# Patient Record
Sex: FEMALE | Race: WHITE | NOT HISPANIC OR LATINO | Employment: FULL TIME | URBAN - METROPOLITAN AREA
[De-identification: names, ages, dates, MRNs, and addresses within clinical notes are randomized per-mention and may not be internally consistent; named-entity substitution may affect disease eponyms.]

---

## 2017-02-14 ENCOUNTER — APPOINTMENT (OUTPATIENT)
Dept: GENERAL RADIOLOGY | Facility: HOSPITAL | Age: 27
End: 2017-02-14

## 2017-02-14 ENCOUNTER — HOSPITAL ENCOUNTER (EMERGENCY)
Facility: HOSPITAL | Age: 27
Discharge: HOME | End: 2017-02-14
Attending: EMERGENCY MEDICINE | Admitting: EMERGENCY MEDICINE

## 2017-02-14 VITALS
WEIGHT: 230 LBS | DIASTOLIC BLOOD PRESSURE: 91 MMHG | SYSTOLIC BLOOD PRESSURE: 133 MMHG | BODY MASS INDEX: 36.1 KG/M2 | RESPIRATION RATE: 16 BRPM | OXYGEN SATURATION: 100 % | HEIGHT: 67 IN | TEMPERATURE: 98.1 F | HEART RATE: 64 BPM

## 2017-02-14 DIAGNOSIS — M79.672 FOOT PAIN, LEFT: Primary | ICD-10-CM

## 2017-02-14 PROCEDURE — 73630 X-RAY EXAM OF FOOT: CPT

## 2017-02-14 PROCEDURE — 99282 EMERGENCY DEPT VISIT SF MDM: CPT

## 2017-02-14 RX ORDER — NAPROXEN 500 MG/1
500 TABLET ORAL 2 TIMES DAILY PRN
Qty: 14 TABLET | Refills: 0 | Status: SHIPPED | OUTPATIENT
Start: 2017-02-14 | End: 2017-09-01

## 2017-02-14 NOTE — ED PROVIDER NOTES
Pt presents to the ED complaining of pain in the sole of the left foot. Pt's L foot XR shows nothing acute. On exam, the pt has tenderness of the distal left foot with chronic appearing deformities of the 2nd and 3rd toes. Instructed the pt to follow up with Dr. Mauro (ortho) if her symptoms continue. I agree with the plan to discharge the pt home with a prescription for anti-inflammatories and a work note.    I supervised care provided by the midlevel provider.    We have discussed this patient's history, physical exam, and treatment plan.   I have reviewed the note and personally saw and examined the patient and agree with the plan of care.    Documentation assistance provided by cat Welch for Dr. Almaguer.  Information recorded by the miriamibe was done at my direction and has been verified and validated by me.       Dariela Welch  02/14/17 8121       Ricardo Amlaguer MD  02/14/17 5126

## 2017-02-14 NOTE — ED PROVIDER NOTES
"EMERGENCY DEPARTMENT ENCOUNTER    CHIEF COMPLAINT  Chief Complaint: foot pain  History given by: patient  History limited by: nothing  Room Number: HALG/G  PMD: Luzma Noe MD      HPI:   Pt is a 26 y.o. female who presents with pain to the sole of her L foot under her first  toe onset 3 days ago with no known injury or trauma. Pt states hx of foot problems and currently wears insoles. Pt denies any numbness of tingling to her L foot or change in sensation. Pt states she is currently taking amoxicillin for an ear infection but she has not taken any pain medication since the onset of her sx. Pt states that she stands for long hours during the day at her job    Duration: 3 days  Timing:constant  Location: sole of L foot under first toe  Radiation: none  Quality: \"pain\"  Intensity/Severity: moderate  Progression: unchanged  Associated Symptoms: none  Aggravating Factors: weight bearing  Alleviating Factors: rest  Previous Episodes: none  Treatment before arrival: none    PAST MEDICAL HISTORY  Active Ambulatory Problems     Diagnosis Date Noted   • No Active Ambulatory Problems     Resolved Ambulatory Problems     Diagnosis Date Noted   • No Resolved Ambulatory Problems     Past Medical History   Diagnosis Date   • GERD (gastroesophageal reflux disease)    • Human metapneumovirus pneumonia    • Impetigo    • Muscle spasm        PAST SURGICAL HISTORY  Past Surgical History   Procedure Laterality Date   • Pharyngeal flap     • Adenoidectomy     • Tonsillectomy     • Ear tubes         FAMILY HISTORY  History reviewed. No pertinent family history.    SOCIAL HISTORY  Social History     Social History   • Marital status: Single     Spouse name: N/A   • Number of children: N/A   • Years of education: N/A     Occupational History   • Not on file.     Social History Main Topics   • Smoking status: Never Smoker   • Smokeless tobacco: Not on file   • Alcohol use No   • Drug use: No   • Sexual activity: Defer     Other Topics " Concern   • Not on file     Social History Narrative   • No narrative on file         ALLERGIES  Ciprofloxacin    REVIEW OF SYSTEMS  Review of Systems   Constitutional: Negative for fever.   Musculoskeletal:        R foot pain   Skin: Negative for color change and wound.   Neurological: Negative for weakness and numbness.       PHYSICAL EXAM  ED Triage Vitals   Temp Heart Rate Resp BP SpO2   02/14/17 1449 02/14/17 1449 02/14/17 1449 02/14/17 1532 02/14/17 1449   98.5 °F (36.9 °C) 67 18 133/93 100 %      Temp src Heart Rate Source Patient Position BP Location FiO2 (%)   02/14/17 1449 02/14/17 1449 -- -- --   Tympanic Monitor          Physical Exam   Constitutional: She is well-developed, well-nourished, and in no distress. No distress.   HENT:   Head: Normocephalic.   Eyes: Pupils are equal, round, and reactive to light.   Neck: Normal range of motion.   Cardiovascular:   Pulses:       Dorsalis pedis pulses are 2+ on the left side.        Posterior tibial pulses are 2+ on the left side.   Musculoskeletal:        Left foot: There is normal range of motion, no tenderness, no bony tenderness, no swelling and normal capillary refill.   Skin: Skin is warm and dry. No rash noted.   Psychiatric: Mood, memory, affect and judgment normal.   Nursing note and vitals reviewed.      RADIOLOGY  XR Foot 3+ View Left   Final Result   Normal 3 view exam     This report was finalized on 2/14/2017 4:57 PM by Dr. Demond Huertas MD.   I ordered the above noted radiological studies and reviewed the images on the PACS system.        PROGRESS AND CONSULTS    5:08 PM  Discussed with Pt nothing acute seen on XR. Advised Pt to limit weight bearing for a few days while sx resolve and take pain medication as need. Advised Pt to be careful taking NSAID's as they can irritate the stomach and she is already taking Prilosec. Pt verbalized understanding and agrees with plan to discharge with walking boot and crutches.     5:15 PM  Reviewed pt's  "history and workup with Dr. Rosina MD.  After a bedside evaluation; Dr. Rosina MD agrees with the plan of care    COURSE & MEDICAL DECISION MAKING  Pertinent Labs and Imaging studies that were ordered and reviewed are noted above.  Results were reviewed/discussed with the patient and they were also made aware of online assess.   Pt also made aware that some labs, such as cultures, will not be resulted during ER visit and follow up with PMD is necessary.     MEDICATIONS GIVEN IN ER  Medications - No data to display    Visit Vitals   • /91 (BP Location: Right arm, Patient Position: Lying)   • Pulse 64   • Temp 98.1 °F (36.7 °C) (Tympanic)   • Resp 16   • Ht 67\" (170.2 cm)   • Wt 230 lb (104 kg)   • SpO2 100%   • BMI 36.02 kg/m2     Discussed all results and noted any abnormalities with patient.  Discussed absoute need to recheck abnormalities with PCP.     Reviewed implications of results, diagnosis, meds, responsibility to follow up, warning signs and symptoms of possible worsening, potential complications and reasons to return to ER with patient    Discussed plan for discharge, as there is no emergent indication for admission.  Pt is agreeable and understands need for follow up and repeat testing.  Pt is aware that discharge does not mean that nothing is wrong but it indicates no emergency is present and they must continue care with PCP.  Pt is discharged with instructions to follow up with primary care doctor to have their blood pressure rechecked.       DIAGNOSIS  Final diagnoses:   Foot pain, left       FOLLOW UP   Luzma Noe MD  [address] [facility] [address]  [place] [address]  [phone]            RX     Medication List      Stop          azithromycin 500 MG tablet   Commonly known as:  ZITHROMAX             I personally reviewed the past medical history, past surgical history, social history, family history, current medications and allergies as they appear in this chart.  The scribe's note " accurately reflects the work and decisions made by me.         I personally scribed for Petra Bell on 2/14/2017 at 6:07 PM.  Electronically signed by Aydee Rosas on 2/14/2017 at time 6:07 PM     Aydee Rosas  02/14/17 3394       Petra Bell, APRN  02/14/17 4380

## 2017-02-17 ENCOUNTER — OFFICE (AMBULATORY)
Dept: URBAN - METROPOLITAN AREA CLINIC 75 | Facility: CLINIC | Age: 27
End: 2017-02-17

## 2017-02-17 VITALS
RESPIRATION RATE: 18 BRPM | SYSTOLIC BLOOD PRESSURE: 118 MMHG | HEIGHT: 67 IN | DIASTOLIC BLOOD PRESSURE: 64 MMHG | HEART RATE: 64 BPM | WEIGHT: 229 LBS

## 2017-02-17 DIAGNOSIS — R11.2 NAUSEA WITH VOMITING, UNSPECIFIED: ICD-10-CM

## 2017-02-17 DIAGNOSIS — K21.9 GASTRO-ESOPHAGEAL REFLUX DISEASE WITHOUT ESOPHAGITIS: ICD-10-CM

## 2017-02-17 PROCEDURE — 99213 OFFICE O/P EST LOW 20 MIN: CPT

## 2017-03-09 ENCOUNTER — OFFICE (AMBULATORY)
Dept: URBAN - METROPOLITAN AREA CLINIC 75 | Facility: CLINIC | Age: 27
End: 2017-03-09

## 2017-04-17 ENCOUNTER — HOSPITAL ENCOUNTER (EMERGENCY)
Facility: HOSPITAL | Age: 27
Discharge: HOME | End: 2017-04-17
Attending: EMERGENCY MEDICINE | Admitting: EMERGENCY MEDICINE

## 2017-04-17 ENCOUNTER — APPOINTMENT (OUTPATIENT)
Dept: GENERAL RADIOLOGY | Facility: HOSPITAL | Age: 27
End: 2017-04-17

## 2017-04-17 VITALS
WEIGHT: 230 LBS | HEART RATE: 55 BPM | TEMPERATURE: 99.1 F | OXYGEN SATURATION: 99 % | RESPIRATION RATE: 16 BRPM | SYSTOLIC BLOOD PRESSURE: 123 MMHG | BODY MASS INDEX: 36.1 KG/M2 | HEIGHT: 67 IN | DIASTOLIC BLOOD PRESSURE: 77 MMHG

## 2017-04-17 DIAGNOSIS — S99.921A RIGHT FOOT INJURY, INITIAL ENCOUNTER: Primary | ICD-10-CM

## 2017-04-17 PROCEDURE — 99283 EMERGENCY DEPT VISIT LOW MDM: CPT

## 2017-04-17 PROCEDURE — 73650 X-RAY EXAM OF HEEL: CPT

## 2017-04-17 NOTE — ED PROVIDER NOTES
26 y.o. female presents c/o R heel pain. Pt believes she kicked her TV in her sleep last night & states it feels like something is stuck in her foot. Pt reports her pain is worse w/ ambulation.    On exam:    Pt is AOx3 & in NAD. Pt has a very small puncture wound medially on her R heel. Pt has no erythema, drainage, swelling, or evidence of foreign body.    Results/Plan:    Pt's XR R foot was negative & I agree w/ plan to discharge pt.    I supervised care provided by the midlevel provider.  We have discussed this patient's history, physical exam, and treatment plan.  I have reviewed the note and personally saw and examined the patient and agree with the plan of care.    --  Documentation assistance provided by cat Agosto for Dr. Knott.  Information recorded by the scribe was done at my direction and has been verified and validated by me.     Lana Agosto  04/17/17 1204       Lana Agosto  04/17/17 1225       Richar Knott MD  04/17/17 7951

## 2017-04-17 NOTE — ED PROVIDER NOTES
EMERGENCY DEPARTMENT ENCOUNTER    CHIEF COMPLAINT  Chief Complaint: right heel pain   History given by: patient   History limited by: nothing   Room Number: 07/07  PMD: No Known Provider    HPI:  Pt is a 26 y.o. female who presents with right heel pain onset last night while pt was sleeping. Pt states it feels like something is stuck in her foot and thinks that she may have kicked her TV in her sleep. She removed a small amount of skin in search of foreign body but did not find one. Pt states it is painful to ambulate. Pt does not remember when her last tetanus shot was.     Duration: onset last night   Timing: constant   Location: right heel  Radiation: does not radiate   Quality: new   Intensity/Severity: moderate   Progression: unchanged   Associated Symptoms: pain  Aggravating Factors: ambulating   Alleviating Factors: none specified   Previous Episodes:none specified   Treatment before arrival: none specified      MEDICAL RECORD REVIEW  Reviewed in [employer].    PAST MEDICAL HISTORY  Active Ambulatory Problems     Diagnosis Date Noted   • No Active Ambulatory Problems     Resolved Ambulatory Problems     Diagnosis Date Noted   • No Resolved Ambulatory Problems     Past Medical History:   Diagnosis Date   • GERD (gastroesophageal reflux disease)    • Human metapneumovirus pneumonia    • Impetigo    • Muscle spasm        PAST SURGICAL HISTORY  Past Surgical History:   Procedure Laterality Date   • ADENOIDECTOMY     • EAR TUBES     • PHARYNGEAL FLAP     • TONSILLECTOMY         FAMILY HISTORY  History reviewed. No pertinent family history.    SOCIAL HISTORY  Social History     Social History   • Marital status: Single     Spouse name: N/A   • Number of children: N/A   • Years of education: N/A     Occupational History   • Not on file.     Social History Main Topics   • Smoking status: Never Smoker   • Smokeless tobacco: Not on file   • Alcohol use No   • Drug use: No   • Sexual activity: Defer     Other Topics Concern    • Not on file     Social History Narrative       ALLERGIES  Ciprofloxacin    REVIEW OF SYSTEMS  Review of Systems   Constitutional: Negative for chills and fever.   HENT: Negative for ear discharge, facial swelling, nosebleeds, rhinorrhea, sore throat, trouble swallowing and voice change.    Eyes: Negative for pain and visual disturbance.   Musculoskeletal: Negative for arthralgias, back pain and myalgias.        Right foot pain   Skin: Negative for rash and wound.   All other systems reviewed and are negative.      PHYSICAL EXAM  ED Triage Vitals   Temp Heart Rate Resp BP SpO2   04/17/17 1024 04/17/17 1024 04/17/17 1024 04/17/17 1037 04/17/17 1024   99.1 °F (37.3 °C) 81 18 127/92 98 %      Temp src Heart Rate Source Patient Position BP Location FiO2 (%)   04/17/17 1024 04/17/17 1024 -- -- --   Tympanic Monitor          Physical Exam   Constitutional: She is oriented to person, place, and time and well-developed, well-nourished, and in no distress. No distress.   HENT:   Head: Normocephalic and atraumatic.   Eyes: EOM are normal.   Neck: Normal range of motion.   Pulmonary/Chest: Effort normal. No respiratory distress.   Musculoskeletal:        Right ankle: Achilles tendon normal.        Right foot: There is tenderness (medial aspect).   There is a 0.5 cm area of skin on right foot that has been removed.    Neurological: She is alert and oriented to person, place, and time.   Skin: Skin is warm and dry. No pallor.   Psychiatric: Mood, memory, affect and judgment normal.   Nursing note and vitals reviewed.      RADIOLOGY  XR Calcaneus 2+ View Right   Final Result      XR calcaneus shows no osseous, articular nor soft tissue abnormality.    I ordered the above noted radiological studies and reviewed the images on the PACS system.      COURSE & MEDICAL DECISION MAKING  Pertinent imaging studies that were ordered and reviewed are noted above.  Results were reviewed/discussed with the patient and they were also made  "aware of online assess.       PROGRESS AND CONSULTS    Progress Notes:    1135: Rechecked pt. Pt is resting comfortably. Discussed unremarkable workup. I recommended that pt treat pain with epsom salt soaks. Pt will receive work note.     1140: Reviewed pt's history and workup with Dr. Knott.  After a bedside evaluation; Dr. Knott agrees with the plan of care.    1155: The patient's history, physical exam, and image findings were discussed with the physician, who also performed a face to face history and physical exam.  I discussed all results and noted any abnormalities with patient.  Discussed absoute need to recheck abnormalities with their family physician.  I answered any of the patient's questions.  Discussed plan for discharge, as there is no emergent indication for admission.  Pt is agreeable and understands need for follow up and repeat testing.  Pt is aware that discharge does not mean that nothing is wrong but it indicates no emergency is present and they must continue care with their family physician.  Pt is discharged with instructions to follow up with primary care doctor to have their blood pressure rechecked.     MEDICATIONS GIVEN IN ER  Medications   Tdap (BOOSTRIX) injection 0.5 mL (0.5 mL Intramuscular Not Given 4/17/17 1105)       /80 (BP Location: Right arm)  Pulse (!) 47  Temp 99.1 °F (37.3 °C) (Tympanic)   Resp 18  Ht 67\" (170.2 cm)  Wt 230 lb (104 kg)  SpO2 99%  BMI 36.02 kg/m2      DIAGNOSIS  Final diagnoses:   Right foot injury, initial encounter       FO[facility] UP   [facility]  [facility] [address] [phone]        RX     Medication List      Stop          AMOXICILLIN PO       azithromycin 500 MG tablet   Commonly known as:  ZITHROMAX       omeprazole 40 MG capsule   Commonly known as:  priLOSEC           I personally scribed for Ewa Elias PA-C on 4/17/2017 at 11:53 AM.  Electronically signed by Agnes Alicea on 4/17/2017 " at time 11:53 AM           Agnes Alicea  04/17/17 1153       Ewa Elias PA-C  04/17/17 1153

## 2017-04-17 NOTE — ED NOTES
PT STATES SHE THINKS SHE HIT HER FOOT ON SOMETHING OR STEPPED ON SOMETHING DURING THE NIGHT. PT C/O R HEEL PAIN AND IS CONCERNED THAT SOMETHING IS STUCK IN HER FOOT. Pt having difficulty walking due to pain.     Monserrat Subramanian RN  04/17/17 1037       Monserrat Subramanian RN  04/17/17 104

## 2017-04-17 NOTE — DISCHARGE INSTRUCTIONS
PLEASE READ AND REVIEW ALL DISCHARGE INSTRUCTIONS.     Please follow up with your primary care physician for any further evaluation/treatment and further management of your blood pressure.     Recheck in emergency department for any worsening and/or concerning symptoms.     Take all prescribed medicine as written and continue chronic medication.    Epsom salt soaks daily.  Apply Neosporin and Band-Aid to site.    Review of pain with walking on the site, obtain a doughnut foot pads to place on surrounding area.

## 2017-04-30 ENCOUNTER — HOSPITAL ENCOUNTER (EMERGENCY)
Facility: HOSPITAL | Age: 27
Discharge: HOME | End: 2017-04-30
Attending: EMERGENCY MEDICINE | Admitting: EMERGENCY MEDICINE

## 2017-04-30 VITALS
OXYGEN SATURATION: 97 % | HEART RATE: 67 BPM | RESPIRATION RATE: 16 BRPM | SYSTOLIC BLOOD PRESSURE: 131 MMHG | HEIGHT: 67 IN | TEMPERATURE: 97.8 F | DIASTOLIC BLOOD PRESSURE: 96 MMHG | WEIGHT: 230 LBS | BODY MASS INDEX: 36.1 KG/M2

## 2017-04-30 DIAGNOSIS — R10.9 LEFT FLANK PAIN: Primary | ICD-10-CM

## 2017-04-30 LAB
ALBUMIN SERPL-MCNC: 3.9 G/DL (ref 3.5–5.2)
ALBUMIN/GLOB SERPL: 1 G/DL
ALP SERPL-CCNC: 72 U/L (ref 39–117)
ALT SERPL W P-5'-P-CCNC: 12 U/L (ref 1–33)
ANION GAP SERPL CALCULATED.3IONS-SCNC: 15 MMOL/L
AST SERPL-CCNC: 12 U/L (ref 1–32)
BASOPHILS # BLD AUTO: 0.03 10*3/MM3 (ref 0–0.2)
BASOPHILS NFR BLD AUTO: 0.3 % (ref 0–1.5)
BILIRUB SERPL-MCNC: 0.2 MG/DL (ref 0.1–1.2)
BILIRUB UR QL STRIP: NEGATIVE
BUN BLD-MCNC: 11 MG/DL (ref 6–20)
BUN/CREAT SERPL: 15.3 (ref 7–25)
CALCIUM SPEC-SCNC: 9.2 MG/DL (ref 8.6–10.5)
CHLORIDE SERPL-SCNC: 106 MMOL/L (ref 98–107)
CLARITY UR: ABNORMAL
CO2 SERPL-SCNC: 20 MMOL/L (ref 22–29)
COLOR UR: YELLOW
CREAT BLD-MCNC: 0.72 MG/DL (ref 0.57–1)
DEPRECATED RDW RBC AUTO: 42.5 FL (ref 37–54)
EOSINOPHIL # BLD AUTO: 0.25 10*3/MM3 (ref 0–0.7)
EOSINOPHIL NFR BLD AUTO: 2.7 % (ref 0.3–6.2)
ERYTHROCYTE [DISTWIDTH] IN BLOOD BY AUTOMATED COUNT: 14.5 % (ref 11.7–13)
GFR SERPL CREATININE-BSD FRML MDRD: 99 ML/MIN/1.73
GLOBULIN UR ELPH-MCNC: 3.8 GM/DL
GLUCOSE BLD-MCNC: 105 MG/DL (ref 65–99)
GLUCOSE UR STRIP-MCNC: NEGATIVE MG/DL
HCG SERPL QL: NEGATIVE
HCT VFR BLD AUTO: 40.1 % (ref 35.6–45.5)
HGB BLD-MCNC: 13.1 G/DL (ref 11.9–15.5)
HGB UR QL STRIP.AUTO: NEGATIVE
HOLD SPECIMEN: NORMAL
HOLD SPECIMEN: NORMAL
IMM GRANULOCYTES # BLD: 0 10*3/MM3 (ref 0–0.03)
IMM GRANULOCYTES NFR BLD: 0 % (ref 0–0.5)
KETONES UR QL STRIP: NEGATIVE
LEUKOCYTE ESTERASE UR QL STRIP.AUTO: NEGATIVE
LIPASE SERPL-CCNC: 30 U/L (ref 13–60)
LYMPHOCYTES # BLD AUTO: 2.44 10*3/MM3 (ref 0.9–4.8)
LYMPHOCYTES NFR BLD AUTO: 26.2 % (ref 19.6–45.3)
MCH RBC QN AUTO: 26.4 PG (ref 26.9–32)
MCHC RBC AUTO-ENTMCNC: 32.7 G/DL (ref 32.4–36.3)
MCV RBC AUTO: 80.8 FL (ref 80.5–98.2)
MONOCYTES # BLD AUTO: 0.88 10*3/MM3 (ref 0.2–1.2)
MONOCYTES NFR BLD AUTO: 9.4 % (ref 5–12)
NEUTROPHILS # BLD AUTO: 5.73 10*3/MM3 (ref 1.9–8.1)
NEUTROPHILS NFR BLD AUTO: 61.4 % (ref 42.7–76)
NITRITE UR QL STRIP: NEGATIVE
PH UR STRIP.AUTO: 6 [PH] (ref 5–8)
PLATELET # BLD AUTO: 215 10*3/MM3 (ref 140–500)
PMV BLD AUTO: 9.4 FL (ref 6–12)
POTASSIUM BLD-SCNC: 4 MMOL/L (ref 3.5–5.2)
PROT SERPL-MCNC: 7.7 G/DL (ref 6–8.5)
PROT UR QL STRIP: NEGATIVE
RBC # BLD AUTO: 4.96 10*6/MM3 (ref 3.9–5.2)
SODIUM BLD-SCNC: 141 MMOL/L (ref 136–145)
SP GR UR STRIP: 1.02 (ref 1–1.03)
UROBILINOGEN UR QL STRIP: ABNORMAL
WBC NRBC COR # BLD: 9.33 10*3/MM3 (ref 4.5–10.7)
WHOLE BLOOD HOLD SPECIMEN: NORMAL
WHOLE BLOOD HOLD SPECIMEN: NORMAL

## 2017-04-30 PROCEDURE — 25010000002 KETOROLAC TROMETHAMINE PER 15 MG: Performed by: EMERGENCY MEDICINE

## 2017-04-30 PROCEDURE — 81003 URINALYSIS AUTO W/O SCOPE: CPT | Performed by: EMERGENCY MEDICINE

## 2017-04-30 PROCEDURE — 83690 ASSAY OF LIPASE: CPT | Performed by: EMERGENCY MEDICINE

## 2017-04-30 PROCEDURE — 84703 CHORIONIC GONADOTROPIN ASSAY: CPT | Performed by: EMERGENCY MEDICINE

## 2017-04-30 PROCEDURE — 85025 COMPLETE CBC W/AUTO DIFF WBC: CPT | Performed by: EMERGENCY MEDICINE

## 2017-04-30 PROCEDURE — 80053 COMPREHEN METABOLIC PANEL: CPT | Performed by: EMERGENCY MEDICINE

## 2017-04-30 PROCEDURE — 99283 EMERGENCY DEPT VISIT LOW MDM: CPT

## 2017-04-30 PROCEDURE — 96374 THER/PROPH/DIAG INJ IV PUSH: CPT

## 2017-04-30 RX ORDER — KETOROLAC TROMETHAMINE 30 MG/ML
30 INJECTION, SOLUTION INTRAMUSCULAR; INTRAVENOUS ONCE
Status: COMPLETED | OUTPATIENT
Start: 2017-04-30 | End: 2017-04-30

## 2017-04-30 RX ORDER — SODIUM CHLORIDE 0.9 % (FLUSH) 0.9 %
10 SYRINGE (ML) INJECTION AS NEEDED
Status: DISCONTINUED | OUTPATIENT
Start: 2017-04-30 | End: 2017-04-30 | Stop reason: HOSPADM

## 2017-04-30 RX ORDER — ONDANSETRON 4 MG/1
4 TABLET, FILM COATED ORAL EVERY 6 HOURS PRN
Qty: 12 TABLET | Refills: 0 | Status: SHIPPED | OUTPATIENT
Start: 2017-04-30 | End: 2017-10-25

## 2017-04-30 RX ADMIN — KETOROLAC TROMETHAMINE 30 MG: 30 INJECTION, SOLUTION INTRAMUSCULAR at 09:41

## 2017-06-01 LAB
BACTERIA UR QL AUTO: ABNORMAL /HPF
BILIRUB UR QL STRIP: NEGATIVE
CLARITY UR: ABNORMAL
COLOR UR: YELLOW
GLUCOSE UR STRIP-MCNC: NEGATIVE MG/DL
HGB UR QL STRIP.AUTO: NEGATIVE
HYALINE CASTS UR QL AUTO: ABNORMAL /LPF
KETONES UR QL STRIP: NEGATIVE
LEUKOCYTE ESTERASE UR QL STRIP.AUTO: ABNORMAL
NITRITE UR QL STRIP: NEGATIVE
PH UR STRIP.AUTO: 6 [PH] (ref 5–8)
PROT UR QL STRIP: NEGATIVE
RBC # UR: ABNORMAL /HPF
REF LAB TEST METHOD: ABNORMAL
SP GR UR STRIP: 1.02 (ref 1–1.03)
SQUAMOUS #/AREA URNS HPF: ABNORMAL /HPF
UROBILINOGEN UR QL STRIP: ABNORMAL
WBC UR QL AUTO: ABNORMAL /HPF

## 2017-06-01 PROCEDURE — 87086 URINE CULTURE/COLONY COUNT: CPT | Performed by: EMERGENCY MEDICINE

## 2017-06-01 PROCEDURE — 99283 EMERGENCY DEPT VISIT LOW MDM: CPT

## 2017-06-01 PROCEDURE — 81001 URINALYSIS AUTO W/SCOPE: CPT | Performed by: EMERGENCY MEDICINE

## 2017-06-02 ENCOUNTER — APPOINTMENT (OUTPATIENT)
Dept: CT IMAGING | Facility: HOSPITAL | Age: 27
End: 2017-06-02

## 2017-06-02 ENCOUNTER — HOSPITAL ENCOUNTER (EMERGENCY)
Facility: HOSPITAL | Age: 27
Discharge: HOME | End: 2017-06-02
Attending: EMERGENCY MEDICINE | Admitting: EMERGENCY MEDICINE

## 2017-06-02 VITALS
SYSTOLIC BLOOD PRESSURE: 147 MMHG | OXYGEN SATURATION: 99 % | WEIGHT: 220 LBS | RESPIRATION RATE: 18 BRPM | TEMPERATURE: 99.3 F | BODY MASS INDEX: 34.53 KG/M2 | HEART RATE: 57 BPM | DIASTOLIC BLOOD PRESSURE: 110 MMHG | HEIGHT: 67 IN

## 2017-06-02 DIAGNOSIS — R10.9 LEFT FLANK PAIN: Primary | ICD-10-CM

## 2017-06-02 LAB
ALBUMIN SERPL-MCNC: 3.6 G/DL (ref 3.5–5.2)
ALBUMIN/GLOB SERPL: 1 G/DL
ALP SERPL-CCNC: 69 U/L (ref 39–117)
ALT SERPL W P-5'-P-CCNC: 14 U/L (ref 1–33)
ANION GAP SERPL CALCULATED.3IONS-SCNC: 9.8 MMOL/L
AST SERPL-CCNC: 13 U/L (ref 1–32)
BASOPHILS # BLD AUTO: 0.02 10*3/MM3 (ref 0–0.2)
BASOPHILS NFR BLD AUTO: 0.2 % (ref 0–1.5)
BILIRUB SERPL-MCNC: <0.2 MG/DL (ref 0.1–1.2)
BILIRUB UR QL STRIP: NEGATIVE
BUN BLD-MCNC: 10 MG/DL (ref 6–20)
BUN/CREAT SERPL: 11.9 (ref 7–25)
CALCIUM SPEC-SCNC: 8.8 MG/DL (ref 8.6–10.5)
CHLORIDE SERPL-SCNC: 102 MMOL/L (ref 98–107)
CLARITY UR: CLEAR
CO2 SERPL-SCNC: 25.2 MMOL/L (ref 22–29)
COLOR UR: YELLOW
CREAT BLD-MCNC: 0.84 MG/DL (ref 0.57–1)
DEPRECATED RDW RBC AUTO: 43.8 FL (ref 37–54)
EOSINOPHIL # BLD AUTO: 0.33 10*3/MM3 (ref 0–0.7)
EOSINOPHIL NFR BLD AUTO: 3.7 % (ref 0.3–6.2)
ERYTHROCYTE [DISTWIDTH] IN BLOOD BY AUTOMATED COUNT: 14.3 % (ref 11.7–13)
GFR SERPL CREATININE-BSD FRML MDRD: 83 ML/MIN/1.73
GLOBULIN UR ELPH-MCNC: 3.5 GM/DL
GLUCOSE BLD-MCNC: 98 MG/DL (ref 65–99)
GLUCOSE UR STRIP-MCNC: NEGATIVE MG/DL
HCG SERPL QL: NEGATIVE
HCT VFR BLD AUTO: 40.6 % (ref 35.6–45.5)
HGB BLD-MCNC: 13 G/DL (ref 11.9–15.5)
HGB UR QL STRIP.AUTO: NEGATIVE
IMM GRANULOCYTES # BLD: 0 10*3/MM3 (ref 0–0.03)
IMM GRANULOCYTES NFR BLD: 0 % (ref 0–0.5)
KETONES UR QL STRIP: NEGATIVE
LEUKOCYTE ESTERASE UR QL STRIP.AUTO: NEGATIVE
LYMPHOCYTES # BLD AUTO: 3.66 10*3/MM3 (ref 0.9–4.8)
LYMPHOCYTES NFR BLD AUTO: 40.6 % (ref 19.6–45.3)
MCH RBC QN AUTO: 26.9 PG (ref 26.9–32)
MCHC RBC AUTO-ENTMCNC: 32 G/DL (ref 32.4–36.3)
MCV RBC AUTO: 83.9 FL (ref 80.5–98.2)
MONOCYTES # BLD AUTO: 0.71 10*3/MM3 (ref 0.2–1.2)
MONOCYTES NFR BLD AUTO: 7.9 % (ref 5–12)
NEUTROPHILS # BLD AUTO: 4.3 10*3/MM3 (ref 1.9–8.1)
NEUTROPHILS NFR BLD AUTO: 47.6 % (ref 42.7–76)
NITRITE UR QL STRIP: NEGATIVE
PH UR STRIP.AUTO: 6 [PH] (ref 5–8)
PLATELET # BLD AUTO: 205 10*3/MM3 (ref 140–500)
PMV BLD AUTO: 10.1 FL (ref 6–12)
POTASSIUM BLD-SCNC: 3.7 MMOL/L (ref 3.5–5.2)
PROT SERPL-MCNC: 7.1 G/DL (ref 6–8.5)
PROT UR QL STRIP: NEGATIVE
RBC # BLD AUTO: 4.84 10*6/MM3 (ref 3.9–5.2)
SODIUM BLD-SCNC: 137 MMOL/L (ref 136–145)
SP GR UR STRIP: 1.02 (ref 1–1.03)
UROBILINOGEN UR QL STRIP: NORMAL
WBC NRBC COR # BLD: 9.02 10*3/MM3 (ref 4.5–10.7)

## 2017-06-02 PROCEDURE — 80053 COMPREHEN METABOLIC PANEL: CPT | Performed by: PHYSICIAN ASSISTANT

## 2017-06-02 PROCEDURE — 74176 CT ABD & PELVIS W/O CONTRAST: CPT

## 2017-06-02 PROCEDURE — 96361 HYDRATE IV INFUSION ADD-ON: CPT

## 2017-06-02 PROCEDURE — 36415 COLL VENOUS BLD VENIPUNCTURE: CPT

## 2017-06-02 PROCEDURE — 25010000002 KETOROLAC TROMETHAMINE PER 15 MG: Performed by: PHYSICIAN ASSISTANT

## 2017-06-02 PROCEDURE — 84703 CHORIONIC GONADOTROPIN ASSAY: CPT | Performed by: PHYSICIAN ASSISTANT

## 2017-06-02 PROCEDURE — 85025 COMPLETE CBC W/AUTO DIFF WBC: CPT | Performed by: PHYSICIAN ASSISTANT

## 2017-06-02 PROCEDURE — 81003 URINALYSIS AUTO W/O SCOPE: CPT | Performed by: PHYSICIAN ASSISTANT

## 2017-06-02 PROCEDURE — 96374 THER/PROPH/DIAG INJ IV PUSH: CPT

## 2017-06-02 RX ORDER — SODIUM CHLORIDE 0.9 % (FLUSH) 0.9 %
10 SYRINGE (ML) INJECTION AS NEEDED
Status: DISCONTINUED | OUTPATIENT
Start: 2017-06-02 | End: 2017-06-02 | Stop reason: HOSPADM

## 2017-06-02 RX ORDER — KETOROLAC TROMETHAMINE 30 MG/ML
30 INJECTION, SOLUTION INTRAMUSCULAR; INTRAVENOUS ONCE
Status: COMPLETED | OUTPATIENT
Start: 2017-06-02 | End: 2017-06-02

## 2017-06-02 RX ADMIN — SODIUM CHLORIDE 1000 ML: 9 INJECTION, SOLUTION INTRAVENOUS at 01:16

## 2017-06-02 RX ADMIN — KETOROLAC TROMETHAMINE 30 MG: 30 INJECTION, SOLUTION INTRAMUSCULAR at 01:16

## 2017-06-02 NOTE — ED PROVIDER NOTES
Pt presents with left flank pain onset this morning. She denies nausea, vomiting and diarrhea. She has h/o kidney stones. 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 On exam, she has tenderness of left paraspinal, lumbar and left SI joint. Abdomen is benign. Negative leg raise. CT Abd/Pel was negative. She will be discharged home.       I supervised care provided by the midlevel provider.    We have discussed this patient's history, physical exam, and treatment plan.   I have reviewed the note and personally saw and examined the patient and agree with the plan of care.  Documentation assistance provided by cat Long for Dr. Galloway.  Information recorded by the scribe was done at my direction and has been verified and validated by me.       Nasreen Long  06/02/17 6257       Scott Galloway MD  06/02/17 0494

## 2017-06-02 NOTE — ED PROVIDER NOTES
"EMERGENCY DEPARTMENT ENCOUNTER    CHIEF COMPLAINT  Chief Complaint: Flank Pain  History given by:Patient  History limited by:  Room Number: 02/02  PMD: No Known Provider  Urologist: Dr. Jaimes    HPI:  Pt is a 26 y.o. female who presents with L sided flank pain that onset yesterday AM. Pt describes the pain as similar to a muscle spasm. She reports a hx of renal abscess and a hx of nephrolithiasis. Pt denies any fever, chills.    Duration: 1 day  Timing:constant  Location:L flank  Radiation:none  Quality:\"pain\"  Intensity/Severity:moderate  Progression:unchanged  Associated Symptoms:back pain  Aggravating Factors:none  Alleviating Factors:none  Previous Episodes:hx of renal abscess, nephrolithaisis, hx of back pain  Treatment before arrival:none    MEDICAL RECORD REVIEW  Hx of back pain with muscle spasm    PAST MEDICAL HISTORY  Active Ambulatory Problems     Diagnosis Date Noted   • No Active Ambulatory Problems     Resolved Ambulatory Problems     Diagnosis Date Noted   • No Resolved Ambulatory Problems     Past Medical History:   Diagnosis Date   • GERD (gastroesophageal reflux disease)    • Human metapneumovirus pneumonia    • Impetigo    • Muscle spasm        PAST SURGICAL HISTORY  Past Surgical History:   Procedure Laterality Date   • ABSCESS DRAINAGE      kidney   • ADENOIDECTOMY     • EAR TUBES     • PHARYNGEAL FLAP     • TONSILLECTOMY         FAMILY HISTORY  History reviewed. No pertinent family history.    SOCIAL HISTORY  Social History     Social History   • Marital status: Single     Spouse name: N/A   • Number of children: N/A   • Years of education: N/A     Occupational History   • Not on file.     Social History Main Topics   • Smoking status: Never Smoker   • Smokeless tobacco: Not on file   • Alcohol use No   • Drug use: No   • Sexual activity: Defer     Other Topics Concern   • Not on file     Social History Narrative       ALLERGIES  Ciprofloxacin    REVIEW OF SYSTEMS  Review of Systems "   Constitutional: Negative for fever.   HENT: Negative for sore throat.    Eyes: Negative.    Respiratory: Negative for cough and shortness of breath.    Cardiovascular: Negative for chest pain.   Gastrointestinal: Negative for abdominal pain, diarrhea and vomiting.   Genitourinary: Positive for flank pain. Negative for dysuria.   Musculoskeletal: Positive for back pain. Negative for neck pain.   Skin: Negative for rash.   Allergic/Immunologic: Negative.    Neurological: Negative for weakness, numbness and headaches.   Hematological: Negative.    Psychiatric/Behavioral: Negative.    All other systems reviewed and are negative.      PHYSICAL EXAM  ED Triage Vitals   Temp Heart Rate Resp BP SpO2   06/01/17 2011 06/01/17 2010 06/01/17 2010 06/01/17 2032 06/01/17 2010   99.3 °F (37.4 °C) 85 18 155/99 98 %      Temp src Heart Rate Source Patient Position BP Location FiO2 (%)   06/01/17 2011 06/01/17 2010 -- -- --   Tympanic Monitor          Physical Exam   Constitutional: She is oriented to person, place, and time and well-developed, well-nourished, and in no distress. No distress.   HENT:   Head: Normocephalic and atraumatic.   Mouth/Throat: Oropharynx is clear and moist.   Eyes: EOM are normal.   Neck: Normal range of motion. Neck supple.   Cardiovascular: Normal rate and regular rhythm.    Pulmonary/Chest: Effort normal and breath sounds normal. No respiratory distress. She has no wheezes. She exhibits no tenderness.   Abdominal: Soft. She exhibits no distension. There is no tenderness. There is CVA tenderness (L). There is no rebound.   Musculoskeletal: Normal range of motion. She exhibits no edema.   Lymphadenopathy:     She has no cervical adenopathy.   Neurological: She is alert and oriented to person, place, and time.   Skin: Skin is warm and dry. No rash noted. No pallor.   Psychiatric: Mood, memory, affect and judgment normal.   Nursing note and vitals reviewed.      LAB RESULTS  Recent Results (from the past  24 hour(s))   Urinalysis With / Culture If Indicated    Collection Time: 06/01/17  8:46 PM   Result Value Ref Range    Color, UA Yellow Yellow, Straw    Appearance, UA Cloudy (A) Clear    pH, UA 6.0 5.0 - 8.0    Specific Gravity, UA 1.022 1.005 - 1.030    Glucose, UA Negative Negative    Ketones, UA Negative Negative    Bilirubin, UA Negative Negative    Blood, UA Negative Negative    Protein, UA Negative Negative    Leuk Esterase, UA Small (1+) (A) Negative    Nitrite, UA Negative Negative    Urobilinogen, UA 0.2 E.U./dL 0.2 - 1.0 E.U./dL   Urinalysis, Microscopic Only    Collection Time: 06/01/17  8:46 PM   Result Value Ref Range    RBC, UA 0-2 None Seen, 0-2 /HPF    WBC, UA 3-5 (A) None Seen, 0-2 /HPF    Bacteria, UA 1+ (A) None Seen /HPF    Squamous Epithelial Cells, UA 7-12 (A) None Seen, 0-2 /HPF    Hyaline Casts, UA 3-6 None Seen /LPF    Methodology Automated Microscopy    Comprehensive Metabolic Panel    Collection Time: 06/02/17  1:15 AM   Result Value Ref Range    Glucose 98 65 - 99 mg/dL    BUN 10 6 - 20 mg/dL    Creatinine 0.84 0.57 - 1.00 mg/dL    Sodium 137 136 - 145 mmol/L    Potassium 3.7 3.5 - 5.2 mmol/L    Chloride 102 98 - 107 mmol/L    CO2 25.2 22.0 - 29.0 mmol/L    Calcium 8.8 8.6 - 10.5 mg/dL    Total Protein 7.1 6.0 - 8.5 g/dL    Albumin 3.60 3.50 - 5.20 g/dL    ALT (SGPT) 14 1 - 33 U/L    AST (SGOT) 13 1 - 32 U/L    Alkaline Phosphatase 69 39 - 117 U/L    Total Bilirubin <0.2 0.1 - 1.2 mg/dL    eGFR Non African Amer 83 >60 mL/min/1.73    Globulin 3.5 gm/dL    A/G Ratio 1.0 g/dL    BUN/Creatinine Ratio 11.9 7.0 - 25.0    Anion Gap 9.8 mmol/L   CBC Auto Differential    Collection Time: 06/02/17  1:15 AM   Result Value Ref Range    WBC 9.02 4.50 - 10.70 10*3/mm3    RBC 4.84 3.90 - 5.20 10*6/mm3    Hemoglobin 13.0 11.9 - 15.5 g/dL    Hematocrit 40.6 35.6 - 45.5 %    MCV 83.9 80.5 - 98.2 fL    MCH 26.9 26.9 - 32.0 pg    MCHC 32.0 (L) 32.4 - 36.3 g/dL    RDW 14.3 (H) 11.7 - 13.0 %    RDW-SD 43.8  37.0 - 54.0 fl    MPV 10.1 6.0 - 12.0 fL    Platelets 205 140 - 500 10*3/mm3    Neutrophil % 47.6 42.7 - 76.0 %    Lymphocyte % 40.6 19.6 - 45.3 %    Monocyte % 7.9 5.0 - 12.0 %    Eosinophil % 3.7 0.3 - 6.2 %    Basophil % 0.2 0.0 - 1.5 %    Immature Grans % 0.0 0.0 - 0.5 %    Neutrophils, Absolute 4.30 1.90 - 8.10 10*3/mm3    Lymphocytes, Absolute 3.66 0.90 - 4.80 10*3/mm3    Monocytes, Absolute 0.71 0.20 - 1.20 10*3/mm3    Eosinophils, Absolute 0.33 0.00 - 0.70 10*3/mm3    Basophils, Absolute 0.02 0.00 - 0.20 10*3/mm3    Immature Grans, Absolute 0.00 0.00 - 0.03 10*3/mm3   hCG, Serum, Qualitative    Collection Time: 06/02/17  1:15 AM   Result Value Ref Range    HCG Qualitative Negative Indeterminate, Negative   Urinalysis With / Culture If Indicated    Collection Time: 06/02/17  1:30 AM   Result Value Ref Range    Color, UA Yellow Yellow, Straw    Appearance, UA Clear Clear    pH, UA 6.0 5.0 - 8.0    Specific Gravity, UA 1.019 1.005 - 1.030    Glucose, UA Negative Negative    Ketones, UA Negative Negative    Bilirubin, UA Negative Negative    Blood, UA Negative Negative    Protein, UA Negative Negative    Leuk Esterase, UA Negative Negative    Nitrite, UA Negative Negative    Urobilinogen, UA 0.2 E.U./dL 0.2 - 1.0 E.U./dL       I ordered the above labs and reviewed the results    RADIOLOGY  CT Abdomen Pelvis Without Contrast   Preliminary Result   1.  No definite acute abdominal pathology, no obstructive uropathy or   inflammatory bowel disease.    2.  Nonobstructing 0.3 cm stone of the right kidney. Bilateral renal   cysts.   3.  Overall no significant change.              I ordered the above noted radiological studies and reviewed the images on the PACS system.    PROCEDURES      COURSE & MEDICAL DECISION MAKING  Pertinent Labs and Imaging studies that were ordered and reviewed are noted above.  Results were reviewed/discussed with the patient and they were also made aware of online assess.  Pt also made aware  "that some labs, such as cultures, will not be resulted during ER visit and follow up with PMD is necessary.       PROGRESS AND CONSULTS    Progress Notes:    0046 Ordered Toradol and IVF. Ordered CT abd/pel.    0220 Reviewed pt's history and workup with Dr. Galloway.  After a bedside evaluation; Dr Galloway agrees with the plan of care    0305 The patient's history, physical exam, and lab findings were discussed with the physician, who also performed a face to face history and physical exam.  I discussed all results and noted any abnormalities with patient.  Discussed absoute need to recheck abnormalities with their family physician.  I answered any of the patient's questions.  Discussed plan for discharge, as there is no emergent indication for admission.  Pt is agreeable and understands need for follow up and repeat testing.  Pt is aware that discharge does not mean that nothing is wrong but it indicates no emergency is present and they must continue care with their family physician.  Pt is discharged with instructions to follow up with primary care doctor to have their blood pressure rechecked.       MEDICATIONS GIVEN IN ER  Medications   sodium chloride 0.9 % flush 10 mL (not administered)   sodium chloride 0.9 % bolus 1,000 mL (1,000 mL Intravenous New Bag 6/2/17 0116)   ketorolac (TORADOL) injection 30 mg (30 mg Intravenous Given 6/2/17 0116)       BP (!) 147/110  Pulse 57  Temp 99.3 °F (37.4 °C) (Tympanic)   Resp 18  Ht 67\" (170.2 cm)  Wt 220 lb (99.8 kg)  SpO2 99%  BMI 34.46 kg/m2      DIAGNOSIS  Final diagnoses:   Left flank pain       FOLLOW UP   [facility] REFERR[facility] SERVICE  [facility] [id number] [phone]        Dedrick Jaimes MD  [address] [facility] [id number] [phone]            RX     Medication List      Stop          AMOXICILLIN PO       azithromycin 500 MG tablet   Commonly known as:  ZITHROMAX       cyclobenzaprine 10 MG tablet   Commonly " known as:  FLEXERIL       naproxen 500 MG EC tablet   Commonly known as:  EC NAPROSYN       ondansetron 4 MG tablet   Commonly known as:  ZOFRAN           I personally scribed for Ewa Elias PA-C on 6/1/2017 at 3:06 AM.  Electronically signed by Sg Alejandro on 6/1/2017 at time 3:06 AM       Anderson Alejandro  06/02/17 0306       Ewa Elias PA-C  06/02/17 0341

## 2017-06-02 NOTE — DISCHARGE INSTRUCTIONS
PLEASE READ AND REVIEW ALL DISCHARGE INSTRUCTIONS.     Please follow up with your primary care physician for any further evaluation/treatment and further management of your blood pressure.     Recheck in emergency department for any worsening and/or concerning symptoms.     Take all prescribed medicine as written and continue chronic medication.    Follow up with Dr. Walt Jaimes regarding your flank pain if it continues.      Tylenol as needed for pain.

## 2017-06-03 LAB — BACTERIA SPEC AEROBE CULT: NORMAL

## 2017-08-01 ENCOUNTER — APPOINTMENT (OUTPATIENT)
Dept: GENERAL RADIOLOGY | Facility: HOSPITAL | Age: 27
End: 2017-08-01

## 2017-08-01 ENCOUNTER — HOSPITAL ENCOUNTER (EMERGENCY)
Facility: HOSPITAL | Age: 27
Discharge: HOME | End: 2017-08-01
Attending: EMERGENCY MEDICINE | Admitting: EMERGENCY MEDICINE

## 2017-08-01 ENCOUNTER — APPOINTMENT (OUTPATIENT)
Dept: CARDIOLOGY | Facility: HOSPITAL | Age: 27
End: 2017-08-01

## 2017-08-01 VITALS
DIASTOLIC BLOOD PRESSURE: 66 MMHG | BODY MASS INDEX: 34.53 KG/M2 | TEMPERATURE: 98.3 F | RESPIRATION RATE: 18 BRPM | SYSTOLIC BLOOD PRESSURE: 107 MMHG | OXYGEN SATURATION: 98 % | HEIGHT: 67 IN | HEART RATE: 54 BPM | WEIGHT: 220 LBS

## 2017-08-01 DIAGNOSIS — M79.10 MYALGIA: Primary | ICD-10-CM

## 2017-08-01 LAB
ANION GAP SERPL CALCULATED.3IONS-SCNC: 8.4 MMOL/L
BH CV LOWER VASCULAR LEFT COMMON FEMORAL AUGMENT: NORMAL
BH CV LOWER VASCULAR LEFT COMMON FEMORAL COMPETENT: NORMAL
BH CV LOWER VASCULAR LEFT COMMON FEMORAL COMPRESS: NORMAL
BH CV LOWER VASCULAR LEFT COMMON FEMORAL PHASIC: NORMAL
BH CV LOWER VASCULAR LEFT COMMON FEMORAL SPONT: NORMAL
BH CV LOWER VASCULAR LEFT DISTAL FEMORAL COMPRESS: NORMAL
BH CV LOWER VASCULAR LEFT GASTRONEMIUS COMPRESS: NORMAL
BH CV LOWER VASCULAR LEFT GREATER SAPH AK COMPRESS: NORMAL
BH CV LOWER VASCULAR LEFT GREATER SAPH BK COMPRESS: NORMAL
BH CV LOWER VASCULAR LEFT LESSER SAPH COMPRESS: NORMAL
BH CV LOWER VASCULAR LEFT MID FEMORAL AUGMENT: NORMAL
BH CV LOWER VASCULAR LEFT MID FEMORAL COMPETENT: NORMAL
BH CV LOWER VASCULAR LEFT MID FEMORAL COMPRESS: NORMAL
BH CV LOWER VASCULAR LEFT MID FEMORAL PHASIC: NORMAL
BH CV LOWER VASCULAR LEFT MID FEMORAL SPONT: NORMAL
BH CV LOWER VASCULAR LEFT PERONEAL COMPRESS: NORMAL
BH CV LOWER VASCULAR LEFT POPLITEAL AUGMENT: NORMAL
BH CV LOWER VASCULAR LEFT POPLITEAL COMPETENT: NORMAL
BH CV LOWER VASCULAR LEFT POPLITEAL COMPRESS: NORMAL
BH CV LOWER VASCULAR LEFT POPLITEAL PHASIC: NORMAL
BH CV LOWER VASCULAR LEFT POPLITEAL SPONT: NORMAL
BH CV LOWER VASCULAR LEFT POSTERIOR TIBIAL COMPRESS: NORMAL
BH CV LOWER VASCULAR LEFT PROXIMAL FEMORAL COMPRESS: NORMAL
BH CV LOWER VASCULAR LEFT SAPHENOFEMORAL JUNCTION AUGMENT: NORMAL
BH CV LOWER VASCULAR LEFT SAPHENOFEMORAL JUNCTION COMPETENT: NORMAL
BH CV LOWER VASCULAR LEFT SAPHENOFEMORAL JUNCTION COMPRESS: NORMAL
BH CV LOWER VASCULAR LEFT SAPHENOFEMORAL JUNCTION PHASIC: NORMAL
BH CV LOWER VASCULAR LEFT SAPHENOFEMORAL JUNCTION SPONT: NORMAL
BH CV LOWER VASCULAR RIGHT COMMON FEMORAL AUGMENT: NORMAL
BH CV LOWER VASCULAR RIGHT COMMON FEMORAL COMPETENT: NORMAL
BH CV LOWER VASCULAR RIGHT COMMON FEMORAL COMPRESS: NORMAL
BH CV LOWER VASCULAR RIGHT COMMON FEMORAL PHASIC: NORMAL
BH CV LOWER VASCULAR RIGHT COMMON FEMORAL SPONT: NORMAL
BUN BLD-MCNC: 10 MG/DL (ref 6–20)
BUN/CREAT SERPL: 15.6 (ref 7–25)
CALCIUM SPEC-SCNC: 9.7 MG/DL (ref 8.6–10.5)
CHLORIDE SERPL-SCNC: 105 MMOL/L (ref 98–107)
CO2 SERPL-SCNC: 26.6 MMOL/L (ref 22–29)
CREAT BLD-MCNC: 0.64 MG/DL (ref 0.57–1)
GFR SERPL CREATININE-BSD FRML MDRD: 112 ML/MIN/1.73
GLUCOSE BLD-MCNC: 74 MG/DL (ref 65–99)
POTASSIUM BLD-SCNC: 4.3 MMOL/L (ref 3.5–5.2)
SODIUM BLD-SCNC: 140 MMOL/L (ref 136–145)

## 2017-08-01 PROCEDURE — 93971 EXTREMITY STUDY: CPT

## 2017-08-01 PROCEDURE — 99283 EMERGENCY DEPT VISIT LOW MDM: CPT

## 2017-08-01 PROCEDURE — 73590 X-RAY EXAM OF LOWER LEG: CPT

## 2017-08-01 PROCEDURE — 36415 COLL VENOUS BLD VENIPUNCTURE: CPT

## 2017-08-01 PROCEDURE — 80048 BASIC METABOLIC PNL TOTAL CA: CPT | Performed by: NURSE PRACTITIONER

## 2017-08-01 NOTE — ED PROVIDER NOTES
Pt presents complaining of atraumatic L shin pain and bilateral  arm pain onset PTA. She denies any other symptoms at this time. Feels like muscle aches. .    .   On exam, BP- 107/66 HR- 54 Temp- 98.3 °F (36.8 °C) (Oral) O2 sat- 98%. Rechecked the patient who is in NAD and is resting comfortably. Pt's upper and lower extremities are intact. She has intact distal pulses.    Checked xray, doppler and lab work which was unremarkable.     Pt understands and agrees with the plan. All questions answered.      I supervised care provided by the midlevel provider.    We have discussed this patient's history, physical exam, and treatment plan.   I have reviewed the note and personally saw and examined the patient and agree with the plan of care.  Documentation assistance provided by cat Ramirez for Dr. Prasad.  Information recorded by the scribe was done at my direction and has been verified and validated by me.       Mima Ramirez  08/01/17 120       Chao Prasad MD  08/01/17 3310

## 2017-08-01 NOTE — PROGRESS NOTES
Vascular lab left lower extremity venous doppler complete prelim negative DVT - Elizabeth Conner, APRN aware

## 2017-08-01 NOTE — ED PROVIDER NOTES
EMERGENCY DEPARTMENT ENCOUNTER    CHIEF COMPLAINT  Chief Complaint: lower extremity pain  History given by: patient  History limited by: N/A  Room Number: 02/02  PMD: No Known Provider      HPI:  Pt is a 27 y.o. female who presents with lower extremity pain. 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 This morning, while she was resting in a chair, she developed sharp left lower leg pain that is exacerbated by LLE movement and bearing weight. She has also had mild bilateral shoulder pain since yesterday that is exacerbated by BUE movement. She denies recent direct injury or trauma, sensory loss, motor loss, neck pain, fevers, chills, chest pain, trouble breathing, palpitations, N/V/D, abd pain, recent decreased PO fluid intake, and recent travel. She took ibuprofen earlier today for pain with temporary sx relief. She also reports that she receives depo injections every 3 months. Past Medical History of GERD.     Duration: started this morning  Timing: intermittent  Location: left lower leg  Radiation: none  Quality: sharp  Intensity/Severity: moderate  Progression: unchanged  Associated Symptoms: mild bilateral shoulder pain  Aggravating Factors: LLE movement, bearing weight  Alleviating Factors: resting, taking ibuprofen  Previous Episodes: none  Treatment before arrival: Pt states that she took ibuprofen earlier today for pain with temporary sx relief.    PAST MEDICAL HISTORY  Active Ambulatory Problems     Diagnosis Date Noted   • No Active Ambulatory Problems     Resolved Ambulatory Problems     Diagnosis Date Noted   • No Resolved Ambulatory Problems     Past Medical History:   Diagnosis Date   • Ear infection    • GERD (gastroesophageal reflux disease)    • Human metapneumovirus pneumonia    • Impetigo    • Muscle spasm    • Sinus infection        PAST SURGICAL HISTORY  Past Surgical History:   Procedure Laterality Date   •  ABSCESS DRAINAGE      kidney   • ADENOIDECTOMY     • EAR TUBES     • PHARYNGEAL FLAP     • TONSILLECTOMY         FAMILY HISTORY  History reviewed. No pertinent family history.    SOCIAL HISTORY  Social History     Social History   • Marital status: Single     Spouse name: N/A   • Number of children: N/A   • Years of education: N/A     Occupational History   • Not on file.     Social History Main Topics   • Smoking status: Former Smoker   • Smokeless tobacco: Not on file   • Alcohol use Yes      Comment: occasionally   • Drug use: No   • Sexual activity: Defer     Other Topics Concern   • Not on file     Social History Narrative   • No narrative on file         ALLERGIES  Ciprofloxacin    REVIEW OF SYSTEMS  Review of Systems   Constitutional: Negative for chills and fever.   HENT: Negative for sore throat.    Respiratory: Negative for cough and shortness of breath.    Cardiovascular: Negative for chest pain and palpitations.   Gastrointestinal: Negative for abdominal pain, diarrhea, nausea and vomiting.   Genitourinary: Negative for dysuria.   Musculoskeletal: Negative for back pain and neck pain.        Left lower leg pain, mild bilateral shoulder pain   Neurological: Negative for weakness and numbness.   Psychiatric/Behavioral: The patient is not nervous/anxious.        PHYSICAL EXAM  ED Triage Vitals   Temp Heart Rate Resp BP SpO2   08/01/17 1018 08/01/17 1018 08/01/17 1018 08/01/17 1031 08/01/17 1018   97.9 °F (36.6 °C) 79 18 133/97 99 % WNL       Physical Exam   Constitutional: She is oriented to person, place, and time and well-developed, well-nourished, and in no distress.   HENT:   Head: Normocephalic.   Mouth/Throat: Mucous membranes are normal.   Eyes: No scleral icterus.   Neck: Normal range of motion.   Cardiovascular: Normal rate, regular rhythm and normal heart sounds.    Pulses:       Radial pulses are 2+ on the right side, and 2+ on the left side.        Dorsalis pedis pulses are 2+ on the right side,  and 2+ on the left side.        Posterior tibial pulses are 2+ on the right side, and 2+ on the left side.   Pulmonary/Chest: Effort normal and breath sounds normal. No respiratory distress.   Abdominal: Soft. There is no tenderness. There is no rebound and no guarding.   Musculoskeletal: Normal range of motion.   Mild left lower leg tenderness, no calf tenderness, no bilateral shoulder tenderness, FROM off all extremities, NV intact distally to all extremities   Neurological: She is alert and oriented to person, place, and time. She has normal motor skills and normal sensation.   Skin: Skin is warm and dry.   Psychiatric: Mood and affect normal.   Nursing note and vitals reviewed.      LAB RESULTS  Recent Results (from the past 24 hour(s))   Basic Metabolic Panel    Collection Time: 08/01/17 11:17 AM   Result Value Ref Range    Glucose 74 65 - 99 mg/dL    BUN 10 6 - 20 mg/dL    Creatinine 0.64 0.57 - 1.00 mg/dL    Sodium 140 136 - 145 mmol/L    Potassium 4.3 3.5 - 5.2 mmol/L    Chloride 105 98 - 107 mmol/L    CO2 26.6 22.0 - 29.0 mmol/L    Calcium 9.7 8.6 - 10.5 mg/dL    eGFR Non African Amer 112 >60 mL/min/1.73    BUN/Creatinine Ratio 15.6 7.0 - 25.0    Anion Gap 8.4 mmol/L   Duplex Venous Lower Extremity - Left    Collection Time: 08/01/17 12:40 PM   Result Value Ref Range    Right Common Femoral Spont Y     Right Common Femoral Phasic Y     Right Common Femoral Augment Y     Right Common Femoral Competent Y     Right Common Femoral Compress C     Left Common Femoral Spont Y     Left Common Femoral Phasic Y     Left Common Femoral Augment Y     Left Common Femoral Competent Y     Left Common Femoral Compress C     Left Saphenofemoral Junction Spont Y     Left Saphenofemoral Junction Phasic Y     Left Saphenofemoral Junction Augment Y     Left Saphenofemoral Junction Competent Y     Left Saphenofemoral Junction Compress C     Left Proximal Femoral Compress C     Left Mid Femoral Spont Y     Left Mid Femoral Phasic  Y     Left Mid Femoral Augment Y     Left Mid Femoral Competent Y     Left Mid Femoral Compress C     Left Distal Femoral Compress C     Left Popliteal Spont Y     Left Popliteal Phasic Y     Left Popliteal Augment Y     Left Popliteal Competent Y     Left Popliteal Compress C     Left Posterior Tibial Compress C     Left Peroneal Compress C     Left GastronemiusSoleal Compress C     Left Greater Saph AK Compress C     Left Greater Saph BK Compress C     Left Lesser Saph Compress C      LLE venous duplex- negative, no DVT    I ordered the above labs and reviewed the results.  Spoke with Elise (vascular tech) regarding LLE venous duplex scan results      RADIOLOGY            XR Tibia Fibula 2 View Left (Final result) Result time: 08/01/17 13:51:53     Final result by Nando Vergara MD (08/01/17 13:51:53)     Impression:        No acute fracture is identified. With persistent clinical indication,  MRI could be considered for further evaluation.     This report was finalized on 8/1/2017 1:51 PM by Dr. Nando Vergara MD.        Narrative:     XR TIBIA FIBULA 2 VW LEFT-     INDICATIONS: Left lower leg pain, no injury     TECHNIQUE: FRONTAL AND LATERAL VIEWS OF THE LEFT LOWER LEG     COMPARISON: None available     FINDINGS:      No acute fracture is identified. Joint spaces appear preserved. Soft  tissues appear unremarkable.                   I ordered the above noted radiological studies and reviewed the images on the PACS system.      PROGRESS AND CONSULTS  11:01 AM- Reviewed pt's history and workup with Dr. Prasad.  At bedside evaluation, they agree with the plan of care.  2:20 PM- Family now at pt's bedside. Rechecked pt. She is resting comfortably and is in no acute distress. Reviewed implications of results (including negative LLE venous duplex results, nothing acute indicated on left tib/fib xray, stable lab results), diagnosis, meds, responsibility to follow up, warning signs and symptoms of possible  "worsening, potential complications and reasons to return to ER with patient.  Discussed all results and noted any abnormalities with patient.  Discussed absolute need to recheck abnormalities and condition with referred [facility] physician. Informed pt that sx could possibly be due to myalgias for which she should take ibuprofen. Advised pt to well hydrate.   Discussed plan for discharge, as there is no emergent indication for admission.  Pt is agreeable and understands need for follow up and repeat testing.  Pt is aware that discharge does not mean that nothing is wrong but it indicates no emergency is present.  Pt is discharged with instructions to follow up with primary care doctor to have their blood pressure rechecked.       DIAGNOSIS  Final diagnoses:   Myalgia       FOLLOW UP   [facility]  [facility] [address] [phone]  Schedule an appointment as soon as possible for a visit  As needed        COURSE & MEDICAL DECISION MAKING  Pertinent Labs and Imaging studies that were ordered and reviewed are noted above.  Results were reviewed/discussed with the patient and they were also made aware of online assess.   Pt also made aware that some labs, such as cultures, will not be resulted during ER visit and follow up with PMD is necessary.     MEDICATIONS GIVEN IN ER  Medications - No data to display    /63 (Patient Position: Sitting)  Pulse 73  Temp 97.9 °F (36.6 °C) (Tympanic)   Resp 18  Ht 67\" (170.2 cm)  Wt 220 lb (99.8 kg)  SpO2 98%  BMI 34.46 kg/m2      I personally reviewed the past medical history, past surgical history, social history, family history, current medications and allergies as they appear in this chart.  The scribe's note accurately reflects the work and decisions made by me.     I personally scribed for ANDREE Burkett on 8/1/2017 at 3:08 PM.  Electronically signed by Mook Mccormick on 8/1/2017 at time 3:08 PM        Mook " Anny  08/01/17 1509       Florinda Conner, LIVIA  08/02/17 2205

## 2017-08-01 NOTE — ED NOTES
Pt c/o left arm pain and left lower extremity pain since this morning with no known injury     Elba Escobar RN  08/01/17 1017

## 2017-08-01 NOTE — PROGRESS NOTES
Spoke w/pt with no PCP listed; offered to arrange a new PCP for patient to f/u with. Patient accepted. Called Kyra w/[facility]- appt made w/Dr.Wantland- Live for 8/21/17 @1400. Appt date and time provided to pt. Tayla Ramirez RN

## 2017-08-01 NOTE — DISCHARGE INSTRUCTIONS
Ibuprofen as needed for pain  Increase fluid intake  Follow up with pmd or clinic of choice as needed  Return to er for fever, chills, shortness of air, chest pain, increased pain or any new or worsening symptoms    Community Clinics available for follow up:      [facility] [place]             [address] [facility] [address]  [phone]    [facility]  [address]  [phone]    [place] [address]  [phone]    [place] [address]  [phone]    [facility]  [address]  [phone]    [facility]  [address]  [phone]    [place] [address]  ([address] [facility])  [phone]    [facility] [address]  [phone]    [facility]  [address]  [phone]    [facility]  [phone]    [facility]  [address]  [phone]    [facility] [place] [id number] [facility]  [phone]    [facility]  [address] [facility]  [phone]

## 2017-09-01 ENCOUNTER — OFFICE VISIT (OUTPATIENT)
Dept: FAMILY MEDICINE CLINIC | Facility: CLINIC | Age: 27
End: 2017-09-01

## 2017-09-01 VITALS
BODY MASS INDEX: 37.2 KG/M2 | DIASTOLIC BLOOD PRESSURE: 70 MMHG | SYSTOLIC BLOOD PRESSURE: 112 MMHG | HEART RATE: 74 BPM | WEIGHT: 237 LBS | HEIGHT: 67 IN | OXYGEN SATURATION: 98 %

## 2017-09-01 DIAGNOSIS — J45.30 MILD PERSISTENT ASTHMA WITHOUT COMPLICATION: ICD-10-CM

## 2017-09-01 DIAGNOSIS — Z23 NEED FOR INFLUENZA VACCINATION: ICD-10-CM

## 2017-09-01 DIAGNOSIS — M54.2 NECK PAIN: ICD-10-CM

## 2017-09-01 DIAGNOSIS — K21.9 GASTROESOPHAGEAL REFLUX DISEASE, ESOPHAGITIS PRESENCE NOT SPECIFIED: Primary | ICD-10-CM

## 2017-09-01 DIAGNOSIS — M62.838 MUSCLE SPASM: ICD-10-CM

## 2017-09-01 PROCEDURE — 90686 IIV4 VACC NO PRSV 0.5 ML IM: CPT | Performed by: FAMILY MEDICINE

## 2017-09-01 PROCEDURE — 99214 OFFICE O/P EST MOD 30 MIN: CPT | Performed by: FAMILY MEDICINE

## 2017-09-01 PROCEDURE — 90471 IMMUNIZATION ADMIN: CPT | Performed by: FAMILY MEDICINE

## 2017-09-01 RX ORDER — SULFAMETHOXAZOLE AND TRIMETHOPRIM 800; 160 MG/1; MG/1
800160 TABLET ORAL 2 TIMES DAILY
Refills: 0 | COMMUNITY
Start: 2017-08-14 | End: 2017-09-01

## 2017-09-01 RX ORDER — AZITHROMYCIN 250 MG/1
250 TABLET, FILM COATED ORAL 2 TIMES DAILY
COMMUNITY
Start: 2017-08-30 | End: 2017-09-20

## 2017-09-01 RX ORDER — CYCLOBENZAPRINE HCL 10 MG
10 TABLET ORAL 3 TIMES DAILY PRN
Qty: 45 TABLET | Refills: 1 | Status: SHIPPED | OUTPATIENT
Start: 2017-09-01 | End: 2017-09-25 | Stop reason: SDUPTHER

## 2017-09-01 RX ORDER — ALBUTEROL SULFATE 90 UG/1
2 AEROSOL, METERED RESPIRATORY (INHALATION) EVERY 4 HOURS PRN
Qty: 18 G | Refills: 6 | Status: SHIPPED | OUTPATIENT
Start: 2017-09-01 | End: 2018-03-16

## 2017-09-01 RX ORDER — CYCLOBENZAPRINE HCL 10 MG
10 TABLET ORAL 3 TIMES DAILY PRN
Qty: 45 TABLET | Refills: 1 | Status: SHIPPED | OUTPATIENT
Start: 2017-09-01 | End: 2017-09-01 | Stop reason: SDUPTHER

## 2017-09-01 RX ORDER — NAPROXEN 500 MG/1
500 TABLET ORAL DAILY
Qty: 14 TABLET | Refills: 0 | Status: SHIPPED | OUTPATIENT
Start: 2017-09-01 | End: 2017-10-27 | Stop reason: SDUPTHER

## 2017-09-01 RX ORDER — DEXLANSOPRAZOLE 60 MG/1
60 CAPSULE, DELAYED RELEASE ORAL DAILY
Refills: 4 | COMMUNITY
Start: 2017-08-24 | End: 2018-03-16 | Stop reason: SDUPTHER

## 2017-09-01 NOTE — PROGRESS NOTES
Subjective   Beatriz Boland is a 27 y.o. female. Being seen in office today to establish care. Beatriz has several specialist for her chronic conditions and wants to discuss care coordination.    Chief Complaint   Patient presents with   • Establish Care     set up PCP   • Back Pain   • Heartburn       HPI     Back pain: Muscle spasms in the lower back x 1-2 yr and the neck x 2-3 weeks.  She has a hx of scoliosis and has experienced back pain ever since she started working at [employer].  Flexeril helps a lot when she takes it, but the pain flares up as soon as she stops taking the muscle relaxer.  She completed a 6 week course of PT with [facility] last year and it helped for a while.  She was recently seen by Shannon Burr, orthopedic PA, who did x-rays of the neck, advised her to take Naproxen, and ordered another course of outpatient PT.      GERD: well controlled on Dexilant unless she eats or drinks something acidic.  S/P EGD on 2/17/17 for persistent GERD with Dr. Wesley Frero which showed nonspecific gastritis.     She follows with Dr. Jaimes with [facility] due to hx of a kidney abscess and kidney stones.      She follows with an ENT, Dr. Shine, due to her hx of congential cleft palate as well as recurrent ear and sinus infections.      She follows with a Gynecologist, Dr. Eddy..      She uses Ventolin PRN SOA or dizzy spells  Her previous PCP prescribed it once during a sinus infection and pt kept using it once she was well since it seemed to help.  Since it ran out, her mother has lent on of her own albuterol inhalers to Beatriz.      Review of Systems   Constitutional: Negative for appetite change, fever and unexpected weight change.   HENT: Positive for congestion and sinus pressure. Negative for rhinorrhea and sore throat.    Eyes: Negative for pain and visual disturbance.   Respiratory: Positive for shortness of breath and wheezing. Negative for cough and chest tightness.    Cardiovascular:  Negative for chest pain and palpitations.   Gastrointestinal: Positive for abdominal pain. Negative for constipation, diarrhea, nausea and vomiting.   Genitourinary: Negative for dysuria, hematuria and menstrual problem.   Musculoskeletal: Positive for back pain, myalgias and neck pain. Negative for arthralgias.   Skin: Negative for pallor and rash.   Neurological: Positive for dizziness and light-headedness. Negative for headaches.   Psychiatric/Behavioral: Negative for dysphoric mood and sleep disturbance. The patient is not nervous/anxious.         Pt reports feeling irritable and getting into fights with her mother   All other systems reviewed and are negative.      The following portions of the patient's history were reviewed and updated as appropriate: allergies, current medications, past family history, past medical history, past social history, past surgical history and problem list.    Past Medical History:   Diagnosis Date   • Amblyopia    • Back pain    • Cleft palate    • Depression    • Developmental delay    • Ear infection     recurrent   • GERD (gastroesophageal reflux disease)    • Impetigo    • Kidney stone    • Muscle spasm    • Scoliosis    • Sinus infection     recurrent   • Sprain of shoulder, left 12/2016   • Urinary tract infection      Past Surgical History:   Procedure Laterality Date   • ABSCESS DRAINAGE      kidney   • ADENOIDECTOMY     • EAR TUBES     • PHARYNGEAL FLAP      for speech   • TONSILLECTOMY       Family History   Problem Relation Age of Onset   • COPD Mother    • Arthritis Mother    • Obesity Mother    • Gestational diabetes Mother    • Cancer Father    • Scoliosis Father    • Rheum arthritis Maternal Aunt    • Diabetes Maternal Uncle    • Alcohol abuse Maternal Uncle    • Rheum arthritis Maternal Grandmother    • COPD Maternal Grandmother    • Stroke Maternal Grandfather      Social History     Social History   • Marital status: Single     Spouse name: N/A   • Number of  children: N/A   • Years of education: N/A     Occupational History   • Not on file.     Social History Main Topics   • Smoking status: Current Some Day Smoker   • Smokeless tobacco: Never Used      Comment: smokes hookah 1-2 times per week     • Alcohol use Yes      Comment: occasionally, 1-2 glasses of wine 1-2 x per week   • Drug use: No   • Sexual activity: Yes     Partners: Male     Birth control/ protection: Injection     Other Topics Concern   • Not on file     Social History Narrative    Works at [employer] Sun-Thursday.  Lives at home with her mom and puppy.  Spends a couple of nights per month with her boyfriend.          Allergies   Allergen Reactions   • Ciprofloxacin Diarrhea and Nausea And Vomiting     Tingling in left leg   • Dust Mite Extract      Other reaction(s): Other (See Comments)  Nasal symptoms          Current Outpatient Prescriptions:   •  albuterol (PROVENTIL HFA;VENTOLIN HFA) 108 (90 Base) MCG/ACT inhaler, Inhale 2 puffs Every 4 (Four) Hours As Needed for Wheezing., Disp: 18 g, Rfl: 6  •  azithromycin (ZITHROMAX) 250 MG tablet, Take 250 mg by mouth 2 (Two) Times a Day., Disp: , Rfl:   •  DEXILANT 60 MG capsule, Take 60 mg by mouth Daily., Disp: , Rfl: 4  •  estradiol cypionate (DEPO-ESTRADIOL) 5 MG/ML injection, Inject 1.5 mg into the shoulder, thigh, or buttocks Every 28 (Twenty-Eight) Days. Patient states she takes it every 3 months, Disp: , Rfl:   •  ibuprofen (ADVIL,MOTRIN) 600 MG tablet, Take 1 tablet by mouth Every 8 (Eight) Hours As Needed for mild pain (1-3)., Disp: 30 tablet, Rfl: 0  •  naproxen (EC NAPROSYN) 500 MG EC tablet, Take 1 tablet by mouth Daily. With food, Disp: 14 tablet, Rfl: 0  •  ondansetron (ZOFRAN) 4 MG tablet, Take 1 tablet by mouth Every 6 (Six) Hours As Needed for Nausea or Vomiting., Disp: 12 tablet, Rfl: 0  •  Chlorcyclizine-Pseudoephed (STAHIST AD) 25-60 MG tablet, Take 25 mg by mouth Daily., Disp: , Rfl:   •  cyclobenzaprine (FLEXERIL) 10 MG tablet, Take 1 tablet  by mouth 3 (Three) Times a Day As Needed for Muscle Spasms., Disp: 45 tablet, Rfl: 1    Objective     Vitals:    09/01/17 1440   BP: 112/70   Pulse: 74   SpO2: 98%       Physical Exam   Constitutional: Vital signs are normal. She appears well-developed and well-nourished. No distress.   BMI 37   HENT:   Head: Normocephalic and atraumatic.   Nose: Nose normal.   Mouth/Throat: Oropharynx is clear and moist.   Narrow arched palate   Eyes: Conjunctivae are normal.   L sided amblyopia   Neck: No thyromegaly present.   Cardiovascular: Normal rate, regular rhythm, S1 normal, S2 normal and normal heart sounds.  Exam reveals no gallop and no friction rub.    No murmur heard.  Pulses:       Radial pulses are 2+ on the right side, and 2+ on the left side.   Pulmonary/Chest: Effort normal and breath sounds normal. She has no wheezes. She has no rales.   Abdominal: Soft. Bowel sounds are normal. She exhibits no distension. There is no hepatosplenomegaly. There is no tenderness. There is no rebound and no guarding.   Musculoskeletal: She exhibits no edema or deformity.   Paraspinal muscle spasms throughout cervical and lumbar spine   Lymphadenopathy:     She has no cervical adenopathy.        Right: No supraclavicular adenopathy present.        Left: No supraclavicular adenopathy present.   Neurological: She is alert. She has normal strength. Gait normal.   Pt pleasant and conversant.  Mild-moderate developmental delay.     Skin: Skin is warm and dry. No cyanosis. Nails show no clubbing.   Psychiatric: She has a normal mood and affect. She expresses impulsivity and inappropriate judgment.   Nursing note and vitals reviewed.       In-office spirometry: FEV1 77% predicted, FVC 71% predicted, FEV1/FVC 93% predicted, FEF 25-75 60% predicted.  Per MA report pt had difficulty completing spirometry and was unable to give good technique.      ASSESSMENT/PLAN             Visit Diagnoses     Gastroesophageal reflux disease, esophagitis  presence not specified    -  Primary    Relevant Medications    Refill DEXILANT 60 MG capsule             Muscle spasm        and        Neck pain        Relevant Medications    cyclobenzaprine (FLEXERIL) 10 MG tablet  Pt advised to continue PT as advised by her Orthopedic Surgery PA      Mild persistent asthma without complication      Working diagnosis based on pt-reported SOA relieved by PRN albuterol.  In-office spirometry likely inaccurate due to pt's inability to complete test.      Relevant Medications    albuterol (PROVENTIL HFA;VENTOLIN HFA) 108 (90 Base) MCG/ACT inhaler    Other Relevant Orders    Full Pulmonary Function Test Without Bronchodilator    Flu Vaccine Quad PF >18YR (Completed)      Need for influenza vaccination        Relevant Orders    Flu Vaccine Quad PF >18YR (Completed)            Patient Instructions   Please try to stop smoking hookah.    Decrease Naproxen to 500 mg daily.    You may take Tylenol with Naproxen, but please avoid Ibuprofen, Advil, and Excedrin.       Gastroesophageal Reflux Disease, Adult  Normally, food travels down the esophagus and stays in the stomach to be digested. If a person has gastroesophageal reflux disease (GERD), food and stomach acid move back up into the esophagus. When this happens, the esophagus becomes sore and swollen (inflamed). Over time, GERD can make small holes (ulcers) in the lining of the esophagus.  HOME CARE  Diet   · Follow a diet as told by your doctor. You may need to avoid foods and drinks such as:    Coffee and tea (with or without caffeine).    Drinks that contain alcohol.    Energy drinks and sports drinks.    Carbonated drinks or sodas.    Chocolate and cocoa.    Peppermint and mint flavorings.    Garlic and onions.    Horseradish.    Spicy and acidic foods, such as peppers, chili powder, cotter powder, vinegar, hot sauces, and BBQ sauce.    Citrus fruit juices and citrus fruits, such as oranges, betsy, and limes.    Tomato-based foods,  such as red sauce, chili, salsa, and pizza with red sauce.    Fried and fatty foods, such as donuts, french fries, potato chips, and high-fat dressings.    High-fat meats, such as hot dogs, rib eye steak, sausage, ham, and kwon.    High-fat dairy items, such as whole milk, butter, and cream cheese.  · Eat small meals often. Avoid eating large meals.  · Avoid drinking large amounts of liquid with your meals.  · Avoid eating meals during the 2-3 hours before bedtime.  · Avoid lying down right after you eat.  · Do not exercise right after you eat.   General Instructions   · Pay attention to any changes in your symptoms.  · Take over-the-counter and prescription medicines only as told by your doctor. Do not take aspirin, ibuprofen, or other NSAIDs unless your doctor says it is okay.  · Do not use any tobacco products, including cigarettes, chewing tobacco, and e-cigarettes. If you need help quitting, ask your doctor.  · Wear loose clothes. Do not wear anything tight around your waist.  · Raise (elevate) the head of your bed about 6 inches (15 cm).  · Try to lower your stress. If you need help doing this, ask your doctor.  · If you are overweight, lose an amount of weight that is healthy for you. Ask your doctor about a safe weight loss goal.  · Keep all follow-up visits as told by your doctor. This is important.  GET HELP IF:  · You have new symptoms.  · You lose weight and you do not know why it is happening.  · You have trouble swallowing, or it hurts to swallow.  · You have wheezing or a cough that keeps happening.  · Your symptoms do not get better with treatment.  · You have a hoarse voice.  GET HELP RIGHT AWAY IF:  · You have pain in your arms, neck, jaw, teeth, or back.  · You feel sweaty, dizzy, or light-headed.  · You have chest pain or shortness of breath.  · You throw up (vomit) and your throw up looks like blood or coffee grounds.  · You pass out (faint).  · Your poop (stool) is bloody or black.  · You  cannot swallow, drink, or eat.     This information is not intended to replace advice given to you by your health care provider. Make sure you discuss any questions you have with your health care provider.     Document Released: 06/05/2009 Document Revised: 09/07/2016 Document Reviewed: 04/13/2016  [employer] Patient Education ©2017 [employer]      Return in about 4 weeks (around 9/29/2017) for Annual.      Victorina Ortega MD  09/04/17

## 2017-09-01 NOTE — PATIENT INSTRUCTIONS
Please try to stop smoking hookah.    Decrease Naproxen to 500 mg daily.    You may take Tylenol with Naproxen, but please avoid Ibuprofen, Advil, and Excedrin.       Gastroesophageal Reflux Disease, Adult  Normally, food travels down the esophagus and stays in the stomach to be digested. If a person has gastroesophageal reflux disease (GERD), food and stomach acid move back up into the esophagus. When this happens, the esophagus becomes sore and swollen (inflamed). Over time, GERD can make small holes (ulcers) in the lining of the esophagus.  HOME CARE  Diet   · Follow a diet as told by your doctor. You may need to avoid foods and drinks such as:    Coffee and tea (with or without caffeine).    Drinks that contain alcohol.    Energy drinks and sports drinks.    Carbonated drinks or sodas.    Chocolate and cocoa.    Peppermint and mint flavorings.    Garlic and onions.    Horseradish.    Spicy and acidic foods, such as peppers, chili powder, cotter powder, vinegar, hot sauces, and BBQ sauce.    Citrus fruit juices and citrus fruits, such as oranges, betsy, and limes.    Tomato-based foods, such as red sauce, chili, salsa, and pizza with red sauce.    Fried and fatty foods, such as donuts, french fries, potato chips, and high-fat dressings.    High-fat meats, such as hot dogs, rib eye steak, sausage, ham, and kwon.    High-fat dairy items, such as whole milk, butter, and cream cheese.  · Eat small meals often. Avoid eating large meals.  · Avoid drinking large amounts of liquid with your meals.  · Avoid eating meals during the 2-3 hours before bedtime.  · Avoid lying down right after you eat.  · Do not exercise right after you eat.   General Instructions   · Pay attention to any changes in your symptoms.  · Take over-the-counter and prescription medicines only as told by your doctor. Do not take aspirin, ibuprofen, or other NSAIDs unless your doctor says it is okay.  · Do not use any tobacco products, including  cigarettes, chewing tobacco, and e-cigarettes. If you need help quitting, ask your doctor.  · Wear loose clothes. Do not wear anything tight around your waist.  · Raise (elevate) the head of your bed about 6 inches (15 cm).  · Try to lower your stress. If you need help doing this, ask your doctor.  · If you are overweight, lose an amount of weight that is healthy for you. Ask your doctor about a safe weight loss goal.  · Keep all follow-up visits as told by your doctor. This is important.  GET HELP IF:  · You have new symptoms.  · You lose weight and you do not know why it is happening.  · You have trouble swallowing, or it hurts to swallow.  · You have wheezing or a cough that keeps happening.  · Your symptoms do not get better with treatment.  · You have a hoarse voice.  GET HELP RIGHT AWAY IF:  · You have pain in your arms, neck, jaw, teeth, or back.  · You feel sweaty, dizzy, or light-headed.  · You have chest pain or shortness of breath.  · You throw up (vomit) and your throw up looks like blood or coffee grounds.  · You pass out (faint).  · Your poop (stool) is bloody or black.  · You cannot swallow, drink, or eat.     This information is not intended to replace advice given to you by your health care provider. Make sure you discuss any questions you have with your health care provider.     Document Released: 06/05/2009 Document Revised: 09/07/2016 Document Reviewed: 04/13/2016  [employer] Interactive Patient Education ©2017 [employer]

## 2017-09-14 ENCOUNTER — TELEPHONE (OUTPATIENT)
Dept: FAMILY MEDICINE CLINIC | Facility: CLINIC | Age: 27
End: 2017-09-14

## 2017-09-20 ENCOUNTER — OFFICE (AMBULATORY)
Dept: URBAN - METROPOLITAN AREA CLINIC 75 | Facility: CLINIC | Age: 27
End: 2017-09-20

## 2017-09-20 ENCOUNTER — OFFICE VISIT (OUTPATIENT)
Dept: FAMILY MEDICINE CLINIC | Facility: CLINIC | Age: 27
End: 2017-09-20

## 2017-09-20 VITALS
BODY MASS INDEX: 36.58 KG/M2 | HEART RATE: 60 BPM | OXYGEN SATURATION: 98 % | HEIGHT: 67 IN | DIASTOLIC BLOOD PRESSURE: 58 MMHG | TEMPERATURE: 97.4 F | WEIGHT: 233.1 LBS | SYSTOLIC BLOOD PRESSURE: 116 MMHG | RESPIRATION RATE: 16 BRPM

## 2017-09-20 VITALS
HEIGHT: 67 IN | DIASTOLIC BLOOD PRESSURE: 80 MMHG | WEIGHT: 233 LBS | SYSTOLIC BLOOD PRESSURE: 132 MMHG | HEART RATE: 80 BPM

## 2017-09-20 DIAGNOSIS — R19.4 CHANGE IN BOWEL HABIT: ICD-10-CM

## 2017-09-20 DIAGNOSIS — R11.2 NAUSEA WITH VOMITING, UNSPECIFIED: ICD-10-CM

## 2017-09-20 DIAGNOSIS — H66.004 RECURRENT ACUTE SUPPURATIVE OTITIS MEDIA OF RIGHT EAR WITHOUT SPONTANEOUS RUPTURE OF TYMPANIC MEMBRANE: Primary | ICD-10-CM

## 2017-09-20 DIAGNOSIS — J01.01 ACUTE RECURRENT MAXILLARY SINUSITIS: ICD-10-CM

## 2017-09-20 DIAGNOSIS — K21.9 GASTRO-ESOPHAGEAL REFLUX DISEASE WITHOUT ESOPHAGITIS: ICD-10-CM

## 2017-09-20 DIAGNOSIS — K62.5 HEMORRHAGE OF ANUS AND RECTUM: ICD-10-CM

## 2017-09-20 PROCEDURE — 99214 OFFICE O/P EST MOD 30 MIN: CPT

## 2017-09-20 PROCEDURE — 99213 OFFICE O/P EST LOW 20 MIN: CPT | Performed by: FAMILY MEDICINE

## 2017-09-20 RX ORDER — AMOXICILLIN 500 MG/1
1000 CAPSULE ORAL 3 TIMES DAILY
Qty: 30 CAPSULE | Refills: 0 | Status: SHIPPED | OUTPATIENT
Start: 2017-09-20 | End: 2017-09-25

## 2017-09-20 NOTE — PATIENT INSTRUCTIONS
Otitis Media, Adult  Otitis media is redness, soreness, and inflammation of the middle ear. Otitis media may be caused by allergies or, most commonly, by infection. Often it occurs as a complication of the common cold.  SIGNS AND SYMPTOMS  Symptoms of otitis media may include:  · Earache.  · Fever.  · Ringing in your ear.  · Headache.  · Leakage of fluid from the ear.  DIAGNOSIS  To diagnose otitis media, your health care provider will examine your ear with an otoscope. This is an instrument that allows your health care provider to see into your ear in order to examine your eardrum. Your health care provider also will ask you questions about your symptoms.  TREATMENT   Typically, otitis media resolves on its own within 3-5 days. Your health care provider may prescribe medicine to ease your symptoms of pain. If otitis media does not resolve within 5 days or is recurrent, your health care provider may prescribe antibiotic medicines if he or she suspects that a bacterial infection is the cause.  HOME CARE INSTRUCTIONS   · If you were prescribed an antibiotic medicine, finish it all even if you start to feel better.  · Take medicines only as directed by your health care provider.  · Keep all follow-up visits as directed by your health care provider.  SEEK MEDICAL CARE IF:  · You have otitis media only in one ear, or bleeding from your nose, or both.  · You notice a lump on your neck.  · You are not getting better in 3-5 days.  · You feel worse instead of better.  SEEK IMMEDIATE MEDICAL CARE IF:   · You have pain that is not controlled with medicine.  · You have swelling, redness, or pain around your ear or stiffness in your neck.  · You notice that part of your face is paralyzed.  · You notice that the bone behind your ear (mastoid) is tender when you touch it.  MAKE SURE YOU:   · Understand these instructions.  · Will watch your condition.  · Will get help right away if you are not doing well or get worse.     This  information is not intended to replace advice given to you by your health care provider. Make sure you discuss any questions you have with your health care provider.     Document Released: 09/22/2005 Document Revised: 04/10/2017 Document Reviewed: 07/15/2014  [employer] Interactive Patient Education ©2017 [employer]      Sinusitis, Adult  Sinusitis is soreness and inflammation of your sinuses. Sinuses are hollow spaces in the bones around your face. Your sinuses are located:  · Around your eyes.  · In the middle of your forehead.  · Behind your nose.  · In your cheekbones.  Your sinuses and nasal passages are lined with a stringy fluid (mucus). Mucus normally drains out of your sinuses. When your nasal tissues become inflamed or swollen, the mucus can become trapped or blocked so air cannot flow through your sinuses. This allows bacteria, viruses, and funguses to grow, which leads to infection.  Sinusitis can develop quickly and last for 7-10 days (acute) or for more than 12 weeks (chronic). Sinusitis often develops after a cold.  CAUSES  This condition is caused by anything that creates swelling in the sinuses or stops mucus from draining, including:  · Allergies.  · Asthma.  · Bacterial or viral infection.  · Abnormally shaped bones between the nasal passages.  · Nasal growths that contain mucus (nasal polyps).  · Narrow sinus openings.  · Pollutants, such as chemicals or irritants in the air.  · A foreign object stuck in the nose.  · A fungal infection. This is rare.  RISK FACTORS  The following factors may make you more likely to develop this condition:  · Having allergies or asthma.  · Having had a recent cold or respiratory tract infection.  · Having structural deformities or blockages in your nose or sinuses.  · Having a weak immune system.  · Doing a lot of swimming or diving.  · Overusing nasal sprays.  · Smoking.  SYMPTOMS  The main symptoms of this condition are pain and a feeling of pressure around the  affected sinuses. Other symptoms include:  · Upper toothache.  · Earache.  · Headache.  · Bad breath.  · Decreased sense of smell and taste.  · A cough that may get worse at night.  · Fatigue.  · Fever.  · Thick drainage from your nose. The drainage is often green and it may contain pus (purulent).  · Stuffy nose or congestion.  · Postnasal drip. This is when extra mucus collects in the throat or back of the nose.  · Swelling and warmth over the affected sinuses.  · Sore throat.  · Sensitivity to light.  DIAGNOSIS  This condition is diagnosed based on symptoms, a medical history, and a physical exam. To find out if your condition is acute or chronic, your health care provider may:  · Look in your nose for signs of nasal polyps.  · Tap over the affected sinus to check for signs of infection.  · View the inside of your sinuses using an imaging device that has a light attached (endoscope).  If your health care provider suspects that you have chronic sinusitis, you may also:  · Be tested for allergies.  · Have a sample of mucus taken from your nose (nasal culture) and checked for bacteria.  · Have a mucus sample examined to see if your sinusitis is related to an allergy.  If your sinusitis does not respond to treatment and it lasts longer than 8 weeks, you may have an MRI or CT scan to check your sinuses. These scans also help to determine how severe your infection is.  In rare cases, a bone biopsy may be done to rule out more serious types of fungal sinus disease.  TREATMENT  Treatment for sinusitis depends on the cause and whether your condition is chronic or acute. If a virus is causing your sinusitis, your symptoms will go away on their own within 10 days. You may be given medicines to relieve your symptoms, including:  · Topical nasal decongestants. They shrink swollen nasal passages and let mucus drain from your sinuses.  · Antihistamines. These drugs block inflammation that is triggered by allergies. This can  help to ease swelling in your nose and sinuses.  · Topical nasal corticosteroids. These are nasal sprays that ease inflammation and swelling in your nose and sinuses.  · Nasal saline washes. These rinses can help to get rid of thick mucus in your nose.  If your condition is caused by bacteria, you will be given an antibiotic medicine. If your condition is caused by a fungus, you will be given an antifungal medicine.  Surgery may be needed to correct underlying conditions, such as narrow nasal passages. Surgery may also be needed to remove polyps.  HOME CARE INSTRUCTIONS  Medicines  · Take, use, or apply over-the-counter and prescription medicines only as told by your health care provider. These may include nasal sprays.  · If you were prescribed an antibiotic medicine, take it as told by your health care provider. Do not stop taking the antibiotic even if you start to feel better.  Hydrate and Humidify  · Drink enough water to keep your urine clear or pale yellow. Staying hydrated will help to thin your mucus.  · Use a cool mist humidifier to keep the humidity level in your home above 50%.  · Inhale steam for 10-15 minutes, 3-4 times a day or as told by your health care provider. You can do this in the bathroom while a hot shower is running.  · Limit your exposure to cool or dry air.  Rest  · Rest as much as possible.  · Sleep with your head raised (elevated).  · Make sure to get enough sleep each night.  General Instructions  · Apply a warm, moist washcloth to your face 3-4 times a day or as told by your health care provider. This will help with discomfort.  · Wash your hands often with soap and water to reduce your exposure to viruses and other germs. If soap and water are not available, use hand .  · Do not smoke. Avoid being around people who are smoking (secondhand smoke).  · Keep all follow-up visits as told by your health care provider. This is important.  SEEK MEDICAL CARE IF:  · You have a  fever.  · Your symptoms get worse.  · Your symptoms do not improve within 10 days.  SEEK IMMEDIATE MEDICAL CARE IF:  · You have a severe headache.  · You have persistent vomiting.  · You have pain or swelling around your face or eyes.  · You have vision problems.  · You develop confusion.  · Your neck is stiff.  · You have trouble breathing.     This information is not intended to replace advice given to you by your health care provider. Make sure you discuss any questions you have with your health care provider.     Document Released: 12/18/2006 Document Revised: 04/10/2017 Document Reviewed: 10/12/2016  [employer] Interactive Patient Education ©2017 [employer]

## 2017-09-20 NOTE — PROGRESS NOTES
Beatriz Boland is a 27 y.o. female. Being seen in office today for possible sinus infection. Has an appointment with GI today.  Chief Complaint   Patient presents with   • Sinusitis     possible sinus infection       Subjective       HPI     Possible sinus infection:   -nasal congestion, post-nasal drip, lots of sinus pressure and nonproductive cough  -no fevers  -she has a hx of environmental allergies and had been getting allergy shots until fairly recently (earlier this year), she plans to get back on allergy shots with her ENT, Dr. Shine  -sinusitis and otitis media are recurrent problems for Ms. Boland since childhood  -she completed a Z-pack for acute sinusitis about 2 weeks ago and felt better for about 1 week, then symptoms returned worse than before  -she has been using albuterol a couple of times per day for SOA and cough         The following portions of the patient's history were reviewed and updated as appropriate: allergies, current medications, past family history, past medical history, past social history, past surgical history and problem list.      Review of Systems   Constitutional: Positive for fatigue. Negative for appetite change and fever.   HENT: Positive for congestion, postnasal drip, rhinorrhea, sinus pain, sinus pressure, sneezing and tinnitus. Negative for ear discharge, ear pain, hearing loss and sore throat (intermittent).    Eyes: Negative for pain and visual disturbance.   Respiratory: Positive for cough and shortness of breath (intermittent, during coughing spells). Negative for chest tightness, wheezing and stridor.    Cardiovascular: Positive for palpitations (with albuterol when she took 8 puffs yesterday). Negative for chest pain.   Gastrointestinal: Positive for blood in stool (GI appointment later today), nausea and vomiting. Negative for abdominal pain.        Normal appetite   Skin: Negative for rash and wound.   Neurological: Positive for dizziness and  light-headedness. Negative for syncope and weakness.   All other systems reviewed and are negative.        Current Outpatient Prescriptions:   •  albuterol (PROVENTIL HFA;VENTOLIN HFA) 108 (90 Base) MCG/ACT inhaler, Inhale 2 puffs Every 4 (Four) Hours As Needed for Wheezing., Disp: 18 g, Rfl: 6  •  Chlorcyclizine-Pseudoephed (STAHIST AD) 25-60 MG tablet, Take 25 mg by mouth Daily., Disp: 42 tablet, Rfl: 0  •  cyclobenzaprine (FLEXERIL) 10 MG tablet, Take 1 tablet by mouth 3 (Three) Times a Day As Needed for Muscle Spasms., Disp: 45 tablet, Rfl: 1  •  DEXILANT 60 MG capsule, Take 60 mg by mouth Daily., Disp: , Rfl: 4  •  estradiol cypionate (DEPO-ESTRADIOL) 5 MG/ML injection, Inject 1.5 mg into the shoulder, thigh, or buttocks Every 28 (Twenty-Eight) Days. Patient states she takes it every 3 months, Disp: , Rfl:   •  ibuprofen (ADVIL,MOTRIN) 600 MG tablet, Take 1 tablet by mouth Every 8 (Eight) Hours As Needed for mild pain (1-3)., Disp: 30 tablet, Rfl: 0  •  naproxen (EC NAPROSYN) 500 MG EC tablet, Take 1 tablet by mouth Daily. With food, Disp: 14 tablet, Rfl: 0  •  ondansetron (ZOFRAN) 4 MG tablet, Take 1 tablet by mouth Every 6 (Six) Hours As Needed for Nausea or Vomiting., Disp: 12 tablet, Rfl: 0  •  amoxicillin (AMOXIL) 500 MG capsule, Take 2 capsules by mouth 3 (Three) Times a Day for 5 days., Disp: 30 capsule, Rfl: 0    Objective     Vitals:    09/20/17 1048   BP: 116/58   Pulse: 60   Resp: 16   Temp: 97.4 °F (36.3 °C)   SpO2: 98%     Physical Exam   Constitutional: Vital signs are normal. She appears well-developed and well-nourished. No distress.   BMI 36.5   HENT:   Head: Normocephalic and atraumatic.   Right Ear: Ear canal normal. Tympanic membrane is bulging. Tympanic membrane is not perforated. A middle ear effusion is present.   Left Ear: Ear canal normal. Tympanic membrane is scarred. Tympanic membrane is not perforated.   Nose: Mucosal edema and rhinorrhea present. Right sinus exhibits maxillary sinus  tenderness. Right sinus exhibits no frontal sinus tenderness. Left sinus exhibits maxillary sinus tenderness. Left sinus exhibits no frontal sinus tenderness.   Mouth/Throat: Oropharynx is clear and moist. No oropharyngeal exudate.   Eyes: Conjunctivae are normal. Pupils are equal, round, and reactive to light.   L eye strabismus   Neck: No thyromegaly present.   Cardiovascular: Normal rate, regular rhythm, S1 normal, S2 normal and normal heart sounds.  Exam reveals no gallop and no friction rub.    No murmur heard.  Pulmonary/Chest: Effort normal and breath sounds normal. She has no wheezes. She has no rhonchi. She has no rales.   Abdominal: Soft. Bowel sounds are normal. She exhibits no distension. There is no tenderness. There is no rebound and no guarding.   Lymphadenopathy:     She has no cervical adenopathy.        Right: No supraclavicular adenopathy present.        Left: No supraclavicular adenopathy present.   Skin: Skin is warm and dry. No cyanosis. Nails show no clubbing.   Nursing note and vitals reviewed.      ASSESSMENT/PLAN        Visit Diagnoses     Recurrent acute suppurative otitis media of right ear without spontaneous rupture of tympanic membrane    -  Primary    Relevant Medications    amoxicillin (AMOXIL) 500 MG capsule , 2 capsules TID with meals x 5 days      Acute recurrent maxillary sinusitis        Relevant Medications    Refill Chlorcyclizine-Pseudoephed (STAHIST AD) 25-60 MG tablet    amoxicillin (AMOXIL) 500 MG capsule as above    Pt encouraged to call her ENT and schedule a follow up appointment to discuss resuming allergy shots.              Patient Instructions   Otitis Media, Adult  Otitis media is redness, soreness, and inflammation of the middle ear. Otitis media may be caused by allergies or, most commonly, by infection. Often it occurs as a complication of the common cold.  SIGNS AND SYMPTOMS  Symptoms of otitis media may include:  · Earache.  · Fever.  · Ringing in your  ear.  · Headache.  · Leakage of fluid from the ear.  DIAGNOSIS  To diagnose otitis media, your health care provider will examine your ear with an otoscope. This is an instrument that allows your health care provider to see into your ear in order to examine your eardrum. Your health care provider also will ask you questions about your symptoms.  TREATMENT   Typically, otitis media resolves on its own within 3-5 days. Your health care provider may prescribe medicine to ease your symptoms of pain. If otitis media does not resolve within 5 days or is recurrent, your health care provider may prescribe antibiotic medicines if he or she suspects that a bacterial infection is the cause.  HOME CARE INSTRUCTIONS   · If you were prescribed an antibiotic medicine, finish it all even if you start to feel better.  · Take medicines only as directed by your health care provider.  · Keep all follow-up visits as directed by your health care provider.  SEEK MEDICAL CARE IF:  · You have otitis media only in one ear, or bleeding from your nose, or both.  · You notice a lump on your neck.  · You are not getting better in 3-5 days.  · You feel worse instead of better.  SEEK IMMEDIATE MEDICAL CARE IF:   · You have pain that is not controlled with medicine.  · You have swelling, redness, or pain around your ear or stiffness in your neck.  · You notice that part of your face is paralyzed.  · You notice that the bone behind your ear (mastoid) is tender when you touch it.  MAKE SURE YOU:   · Understand these instructions.  · Will watch your condition.  · Will get help right away if you are not doing well or get worse.     This information is not intended to replace advice given to you by your health care provider. Make sure you discuss any questions you have with your health care provider.     Document Released: 09/22/2005 Document Revised: 04/10/2017 Document Reviewed: 07/15/2014  [employer] Interactive Patient Education ©2017 [employer]      Sinusitis, Adult  Sinusitis is soreness and inflammation of your sinuses. Sinuses are hollow spaces in the bones around your face. Your sinuses are located:  · Around your eyes.  · In the middle of your forehead.  · Behind your nose.  · In your cheekbones.  Your sinuses and nasal passages are lined with a stringy fluid (mucus). Mucus normally drains out of your sinuses. When your nasal tissues become inflamed or swollen, the mucus can become trapped or blocked so air cannot flow through your sinuses. This allows bacteria, viruses, and funguses to grow, which leads to infection.  Sinusitis can develop quickly and last for 7-10 days (acute) or for more than 12 weeks (chronic). Sinusitis often develops after a cold.  CAUSES  This condition is caused by anything that creates swelling in the sinuses or stops mucus from draining, including:  · Allergies.  · Asthma.  · Bacterial or viral infection.  · Abnormally shaped bones between the nasal passages.  · Nasal growths that contain mucus (nasal polyps).  · Narrow sinus openings.  · Pollutants, such as chemicals or irritants in the air.  · A foreign object stuck in the nose.  · A fungal infection. This is rare.  RISK FACTORS  The following factors may make you more likely to develop this condition:  · Having allergies or asthma.  · Having had a recent cold or respiratory tract infection.  · Having structural deformities or blockages in your nose or sinuses.  · Having a weak immune system.  · Doing a lot of swimming or diving.  · Overusing nasal sprays.  · Smoking.  SYMPTOMS  The main symptoms of this condition are pain and a feeling of pressure around the affected sinuses. Other symptoms include:  · Upper toothache.  · Earache.  · Headache.  · Bad breath.  · Decreased sense of smell and taste.  · A cough that may get worse at night.  · Fatigue.  · Fever.  · Thick drainage from your nose. The drainage is often green and it may contain pus (purulent).  · Stuffy nose  or congestion.  · Postnasal drip. This is when extra mucus collects in the throat or back of the nose.  · Swelling and warmth over the affected sinuses.  · Sore throat.  · Sensitivity to light.  DIAGNOSIS  This condition is diagnosed based on symptoms, a medical history, and a physical exam. To find out if your condition is acute or chronic, your health care provider may:  · Look in your nose for signs of nasal polyps.  · Tap over the affected sinus to check for signs of infection.  · View the inside of your sinuses using an imaging device that has a light attached (endoscope).  If your health care provider suspects that you have chronic sinusitis, you may also:  · Be tested for allergies.  · Have a sample of mucus taken from your nose (nasal culture) and checked for bacteria.  · Have a mucus sample examined to see if your sinusitis is related to an allergy.  If your sinusitis does not respond to treatment and it lasts longer than 8 weeks, you may have an MRI or CT scan to check your sinuses. These scans also help to determine how severe your infection is.  In rare cases, a bone biopsy may be done to rule out more serious types of fungal sinus disease.  TREATMENT  Treatment for sinusitis depends on the cause and whether your condition is chronic or acute. If a virus is causing your sinusitis, your symptoms will go away on their own within 10 days. You may be given medicines to relieve your symptoms, including:  · Topical nasal decongestants. They shrink swollen nasal passages and let mucus drain from your sinuses.  · Antihistamines. These drugs block inflammation that is triggered by allergies. This can help to ease swelling in your nose and sinuses.  · Topical nasal corticosteroids. These are nasal sprays that ease inflammation and swelling in your nose and sinuses.  · Nasal saline washes. These rinses can help to get rid of thick mucus in your nose.  If your condition is caused by bacteria, you will be given an  antibiotic medicine. If your condition is caused by a fungus, you will be given an antifungal medicine.  Surgery may be needed to correct underlying conditions, such as narrow nasal passages. Surgery may also be needed to remove polyps.  HOME CARE INSTRUCTIONS  Medicines  · Take, use, or apply over-the-counter and prescription medicines only as told by your health care provider. These may include nasal sprays.  · If you were prescribed an antibiotic medicine, take it as told by your health care provider. Do not stop taking the antibiotic even if you start to feel better.  Hydrate and Humidify  · Drink enough water to keep your urine clear or pale yellow. Staying hydrated will help to thin your mucus.  · Use a cool mist humidifier to keep the humidity level in your home above 50%.  · Inhale steam for 10-15 minutes, 3-4 times a day or as told by your health care provider. You can do this in the bathroom while a hot shower is running.  · Limit your exposure to cool or dry air.  Rest  · Rest as much as possible.  · Sleep with your head raised (elevated).  · Make sure to get enough sleep each night.  General Instructions  · Apply a warm, moist washcloth to your face 3-4 times a day or as told by your health care provider. This will help with discomfort.  · Wash your hands often with soap and water to reduce your exposure to viruses and other germs. If soap and water are not available, use hand .  · Do not smoke. Avoid being around people who are smoking (secondhand smoke).  · Keep all follow-up visits as told by your health care provider. This is important.  SEEK MEDICAL CARE IF:  · You have a fever.  · Your symptoms get worse.  · Your symptoms do not improve within 10 days.  SEEK IMMEDIATE MEDICAL CARE IF:  · You have a severe headache.  · You have persistent vomiting.  · You have pain or swelling around your face or eyes.  · You have vision problems.  · You develop confusion.  · Your neck is stiff.  · You have  trouble breathing.     This information is not intended to replace advice given to you by your health care provider. Make sure you discuss any questions you have with your health care provider.     Document Released: 12/18/2006 Document Revised: 04/10/2017 Document Reviewed: 10/12/2016  [employer] Interactive Patient Education ©2017 [employer]        Return next week as scheduled.      Victorina Ortega MD  09/20/17

## 2017-09-25 ENCOUNTER — OFFICE VISIT (OUTPATIENT)
Dept: FAMILY MEDICINE CLINIC | Facility: CLINIC | Age: 27
End: 2017-09-25

## 2017-09-25 VITALS
HEIGHT: 67 IN | TEMPERATURE: 97.8 F | OXYGEN SATURATION: 98 % | DIASTOLIC BLOOD PRESSURE: 78 MMHG | HEART RATE: 78 BPM | RESPIRATION RATE: 16 BRPM | SYSTOLIC BLOOD PRESSURE: 122 MMHG | WEIGHT: 231.3 LBS | BODY MASS INDEX: 36.3 KG/M2

## 2017-09-25 DIAGNOSIS — M62.838 MUSCLE SPASM: ICD-10-CM

## 2017-09-25 DIAGNOSIS — M41.9 SCOLIOSIS, UNSPECIFIED SCOLIOSIS TYPE, UNSPECIFIED SPINAL REGION: ICD-10-CM

## 2017-09-25 DIAGNOSIS — M25.512 CHRONIC LEFT SHOULDER PAIN: ICD-10-CM

## 2017-09-25 DIAGNOSIS — M54.5 CHRONIC BILATERAL LOW BACK PAIN, WITH SCIATICA PRESENCE UNSPECIFIED: Primary | ICD-10-CM

## 2017-09-25 DIAGNOSIS — G89.29 CHRONIC LEFT SHOULDER PAIN: ICD-10-CM

## 2017-09-25 DIAGNOSIS — G89.29 CHRONIC BILATERAL LOW BACK PAIN, WITH SCIATICA PRESENCE UNSPECIFIED: Primary | ICD-10-CM

## 2017-09-25 PROCEDURE — 99213 OFFICE O/P EST LOW 20 MIN: CPT | Performed by: FAMILY MEDICINE

## 2017-09-25 RX ORDER — CYCLOBENZAPRINE HCL 10 MG
10 TABLET ORAL 3 TIMES DAILY PRN
Qty: 45 TABLET | Refills: 1 | Status: SHIPPED | OUTPATIENT
Start: 2017-09-25 | End: 2018-01-20 | Stop reason: SDUPTHER

## 2017-09-25 RX ORDER — NAPROXEN 500 MG/1
TABLET ORAL
Refills: 0 | COMMUNITY
Start: 2017-09-20 | End: 2017-09-25

## 2017-09-25 NOTE — PROGRESS NOTES
Beatriz Boland is a 27 y.o. female. Being seen in office to follow up for her right ear.  Chief Complaint   Patient presents with   • Otitis Media     follow up   • chronic back pain   • Shoulder Pain       Subjective       HPI     Sinus and ear pain much improved with amoxicillin prescribed at last office visit.  No fevers or diarrhea.      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      Review of Systems   Constitutional: Negative for fatigue and fever.   HENT: Negative for congestion, ear discharge, ear pain, sneezing and sore throat.    Respiratory: Negative for cough and shortness of breath.    Cardiovascular: Negative for chest pain and palpitations.   Gastrointestinal: Negative for abdominal pain, diarrhea and nausea.   Musculoskeletal: Positive for back pain. Negative for gait problem, joint swelling and myalgias.        L shoulder pain   Skin: Negative for rash and wound.   Neurological: Negative for dizziness and weakness.   All other systems reviewed and are negative.    Past Medical History:   Diagnosis Date   • Amblyopia    • Back pain    • Cleft palate    • Depression    • Developmental delay    • Ear infection     recurrent   • GERD (gastroesophageal reflux disease)    •  Impetigo    • Kidney abscess    • Kidney stone    • Muscle spasm    • Scoliosis    • Sinus infection     recurrent   • Sprain of shoulder, left 12/2016   • Urinary tract infection      Past Surgical History:   Procedure Laterality Date   • ABSCESS DRAINAGE      kidney   • ADENOIDECTOMY     • EAR TUBES     • PHARYNGEAL FLAP      for speech   • TONSILLECTOMY             Current Outpatient Prescriptions:   •  albuterol (PROVENTIL HFA;VENTOLIN HFA) 108 (90 Base) MCG/ACT inhaler, Inhale 2 puffs Every 4 (Four) Hours As Needed for Wheezing., Disp: 18 g, Rfl: 6  •  Chlorcyclizine-Pseudoephed (STAHIST AD) 25-60 MG tablet, Take 25 mg by mouth Daily., Disp: 42 tablet, Rfl: 0  •  cyclobenzaprine (FLEXERIL) 10 MG tablet, Take 1 tablet by mouth 3 (Three) Times a Day As Needed for Muscle Spasms., Disp: 45 tablet, Rfl: 1  •  DEXILANT 60 MG capsule, Take 60 mg by mouth Daily., Disp: , Rfl: 4  •  estradiol cypionate (DEPO-ESTRADIOL) 5 MG/ML injection, Inject 1.5 mg into the shoulder, thigh, or buttocks Every 28 (Twenty-Eight) Days. Patient states she takes it every 3 months, Disp: , Rfl:   •  ibuprofen (ADVIL,MOTRIN) 600 MG tablet, Take 1 tablet by mouth Every 8 (Eight) Hours As Needed for mild pain (1-3)., Disp: 30 tablet, Rfl: 0  •  naproxen (EC NAPROSYN) 500 MG EC tablet, Take 1 tablet by mouth Daily. With food, Disp: 14 tablet, Rfl: 0  •  ondansetron (ZOFRAN) 4 MG tablet, Take 1 tablet by mouth Every 6 (Six) Hours As Needed for Nausea or Vomiting., Disp: 12 tablet, Rfl: 0    Objective     Vitals:    09/25/17 1432   BP: 122/78   Pulse: 78   Resp: 16   Temp: 97.8 °F (36.6 °C)   SpO2: 98%     Physical Exam   Constitutional: She is oriented to person, place, and time. Vital signs are normal. She appears well-developed and well-nourished. No distress.   BMI 36.2   HENT:   Head: Normocephalic and atraumatic.   Right Ear: Tympanic membrane and ear canal normal.   Left Ear: Tympanic membrane normal.   Nose: Nose normal.   Mouth/Throat:  Oropharynx is clear and moist.   Eyes: Conjunctivae are normal. Pupils are equal, round, and reactive to light.   amblyopia   Neck: No thyromegaly present.   Cardiovascular: Normal rate, regular rhythm, S1 normal, S2 normal and normal heart sounds.  Exam reveals no gallop and no friction rub.    No murmur heard.  Pulses:       Radial pulses are 2+ on the right side, and 2+ on the left side.   Pulmonary/Chest: Effort normal and breath sounds normal. She has no wheezes. She has no rales.   Abdominal: Soft. Bowel sounds are normal. She exhibits no distension. There is no hepatosplenomegaly. There is no tenderness. There is no rebound and no guarding.   Musculoskeletal: She exhibits no edema or deformity.   Bilateral lumbar paraspinal muscle spasms and tenderness to palpation.    B/L shoulders with full ROM and normal strength.     Lymphadenopathy:     She has no cervical adenopathy.        Right: No supraclavicular adenopathy present.        Left: No supraclavicular adenopathy present.   Neurological: She is alert and oriented to person, place, and time. She has normal strength. Gait normal.   Skin: Skin is warm and dry. No cyanosis. Nails show no clubbing.   Psychiatric: She has a normal mood and affect. Her speech is normal and behavior is normal.   Nursing note and vitals reviewed.      ASSESSMENT/PLAN     Problem List Items Addressed This Visit        Nervous and Auditory    Back pain - Primary    Relevant Orders    Ambulatory Referral to Orthopedic Surgery for a second opinion regarding work duties        Musculoskeletal and Integument    Scoliosis    Relevant Orders    Ambulatory Referral to Orthopedic Surgery      Other Visit Diagnoses     Chronic left shoulder pain        Relevant Orders    Ambulatory Referral to Orthopedic Surgery    Muscle spasm        Relevant Medications    cyclobenzaprine (FLEXERIL) 10 MG tablet    Neck pain        Relevant Medications    cyclobenzaprine (FLEXERIL) 10 MG tablet             Patient Instructions   Scoliosis  Scoliosis is the name given to a spine that curves sideways. Scoliosis can cause twisting of your shoulders, hips, chest, back, and rib cage.   CAUSES   The cause of scoliosis is not always known. It may be caused by a birth defect or by a disease that can cause muscular dysfunction and imbalance, such as cerebral palsy and muscular dystrophy.   RISK FACTORS  Having a disease that causes muscle disease or dysfunction.  SIGNS AND SYMPTOMS  Scoliosis often has no signs or symptoms. If they are present, they may include:  · Unequal size of one body side compared to the other (asymmetry).  · Visible curvature of the spine.  · Pain. The pain may limit physical activity.  · Shortness of breath.  · Bowel or bladder issues.  DIAGNOSIS  A skilled health care provider will perform an evaluation. This will involve:  · Taking your history.  · Performing a physical examination.  · Performing a neurological exam to detect nerve or muscle function loss.  · Range of motion studies on the spine.  · X-rays.  An MRI may also be obtained.  TREATMENT   Treatment varies depending on the nature, extent, and severity of the disease. If the curvature is not great, you may need only observation. A brace may be used to prevent scoliosis from progressing. A brace may also be needed during growth spurts. Physical therapy may be of benefit. Surgery may be required.   HOME CARE INSTRUCTIONS   · Your health care provider may suggest exercises to strengthen your muscles. Perform them as directed.  · Ask your health care provider before participating in any sports.    · If you have been prescribed an orthopedic brace, wear it as instructed by your health care provider.   SEEK MEDICAL CARE IF:  Your brace causes the skin to become sore (chafe) or is uncomfortable.   SEEK IMMEDIATE MEDICAL CARE IF:  · You have back pain that is not relieved by the medicines prescribed by your health care provider.    · Your  legs feel weak or you lose function in your legs.  · You lose some bowel or bladder control.       This information is not intended to replace advice given to you by your health care provider. Make sure you discuss any questions you have with your health care provider.     Document Released: 12/15/2001 Document Revised: 12/23/2014 Document Reviewed: 08/25/2014  [employer] Interactive Patient Education ©2017 [employer]      Return in about 3 weeks (around 10/16/2017) for Annual with fasting labs.      Victorina Ortega MD  09/29/17

## 2017-09-25 NOTE — PATIENT INSTRUCTIONS
Scoliosis  Scoliosis is the name given to a spine that curves sideways. Scoliosis can cause twisting of your shoulders, hips, chest, back, and rib cage.   CAUSES   The cause of scoliosis is not always known. It may be caused by a birth defect or by a disease that can cause muscular dysfunction and imbalance, such as cerebral palsy and muscular dystrophy.   RISK FACTORS  Having a disease that causes muscle disease or dysfunction.  SIGNS AND SYMPTOMS  Scoliosis often has no signs or symptoms. If they are present, they may include:  · Unequal size of one body side compared to the other (asymmetry).  · Visible curvature of the spine.  · Pain. The pain may limit physical activity.  · Shortness of breath.  · Bowel or bladder issues.  DIAGNOSIS  A skilled health care provider will perform an evaluation. This will involve:  · Taking your history.  · Performing a physical examination.  · Performing a neurological exam to detect nerve or muscle function loss.  · Range of motion studies on the spine.  · X-rays.  An MRI may also be obtained.  TREATMENT   Treatment varies depending on the nature, extent, and severity of the disease. If the curvature is not great, you may need only observation. A brace may be used to prevent scoliosis from progressing. A brace may also be needed during growth spurts. Physical therapy may be of benefit. Surgery may be required.   HOME CARE INSTRUCTIONS   · Your health care provider may suggest exercises to strengthen your muscles. Perform them as directed.  · Ask your health care provider before participating in any sports.    · If you have been prescribed an orthopedic brace, wear it as instructed by your health care provider.   SEEK MEDICAL CARE IF:  Your brace causes the skin to become sore (chafe) or is uncomfortable.   SEEK IMMEDIATE MEDICAL CARE IF:  · You have back pain that is not relieved by the medicines prescribed by your health care provider.    · Your legs feel weak or you lose  function in your legs.  · You lose some bowel or bladder control.       This information is not intended to replace advice given to you by your health care provider. Make sure you discuss any questions you have with your health care provider.     Document Released: 12/15/2001 Document Revised: 12/23/2014 Document Reviewed: 08/25/2014  [employer] Interactive Patient Education ©2017 [employer]

## 2017-09-29 PROBLEM — M62.838 MUSCLE SPASM: Status: ACTIVE | Noted: 2017-09-29

## 2017-09-29 PROBLEM — M25.512 CHRONIC LEFT SHOULDER PAIN: Status: ACTIVE | Noted: 2017-09-29

## 2017-09-29 PROBLEM — G89.29 CHRONIC LEFT SHOULDER PAIN: Status: ACTIVE | Noted: 2017-09-29

## 2017-10-05 ENCOUNTER — HOSPITAL ENCOUNTER (EMERGENCY)
Facility: HOSPITAL | Age: 27
Discharge: HOME | End: 2017-10-05
Attending: EMERGENCY MEDICINE | Admitting: EMERGENCY MEDICINE

## 2017-10-05 ENCOUNTER — APPOINTMENT (OUTPATIENT)
Dept: GENERAL RADIOLOGY | Facility: HOSPITAL | Age: 27
End: 2017-10-05

## 2017-10-05 ENCOUNTER — TELEPHONE (OUTPATIENT)
Dept: FAMILY MEDICINE CLINIC | Facility: CLINIC | Age: 27
End: 2017-10-05

## 2017-10-05 VITALS
OXYGEN SATURATION: 98 % | RESPIRATION RATE: 16 BRPM | WEIGHT: 231 LBS | HEART RATE: 53 BPM | BODY MASS INDEX: 35.01 KG/M2 | SYSTOLIC BLOOD PRESSURE: 128 MMHG | TEMPERATURE: 98.7 F | DIASTOLIC BLOOD PRESSURE: 82 MMHG | HEIGHT: 68 IN

## 2017-10-05 DIAGNOSIS — R07.89 CHEST WALL PAIN: Primary | ICD-10-CM

## 2017-10-05 PROCEDURE — 71020 HC CHEST PA AND LATERAL: CPT

## 2017-10-05 PROCEDURE — 93010 ELECTROCARDIOGRAM REPORT: CPT | Performed by: INTERNAL MEDICINE

## 2017-10-05 PROCEDURE — 93005 ELECTROCARDIOGRAM TRACING: CPT | Performed by: EMERGENCY MEDICINE

## 2017-10-05 PROCEDURE — 99283 EMERGENCY DEPT VISIT LOW MDM: CPT

## 2017-10-05 RX ORDER — AMOXICILLIN 500 MG/1
1000 CAPSULE ORAL 3 TIMES DAILY
COMMUNITY
End: 2017-10-27

## 2017-10-05 NOTE — ED NOTES
Pt reports intermittent midsternal chest pain 7/10 for past two days; denies weakness, dizziness, SOA, n/v.  On assessment, pt appears calm; respirations even and unlabored, HR 94 on monitor.     Vinayak Abbott RN  10/05/17 1016

## 2017-10-05 NOTE — TELEPHONE ENCOUNTER
Pt called and complained of having chest pain on 10/4/17 and then again on 10/5/17. Pt rated pain as 8 on pain level scale.Pt complained of chest hurting when taking a deep breath. She thinks that it may have began when her mom walked outside and she couldn't find her. Spoke to Dr Ortega and she recommended pt go to er. Spoke to pt and advised her per Dr Ortega to go to er for chest pain and to f/u here after er visit. Pt fully understood and said that she would go to er today.

## 2017-10-05 NOTE — ED PROVIDER NOTES
" EMERGENCY DEPARTMENT ENCOUNTER    CHIEF COMPLAINT  Chief Complaint: chest pain  History given by: Pt  History limited by: N/A  Room Number: 33/33  PMD: Victorina Ortega MD      HPI:  Pt is a 27 y.o. female who presents with a h/o of asthma complaining of mid sternal sharp chest pain for the last 2 days. She states pain worsens with coughing and exertion, and also c/o of pain to her R shoulder. She denies any fever, and states she might have overused her chest muscle yesterday while working at [employer]. Pt is currently on Amoxicillin for an ear infection.    Duration: 2 days ago  Onset: gradual  Timing: constant  Location: mid-sternal  Radiation: none  Quality: \"sharp\"  Intensity/Severity: moderate  Progression: unchanged  Associated Symptoms: R shoulder pain  Aggravating Factors: coughing, exertion  Alleviating Factors: none  Previous Episodes: No history of chest pain.   Treatment before arrival: No treatment PTA.     PAST MEDICAL HISTORY  Active Ambulatory Problems     Diagnosis Date Noted   • Depression    • Developmental delay    • GERD (gastroesophageal reflux disease)    • Scoliosis    • Amblyopia    • Back pain    • Chronic left shoulder pain 09/29/2017   • Muscle spasm 09/29/2017     Resolved Ambulatory Problems     Diagnosis Date Noted   • No Resolved Ambulatory Problems     Past Medical History:   Diagnosis Date   • Amblyopia    • Back pain    • Cleft palate    • Depression    • Developmental delay    • Ear infection    • GERD (gastroesophageal reflux disease)    • Impetigo    • Kidney abscess    • Kidney stone    • Muscle spasm    • Scoliosis    • Sinus infection    • Sprain of shoulder, left 12/2016   • Urinary tract infection        PAST SURGICAL HISTORY  Past Surgical History:   Procedure Laterality Date   • ABSCESS DRAINAGE      kidney   • ADENOIDECTOMY     • EAR TUBES     • PHARYNGEAL FLAP      for speech   • TONSILLECTOMY         FAMILY HISTORY  Family History   Problem Relation Age of " Onset   • COPD Mother    • Arthritis Mother    • Obesity Mother    • Gestational diabetes Mother    • Cancer Father    • Scoliosis Father    • Rheum arthritis Maternal Aunt    • Diabetes Maternal Uncle    • Alcohol abuse Maternal Uncle    • Rheum arthritis Maternal Grandmother    • COPD Maternal Grandmother    • Stroke Maternal Grandfather        SOCIAL HISTORY  Social History     Social History   • Marital status: Single     Spouse name: N/A   • Number of children: N/A   • Years of education: N/A     Occupational History   • Not on file.     Social History Main Topics   • Smoking status: Current Some Day Smoker   • Smokeless tobacco: Never Used      Comment: smokes hookah 1-2 times per week     • Alcohol use Yes      Comment: occasionally, 1-2 glasses of wine 1-2 x per week   • Drug use: No   • Sexual activity: Yes     Partners: Male     Birth control/ protection: Injection     Other Topics Concern   • Not on file     Social History Narrative    Works at [employer] Sun-Thursday.  Lives at home with her mom and puppy.  Spends a couple of nights per month with her boyfriend.         ALLERGIES  Ciprofloxacin and Dust mite extract    REVIEW OF SYSTEMS  Review of Systems   Constitutional: Negative for fever.   HENT: Negative for sore throat.    Eyes: Negative.    Respiratory: Negative for cough and shortness of breath.    Cardiovascular: Positive for chest pain.   Gastrointestinal: Negative for abdominal pain, diarrhea and vomiting.   Genitourinary: Negative for dysuria.   Musculoskeletal: Positive for arthralgias (R shoulder pain). Negative for neck pain.   Skin: Negative for rash.   Allergic/Immunologic: Negative.    Neurological: Negative for weakness, numbness and headaches.   Hematological: Negative.    Psychiatric/Behavioral: Negative.    All other systems reviewed and are negative.      PHYSICAL EXAM  ED Triage Vitals   Temp Heart Rate Resp BP SpO2   10/05/17 0958 10/05/17 0958 10/05/17 0958 10/05/17 1004 10/05/17 0958    98.7 °F (37.1 °C) 75 16 135/119 99 %      Temp src Heart Rate Source Patient Position BP Location FiO2 (%)   10/05/17 0958 10/05/17 0958 10/05/17 1004 10/05/17 1004 --   Tympanic Monitor Lying Right arm        Physical Exam   Constitutional: She is oriented to person, place, and time and well-developed, well-nourished, and in no distress. No distress.   HENT:   Head: Normocephalic and atraumatic.   Eyes: EOM are normal. Pupils are equal, round, and reactive to light.   Neck: Normal range of motion. Neck supple.   Cardiovascular: Normal rate, regular rhythm and normal heart sounds.    There is no leg swelling or calf tenderness.    Pulmonary/Chest: Effort normal and breath sounds normal. No respiratory distress. She exhibits tenderness (to anterior chest wall.).   Abdominal: Soft. There is no tenderness. There is no rebound and no guarding.   Musculoskeletal: Normal range of motion. She exhibits no edema.   Neurological: She is alert and oriented to person, place, and time. She has normal sensation and normal strength.   Skin: Skin is warm and dry. No rash noted.   Psychiatric: Mood and affect normal.   Nursing note and vitals reviewed.      RADIOLOGY  XR Chest 2 View   Final Result   Negative.       This report was finalized on 10/5/2017 11:52 AM by Dr. Zackery Rivas MD.               I ordered the above noted radiological studies. Interpreted by radiologist. Reviewed by me in PACS.       PROCEDURES  Procedures  EKG           EKG time: 1156  Rhythm/Rate: sinus bradycardia, 52 BPM, PACs  present  QRS, axis: normal   ST and T waves: nonspecific T wave changes     Interpreted Contemporaneously by me, independently viewed  No prior for comparison.       PROGRESS AND CONSULTS  ED Course     1117  I informed pt of plan to get an EKG and XR chest done, and she will be able to go home with pain meds if her workup is negative. She understands and agrees with plan.   1119  Ordered XR chest and EKG for further  evaluation.  1221  I informed pt her XR chest and EKG were negative, and there were some extra beats in her EKG she can f/u for. We discussed plan to discharge home with Ibuprofen. She understands and agrees with plan. All questions addressed.      MEDICAL DECISION MAKING  Results were reviewed/discussed with the patient and they were also made aware of online access. Pt also made aware that some labs, such as cultures, will not be resulted during ER visit and follow up with PMD is necessary.     MDM  Number of Diagnoses or Management Options     Amount and/or Complexity of Data Reviewed  Tests in the radiology section of CPT®: ordered and reviewed (XR chest- Negative. )  Tests in the medicine section of CPT®: reviewed and ordered (See note. )  Independent visualization of images, tracings, or specimens: yes    Patient Progress  Patient progress: stable         DIAGNOSIS  Final diagnoses:   Chest wall pain       DISPOSITION  DISCHARGE    Patient discharged in stable condition.    Reviewed implications of results, diagnosis, meds, responsibility to follow up, warning signs and symptoms of possible worsening, potential complications and reasons to return to ER.    Patient/Family voiced understanding of above instructions.    Discussed plan for discharge, as there is no emergent indication for admission.  Pt/family is agreeable and understands need for follow up and repeat testing.  Pt is aware that discharge does not mean that nothing is wrong but it indicates no emergency is present that requires admission and they must continue care with follow-up as given below or physician of their choice.     FOLLOW-UP  Victorina Ortega MD  [address] [facility] [address] [phone]               Medication List      Stop          ondansetron 4 MG tablet   Commonly known as:  ZOFRAN               Latest Documented Vital Signs:  As of 12:18 PM  BP- 119/91 HR- 68 Temp- 98.7 °F (37.1 °C) (Tympanic) O2 sat-  98%    --  Documentation assistance provided by cat Faria for Dr.Eric Samuel MD.  Information recorded by the scribe was done at my direction and has been verified and validated by me.     Anahi Patel  10/05/17 1225       Cj Baker MD  10/08/17 2017

## 2017-10-06 ENCOUNTER — TELEPHONE (OUTPATIENT)
Dept: SOCIAL WORK | Facility: HOSPITAL | Age: 27
End: 2017-10-06

## 2017-10-06 NOTE — TELEPHONE ENCOUNTER
ER f/u phone call: states she is feeling better and has f/u evert't w/ PCP on 10/27. No questions/concerns

## 2017-10-07 ENCOUNTER — HOSPITAL ENCOUNTER (EMERGENCY)
Facility: HOSPITAL | Age: 27
Discharge: HOME | End: 2017-10-07
Attending: EMERGENCY MEDICINE | Admitting: EMERGENCY MEDICINE

## 2017-10-07 ENCOUNTER — APPOINTMENT (OUTPATIENT)
Dept: CARDIOLOGY | Facility: HOSPITAL | Age: 27
End: 2017-10-07
Attending: EMERGENCY MEDICINE

## 2017-10-07 VITALS
TEMPERATURE: 98.9 F | OXYGEN SATURATION: 100 % | BODY MASS INDEX: 35.01 KG/M2 | RESPIRATION RATE: 18 BRPM | SYSTOLIC BLOOD PRESSURE: 125 MMHG | HEIGHT: 68 IN | WEIGHT: 231 LBS | HEART RATE: 72 BPM | DIASTOLIC BLOOD PRESSURE: 85 MMHG

## 2017-10-07 DIAGNOSIS — M79.652 LEFT THIGH PAIN: Primary | ICD-10-CM

## 2017-10-07 LAB
ANION GAP SERPL CALCULATED.3IONS-SCNC: 11.2 MMOL/L
BASOPHILS # BLD AUTO: 0.03 10*3/MM3 (ref 0–0.2)
BASOPHILS NFR BLD AUTO: 0.3 % (ref 0–1.5)
BH CV LOWER VASCULAR LEFT COMMON FEMORAL AUGMENT: NORMAL
BH CV LOWER VASCULAR LEFT COMMON FEMORAL COMPETENT: NORMAL
BH CV LOWER VASCULAR LEFT COMMON FEMORAL COMPRESS: NORMAL
BH CV LOWER VASCULAR LEFT COMMON FEMORAL PHASIC: NORMAL
BH CV LOWER VASCULAR LEFT COMMON FEMORAL SPONT: NORMAL
BH CV LOWER VASCULAR LEFT DISTAL FEMORAL COMPRESS: NORMAL
BH CV LOWER VASCULAR LEFT GASTRONEMIUS COMPRESS: NORMAL
BH CV LOWER VASCULAR LEFT GREATER SAPH AK COMPRESS: NORMAL
BH CV LOWER VASCULAR LEFT GREATER SAPH BK COMPRESS: NORMAL
BH CV LOWER VASCULAR LEFT LESSER SAPH COMPRESS: NORMAL
BH CV LOWER VASCULAR LEFT MID FEMORAL AUGMENT: NORMAL
BH CV LOWER VASCULAR LEFT MID FEMORAL COMPETENT: NORMAL
BH CV LOWER VASCULAR LEFT MID FEMORAL COMPRESS: NORMAL
BH CV LOWER VASCULAR LEFT MID FEMORAL PHASIC: NORMAL
BH CV LOWER VASCULAR LEFT MID FEMORAL SPONT: NORMAL
BH CV LOWER VASCULAR LEFT PERONEAL COMPRESS: NORMAL
BH CV LOWER VASCULAR LEFT POPLITEAL AUGMENT: NORMAL
BH CV LOWER VASCULAR LEFT POPLITEAL COMPETENT: NORMAL
BH CV LOWER VASCULAR LEFT POPLITEAL COMPRESS: NORMAL
BH CV LOWER VASCULAR LEFT POPLITEAL PHASIC: NORMAL
BH CV LOWER VASCULAR LEFT POPLITEAL SPONT: NORMAL
BH CV LOWER VASCULAR LEFT POSTERIOR TIBIAL COMPRESS: NORMAL
BH CV LOWER VASCULAR LEFT PROXIMAL FEMORAL COMPRESS: NORMAL
BH CV LOWER VASCULAR LEFT SAPHENOFEMORAL JUNCTION AUGMENT: NORMAL
BH CV LOWER VASCULAR LEFT SAPHENOFEMORAL JUNCTION COMPETENT: NORMAL
BH CV LOWER VASCULAR LEFT SAPHENOFEMORAL JUNCTION COMPRESS: NORMAL
BH CV LOWER VASCULAR LEFT SAPHENOFEMORAL JUNCTION PHASIC: NORMAL
BH CV LOWER VASCULAR LEFT SAPHENOFEMORAL JUNCTION SPONT: NORMAL
BH CV LOWER VASCULAR RIGHT COMMON FEMORAL AUGMENT: NORMAL
BH CV LOWER VASCULAR RIGHT COMMON FEMORAL COMPETENT: NORMAL
BH CV LOWER VASCULAR RIGHT COMMON FEMORAL COMPRESS: NORMAL
BH CV LOWER VASCULAR RIGHT COMMON FEMORAL PHASIC: NORMAL
BH CV LOWER VASCULAR RIGHT COMMON FEMORAL SPONT: NORMAL
BUN BLD-MCNC: 9 MG/DL (ref 6–20)
BUN/CREAT SERPL: 10.7 (ref 7–25)
CALCIUM SPEC-SCNC: 9.2 MG/DL (ref 8.6–10.5)
CHLORIDE SERPL-SCNC: 106 MMOL/L (ref 98–107)
CK SERPL-CCNC: 86 U/L (ref 20–180)
CO2 SERPL-SCNC: 24.8 MMOL/L (ref 22–29)
CREAT BLD-MCNC: 0.84 MG/DL (ref 0.57–1)
DEPRECATED RDW RBC AUTO: 43 FL (ref 37–54)
EOSINOPHIL # BLD AUTO: 0.57 10*3/MM3 (ref 0–0.7)
EOSINOPHIL NFR BLD AUTO: 5.7 % (ref 0.3–6.2)
ERYTHROCYTE [DISTWIDTH] IN BLOOD BY AUTOMATED COUNT: 13.7 % (ref 11.7–13)
GFR SERPL CREATININE-BSD FRML MDRD: 82 ML/MIN/1.73
GLUCOSE BLD-MCNC: 96 MG/DL (ref 65–99)
HCG SERPL QL: NEGATIVE
HCT VFR BLD AUTO: 41.1 % (ref 35.6–45.5)
HGB BLD-MCNC: 13.1 G/DL (ref 11.9–15.5)
IMM GRANULOCYTES # BLD: 0.02 10*3/MM3 (ref 0–0.03)
IMM GRANULOCYTES NFR BLD: 0.2 % (ref 0–0.5)
LYMPHOCYTES # BLD AUTO: 3.1 10*3/MM3 (ref 0.9–4.8)
LYMPHOCYTES NFR BLD AUTO: 30.8 % (ref 19.6–45.3)
MCH RBC QN AUTO: 27.4 PG (ref 26.9–32)
MCHC RBC AUTO-ENTMCNC: 31.9 G/DL (ref 32.4–36.3)
MCV RBC AUTO: 86 FL (ref 80.5–98.2)
MONOCYTES # BLD AUTO: 0.87 10*3/MM3 (ref 0.2–1.2)
MONOCYTES NFR BLD AUTO: 8.6 % (ref 5–12)
NEUTROPHILS # BLD AUTO: 5.49 10*3/MM3 (ref 1.9–8.1)
NEUTROPHILS NFR BLD AUTO: 54.4 % (ref 42.7–76)
NRBC BLD MANUAL-RTO: 0 /100 WBC (ref 0–0)
PLATELET # BLD AUTO: 215 10*3/MM3 (ref 140–500)
PMV BLD AUTO: 9.2 FL (ref 6–12)
POTASSIUM BLD-SCNC: 4.2 MMOL/L (ref 3.5–5.2)
RBC # BLD AUTO: 4.78 10*6/MM3 (ref 3.9–5.2)
SODIUM BLD-SCNC: 142 MMOL/L (ref 136–145)
WBC NRBC COR # BLD: 10.08 10*3/MM3 (ref 4.5–10.7)

## 2017-10-07 PROCEDURE — 82550 ASSAY OF CK (CPK): CPT | Performed by: EMERGENCY MEDICINE

## 2017-10-07 PROCEDURE — 84703 CHORIONIC GONADOTROPIN ASSAY: CPT | Performed by: EMERGENCY MEDICINE

## 2017-10-07 PROCEDURE — 85025 COMPLETE CBC W/AUTO DIFF WBC: CPT | Performed by: EMERGENCY MEDICINE

## 2017-10-07 PROCEDURE — 36415 COLL VENOUS BLD VENIPUNCTURE: CPT

## 2017-10-07 PROCEDURE — 93971 EXTREMITY STUDY: CPT

## 2017-10-07 PROCEDURE — 99284 EMERGENCY DEPT VISIT MOD MDM: CPT

## 2017-10-07 PROCEDURE — 80048 BASIC METABOLIC PNL TOTAL CA: CPT | Performed by: EMERGENCY MEDICINE

## 2017-10-07 NOTE — ED PROVIDER NOTES
" EMERGENCY DEPARTMENT ENCOUNTER    CHIEF COMPLAINT  Chief Complaint: Left thigh pain  History given by: Patient  History limited by: Nothing  Room Number: 02/02  PMD: Victorina Ortega MD      HPI:  Pt is a 27 y.o. female who presents complaining of left thigh pain onset earlier today. The pt states she was watching TV when the pain started and denies injury. The pt states she had CP earlier this week, but is has since resolved. Pt denies CP, SOA, fever, chills, and cough. The pt denies recent long travel.       Duration:  Since earlier today  Onset: Gradual  Timing: Constant  Location: Left thigh  Radiation: None  Quality: \"Pain\"  Intensity/Severity: Moderate  Progression: Unchanged  Associated Symptoms: Nothing  Aggravating Factors: Nothing  Alleviating Factors: Nothing  Previous Episodes: None  Treatment before arrival: Nothing    PAST MEDICAL HISTORY  Active Ambulatory Problems     Diagnosis Date Noted   • Depression    • Developmental delay    • GERD (gastroesophageal reflux disease)    • Scoliosis    • Amblyopia    • Back pain    • Chronic left shoulder pain 09/29/2017   • Muscle spasm 09/29/2017     Resolved Ambulatory Problems     Diagnosis Date Noted   • No Resolved Ambulatory Problems     Past Medical History:   Diagnosis Date   • Amblyopia    • Back pain    • Cleft palate    • Depression    • Developmental delay    • Ear infection    • GERD (gastroesophageal reflux disease)    • Impetigo    • Kidney abscess    • Kidney stone    • Muscle spasm    • Scoliosis    • Sinus infection    • Sprain of shoulder, left 12/2016   • Urinary tract infection        PAST SURGICAL HISTORY  Past Surgical History:   Procedure Laterality Date   • ABSCESS DRAINAGE      kidney   • ADENOIDECTOMY     • EAR TUBES     • PHARYNGEAL FLAP      for speech   • TONSILLECTOMY         FAMILY HISTORY  Family History   Problem Relation Age of Onset   • COPD Mother    • Arthritis Mother    • Obesity Mother    • Gestational " diabetes Mother    • Cancer Father    • Scoliosis Father    • Rheum arthritis Maternal Aunt    • Diabetes Maternal Uncle    • Alcohol abuse Maternal Uncle    • Rheum arthritis Maternal Grandmother    • COPD Maternal Grandmother    • Stroke Maternal Grandfather        SOCIAL HISTORY  Social History     Social History   • Marital status: Single     Spouse name: N/A   • Number of children: N/A   • Years of education: N/A     Occupational History   • Not on file.     Social History Main Topics   • Smoking status: Current Some Day Smoker   • Smokeless tobacco: Never Used      Comment: smokes hookah 1-2 times per week     • Alcohol use Yes      Comment: occasionally, 1-2 glasses of wine 1-2 x per week   • Drug use: No   • Sexual activity: Yes     Partners: Male     Birth control/ protection: Injection     Other Topics Concern   • Not on file     Social History Narrative    Works at [employer]-Thursday.  Lives at home with her mom and puppy.  Spends a couple of nights per month with her boyfriend.         ALLERGIES  Ciprofloxacin and Dust mite extract    REVIEW OF SYSTEMS  Review of Systems   Constitutional: Negative for chills and fever.   HENT: Negative for sore throat and trouble swallowing.    Eyes: Negative for visual disturbance.   Respiratory: Negative for cough and shortness of breath.    Cardiovascular: Negative for chest pain, palpitations and leg swelling.   Gastrointestinal: Negative for abdominal pain, diarrhea and vomiting.   Endocrine: Negative.    Genitourinary: Negative for decreased urine volume, dysuria and frequency.   Musculoskeletal: Negative for neck pain.   Skin: Negative for rash.   Allergic/Immunologic: Negative.    Neurological: Negative for syncope, weakness, numbness and headaches.   Hematological: Negative.    Psychiatric/Behavioral: Negative.    All other systems reviewed and are negative.      PHYSICAL EXAM  ED Triage Vitals   Temp Heart Rate Resp BP SpO2   10/07/17 1823 10/07/17 1823 10/07/17  1823 10/07/17 1823 10/07/17 1823   98.9 °F (37.2 °C) 76 16 132/91 97 %      Temp src Heart Rate Source Patient Position BP Location FiO2 (%)   -- 10/07/17 1845 -- -- --    Monitor          Physical Exam   Constitutional: She is oriented to person, place, and time and well-developed, well-nourished, and in no distress. No distress.   HENT:   Head: Normocephalic and atraumatic.   Eyes: EOM are normal. Pupils are equal, round, and reactive to light.   Neck: Normal range of motion. Neck supple.   Cardiovascular: Normal rate, regular rhythm and normal heart sounds.    Pulmonary/Chest: Effort normal and breath sounds normal. No respiratory distress.   Abdominal: Soft. There is no tenderness. There is no rebound and no guarding.   Musculoskeletal: Normal range of motion. She exhibits no edema.   Anterior lateral thigh tenderness in left leg.   Neurological: She is alert and oriented to person, place, and time. She has normal sensation and normal strength.   Skin: Skin is warm and dry. No rash noted.   Psychiatric: Mood and affect normal.   Nursing note and vitals reviewed.      LAB RESULTS  Lab Results (last 24 hours)     Procedure Component Value Units Date/Time    CBC & Differential [[id number]] Collected:  10/07/17 1911    Specimen:  Blood Updated:  10/07/17 1928    Narrative:       The following orders were created for panel order CBC & Differential.  Procedure                               Abnormality         Status                     ---------                               -----------         ------                     CBC Auto Differential[[id number]]        Abnormal            Final result                 Please view results for these tests on the individual orders.    Basic Metabolic Panel [[id number]] Collected:  10/07/17 1911    Specimen:  Blood Updated:  10/07/17 1945     Glucose 96 mg/dL      BUN 9 mg/dL      Creatinine 0.84 mg/dL      Sodium 142 mmol/L      Potassium 4.2 mmol/L      Chloride 106 mmol/L      CO2  24.8 mmol/L      Calcium 9.2 mg/dL      eGFR Non African Amer 82 mL/min/1.73      BUN/Creatinine Ratio 10.7     Anion Gap 11.2 mmol/L     Narrative:       GFR Normal >60  Chronic Kidney Disease <60  Kidney Failure <15    hCG, Serum, Qualitative [[id number]]  (Normal) Collected:  10/07/17 1911    Specimen:  Blood Updated:  10/07/17 1956     HCG Qualitative Negative    CK [[id number]]  (Normal) Collected:  10/07/17 1911    Specimen:  Blood Updated:  10/07/17 1945     Creatine Kinase 86 U/L     CBC Auto Differential [[id number]]  (Abnormal) Collected:  10/07/17 1911    Specimen:  Blood Updated:  10/07/17 1928     WBC 10.08 10*3/mm3      RBC 4.78 10*6/mm3      Hemoglobin 13.1 g/dL      Hematocrit 41.1 %      MCV 86.0 fL      MCH 27.4 pg      MCHC 31.9 (L) g/dL      RDW 13.7 (H) %      RDW-SD 43.0 fl      MPV 9.2 fL      Platelets 215 10*3/mm3      Neutrophil % 54.4 %      Lymphocyte % 30.8 %      Monocyte % 8.6 %      Eosinophil % 5.7 %      Basophil % 0.3 %      Immature Grans % 0.2 %      Neutrophils, Absolute 5.49 10*3/mm3      Lymphocytes, Absolute 3.10 10*3/mm3      Monocytes, Absolute 0.87 10*3/mm3      Eosinophils, Absolute 0.57 10*3/mm3      Basophils, Absolute 0.03 10*3/mm3      Immature Grans, Absolute 0.02 10*3/mm3      nRBC 0.0 /100 WBC           I ordered the above labs and reviewed the results    RADIOLOGY  No orders to display      Duplex Venous LLE - Negative for DVT    I ordered the above noted radiological studies. Interpreted by radiologist. Reviewed by me in PACS.       PROCEDURES  Procedures      PROGRESS AND CONSULTS  ED Course     1852  Labs and Duplex Venous LLE ordered for further evaluation.    2006  I went to recheck the pt, but she was still at US. Discussed the negative US results with the pt's family. Discussed the plan to discharge the pt due to her negative work up. The pt's family understands and agrees with the plan.      MEDICAL DECISION MAKING  Results were reviewed/discussed with the  patient and they were also made aware of online access. Pt also made aware that some labs, such as cultures, will not be resulted during ER visit and follow up with PMD is necessary.     MDM  Number of Diagnoses or Management Options  Left thigh pain:      Amount and/or Complexity of Data Reviewed  Clinical lab tests: reviewed and ordered (Unremarkable)  Tests in the radiology section of CPT®: reviewed and ordered (Duplex Venous LLE - Negative for DVT)  Review and summarize past medical records: yes (Patient was seen in the emergency room  2 days ago for chest pain, had a negative EKG and chest XR)    Patient Progress  Patient progress: stable         DIAGNOSIS  Final diagnoses:   Left thigh pain       DISPOSITION  DISCHARGE    Patient discharged in stable condition.    Reviewed implications of results, diagnosis, meds, responsibility to follow up, warning signs and symptoms of possible worsening, potential complications and reasons to return to ER.    Patient/Family voiced understanding of above instructions.    Discussed plan for discharge, as there is no emergent indication for admission.  Pt/family is agreeable and understands need for follow up and repeat testing.  Pt is aware that discharge does not mean that nothing is wrong but it indicates no emergency is present that requires admission and they must continue care with follow-up as given below or physician of their choice.     FOLLOW-UP  Victorina Ortega MD  [address] [facility] [address] [phone]    Schedule an appointment as soon as possible for a visit  If symptoms worsen or do not improve         Medication List      Stop          amoxicillin 500 MG capsule   Commonly known as:  AMOXIL               Latest Documented Vital Signs:  As of 8:08 PM  BP- 141/96 HR- 59 Temp- 98.9 °F (37.2 °C) O2 sat- 100%    --  Documentation assistance provided by cat Navarro for Dr. Ortega.  Information recorded by the cat was done  at my direction and has been verified and validated by me.         Aric Navarro  10/07/17 2008       Tarik Ortega MD  10/07/17 7234

## 2017-10-07 NOTE — ED NOTES
Patient reports left knee pain started earlier today that radiates to left hip. Denies pain. No swelling noted. Pt states she too muscle relaxer before coming to ER rates pain 9.5/10 states she can not put weight on it or walk. Pt stand without difficulty.      Jeancarlos Griffith RN  10/07/17 7893

## 2017-10-07 NOTE — PROGRESS NOTES
Vascular Lab    LLE Venous doppler completed    Preliminary exam is Negative for DVT    Results to Dr. Jordan Guillen T

## 2017-10-09 ENCOUNTER — TELEPHONE (OUTPATIENT)
Dept: SOCIAL WORK | Facility: HOSPITAL | Age: 27
End: 2017-10-09

## 2017-10-09 VITALS
HEIGHT: 67 IN | HEART RATE: 82 BPM | SYSTOLIC BLOOD PRESSURE: 111 MMHG | RESPIRATION RATE: 17 BRPM | RESPIRATION RATE: 12 BRPM | HEART RATE: 77 BPM | TEMPERATURE: 99.5 F | DIASTOLIC BLOOD PRESSURE: 76 MMHG | SYSTOLIC BLOOD PRESSURE: 141 MMHG | HEART RATE: 80 BPM | DIASTOLIC BLOOD PRESSURE: 59 MMHG | RESPIRATION RATE: 21 BRPM | HEART RATE: 87 BPM | RESPIRATION RATE: 27 BRPM | DIASTOLIC BLOOD PRESSURE: 78 MMHG | SYSTOLIC BLOOD PRESSURE: 76 MMHG | SYSTOLIC BLOOD PRESSURE: 74 MMHG | HEART RATE: 79 BPM | TEMPERATURE: 97.8 F | RESPIRATION RATE: 20 BRPM | WEIGHT: 233 LBS | OXYGEN SATURATION: 99 % | DIASTOLIC BLOOD PRESSURE: 53 MMHG | HEART RATE: 81 BPM | RESPIRATION RATE: 18 BRPM | HEART RATE: 99 BPM | OXYGEN SATURATION: 95 % | OXYGEN SATURATION: 98 % | RESPIRATION RATE: 23 BRPM | SYSTOLIC BLOOD PRESSURE: 101 MMHG | OXYGEN SATURATION: 97 % | OXYGEN SATURATION: 100 % | DIASTOLIC BLOOD PRESSURE: 62 MMHG | HEART RATE: 93 BPM | DIASTOLIC BLOOD PRESSURE: 73 MMHG | SYSTOLIC BLOOD PRESSURE: 92 MMHG | SYSTOLIC BLOOD PRESSURE: 133 MMHG | OXYGEN SATURATION: 96 % | DIASTOLIC BLOOD PRESSURE: 54 MMHG | SYSTOLIC BLOOD PRESSURE: 97 MMHG | DIASTOLIC BLOOD PRESSURE: 63 MMHG

## 2017-10-09 DIAGNOSIS — E66.9 OBESITY WITHOUT SERIOUS COMORBIDITY, UNSPECIFIED CLASSIFICATION, UNSPECIFIED OBESITY TYPE: ICD-10-CM

## 2017-10-09 DIAGNOSIS — R53.83 FATIGUE, UNSPECIFIED TYPE: Primary | ICD-10-CM

## 2017-10-09 DIAGNOSIS — Z13.220 SCREENING FOR HYPERLIPIDEMIA: ICD-10-CM

## 2017-10-09 DIAGNOSIS — Z13.1 SCREENING FOR DIABETES MELLITUS: ICD-10-CM

## 2017-10-09 NOTE — TELEPHONE ENCOUNTER
ER F/U phone call:   Pt states that she is doing better. Intends of keeping scheduled appointments. No other questions or concerns voiced at this time. Denita Sheppard RN

## 2017-10-10 ENCOUNTER — OFFICE (AMBULATORY)
Dept: URBAN - METROPOLITAN AREA CLINIC 64 | Facility: CLINIC | Age: 27
End: 2017-10-10
Payer: COMMERCIAL

## 2017-10-10 ENCOUNTER — AMBULATORY SURGICAL CENTER (AMBULATORY)
Dept: URBAN - METROPOLITAN AREA SURGERY 17 | Facility: SURGERY | Age: 27
End: 2017-10-10
Payer: COMMERCIAL

## 2017-10-10 DIAGNOSIS — K64.8 OTHER HEMORRHOIDS: ICD-10-CM

## 2017-10-10 DIAGNOSIS — K62.5 HEMORRHAGE OF ANUS AND RECTUM: ICD-10-CM

## 2017-10-10 DIAGNOSIS — K63.5 POLYP OF COLON: ICD-10-CM

## 2017-10-10 DIAGNOSIS — R19.4 CHANGE IN BOWEL HABIT: ICD-10-CM

## 2017-10-10 LAB
GI HISTOLOGY: A. UNSPECIFIED: (no result)
GI HISTOLOGY: B. UNSPECIFIED: (no result)
GI HISTOLOGY: PDF REPORT: (no result)

## 2017-10-10 PROCEDURE — 88305 TISSUE EXAM BY PATHOLOGIST: CPT

## 2017-10-10 PROCEDURE — 45380 COLONOSCOPY AND BIOPSY: CPT

## 2017-10-10 RX ADMIN — PROPOFOL 50 MG: 10 INJECTION, EMULSION INTRAVENOUS at 09:16

## 2017-10-10 RX ADMIN — PROPOFOL 50 MG: 10 INJECTION, EMULSION INTRAVENOUS at 09:23

## 2017-10-10 RX ADMIN — PROPOFOL 100 MG: 10 INJECTION, EMULSION INTRAVENOUS at 09:12

## 2017-10-10 RX ADMIN — LIDOCAINE HYDROCHLORIDE 50 MG: 10 INJECTION, SOLUTION EPIDURAL; INFILTRATION; INTRACAUDAL; PERINEURAL at 09:10

## 2017-10-10 RX ADMIN — PROPOFOL 50 MG: 10 INJECTION, EMULSION INTRAVENOUS at 09:20

## 2017-10-10 RX ADMIN — PROPOFOL 50 MG: 10 INJECTION, EMULSION INTRAVENOUS at 09:14

## 2017-10-10 RX ADMIN — PROPOFOL 50 MG: 10 INJECTION, EMULSION INTRAVENOUS at 09:18

## 2017-10-10 NOTE — SERVICENOTES
I think the bleeding was most likely hemorrhoidal. Would suggest she stay on a high fiber cereal daily. Await pathology.

## 2017-10-10 NOTE — SERVICEHPINOTES
Shaunna indicates that recently she has had some rectal bleeding. She's had some intermittent diarrhea she says for some time. Previously with seeing her for upper gastrointestinal complaints. Those symptoms have subsided. She is on Dexilant. She's not having any stomach issues. No nausea or vomiting. There is no family history of colitis or Crohn's disease. No family history of colon cancer. Her weight has been stable. She has developed some asthma since we last saw her. Apparently for the last week she has been on some naproxen and Flexeril for some back problems. She is not a diabetic. She is not on blood thinners. She did start on some amoxicillin today but that was after the bleeding episode.

## 2017-10-19 ENCOUNTER — OFFICE (AMBULATORY)
Dept: URBAN - METROPOLITAN AREA CLINIC 75 | Facility: CLINIC | Age: 27
End: 2017-10-19

## 2017-10-25 ENCOUNTER — OFFICE VISIT (OUTPATIENT)
Dept: ORTHOPEDIC SURGERY | Facility: CLINIC | Age: 27
End: 2017-10-25

## 2017-10-25 VITALS — WEIGHT: 243.2 LBS | BODY MASS INDEX: 38.17 KG/M2 | HEIGHT: 67 IN | TEMPERATURE: 98.2 F

## 2017-10-25 DIAGNOSIS — M54.50 LUMBAR SPINE PAIN: Primary | ICD-10-CM

## 2017-10-25 DIAGNOSIS — M41.129 ADOLESCENT SCOLIOSIS: ICD-10-CM

## 2017-10-25 PROCEDURE — 99213 OFFICE O/P EST LOW 20 MIN: CPT | Performed by: ORTHOPAEDIC SURGERY

## 2017-10-25 PROCEDURE — 72100 X-RAY EXAM L-S SPINE 2/3 VWS: CPT | Performed by: ORTHOPAEDIC SURGERY

## 2017-10-25 NOTE — PROGRESS NOTES
New patient or new problem visit    Chief Complaint   Patient presents with   • Lumbar Spine - Establish Care       HPI: She complains of low back pain ongoing for 5 years accompanied by spasm and rare shooting pains into the left leg.  When these occur they are severe quite intermittent stabbing aching and burning.  She hasn't fallen in her leg is not weak.  She sees Shannon Burr at [facility] for a plethora of complaints.    PFSH: See chart- reviewed    Review of Systems   Constitutional: Negative.  Negative for chills, fever and unexpected weight change.   HENT: Positive for postnasal drip. Negative for trouble swallowing and voice change.    Eyes: Negative.  Negative for visual disturbance.   Respiratory: Negative.  Negative for cough and shortness of breath.    Cardiovascular: Positive for chest pain. Negative for leg swelling.   Gastrointestinal: Negative.  Negative for abdominal pain, nausea and vomiting.   Endocrine: Negative.  Negative for cold intolerance and heat intolerance.   Genitourinary: Negative.  Negative for difficulty urinating, frequency and urgency.   Musculoskeletal: Positive for back pain, gait problem, myalgias, neck pain and neck stiffness.   Skin: Negative.  Negative for rash and wound.   Allergic/Immunologic: Negative.  Negative for immunocompromised state.   Neurological: Positive for dizziness. Negative for weakness and numbness.   Hematological: Negative.  Does not bruise/bleed easily.   Psychiatric/Behavioral: Negative.  Negative for dysphoric mood. The patient is not nervous/anxious.        PE: Constitutional: Vital signs above-noted.  Awake, alert and oriented she is obese    Psychiatric: Affect and insight do not appear grossly disturbed.    Pulmonary: Breathing is unlabored, color is good.    Skin: Warm, dry and normal turgor    Cardiac:  pedal pulses intact.  No edema.    Eyesight and hearing appear adequate for examination purposes      Musculoskeletal:   There is moderate tenderness to percussion and palpation of the spine. Motion appears undisturbed.  Posture is unremarkable to coronal and sagittal inspection.    The skin about the area is intact.  There is no palpable or visible deformity.  There is no local spasm.       Neurologic:   Reflexes are 2+ and symmetrical in the patellae and absent in the Achilles.   Motor function is undisturbed in quadriceps, EHL, and gastrocnemius      Sensation appears symmetrically intact to light touch   .  In the bilateral lower extremities there is no evidence of atrophy.   Clonus is absent..  Gait appears undisturbed.  SLR test negative      MEDICAL DECISION MAKING    XRAY: Plain film x-rays the lumbar spine show minimal disc space narrowing at L5-S1 and slight scoliosis.  I have no comparison films.    Other: n/a    Impression: Lumbar back pain    Plan: For now bracing and physical therapy.  Told her I would write a note for work to limit bending, to the extent possible.  I did encourage her however to continue to work.  If she fails to improve we'll get an MRI the lumbar spine but I don't anticipate anything worrisome.

## 2017-10-27 ENCOUNTER — OFFICE VISIT (OUTPATIENT)
Dept: FAMILY MEDICINE CLINIC | Facility: CLINIC | Age: 27
End: 2017-10-27

## 2017-10-27 VITALS
HEART RATE: 83 BPM | WEIGHT: 239.4 LBS | SYSTOLIC BLOOD PRESSURE: 100 MMHG | RESPIRATION RATE: 16 BRPM | HEIGHT: 67 IN | BODY MASS INDEX: 37.57 KG/M2 | TEMPERATURE: 98 F | OXYGEN SATURATION: 99 % | DIASTOLIC BLOOD PRESSURE: 72 MMHG

## 2017-10-27 DIAGNOSIS — M54.40 CHRONIC BILATERAL LOW BACK PAIN WITH SCIATICA, SCIATICA LATERALITY UNSPECIFIED: Primary | ICD-10-CM

## 2017-10-27 DIAGNOSIS — G89.29 CHRONIC BILATERAL LOW BACK PAIN WITH SCIATICA, SCIATICA LATERALITY UNSPECIFIED: Primary | ICD-10-CM

## 2017-10-27 PROCEDURE — 99213 OFFICE O/P EST LOW 20 MIN: CPT | Performed by: FAMILY MEDICINE

## 2017-10-27 RX ORDER — OMEPRAZOLE 40 MG/1
40 CAPSULE, DELAYED RELEASE ORAL DAILY
Refills: 4 | COMMUNITY
Start: 2017-10-06 | End: 2017-11-15 | Stop reason: ALTCHOICE

## 2017-10-27 RX ORDER — AMOXICILLIN AND CLAVULANATE POTASSIUM 875; 125 MG/1; MG/1
1 TABLET, FILM COATED ORAL 2 TIMES DAILY
Refills: 0 | COMMUNITY
Start: 2017-10-22 | End: 2017-11-15

## 2017-10-27 RX ORDER — NAPROXEN 500 MG/1
500 TABLET ORAL DAILY
Qty: 30 TABLET | Refills: 1 | Status: ON HOLD | OUTPATIENT
Start: 2017-10-27 | End: 2020-10-22

## 2017-10-27 RX ORDER — GUAIFENESIN/PSEUDOEPHEDRNE HCL 600MG-60MG
2 TABLET, EXTENDED RELEASE 12 HR ORAL DAILY
Refills: 0 | COMMUNITY
Start: 2017-10-22

## 2017-11-02 ENCOUNTER — APPOINTMENT (OUTPATIENT)
Dept: PHYSICAL THERAPY | Facility: HOSPITAL | Age: 27
End: 2017-11-02

## 2017-11-05 PROBLEM — J30.2 SEASONAL ALLERGIES: Status: ACTIVE | Noted: 2017-11-05

## 2017-11-13 ENCOUNTER — APPOINTMENT (OUTPATIENT)
Dept: PHYSICAL THERAPY | Facility: HOSPITAL | Age: 27
End: 2017-11-13
Attending: ORTHOPAEDIC SURGERY

## 2017-11-15 ENCOUNTER — OFFICE VISIT (OUTPATIENT)
Dept: FAMILY MEDICINE CLINIC | Facility: CLINIC | Age: 27
End: 2017-11-15

## 2017-11-15 VITALS
WEIGHT: 239 LBS | HEIGHT: 67 IN | OXYGEN SATURATION: 98 % | DIASTOLIC BLOOD PRESSURE: 88 MMHG | HEART RATE: 70 BPM | BODY MASS INDEX: 37.51 KG/M2 | SYSTOLIC BLOOD PRESSURE: 132 MMHG

## 2017-11-15 DIAGNOSIS — R00.2 PALPITATIONS: ICD-10-CM

## 2017-11-15 DIAGNOSIS — F41.0 PANIC ATTACKS: Primary | ICD-10-CM

## 2017-11-15 DIAGNOSIS — H60.503 ACUTE OTITIS EXTERNA OF BOTH EARS, UNSPECIFIED TYPE: ICD-10-CM

## 2017-11-15 PROCEDURE — 99213 OFFICE O/P EST LOW 20 MIN: CPT | Performed by: FAMILY MEDICINE

## 2017-11-15 RX ORDER — CIPROFLOXACIN AND DEXAMETHASONE 3; 1 MG/ML; MG/ML
4 SUSPENSION/ DROPS AURICULAR (OTIC) 2 TIMES DAILY
Qty: 7.5 ML | Refills: 0 | Status: SHIPPED | OUTPATIENT
Start: 2017-11-15 | End: 2017-11-22

## 2017-11-15 NOTE — PROGRESS NOTES
"Subjective   Beatriz Boland is a 27 y.o. female.     Chief Complaint   Patient presents with   • Panic Attack   • Palpitations   • Sinus Problem       HPI     Panic attack:  -heart racing, palpitations, \"nerves tensed up,\" sweating, anxious  -started about 1.5 hr ago while she was watching one of her favorite TV show in bed  -no chest pain, but she always worries about her heart when this happens  -no dizziness  -panic attacks are becoming more frequent and disruptive to her life  -she is under a lot of stress due to conflict with her mother, who she lives with  -she has seen a Psychiatrist in the past and reports not tolerating any mood stabilizing medications in the past    Possible sinus infection:  -runny nose, ear and sinus pressure  -no fevers  -no myalgias  -no rash       Review of Systems   Constitutional: Negative for appetite change, fever and unexpected weight change.   HENT: Positive for congestion, ear discharge, ear pain, rhinorrhea and sinus pressure. Negative for hearing loss and sore throat.    Respiratory: Positive for chest tightness. Negative for cough, shortness of breath and wheezing.    Cardiovascular: Positive for palpitations. Negative for chest pain.   Gastrointestinal: Negative for abdominal pain, constipation, diarrhea, nausea and vomiting.   Musculoskeletal: Negative for arthralgias and myalgias.   Skin: Negative for rash and wound.   Neurological: Negative for dizziness and headaches.   Psychiatric/Behavioral: Negative for dysphoric mood, self-injury and sleep disturbance. The patient is nervous/anxious.        The following portions of the patient's history were reviewed and updated as appropriate: allergies, current medications, past family history, past medical history, past social history, past surgical history and problem list.    Past Medical History:   Diagnosis Date   • Amblyopia    • Back pain    • Cleft palate    • Depression    • Developmental delay    • Ear infection  "    recurrent   • GERD (gastroesophageal reflux disease)    • Impetigo    • Kidney abscess    • Kidney stone    • Muscle spasm    • Scoliosis    • Sinus infection     recurrent   • Sprain of shoulder, left 12/2016   • Urinary tract infection      Past Surgical History:   Procedure Laterality Date   • ABSCESS DRAINAGE      kidney   • ADENOIDECTOMY     • EAR TUBES     • PHARYNGEAL FLAP      for speech   • TONSILLECTOMY       Family History   Problem Relation Age of Onset   • COPD Mother    • Arthritis Mother    • Obesity Mother    • Gestational diabetes Mother    • Cancer Father    • Scoliosis Father    • Rheum arthritis Maternal Aunt    • Diabetes Maternal Uncle    • Alcohol abuse Maternal Uncle    • Rheum arthritis Maternal Grandmother    • COPD Maternal Grandmother    • Stroke Maternal Grandfather      Social History     Social History   • Marital status: Single     Social History Main Topics   • Smoking status: Never Smoker   • Smokeless tobacco: Never Used      Comment: smokes hookah 1-2 times per week     • Alcohol use Yes      Comment: occasionally, 1-2 glasses of wine 1-2 x per week   • Drug use: No   • Sexual activity: Yes     Partners: Male     Birth control/ protection: Injection     Other Topics Concern   • Not on file     Social History Narrative    Works at [employer] Sun-Thursday.  Lives at home with her mom and puppy.  Spends a couple of nights per month with her boyfriend.          Allergies   Allergen Reactions   • Ciprofloxacin Diarrhea and Nausea And Vomiting     Tingling in left leg   • Dust Mite Extract      Other reaction(s): Other (See Comments)  Nasal symptoms          Current Outpatient Prescriptions:   •  albuterol (PROVENTIL HFA;VENTOLIN HFA) 108 (90 Base) MCG/ACT inhaler, Inhale 2 puffs Every 4 (Four) Hours As Needed for Wheezing., Disp: 18 g, Rfl: 6  •  Chlorcyclizine-Pseudoephed (STAHIST AD) 25-60 MG tablet, Take 25 mg by mouth Daily., Disp: 42 tablet, Rfl: 0  •  [employer] NASAL ALLERGY SPRAY 55  MCG/ACT nasal inhaler, 2 sprays into each nostril Daily., Disp: , Rfl: 0  •  cyclobenzaprine (FLEXERIL) 10 MG tablet, Take 1 tablet by mouth 3 (Three) Times a Day As Needed for Muscle Spasms., Disp: 45 tablet, Rfl: 1  •  DEXILANT 60 MG capsule, Take 60 mg by mouth Daily., Disp: , Rfl: 4  •  estradiol cypionate (DEPO-ESTRADIOL) 5 MG/ML injection, Inject 1.5 mg into the shoulder, thigh, or buttocks Every 28 (Twenty-Eight) Days. Patient states she takes it every 3 months, Disp: , Rfl:   •  ibuprofen (ADVIL,MOTRIN) 600 MG tablet, Take 1 tablet by mouth Every 8 (Eight) Hours As Needed for mild pain (1-3)., Disp: 30 tablet, Rfl: 0  •  naproxen (EC NAPROSYN) 500 MG EC tablet, Take 1 tablet by mouth Daily. With food, Disp: 30 tablet, Rfl: 1      Objective     Vitals:    11/15/17 1031   BP: 132/88   Pulse: 70   SpO2: 98%   RR 16    Physical Exam   Constitutional: She is oriented to person, place, and time. Vital signs are normal. She appears well-developed and well-nourished. No distress.   BMI 37.5   HENT:   Head: Normocephalic and atraumatic.   Right Ear: Tympanic membrane normal. There is drainage and tenderness.   Left Ear: Tympanic membrane normal. There is drainage and tenderness.   Nose: Nose normal.   Mouth/Throat: Oropharynx is clear and moist.   Eyes: Conjunctivae are normal. Pupils are equal, round, and reactive to light.   amblyopia   Neck: No thyromegaly present.   Cardiovascular: Normal rate, regular rhythm, S1 normal, S2 normal and normal heart sounds.  Exam reveals no gallop and no friction rub.    No murmur heard.  Pulses:       Radial pulses are 2+ on the right side, and 2+ on the left side.   Pulmonary/Chest: Effort normal and breath sounds normal. She has no wheezes. She has no rales.   Abdominal: Soft. Bowel sounds are normal. She exhibits no distension. There is no hepatosplenomegaly. There is no tenderness. There is no rebound and no guarding.   Musculoskeletal: She exhibits no edema or deformity.  "  Lymphadenopathy:     She has no cervical adenopathy.        Right: No supraclavicular adenopathy present.        Left: No supraclavicular adenopathy present.   Neurological: She is alert and oriented to person, place, and time. She has normal strength. Gait normal.   Skin: Skin is warm and dry. No cyanosis. Nails show no clubbing.   Psychiatric: Her mood appears anxious. She is agitated.   Nursing note and vitals reviewed.      EKG: sinus bradycardia at 55 bpm    ASSESSMENT/PLAN       Problem List Items Addressed This Visit        Cardiovascular and Mediastinum    Palpitations    Relevant Orders    ECG 12 Lead, sinus bradycardia which is consistent with her prior EKGs, pt reassured       Nervous and Auditory    Acute otitis externa of both ears, recurrent problem    Relevant Medications    ciprofloxacin-dexamethasone (CIPRODEX) 0.3-0.1 % otic suspension (pt has tolerated this treatment well in the past despite ciprofloxacin allergy when taken systemically)       Other    Panic attacks - Primary    Relevant Orders    Ambulatory Referral to Psychiatry            Patient Instructions   You may take the rest of the day off work.    Call your ENT and schedule a follow up appointment.      Otitis Externa  Otitis externa is a bacterial or fungal infection of the outer ear canal. This is the area from the eardrum to the outside of the ear. Otitis externa is sometimes called \"swimmer's ear.\"  CAUSES   Possible causes of infection include:  · Swimming in dirty water.  · Moisture remaining in the ear after swimming or bathing.  · Mild injury (trauma) to the ear.  · Objects stuck in the ear (foreign body).  · Cuts or scrapes (abrasions) on the outside of the ear.  SIGNS AND SYMPTOMS   The first symptom of infection is often itching in the ear canal. Later signs and symptoms may include swelling and redness of the ear canal, ear pain, and yellowish-white fluid (pus) coming from the ear. The ear pain may be worse when pulling " on the earlobe.  DIAGNOSIS   Your health care provider will perform a physical exam. A sample of fluid may be taken from the ear and examined for bacteria or fungi.  TREATMENT   Antibiotic ear drops are often given for 10 to 14 days. Treatment may also include pain medicine or corticosteroids to reduce itching and swelling.  HOME CARE INSTRUCTIONS   · Apply antibiotic ear drops to the ear canal as prescribed by your health care provider.  · Take medicines only as directed by your health care provider.  · If you have diabetes, follow any additional treatment instructions from your health care provider.  · Keep all follow-up visits as directed by your health care provider.  PREVENTION   · Keep your ear dry. Use the corner of a towel to absorb water out of the ear canal after swimming or bathing.  · Avoid scratching or putting objects inside your ear. This can damage the ear canal or remove the protective wax that lines the canal. This makes it easier for bacteria and fungi to grow.  · Avoid swimming in lakes, polluted water, or poorly chlorinated pools.  · You may use ear drops made of rubbing alcohol and vinegar after swimming. Combine equal parts of white vinegar and alcohol in a bottle. Put 3 or 4 drops into each ear after swimming.  SEEK MEDICAL CARE IF:   · You have a fever.  · Your ear is still red, swollen, painful, or draining pus after 3 days.  · Your redness, swelling, or pain gets worse.  · You have a severe headache.  · You have redness, swelling, pain, or tenderness in the area behind your ear.  MAKE SURE YOU:   · Understand these instructions.  · Will watch your condition.  · Will get help right away if you are not doing well or get worse.     This information is not intended to replace advice given to you by your health care provider. Make sure you discuss any questions you have with your health care provider.     Document Released: 12/18/2006 Document Revised: 01/08/2016 Document Reviewed:  09/26/2016  [employer] Interactive Patient Education ©2017 [employer] Inc.      Return in about 1 month (around 12/15/2017).      Victorina Ortega MD  11/15/17

## 2017-11-15 NOTE — PATIENT INSTRUCTIONS
"You may take the rest of the day off work.    Call your ENT and schedule a follow up appointment.      Otitis Externa  Otitis externa is a bacterial or fungal infection of the outer ear canal. This is the area from the eardrum to the outside of the ear. Otitis externa is sometimes called \"swimmer's ear.\"  CAUSES   Possible causes of infection include:  · Swimming in dirty water.  · Moisture remaining in the ear after swimming or bathing.  · Mild injury (trauma) to the ear.  · Objects stuck in the ear (foreign body).  · Cuts or scrapes (abrasions) on the outside of the ear.  SIGNS AND SYMPTOMS   The first symptom of infection is often itching in the ear canal. Later signs and symptoms may include swelling and redness of the ear canal, ear pain, and yellowish-white fluid (pus) coming from the ear. The ear pain may be worse when pulling on the earlobe.  DIAGNOSIS   Your health care provider will perform a physical exam. A sample of fluid may be taken from the ear and examined for bacteria or fungi.  TREATMENT   Antibiotic ear drops are often given for 10 to 14 days. Treatment may also include pain medicine or corticosteroids to reduce itching and swelling.  HOME CARE INSTRUCTIONS   · Apply antibiotic ear drops to the ear canal as prescribed by your health care provider.  · Take medicines only as directed by your health care provider.  · If you have diabetes, follow any additional treatment instructions from your health care provider.  · Keep all follow-up visits as directed by your health care provider.  PREVENTION   · Keep your ear dry. Use the corner of a towel to absorb water out of the ear canal after swimming or bathing.  · Avoid scratching or putting objects inside your ear. This can damage the ear canal or remove the protective wax that lines the canal. This makes it easier for bacteria and fungi to grow.  · Avoid swimming in lakes, polluted water, or poorly chlorinated pools.  · You may use ear drops made of " rubbing alcohol and vinegar after swimming. Combine equal parts of white vinegar and alcohol in a bottle. Put 3 or 4 drops into each ear after swimming.  SEEK MEDICAL CARE IF:   · You have a fever.  · Your ear is still red, swollen, painful, or draining pus after 3 days.  · Your redness, swelling, or pain gets worse.  · You have a severe headache.  · You have redness, swelling, pain, or tenderness in the area behind your ear.  MAKE SURE YOU:   · Understand these instructions.  · Will watch your condition.  · Will get help right away if you are not doing well or get worse.     This information is not intended to replace advice given to you by your health care provider. Make sure you discuss any questions you have with your health care provider.     Document Released: 12/18/2006 Document Revised: 01/08/2016 Document Reviewed: 09/26/2016  [employer] Interactive Patient Education ©2017 Elsevier Inc.

## 2017-11-28 ENCOUNTER — APPOINTMENT (OUTPATIENT)
Dept: GENERAL RADIOLOGY | Facility: HOSPITAL | Age: 27
End: 2017-11-28

## 2017-11-28 ENCOUNTER — HOSPITAL ENCOUNTER (EMERGENCY)
Facility: HOSPITAL | Age: 27
Discharge: HOME | End: 2017-11-29
Attending: EMERGENCY MEDICINE | Admitting: EMERGENCY MEDICINE

## 2017-11-28 VITALS
HEIGHT: 67 IN | DIASTOLIC BLOOD PRESSURE: 86 MMHG | BODY MASS INDEX: 37.67 KG/M2 | WEIGHT: 240 LBS | OXYGEN SATURATION: 100 % | HEART RATE: 102 BPM | TEMPERATURE: 102.2 F | SYSTOLIC BLOOD PRESSURE: 126 MMHG | RESPIRATION RATE: 20 BRPM

## 2017-11-28 DIAGNOSIS — J01.90 ACUTE SINUSITIS, RECURRENCE NOT SPECIFIED, UNSPECIFIED LOCATION: Primary | ICD-10-CM

## 2017-11-28 LAB
ALBUMIN SERPL-MCNC: 3.9 G/DL (ref 3.5–5.2)
ALBUMIN/GLOB SERPL: 1 G/DL
ALP SERPL-CCNC: 77 U/L (ref 39–117)
ALT SERPL W P-5'-P-CCNC: 9 U/L (ref 1–33)
ANION GAP SERPL CALCULATED.3IONS-SCNC: 17.2 MMOL/L
AST SERPL-CCNC: 14 U/L (ref 1–32)
BACTERIA UR QL AUTO: ABNORMAL /HPF
BASOPHILS # BLD AUTO: 0.02 10*3/MM3 (ref 0–0.2)
BASOPHILS NFR BLD AUTO: 0.1 % (ref 0–1.5)
BILIRUB SERPL-MCNC: 0.4 MG/DL (ref 0.1–1.2)
BILIRUB UR QL STRIP: NEGATIVE
BUN BLD-MCNC: 9 MG/DL (ref 6–20)
BUN/CREAT SERPL: 11.1 (ref 7–25)
CALCIUM SPEC-SCNC: 9.4 MG/DL (ref 8.6–10.5)
CHLORIDE SERPL-SCNC: 100 MMOL/L (ref 98–107)
CLARITY UR: ABNORMAL
CO2 SERPL-SCNC: 19.8 MMOL/L (ref 22–29)
COLOR UR: YELLOW
CREAT BLD-MCNC: 0.81 MG/DL (ref 0.57–1)
D-LACTATE SERPL-SCNC: 1.1 MMOL/L (ref 0.5–2)
DEPRECATED RDW RBC AUTO: 43.3 FL (ref 37–54)
EOSINOPHIL # BLD AUTO: 0 10*3/MM3 (ref 0–0.7)
EOSINOPHIL NFR BLD AUTO: 0 % (ref 0.3–6.2)
ERYTHROCYTE [DISTWIDTH] IN BLOOD BY AUTOMATED COUNT: 14 % (ref 11.7–13)
FLUAV AG NPH QL: NEGATIVE
FLUBV AG NPH QL IA: NEGATIVE
GFR SERPL CREATININE-BSD FRML MDRD: 85 ML/MIN/1.73
GLOBULIN UR ELPH-MCNC: 3.8 GM/DL
GLUCOSE BLD-MCNC: 133 MG/DL (ref 65–99)
GLUCOSE UR STRIP-MCNC: NEGATIVE MG/DL
HCT VFR BLD AUTO: 41.9 % (ref 35.6–45.5)
HGB BLD-MCNC: 13.5 G/DL (ref 11.9–15.5)
HGB UR QL STRIP.AUTO: ABNORMAL
HOLD SPECIMEN: NORMAL
HOLD SPECIMEN: NORMAL
HYALINE CASTS UR QL AUTO: ABNORMAL /LPF
IMM GRANULOCYTES # BLD: 0.06 10*3/MM3 (ref 0–0.03)
IMM GRANULOCYTES NFR BLD: 0.3 % (ref 0–0.5)
KETONES UR QL STRIP: ABNORMAL
LEUKOCYTE ESTERASE UR QL STRIP.AUTO: ABNORMAL
LYMPHOCYTES # BLD AUTO: 1.51 10*3/MM3 (ref 0.9–4.8)
LYMPHOCYTES NFR BLD AUTO: 8.3 % (ref 19.6–45.3)
MCH RBC QN AUTO: 27.3 PG (ref 26.9–32)
MCHC RBC AUTO-ENTMCNC: 32.2 G/DL (ref 32.4–36.3)
MCV RBC AUTO: 84.6 FL (ref 80.5–98.2)
MONOCYTES # BLD AUTO: 0.94 10*3/MM3 (ref 0.2–1.2)
MONOCYTES NFR BLD AUTO: 5.2 % (ref 5–12)
NEUTROPHILS # BLD AUTO: 15.62 10*3/MM3 (ref 1.9–8.1)
NEUTROPHILS NFR BLD AUTO: 86.1 % (ref 42.7–76)
NITRITE UR QL STRIP: NEGATIVE
PH UR STRIP.AUTO: 6.5 [PH] (ref 5–8)
PLATELET # BLD AUTO: 209 10*3/MM3 (ref 140–500)
PMV BLD AUTO: 9.5 FL (ref 6–12)
POTASSIUM BLD-SCNC: 3.6 MMOL/L (ref 3.5–5.2)
PROT SERPL-MCNC: 7.7 G/DL (ref 6–8.5)
PROT UR QL STRIP: NEGATIVE
RBC # BLD AUTO: 4.95 10*6/MM3 (ref 3.9–5.2)
RBC # UR: ABNORMAL /HPF
REF LAB TEST METHOD: ABNORMAL
S PYO AG THROAT QL: NEGATIVE
SODIUM BLD-SCNC: 137 MMOL/L (ref 136–145)
SP GR UR STRIP: 1.01 (ref 1–1.03)
SQUAMOUS #/AREA URNS HPF: ABNORMAL /HPF
UROBILINOGEN UR QL STRIP: ABNORMAL
WBC NRBC COR # BLD: 18.15 10*3/MM3 (ref 4.5–10.7)
WBC UR QL AUTO: ABNORMAL /HPF
WHOLE BLOOD HOLD SPECIMEN: NORMAL
WHOLE BLOOD HOLD SPECIMEN: NORMAL

## 2017-11-28 PROCEDURE — 99284 EMERGENCY DEPT VISIT MOD MDM: CPT

## 2017-11-28 PROCEDURE — 87804 INFLUENZA ASSAY W/OPTIC: CPT | Performed by: EMERGENCY MEDICINE

## 2017-11-28 PROCEDURE — 87081 CULTURE SCREEN ONLY: CPT | Performed by: PHYSICIAN ASSISTANT

## 2017-11-28 PROCEDURE — 81001 URINALYSIS AUTO W/SCOPE: CPT | Performed by: EMERGENCY MEDICINE

## 2017-11-28 PROCEDURE — 83605 ASSAY OF LACTIC ACID: CPT | Performed by: EMERGENCY MEDICINE

## 2017-11-28 PROCEDURE — 36415 COLL VENOUS BLD VENIPUNCTURE: CPT | Performed by: EMERGENCY MEDICINE

## 2017-11-28 PROCEDURE — 85025 COMPLETE CBC W/AUTO DIFF WBC: CPT | Performed by: EMERGENCY MEDICINE

## 2017-11-28 PROCEDURE — 87880 STREP A ASSAY W/OPTIC: CPT | Performed by: PHYSICIAN ASSISTANT

## 2017-11-28 PROCEDURE — 87086 URINE CULTURE/COLONY COUNT: CPT | Performed by: EMERGENCY MEDICINE

## 2017-11-28 PROCEDURE — 71020 HC CHEST PA AND LATERAL: CPT

## 2017-11-28 PROCEDURE — 96360 HYDRATION IV INFUSION INIT: CPT

## 2017-11-28 PROCEDURE — 87040 BLOOD CULTURE FOR BACTERIA: CPT | Performed by: EMERGENCY MEDICINE

## 2017-11-28 PROCEDURE — 80053 COMPREHEN METABOLIC PANEL: CPT | Performed by: EMERGENCY MEDICINE

## 2017-11-28 RX ORDER — ACETAMINOPHEN 500 MG
TABLET ORAL
Status: COMPLETED
Start: 2017-11-28 | End: 2017-11-28

## 2017-11-28 RX ORDER — SODIUM CHLORIDE 0.9 % (FLUSH) 0.9 %
10 SYRINGE (ML) INJECTION AS NEEDED
Status: DISCONTINUED | OUTPATIENT
Start: 2017-11-28 | End: 2017-11-29 | Stop reason: HOSPADM

## 2017-11-28 RX ORDER — ACETAMINOPHEN 500 MG
1000 TABLET ORAL ONCE
Status: COMPLETED | OUTPATIENT
Start: 2017-11-28 | End: 2017-11-28

## 2017-11-28 RX ADMIN — Medication 1000 MG: at 17:21

## 2017-11-28 RX ADMIN — ACETAMINOPHEN 1000 MG: 500 TABLET ORAL at 17:21

## 2017-11-28 RX ADMIN — SODIUM CHLORIDE 1000 ML: 9 INJECTION, SOLUTION INTRAVENOUS at 21:13

## 2017-11-29 RX ORDER — AMOXICILLIN AND CLAVULANATE POTASSIUM 875; 125 MG/1; MG/1
1 TABLET, FILM COATED ORAL 2 TIMES DAILY
Qty: 20 TABLET | Refills: 0 | Status: SHIPPED | OUTPATIENT
Start: 2017-11-29 | End: 2018-01-04

## 2017-11-30 LAB
BACTERIA SPEC AEROBE CULT: NORMAL
BACTERIA SPEC AEROBE CULT: NORMAL

## 2017-12-01 ENCOUNTER — TELEPHONE (OUTPATIENT)
Dept: SOCIAL WORK | Facility: HOSPITAL | Age: 27
End: 2017-12-01

## 2017-12-01 LAB — BACTERIA SPEC AEROBE CULT: NORMAL

## 2017-12-01 NOTE — TELEPHONE ENCOUNTER
ED f/u phone call: spoke w/ both patient and mother. No longer has a fever and feels a bit better, but continues to have a lot of congestion. They asked how long it should take for the Augmentin to take effect. After speaking w/ MNarendra Cadet, pharmacist, informed them that it will take 3-4 days to have a noticeable effect, and she may not feel back to normal until she has completed the entire course. Patient states she is also Neti-pot. Advised to continue to use, and when asked, informed her that the rinse mix contains saline, which may give it a salty taste. No other questions/concerns

## 2017-12-03 LAB
BACTERIA SPEC AEROBE CULT: NORMAL
BACTERIA SPEC AEROBE CULT: NORMAL

## 2017-12-26 DIAGNOSIS — M54.5 CHRONIC BILATERAL LOW BACK PAIN, WITH SCIATICA PRESENCE UNSPECIFIED: Primary | ICD-10-CM

## 2017-12-26 DIAGNOSIS — G89.29 CHRONIC BILATERAL LOW BACK PAIN, WITH SCIATICA PRESENCE UNSPECIFIED: Primary | ICD-10-CM

## 2018-01-04 ENCOUNTER — OFFICE VISIT (OUTPATIENT)
Dept: FAMILY MEDICINE CLINIC | Facility: CLINIC | Age: 28
End: 2018-01-04

## 2018-01-04 VITALS
RESPIRATION RATE: 14 BRPM | BODY MASS INDEX: 37.83 KG/M2 | HEIGHT: 67 IN | OXYGEN SATURATION: 98 % | HEART RATE: 70 BPM | SYSTOLIC BLOOD PRESSURE: 124 MMHG | WEIGHT: 241 LBS | DIASTOLIC BLOOD PRESSURE: 86 MMHG

## 2018-01-04 DIAGNOSIS — R07.89 ATYPICAL CHEST PAIN: Primary | ICD-10-CM

## 2018-01-04 DIAGNOSIS — F41.9 ANXIETY: ICD-10-CM

## 2018-01-04 PROCEDURE — 99213 OFFICE O/P EST LOW 20 MIN: CPT | Performed by: FAMILY MEDICINE

## 2018-01-04 RX ORDER — BUSPIRONE HYDROCHLORIDE 5 MG/1
5 TABLET ORAL 3 TIMES DAILY PRN
Qty: 90 TABLET | Refills: 1 | Status: SHIPPED | OUTPATIENT
Start: 2018-01-04 | End: 2018-05-22

## 2018-01-04 NOTE — PROGRESS NOTES
Subjective   Beatriz Boland is a 27 y.o. female.     Chief Complaint   Patient presents with   • Chest Pain       HPI     Chest pain:  -sharp and needle-like pain  -located in the center of the sternum  -worsened by deep breathing and stress  -occurring off and on for months  -she has been to the ER a few times  -no palpitations or SOA  -occasional dizziness, but pt associates this more with chronic sinusitis   -pt worries that her heart may be damaged  -she has a hx of anxiety and depression with panic attacks  -she is following up with her therapist again soon  -she is reluctant to add an SSRI to her current medication regimen but would be open to a PRN anxiety medication  -last ECG in November 2017 (2 months ago) showed sinus bradycardia  -pt hasn't seen a Cardiologist since early childhood (at which time she had a benign heart murmur)    Review of Systems   Constitutional: Negative for fatigue and fever.   HENT: Negative for sore throat.    Respiratory: Positive for chest tightness. Negative for cough, shortness of breath and wheezing.    Cardiovascular: Positive for chest pain. Negative for palpitations and leg swelling.   Gastrointestinal: Negative for abdominal pain and nausea.   Neurological: Positive for dizziness and light-headedness. Negative for syncope.   Psychiatric/Behavioral: Positive for dysphoric mood. Negative for suicidal ideas. The patient is nervous/anxious.        The following portions of the patient's history were reviewed and updated as appropriate: allergies, current medications, past family history, past medical history, past social history, past surgical history and problem list.    Past Medical History:   Diagnosis Date   • Amblyopia    • Back pain    • Cleft palate    • Depression    • Developmental delay    • Ear infection     recurrent   • GERD (gastroesophageal reflux disease)    • Impetigo    • Kidney abscess    • Kidney stone    • Muscle spasm    • Scoliosis    • Sinus  infection     recurrent   • Sprain of shoulder, left 12/2016   • Urinary tract infection      Past Surgical History:   Procedure Laterality Date   • ABSCESS DRAINAGE      kidney   • ADENOIDECTOMY     • EAR TUBES     • PHARYNGEAL FLAP      for speech   • TONSILLECTOMY       Family History   Problem Relation Age of Onset   • COPD Mother    • Arthritis Mother    • Obesity Mother    • Gestational diabetes Mother    • Cancer Father    • Scoliosis Father    • Rheum arthritis Maternal Aunt    • Diabetes Maternal Uncle    • Alcohol abuse Maternal Uncle    • Rheum arthritis Maternal Grandmother    • COPD Maternal Grandmother    • Stroke Maternal Grandfather      Social History     Social History   • Marital status: Single     Social History Main Topics   • Smoking status: Never Smoker   • Smokeless tobacco: Never Used      Comment: smokes hookah 1-2 times per week     • Alcohol use Yes      Comment: occasionally, 1-2 glasses of wine 1-2 x per week   • Drug use: No   • Sexual activity: Yes     Partners: Male     Birth control/ protection: Injection     Social History Narrative    Works at [employer] Sun-Thursday.  Lives at home with her mom and puppy.  Spends a couple of nights per month with her boyfriend.          Allergies   Allergen Reactions   • Ciprofloxacin Diarrhea and Nausea And Vomiting     Tingling in left leg   • Dust Mite Extract      Other reaction(s): Other (See Comments)  Nasal symptoms        Outpatient Medications Prior to Visit   Medication Sig Dispense Refill   • albuterol (PROVENTIL HFA;VENTOLIN HFA) 108 (90 Base) MCG/ACT inhaler Inhale 2 puffs Every 4 (Four) Hours As Needed for Wheezing. 18 g 6   • Chlorcyclizine-Pseudoephed (STAHIST AD) 25-60 MG tablet Take 25 mg by mouth Daily. 42 tablet 0   • [employer]S NASAL ALLERGY SPRAY 55 MCG/ACT nasal inhaler 2 sprays into each nostril Daily.  0   • cyclobenzaprine (FLEXERIL) 10 MG tablet Take 1 tablet by mouth 3 (Three) Times a Day As Needed for Muscle Spasms. 45 tablet 1   •  DEXILANT 60 MG capsule Take 60 mg by mouth Daily.  4   • estradiol cypionate (DEPO-ESTRADIOL) 5 MG/ML injection Inject 1.5 mg into the shoulder, thigh, or buttocks Every 28 (Twenty-Eight) Days. Patient states she takes it every 3 months     • ibuprofen (ADVIL,MOTRIN) 600 MG tablet Take 1 tablet by mouth Every 8 (Eight) Hours As Needed for mild pain (1-3). 30 tablet 0   • naproxen (EC NAPROSYN) 500 MG EC tablet Take 1 tablet by mouth Daily. With food 30 tablet 1   • pseudoephedrine (SUDAFED) 15 MG/5ML liquid liquid Take  by mouth.           Objective     Vitals:    01/04/18 1121   BP: 124/86   Pulse: 70   Resp: 14   SpO2: 98%   BMI 38    Physical Exam   Constitutional: She appears well-developed and well-nourished. No distress.   HENT:   Head: Normocephalic and atraumatic.   Cardiovascular: Normal rate, regular rhythm and normal heart sounds.  Exam reveals no gallop and no friction rub.    No murmur heard.  Pulses:       Radial pulses are 2+ on the right side, and 2+ on the left side.   Chest wall tender to palpation bilaterally   Pulmonary/Chest: Effort normal and breath sounds normal. No respiratory distress. She has no wheezes. She has no rhonchi. She has no rales.   Abdominal: Soft. Bowel sounds are normal. She exhibits no distension. There is no tenderness.   Skin: Skin is warm and dry.       ASSESSMENT/PLAN       Problem List Items Addressed This Visit        Nervous and Auditory    Atypical chest pain - Primary, most likely panic attack vs. Costochondritis, but will refer to Cardiology to rule out cardiac etiology of recurrent chest pain    Relevant Orders    Ambulatory Referral to Cardiology   Pt advised to schedule Naproxen 500 mg daily x 3 days       Other    Anxiety    Relevant Medications    Trial of busPIRone (BUSPAR) 5 MG tablet            Patient Instructions   Try taking 1 dose of buspirone every morning and take a second dose as needed for anxiety or panic attacks.      Take your Naproxen daily for  the next 3 days.        Panic Attacks  Panic attacks are sudden, short feelings of great fear or discomfort. You may have them for no reason when you are relaxed, when you are uneasy (anxious), or when you are sleeping.   HOME CARE  · Take all your medicines as told.  · Check with your doctor before starting new medicines.  · Keep all doctor visits.  GET HELP IF:  · You are not able to take your medicines as told.  · Your symptoms do not get better.  · Your symptoms get worse.  GET HELP RIGHT AWAY IF:  · Your attacks seem different than your normal attacks.  · You have thoughts about hurting yourself or others.  · You take panic attack medicine and you have a side effect.  MAKE SURE YOU:  · Understand these instructions.  · Will watch your condition.  · Will get help right away if you are not doing well or get worse.     This information is not intended to replace advice given to you by your health care provider. Make sure you discuss any questions you have with your health care provider.     Document Released: 01/20/2012 Document Revised: 10/08/2014 Document Reviewed: 08/01/2014  [employer] Interactive Patient Education ©2017 [employer]      Return in about 1 month (around 2/4/2018).      Victorina Ortega MD  01/04/18

## 2018-01-04 NOTE — PATIENT INSTRUCTIONS
Try taking 1 dose of buspirone every morning and take a second dose as needed for anxiety or panic attacks.      Take your Naproxen daily for the next 3 days.        Panic Attacks  Panic attacks are sudden, short feelings of great fear or discomfort. You may have them for no reason when you are relaxed, when you are uneasy (anxious), or when you are sleeping.   HOME CARE  · Take all your medicines as told.  · Check with your doctor before starting new medicines.  · Keep all doctor visits.  GET HELP IF:  · You are not able to take your medicines as told.  · Your symptoms do not get better.  · Your symptoms get worse.  GET HELP RIGHT AWAY IF:  · Your attacks seem different than your normal attacks.  · You have thoughts about hurting yourself or others.  · You take panic attack medicine and you have a side effect.  MAKE SURE YOU:  · Understand these instructions.  · Will watch your condition.  · Will get help right away if you are not doing well or get worse.     This information is not intended to replace advice given to you by your health care provider. Make sure you discuss any questions you have with your health care provider.     Document Released: 01/20/2012 Document Revised: 10/08/2014 Document Reviewed: 08/01/2014  [employer] Interactive Patient Education ©2017 [employer] Inc.

## 2018-01-16 ENCOUNTER — TRANSCRIBE ORDERS (OUTPATIENT)
Dept: URGENT CARE | Facility: CLINIC | Age: 28
End: 2018-01-16

## 2018-01-16 DIAGNOSIS — S93.402A SPRAIN OF LEFT ANKLE, UNSPECIFIED LIGAMENT, INITIAL ENCOUNTER: Primary | ICD-10-CM

## 2018-01-19 ENCOUNTER — APPOINTMENT (OUTPATIENT)
Dept: PHYSICAL THERAPY | Facility: HOSPITAL | Age: 28
End: 2018-01-19
Attending: ORTHOPAEDIC SURGERY

## 2018-01-20 DIAGNOSIS — M62.838 MUSCLE SPASM: ICD-10-CM

## 2018-01-22 RX ORDER — CYCLOBENZAPRINE HCL 10 MG
TABLET ORAL
Qty: 45 TABLET | Refills: 1 | Status: SHIPPED | OUTPATIENT
Start: 2018-01-22 | End: 2018-09-04 | Stop reason: SDUPTHER

## 2018-01-26 ENCOUNTER — HOSPITAL ENCOUNTER (OUTPATIENT)
Dept: PHYSICAL THERAPY | Facility: HOSPITAL | Age: 28
Setting detail: THERAPIES SERIES
Discharge: HOME | End: 2018-01-26
Attending: ORTHOPAEDIC SURGERY

## 2018-01-26 DIAGNOSIS — G89.29 CHRONIC MIDLINE LOW BACK PAIN WITHOUT SCIATICA: Primary | ICD-10-CM

## 2018-01-26 DIAGNOSIS — M54.50 CHRONIC MIDLINE LOW BACK PAIN WITHOUT SCIATICA: Primary | ICD-10-CM

## 2018-01-26 PROCEDURE — 97161 PT EVAL LOW COMPLEX 20 MIN: CPT | Performed by: PHYSICAL THERAPIST

## 2018-01-26 PROCEDURE — 97110 THERAPEUTIC EXERCISES: CPT | Performed by: PHYSICAL THERAPIST

## 2018-01-26 NOTE — THERAPY TREATMENT NOTE
Outpatient Physical Therapy Ortho Treatment Note  [facility]     Patient Name: Beatriz Boland  : 1990  MRN: [id number]  Today's Date: 2018      Visit Date: 2018    Visit Dx:    ICD-10-CM ICD-9-CM   1. Chronic midline low back pain without sciatica M54.5 724.2    G89.29 338.29       Patient Active Problem List   Diagnosis   • Depression   • Developmental delay   • GERD (gastroesophageal reflux disease)   • Scoliosis   • Amblyopia   • Back pain   • Chronic left shoulder pain   • Muscle spasm   • Seasonal allergies   • Sinus bradycardia   • Palpitations   • Panic attacks   • Acute otitis externa of both ears   • Atypical chest pain   • Anxiety        Past Medical History:   Diagnosis Date   • Amblyopia    • Back pain    • Cleft palate    • Depression    • Developmental delay    • Ear infection     recurrent   • GERD (gastroesophageal reflux disease)    • Impetigo    • Kidney abscess    • Kidney stone    • Muscle spasm    • Scoliosis    • Sinus infection     recurrent   • Sprain of shoulder, left 2016   • Urinary tract infection         Past Surgical History:   Procedure Laterality Date   • ABSCESS DRAINAGE      kidney   • ADENOIDECTOMY     • EAR TUBES     • PHARYNGEAL FLAP      for speech   • TONSILLECTOMY               PT Ortho       18 1100    Subjective Comments    Subjective Comments 26 y/o F with 1 year history of LBP, insidious onset. Has worked at [employer] for 5 years.  She lifts at least 50 lbs on a regular basis.  Right now she is on lifting restrictions of no weight due to a recent L ankle sprain which she is having treatment for at [facility] [place], [place] by [facility].  States the back problem is a separate issue. Pain is worse with bending. Denies sleep interruption, typically sleeps on her side.She has to limit walking at times per her back and also due to being in a boot.  -GR    Precautions and Contraindications    Precautions no lifting per L ankle injury (in boot and being treated at  [facility]).  -GR    Subjective Pain    Able to rate subjective pain? yes  -GR    Pre-Treatment Pain Level 0  -GR    Subjective Pain Comment at worst 8-9/10 - lowest part of her bacck  -GR    Posture/Observations    Posture/Observations Comments Slumped sitting  -GR    Quarter Clearing    Quarter Clearing Lower Quarter Clearing  -GR    DTR- Lower Quarter Clearing    Patellar tendon (L2-4) Bilateral:;2- Normal response  -GR    Achilles tendon (S1-2) Right:;2- Normal response;Left:;Unable to assess  -GR    Neural Tension Signs- Lower Quarter Clearing    Slump Bilateral:;Negative  -GR    SLR Bilateral:;Negative  -GR    Myotomal Screen- Lower Quarter Clearing    Hip flexion (L2) 5 (Normal)  -GR    Knee extension (L3) 4 (Good)  -GR    Ankle DF (L4) Right:;5 (Normal);Left:;Unable to assess  -GR    Great toe extension (L5) Right:;5 (Normal);Left:;Unable to assess  -GR    Knee flexion (S2) 4 (Good)  -GR    Lumbar ROM Screen- Lower Quarter Clearing    Lumbar Flexion Normal  -GR    Lumbar Extension Impaired   25% with pain  -GR    Lumbar Lateral Flexion Impaired   to ankle B with forward flexion compensation  -GR    Lumbar Rotation Normal  -GR    SI/Hip Screen- Lower Quarter Clearing    ASIS compression Bilateral:;Positive  -GR    ROM (Range of Motion)    General ROM head/neck/trunk range of motion deficits identified  -GR    General Head/Neck/Trunk Assessment    ROM trunk ROM deficit  -GR    ROM Detail see lumbar screen  -GR      User Key  (r) = Recorded By, (t) = Taken By, (c) = Cosigned By    Initials Name Provider Type    GR Richar Fernandez, PT Physical Therapist                            PT Assessment/Plan       01/26/18 1311       PT Assessment    Functional Limitations Limitations in community activities;Limitations in functional capacity and performance;Performance in leisure activities;Performance in self-care ADL;Performance in work activities  -GR     Impairments Balance;Gait;Endurance;Joint  mobility;Posture;Poor body mechanics;Pain;Muscle strength;Range of motion  -GR     Assessment Comments 28 y/o F evaluated in outpatient PT for chronic LBP, insidious onset x 1 year history. She works at [employer] and has some difficulty organizing her PMH.  Patient also in PT at [facility] for treatment of L ankle injury while at work.  At this time she presents in boot with abnormal posture, including rounded shoulders increased lordosis and scoliosis.  She demonstrates decreased core strength and impaired lumbar AROM impacting her positional tolerance and ADL completion.  Patient may benefit from skilled PT to address current deficits.  -GR     Please refer to paper survey for additional self-reported information Yes  -GR     Rehab Potential Good  -GR     Patient/caregiver participated in establishment of treatment plan and goals Yes  -GR     Patient would benefit from skilled therapy intervention Yes  -GR     PT Plan    PT Frequency 2x/week  -GR     Predicted Duration of Therapy Intervention (days/wks) 4 weeks  -GR     Planned CPT's? PT EVAL LOW COMPLEXITY: 35814;PT THER PROC EA 15 MIN: 23399;PT THER ACT EA 15 MIN: 42052;PT MANUAL THERAPY EA 15 MIN: 37612;PT NEUROMUSC RE-EDUCATION EA 15 MIN: 04581;PT GAIT TRAINING EA 15 MIN: 14388;PT HOT OR COLD PACK TREAT MCARE;PT ELECTRICAL STIM UNATTEND: ;PT ULTRASOUND EA 15 MIN: 01918  -GR     Physical Therapy Interventions (Optional Details) balance training;joint mobilization;home exercise program;gait training;lumbar stabilization;manual therapy techniques;neuromuscular re-education;patient/family education;postural re-education;ROM (Range of Motion);strengthening;stretching;swiss ball techniques;taping  -GR     PT Plan Comments Review HEP. Add iso hip add and h/l clam.  Bridges when able pending L ankle. Modify with swiss ball as needed.  -GR       User Key  (r) = Recorded By, (t) = Taken By, (c) = Cosigned By    Initials Name Provider Type    GR Richar Fernandez, PT  Physical Therapist                    Exercises       01/26/18 1100          Subjective Comments    Subjective Comments 26 y/o F with 1 year history of LBP, insidious onset. Has worked at [employer] for 5 years.  She lifts at least 50 lbs on a regular basis.  Right now she is on lifting restrictions of no weight due to a recent L ankle sprain which she is having treatment for at [facility].  States the back problem is a separate issue. Pain is worse with bending. Denies sleep interruption, typically sleeps on her side.She has to limit walking at times per her back and also due to being in a boot.  -GR      Subjective Pain    Able to rate subjective pain? yes  -GR      Pre-Treatment Pain Level 0  -GR      Subjective Pain Comment at worst 8-9/10 - lowest part of her bacck  -GR      Exercise 1    Exercise Name 1 LTR  -GR      Cueing 1 Demo;Verbal  -GR      Reps 1 10  -GR      Exercise 2    Exercise Name 2 SKTC  -GR      Cueing 2 Demo;Verbal  -GR      Reps 2 10  -GR      Exercise 3    Exercise Name 3 PPT  -GR      Cueing 3 Demo;Verbal  -GR      Reps 3 10  -GR        User Key  (r) = Recorded By, (t) = Taken By, (c) = Cosigned By    Initials Name Provider Type    GR Richar Fernandez, PT Physical Therapist                               PT OP Goals       01/26/18 1300       PT Short Term Goals    STG Date to Achieve 02/09/18  -GR     STG 1 Patient will be independent with initial HEP.  -GR     STG 1 Progress New  -GR     STG 2 Patient will report 50% reduction in pain with bending forward.  -GR     STG 2 Progress New  -GR     Long Term Goals    LTG Date to Achieve 02/25/18  -GR     LTG 1 Patient will be independent with progressive HEP for long term management of current condition.  -GR     LTG 1 Progress New  -GR     LTG 2 Patient will score </=20% disability on the Modified Oswestry to indicate improved perceived performance with ADLs.  -GR     LTG 2 Progress New  -GR     LTG 3 Patient will demonstrate lumbar extension >/=50%  of normal limits to improve transfers and transitions.  -GR     LTG 3 Progress New  -GR     LTG 4 Patient will demonstrate safe body mechanics with lifting to reduce risk of reinjury.  -GR     LTG 4 Progress New  -GR     Time Calculation    PT Goal Re-Cert Due Date 04/26/18  -GR       User Key  (r) = Recorded By, (t) = Taken By, (c) = Cosigned By    Initials Name Provider Type    GR Richar Fernandez, PT Physical Therapist          Therapy Education  Education Details: Educated on basic HEP, limited per time constraints (started 15' late per patient paperwork issues)  Given: HEP, Symptoms/condition management, Posture/body mechanics, Pain management  Program: New  How Provided: Verbal, Written  Provided to: Patient  Level of Understanding: Teach back education performed, Verbalized    Outcome Measure Options: Modifed Winston  Modified Oswestry  Modified Oswestry Score/Comments: 30% disability      Time Calculation:   Start Time: 1145  Stop Time: 1215  Time Calculation (min): 30 min    Therapy Charges for Today     Code Description Service Date Service Provider Modifiers Qty    92574209608 HC PT THER PROC EA 15 MIN 1/26/2018 Richar Fernandez, PT GP 1    22303827523 HC PT EVAL LOW COMPLEXITY 1 1/26/2018 Richar Fernandez, PT GP 1          PT G-Codes  Outcome Measure Options: Modifed Owestry         Richar Fernandez, PT  1/26/2018

## 2018-02-01 ENCOUNTER — HOSPITAL ENCOUNTER (OUTPATIENT)
Dept: PHYSICAL THERAPY | Facility: HOSPITAL | Age: 28
Setting detail: THERAPIES SERIES
Discharge: HOME | End: 2018-02-01

## 2018-02-01 DIAGNOSIS — M54.50 CHRONIC MIDLINE LOW BACK PAIN WITHOUT SCIATICA: Primary | ICD-10-CM

## 2018-02-01 DIAGNOSIS — G89.29 CHRONIC MIDLINE LOW BACK PAIN WITHOUT SCIATICA: Primary | ICD-10-CM

## 2018-02-01 PROCEDURE — 97110 THERAPEUTIC EXERCISES: CPT

## 2018-02-01 NOTE — THERAPY TREATMENT NOTE
Outpatient Physical Therapy Ortho Treatment Note  [facility]     Patient Name: Beatriz Boland  : 1990  MRN: [id number]  Today's Date: 2018      Visit Date: 2018    Visit Dx:    ICD-10-CM ICD-9-CM   1. Chronic midline low back pain without sciatica M54.5 724.2    G89.29 338.29       Patient Active Problem List   Diagnosis   • Depression   • Developmental delay   • GERD (gastroesophageal reflux disease)   • Scoliosis   • Amblyopia   • Back pain   • Chronic left shoulder pain   • Muscle spasm   • Seasonal allergies   • Sinus bradycardia   • Palpitations   • Panic attacks   • Acute otitis externa of both ears   • Atypical chest pain   • Anxiety        Past Medical History:   Diagnosis Date   • Amblyopia    • Back pain    • Cleft palate    • Depression    • Developmental delay    • Ear infection     recurrent   • GERD (gastroesophageal reflux disease)    • Impetigo    • Kidney abscess    • Kidney stone    • Muscle spasm    • Scoliosis    • Sinus infection     recurrent   • Sprain of shoulder, left 2016   • Urinary tract infection         Past Surgical History:   Procedure Laterality Date   • ABSCESS DRAINAGE      kidney   • ADENOIDECTOMY     • EAR TUBES     • PHARYNGEAL FLAP      for speech   • TONSILLECTOMY                               PT Assessment/Plan       18 1615       PT Assessment    Assessment Comments Pain is low and controlled. Added several exercises to HEP. May consider miini squats next visit  -       User Key  (r) = Recorded By, (t) = Taken By, (c) = Cosigned By    Initials Name Provider Type    HALIE Chahal PTA Physical Therapy Assistant                    Exercises       18 6490          Subjective Comments    Subjective Comments No pain. Occasional muscle spasm  -      Subjective Pain    Able to rate subjective pain? yes  -WS      Pre-Treatment Pain Level 0  -WS      Exercise 1    Exercise Name 1 LTR  -WS      Cueing 1 Demo;Verbal  -WS      Reps  1 10  -WS      Exercise 2    Exercise Name 2 SKTC  -WS      Cueing 2 Demo;Verbal  -WS      Reps 2 10  -WS      Time (Seconds) 2 5  -WS      Exercise 3    Exercise Name 3 PPT  -WS      Cueing 3 Demo;Verbal  -WS      Reps 3 15  -WS      Exercise 4    Exercise Name 4 scap ret  -WS      Reps 4 15  -WS      Additional Comments GTB  -WS      Exercise 5    Exercise Name 5 supine hip abd  -WS      Reps 5 15  -WS      Additional Comments GTB  -WS      Exercise 6    Exercise Name 6 supine hip add  -WS      Reps 6 15  -WS      Exercise 7    Exercise Name 7 add mini squat  -WS        User Key  (r) = Recorded By, (t) = Taken By, (c) = Cosigned By    Initials Name Provider Type    HALIE Chahal PTA Physical Therapy Assistant                               PT OP Goals       02/01/18 1600       PT Short Term Goals    STG Date to Achieve 02/09/18  -WS     STG 1 Patient will be independent with initial HEP.  -WS     STG 1 Progress Ongoing  -     STG 2 Patient will report 50% reduction in pain with bending forward.  -     STG 2 Progress Ongoing  -     Long Term Goals    LTG Date to Achieve 02/25/18  -WS     LTG 1 Patient will be independent with progressive HEP for long term management of current condition.  -     LTG 1 Progress New  -     LTG 2 Patient will score </=20% disability on the Modified Oswestry to indicate improved perceived performance with ADLs.  -WS     LTG 2 Progress New  -     LTG 3 Patient will demonstrate lumbar extension >/=50% of normal limits to improve transfers and transitions.  -     LTG 3 Progress New  -     LTG 4 Patient will demonstrate safe body mechanics with lifting to reduce risk of reinjury.  -     LTG 4 Progress New  -       User Key  (r) = Recorded By, (t) = Taken By, (c) = Cosigned By    Initials Name Provider Type    HALIE Chahal PTA Physical Therapy Assistant          Therapy Education  Given: HEP, Symptoms/condition management, Pain management, Posture/body  mechanics  Program: Reinforced  How Provided: Verbal  Provided to: Patient              Time Calculation:   Start Time: 1545  Stop Time: 1615  Time Calculation (min): 30 min    Therapy Charges for Today     Code Description Service Date Service Provider Modifiers Qty    28359489976 HC PT THER PROC EA 15 MIN 2/1/2018 Silvestre Chahal PTA GP 2                    Silvestre Chahal PTA  2/1/2018

## 2018-02-08 ENCOUNTER — HOSPITAL ENCOUNTER (OUTPATIENT)
Dept: PHYSICAL THERAPY | Facility: HOSPITAL | Age: 28
Setting detail: THERAPIES SERIES
Discharge: HOME | End: 2018-02-08

## 2018-02-08 DIAGNOSIS — M54.50 CHRONIC MIDLINE LOW BACK PAIN WITHOUT SCIATICA: Primary | ICD-10-CM

## 2018-02-08 DIAGNOSIS — G89.29 CHRONIC MIDLINE LOW BACK PAIN WITHOUT SCIATICA: Primary | ICD-10-CM

## 2018-02-08 PROCEDURE — 97110 THERAPEUTIC EXERCISES: CPT | Performed by: PHYSICAL THERAPIST

## 2018-02-08 NOTE — THERAPY TREATMENT NOTE
Outpatient Physical Therapy Ortho Treatment Note  [facility]     Patient Name: Beatriz Boland  : 1990  MRN: [id number]  Today's Date: 2018      Visit Date: 2018    Visit Dx:    ICD-10-CM ICD-9-CM   1. Chronic midline low back pain without sciatica M54.5 724.2    G89.29 338.29       Patient Active Problem List   Diagnosis   • Depression   • Developmental delay   • GERD (gastroesophageal reflux disease)   • Scoliosis   • Amblyopia   • Back pain   • Chronic left shoulder pain   • Muscle spasm   • Seasonal allergies   • Sinus bradycardia   • Palpitations   • Panic attacks   • Acute otitis externa of both ears   • Atypical chest pain   • Anxiety        Past Medical History:   Diagnosis Date   • Amblyopia    • Back pain    • Cleft palate    • Depression    • Developmental delay    • Ear infection     recurrent   • GERD (gastroesophageal reflux disease)    • Impetigo    • Kidney abscess    • Kidney stone    • Muscle spasm    • Scoliosis    • Sinus infection     recurrent   • Sprain of shoulder, left 2016   • Urinary tract infection         Past Surgical History:   Procedure Laterality Date   • ABSCESS DRAINAGE      kidney   • ADENOIDECTOMY     • EAR TUBES     • PHARYNGEAL FLAP      for speech   • TONSILLECTOMY                               PT Assessment/Plan       18 1631       PT Assessment    Assessment Comments Patient requires redirection to stay on task but is completing exercises to appropriate fatigue.Her LBP is well controlled; 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  -GR     PT Plan    PT Plan Comments Continue POC.  -GR       User Key  (r) = Recorded By, (t) = Taken By, (c) = Cosigned By    Initials Name Provider Type    HOLLIS Fernandez, PT Physical Therapist                    Exercises       18 1500          Subjective Comments    Subjective Comments Sees MD about her L ankle tomorrow.  Back is not in pain.  -GR      Subjective Pain    Able to rate  "subjective pain? yes  -GR      Pre-Treatment Pain Level 0  -GR      Exercise 1    Exercise Name 1 Nustep  -GR      Time (Minutes) 1 5  -GR      Additional Comments L3  -GR      Exercise 2    Exercise Name 2 DKTC  -GR      Cueing 2 Verbal  -GR      Sets 2 2  -GR      Reps 2 10  -GR      Additional Comments with swiss ball  -GR      Exercise 3    Exercise Name 3 PPT  -GR      Cueing 3 Verbal  -GR      Sets 3 2  -GR      Reps 3 10  -GR      Additional Comments legs over swiss ball  -GR      Exercise 4    Exercise Name 4 seated rows  -GR      Cueing 4 Verbal  -GR      Sets 4 2  -GR      Reps 4 10  -GR      Additional Comments 30# tuff stuff  -GR      Exercise 5    Exercise Name 5 h/l clam  -GR      Cueing 5 Verbal  -GR      Sets 5 2  -GR      Reps 5 10  -GR      Additional Comments GTB  -GR      Exercise 6    Exercise Name 6 h/l hip add squeeze  -GR      Cueing 6 Verbal  -GR      Sets 6 2  -GR      Reps 6 10  -GR      Exercise 7    Exercise Name 7 attempted mini squat - achieved 10 degrees stopped per boot irritation  -GR      Exercise 8    Exercise Name 8 \"prayer\" stretch - fwd and lateral R/L  -GR      Sets 8 1  -GR      Reps 8 3  -GR      Time (Seconds) 8 20  -GR      Additional Comments each position  -GR        User Key  (r) = Recorded By, (t) = Taken By, (c) = Cosigned By    Initials Name Provider Type    GR Richar Fernandez, PT Physical Therapist                               PT OP Goals       02/08/18 1600       PT Short Term Goals    STG Date to Achieve 02/09/18  -GR     STG 1 Patient will be independent with initial HEP.  -GR     STG 1 Progress Ongoing  -GR     STG 2 Patient will report 50% reduction in pain with bending forward.  -GR     STG 2 Progress Ongoing  -GR     Long Term Goals    LTG Date to Achieve 02/25/18  -GR     LTG 1 Patient will be independent with progressive HEP for long term management of current condition.  -GR     LTG 1 Progress Ongoing  -GR     LTG 2 Patient will score </=20% disability " on the Modified Oswestry to indicate improved perceived performance with ADLs.  -GR     LTG 2 Progress Ongoing  -GR     LTG 3 Patient will demonstrate lumbar extension >/=50% of normal limits to improve transfers and transitions.  -GR     LTG 3 Progress Ongoing  -GR     LTG 4 Patient will demonstrate safe body mechanics with lifting to reduce risk of reinjury.  -GR     LTG 4 Progress Ongoing  -GR       User Key  (r) = Recorded By, (t) = Taken By, (c) = Cosigned By    Initials Name Provider Type    GR Richar Fernandez PT Physical Therapist          Therapy Education  Education Details: reviewed HEP and modifications  Given: HEP, Symptoms/condition management, Posture/body mechanics  Program: Reinforced  How Provided: Verbal  Provided to: Patient  Level of Understanding: Teach back education performed              Time Calculation:   Start Time: 1550  Stop Time: 1636  Time Calculation (min): 46 min    Therapy Charges for Today     Code Description Service Date Service Provider Modifiers Qty    48497874624  PT THER PROC EA 15 MIN 2/8/2018 Richar Fernandez PT GP 2                    Richar Fernandez PT  2/8/2018

## 2018-02-13 ENCOUNTER — APPOINTMENT (OUTPATIENT)
Dept: PHYSICAL THERAPY | Facility: HOSPITAL | Age: 28
End: 2018-02-13

## 2018-02-15 ENCOUNTER — HOSPITAL ENCOUNTER (OUTPATIENT)
Dept: PHYSICAL THERAPY | Facility: HOSPITAL | Age: 28
Setting detail: THERAPIES SERIES
Discharge: HOME | End: 2018-02-15

## 2018-02-15 DIAGNOSIS — G89.29 CHRONIC MIDLINE LOW BACK PAIN WITHOUT SCIATICA: Primary | ICD-10-CM

## 2018-02-15 DIAGNOSIS — M54.50 CHRONIC MIDLINE LOW BACK PAIN WITHOUT SCIATICA: Primary | ICD-10-CM

## 2018-02-15 PROCEDURE — 97110 THERAPEUTIC EXERCISES: CPT

## 2018-02-15 NOTE — THERAPY TREATMENT NOTE
Outpatient Physical Therapy Ortho Treatment Note  [facility]     Patient Name: Beatriz Boland  : 1990  MRN: [id number]  Today's Date: 2/15/2018      Visit Date: 02/15/2018    Visit Dx:    ICD-10-CM ICD-9-CM   1. Chronic midline low back pain without sciatica M54.5 724.2    G89.29 338.29       Patient Active Problem List   Diagnosis   • Depression   • Developmental delay   • GERD (gastroesophageal reflux disease)   • Scoliosis   • Amblyopia   • Back pain   • Chronic left shoulder pain   • Muscle spasm   • Seasonal allergies   • Sinus bradycardia   • Palpitations   • Panic attacks   • Acute otitis externa of both ears   • Atypical chest pain   • Anxiety        Past Medical History:   Diagnosis Date   • Amblyopia    • Back pain    • Cleft palate    • Depression    • Developmental delay    • Ear infection     recurrent   • GERD (gastroesophageal reflux disease)    • Impetigo    • Kidney abscess    • Kidney stone    • Muscle spasm    • Scoliosis    • Sinus infection     recurrent   • Sprain of shoulder, left 2016   • Urinary tract infection         Past Surgical History:   Procedure Laterality Date   • ABSCESS DRAINAGE      kidney   • ADENOIDECTOMY     • EAR TUBES     • PHARYNGEAL FLAP      for speech   • TONSILLECTOMY                               PT Assessment/Plan       02/15/18 1621       PT Assessment    Assessment Comments Able to tolerate mini squat and bridge since not wearing brace on ankle.   -WS       User Key  (r) = Recorded By, (t) = Taken By, (c) = Cosigned By    Initials Name Provider Type    HALIE Chahal PTA Physical Therapy Assistant                    Exercises       02/15/18 7309          Subjective Comments    Subjective Comments LBP is low  -WS      Subjective Pain    Able to rate subjective pain? yes  -WS      Pre-Treatment Pain Level 2  -WS      Exercise 1    Exercise Name 1 Nustep UE/LE  -WS      Time (Minutes) 1 5  -WS      Additional Comments L4  -WS      Exercise  "2    Exercise Name 2 DKTC  -WS      Cueing 2 Verbal  -WS      Sets 2 2  -WS      Reps 2 10  -WS      Additional Comments with swiss ball  -WS      Exercise 3    Exercise Name 3 PPT  -WS      Cueing 3 Verbal  -WS      Sets 3 2  -WS      Reps 3 10  -WS      Exercise 4    Exercise Name 4 standing rows  -WS      Cueing 4 Verbal  -WS      Sets 4 2  -WS      Reps 4 10  -WS      Additional Comments 30# Tuff Stuff  -WS      Exercise 5    Exercise Name 5 h/l clam  -WS      Cueing 5 Verbal  -WS      Sets 5 2  -WS      Reps 5 10  -WS      Additional Comments GTB  -WS      Exercise 6    Exercise Name 6 h/l hip add squeeze  -WS      Cueing 6 Verbal  -WS      Sets 6 2  -WS      Reps 6 10  -WS      Exercise 7    Exercise Name 7  mini squat   -WS      Reps 7 10  -WS      Exercise 8    Exercise Name 8 \"prayer\" stretch - fwd and lateral R/L  -WS      Sets 8 1  -WS      Reps 8 3  -WS      Time (Seconds) 8 20  -WS      Additional Comments each position  -WS      Exercise 9    Exercise Name 9 bridge  -WS      Reps 9 10  -WS      Exercise 10    Exercise Name 10 consider HS stretch  -WS      Exercise 11    Exercise Name 11 consider piriformis stretch  -WS        User Key  (r) = Recorded By, (t) = Taken By, (c) = Cosigned By    Initials Name Provider Type    HALIE Chahal PTA Physical Therapy Assistant                               PT OP Goals       02/15/18 1600       PT Short Term Goals    STG Date to Achieve 02/09/18  -WS     STG 1 Patient will be independent with initial HEP.  -WS     STG 1 Progress Ongoing  -WS     STG 2 Patient will report 50% reduction in pain with bending forward.  -     STG 2 Progress Ongoing  -     Long Term Goals    LTG Date to Achieve 02/25/18  -WS     LTG 1 Patient will be independent with progressive HEP for long term management of current condition.  -WS     LTG 1 Progress Ongoing  -WS     LTG 2 Patient will score </=20% disability on the Modified Oswestry to indicate improved perceived " performance with ADLs.  -WS     LTG 2 Progress Ongoing  -WS     LTG 3 Patient will demonstrate lumbar extension >/=50% of normal limits to improve transfers and transitions.  -WS     LTG 3 Progress Ongoing  -     LTG 4 Patient will demonstrate safe body mechanics with lifting to reduce risk of reinjury.  -     LTG 4 Progress Ongoing  -       User Key  (r) = Recorded By, (t) = Taken By, (c) = Cosigned By    Initials Name Provider Type     Silvestre Chahal PTA Physical Therapy Assistant          Therapy Education  Given: HEP, Symptoms/condition management  Program: Reinforced  How Provided: Verbal  Provided to: Patient              Time Calculation:   Start Time: 1545  Stop Time: 1630  Time Calculation (min): 45 min    Therapy Charges for Today     Code Description Service Date Service Provider Modifiers Qty    57807747250 HC PT THER PROC EA 15 MIN 2/15/2018 Silvestre Chahal PTA GP 3                    Silvestre Chahal PTA  2/15/2018

## 2018-02-20 ENCOUNTER — APPOINTMENT (OUTPATIENT)
Dept: PHYSICAL THERAPY | Facility: HOSPITAL | Age: 28
End: 2018-02-20

## 2018-02-22 ENCOUNTER — HOSPITAL ENCOUNTER (OUTPATIENT)
Dept: PHYSICAL THERAPY | Facility: HOSPITAL | Age: 28
Setting detail: THERAPIES SERIES
Discharge: HOME | End: 2018-02-22

## 2018-02-22 DIAGNOSIS — M54.50 CHRONIC MIDLINE LOW BACK PAIN WITHOUT SCIATICA: Primary | ICD-10-CM

## 2018-02-22 DIAGNOSIS — G89.29 CHRONIC MIDLINE LOW BACK PAIN WITHOUT SCIATICA: Primary | ICD-10-CM

## 2018-02-22 PROCEDURE — 97110 THERAPEUTIC EXERCISES: CPT

## 2018-02-22 NOTE — THERAPY TREATMENT NOTE
Outpatient Physical Therapy Ortho Treatment Note  [facility]     Patient Name: Beatriz Boland  : 1990  MRN: [id number]  Today's Date: 2018      Visit Date: 2018    Visit Dx:    ICD-10-CM ICD-9-CM   1. Chronic midline low back pain without sciatica M54.5 724.2    G89.29 338.29       Patient Active Problem List   Diagnosis   • Depression   • Developmental delay   • GERD (gastroesophageal reflux disease)   • Scoliosis   • Amblyopia   • Back pain   • Chronic left shoulder pain   • Muscle spasm   • Seasonal allergies   • Sinus bradycardia   • Palpitations   • Panic attacks   • Acute otitis externa of both ears   • Atypical chest pain   • Anxiety        Past Medical History:   Diagnosis Date   • Amblyopia    • Anxiety    • Atypical chest pain    • Back pain    • Chest pain    • Chest tightness    • Cleft palate    • Depression    • Developmental delay    • Dizziness    • Dysphoric mood    • Ear infection     recurrent   • GERD (gastroesophageal reflux disease)    • Impetigo    • Kidney abscess    • Kidney stone    • Lightheadedness    • Muscle spasm    • Scoliosis    • Sinus infection     recurrent   • Sprain of shoulder, left 2016   • Urinary tract infection         Past Surgical History:   Procedure Laterality Date   • ABSCESS DRAINAGE      kidney   • ADENOIDECTOMY     • EAR TUBES     • PHARYNGEAL FLAP      for speech   • TONSILLECTOMY                               PT Assessment/Plan       18 1615       PT Assessment    Assessment Comments Patient reports 0 pain. Reprinted HEP. Worked on cuing to aaliyah/doff back brace.   -WS       User Key  (r) = Recorded By, (t) = Taken By, (c) = Cosigned By    Initials Name Provider Type    HALIE Chahal PTA Physical Therapy Assistant                    Exercises       18 1542          Subjective Comments    Subjective Comments LBP 0/10. Need to get ankle evaled  -WS      Subjective Pain    Able to rate subjective pain? yes  -WS    "   Pre-Treatment Pain Level 0  -WS      Exercise 1    Exercise Name 1 Nustep UE/LE  -WS      Time (Minutes) 1 5  -WS      Additional Comments L5  -WS      Exercise 2    Exercise Name 2 DKTC  -WS      Cueing 2 Verbal  -WS      Sets 2 2  -WS      Reps 2 10  -WS      Exercise 3    Exercise Name 3 PPT  -WS      Cueing 3 Verbal  -WS      Sets 3 2  -WS      Reps 3 10  -WS      Exercise 4    Exercise Name 4 standing rows  -WS      Cueing 4 Verbal  -WS      Sets 4 2  -WS      Reps 4 10  -WS      Additional Comments 30# Tuff Stuff  -WS      Exercise 5    Exercise Name 5 h/l clam  -WS      Cueing 5 Verbal  -WS      Sets 5 2  -WS      Reps 5 10  -WS      Additional Comments GTB  -WS      Exercise 6    Exercise Name 6 h/l hip add squeeze  -WS      Cueing 6 Verbal  -WS      Sets 6 2  -WS      Reps 6 10  -WS      Exercise 7    Exercise Name 7  mini squat   -WS      Reps 7 15  -WS      Exercise 8    Exercise Name 8 \"prayer\" stretch - fwd and lateral R/L  -WS      Sets 8 1  -WS      Reps 8 3  -WS      Time (Seconds) 8 20  -WS      Exercise 9    Exercise Name 9 bridge  -WS      Sets 9 2  -WS      Reps 9 10  -WS      Exercise 10    Exercise Name 10  HS stretch  -WS      Reps 10 3  -WS      Time (Seconds) 10 20  -WS      Exercise 11    Exercise Name 11  piriformis stretch  -WS      Reps 11 3  -WS      Time (Seconds) 11 20  -WS        User Key  (r) = Recorded By, (t) = Taken By, (c) = Cosigned By    Initials Name Provider Type    HALIE Chahal PTA Physical Therapy Assistant                               PT OP Goals       02/22/18 1600       PT Short Term Goals    STG Date to Achieve 02/09/18  -WS     STG 1 Patient will be independent with initial HEP.  -WS     STG 1 Progress Ongoing  -WS     STG 2 Patient will report 50% reduction in pain with bending forward.  -WS     STG 2 Progress Met  -WS     Long Term Goals    LTG Date to Achieve 02/25/18  -WS     LTG 1 Patient will be independent with progressive HEP for long term " management of current condition.  -     LTG 1 Progress Ongoing  -     LTG 2 Patient will score </=20% disability on the Modified Oswestry to indicate improved perceived performance with ADLs.  -     LTG 2 Progress Ongoing  -     LTG 3 Patient will demonstrate lumbar extension >/=50% of normal limits to improve transfers and transitions.  -     LTG 3 Progress Ongoing  -     LTG 4 Patient will demonstrate safe body mechanics with lifting to reduce risk of reinjury.  -     LTG 4 Progress Ongoing  -       User Key  (r) = Recorded By, (t) = Taken By, (c) = Cosigned By    Initials Name Provider Type    HALIE Chahal PTA Physical Therapy Assistant          Therapy Education  Given: HEP, Symptoms/condition management, Pain management, Posture/body mechanics  Program: Reinforced  How Provided: Verbal  Provided to: Patient  Level of Understanding: Teach back education performed              Time Calculation:   Start Time: 1540  Stop Time: 1615  Time Calculation (min): 35 min    Therapy Charges for Today     Code Description Service Date Service Provider Modifiers Qty    70108059746 HC PT THER PROC EA 15 MIN 2/22/2018 Silvestre Chahal PTA GP 2                    Silvestre Chahal PTA  2/22/2018

## 2018-02-23 ENCOUNTER — OFFICE VISIT (OUTPATIENT)
Dept: CARDIOLOGY | Facility: CLINIC | Age: 28
End: 2018-02-23

## 2018-02-23 VITALS
HEART RATE: 73 BPM | HEIGHT: 68 IN | BODY MASS INDEX: 39.22 KG/M2 | DIASTOLIC BLOOD PRESSURE: 74 MMHG | SYSTOLIC BLOOD PRESSURE: 106 MMHG | WEIGHT: 258.8 LBS

## 2018-02-23 DIAGNOSIS — R07.89 ATYPICAL CHEST PAIN: Primary | ICD-10-CM

## 2018-02-23 PROBLEM — R00.2 PALPITATIONS: Status: RESOLVED | Noted: 2017-11-15 | Resolved: 2018-02-23

## 2018-02-23 PROBLEM — H60.503 ACUTE OTITIS EXTERNA OF BOTH EARS: Status: RESOLVED | Noted: 2017-11-15 | Resolved: 2018-02-23

## 2018-02-23 PROCEDURE — 99244 OFF/OP CNSLTJ NEW/EST MOD 40: CPT | Performed by: INTERNAL MEDICINE

## 2018-02-23 PROCEDURE — 93000 ELECTROCARDIOGRAM COMPLETE: CPT | Performed by: INTERNAL MEDICINE

## 2018-02-23 RX ORDER — FLUTICASONE PROPIONATE 50 MCG
SPRAY, SUSPENSION (ML) NASAL
COMMUNITY
Start: 2018-01-09

## 2018-02-23 RX ORDER — MEDROXYPROGESTERONE ACETATE 150 MG/ML
INJECTION, SUSPENSION INTRAMUSCULAR
COMMUNITY
Start: 2018-02-23 | End: 2018-04-28 | Stop reason: SDUPTHER

## 2018-02-23 NOTE — PROGRESS NOTES
"Date of Office Visit: 2018  Encounter Provider: Víctor Gunter MD  Place of Service: [facility] CARDIOLOGY  Patient Name: Beatriz Boland  :1990    Chief Complaint   Patient presents with   • Chest Pain   :     HPI: Beatriz Boland is a 27 y.o. female who presents today in consultation for chest pain at the request of Dr. Ortega.    She reports seeing a cardiologist \"3-4 years ago\" for a murmur but was told that everything was fine and that she would \"grow out of it.\"  I cannot find any record of this in the [facility] or [place] [facility] system.      She has had recurrent atypical chest pain and has made numerous ED and UC visits.  She has had normal EKGs, normal chest x-rays, and normal biomarkers.  She has sharp, stabbing pain in the upper sternum that occurs at rest and worsens with positional changes.  She does not have exertional pain.  It's severe, and can last 15-20 minutes.  It makes her \"catch her breath.\"  She does not have palpitations, or syncope.  She has had vertiginous dizziness in the past but this is resolved.      She took naproxen BID for a few weeks, and now her pain has resolved.      Past Medical History:   Diagnosis Date   • Amblyopia    • Anxiety    • Back pain    • Cleft palate    • Depression    • Developmental delay    • GERD (gastroesophageal reflux disease)    • Impetigo    • Kidney abscess    • Kidney stone    • Muscle spasm    • Obesity (BMI 30-39.9)    • Recurrent otitis media    • Scoliosis    • Sinus infection     recurrent   • Sprain of shoulder, left 2016   • Urinary tract infection        Past Surgical History:   Procedure Laterality Date   • ABSCESS DRAINAGE      kidney   • ADENOIDECTOMY     • EAR TUBES     • PHARYNGEAL FLAP      for speech   • TONSILLECTOMY         Social History     Social History   • Marital status: Single     Spouse name: N/A   • Number of children: N/A   • Years of education: N/A     Occupational History "   • Not on file.     Social History Main Topics   • Smoking status: Current Some Day Smoker   • Smokeless tobacco: Never Used      Comment: smokes hookah 1-2 times per week     • Alcohol use Yes      Comment: occasionally, 1-2 glasses of wine 1-2 x per week   • Drug use: No   • Sexual activity: Yes     Partners: Male     Birth control/ protection: Injection     Other Topics Concern   • Not on file     Social History Narrative    Works at [employer].  Lives at home with her mom and puppy.  Spends a couple of nights per month with her boyfriend.         Family History   Problem Relation Age of Onset   • COPD Mother    • Arthritis Mother    • Obesity Mother    • Gestational diabetes Mother    • Cancer Father    • Scoliosis Father    • Rheum arthritis Maternal Aunt    • Diabetes Maternal Uncle    • Alcohol abuse Maternal Uncle    • Rheum arthritis Maternal Grandmother    • COPD Maternal Grandmother    • Stroke Maternal Grandfather        Review of Systems   HENT: Positive for ear pain.    Cardiovascular: Negative for chest pain and palpitations.   Respiratory: Negative for shortness of breath.    Musculoskeletal: Positive for myalgias.   Neurological: Negative for dizziness.   Psychiatric/Behavioral: Positive for depression. The patient is nervous/anxious.        Allergies   Allergen Reactions   • Ciprofloxacin Diarrhea and Nausea And Vomiting     Tingling in left leg   • Dust Mite Extract      Other reaction(s): Other (See Comments)  Nasal symptoms         Current Outpatient Prescriptions:   •  albuterol (PROVENTIL HFA;VENTOLIN HFA) 108 (90 Base) MCG/ACT inhaler, Inhale 2 puffs Every 4 (Four) Hours As Needed for Wheezing., Disp: 18 g, Rfl: 6  •  busPIRone (BUSPAR) 5 MG tablet, Take 1 tablet by mouth 3 (Three) Times a Day As Needed (anxiety or panic)., Disp: 90 tablet, Rfl: 1  •  Chlorcyclizine-Pseudoephed (STAHIST AD) 25-60 MG tablet, Take 25 mg by mouth Daily., Disp: 42 tablet, Rfl: 0  •  [employer] NASAL ALLERGY SPRAY  "55 MCG/ACT nasal inhaler, 2 sprays into each nostril Daily., Disp: , Rfl: 0  •  cyclobenzaprine (FLEXERIL) 10 MG tablet, TAKE 1 TABLET BY MOUTH 3 TIMES A DAY AS NEEDED, Disp: 45 tablet, Rfl: 1  •  DEXILANT 60 MG capsule, Take 60 mg by mouth Daily., Disp: , Rfl: 4  •  estradiol cypionate (DEPO-ESTRADIOL) 5 MG/ML injection, Inject 1.5 mg into the shoulder, thigh, or buttocks Every 28 (Twenty-Eight) Days. Patient states she takes it every 3 months, Disp: , Rfl:   •  fluticasone (FLONASE) 50 MCG/ACT nasal spray, , Disp: , Rfl:   •  ibuprofen (ADVIL,MOTRIN) 600 MG tablet, Take 1 tablet by mouth Every 8 (Eight) Hours As Needed for mild pain (1-3)., Disp: 30 tablet, Rfl: 0  •  MedroxyPROGESTERone Acetate 150 MG/ML suspension prefilled syringe, , Disp: , Rfl:   •  naproxen (EC NAPROSYN) 500 MG EC tablet, Take 1 tablet by mouth Daily. With food, Disp: 30 tablet, Rfl: 1  •  pseudoephedrine (SUDAFED) 15 MG/5ML liquid liquid, Take  by mouth., Disp: , Rfl:      Objective:     Vitals:    02/23/18 0943   BP: 106/74   BP Location: Left arm   Pulse: 73   Weight: 117 kg (258 lb 12.8 oz)   Height: 172.7 cm (68\")     Body mass index is 39.35 kg/(m^2).    Physical Exam   Constitutional: She is oriented to person, place, and time.   Obese   HENT:   Head: Normocephalic.   Nose: Nose normal.   Mouth/Throat: Oropharynx is clear and moist.   Eyes: Conjunctivae and EOM are normal. Pupils are equal, round, and reactive to light.   Neck: Normal range of motion.   Cannot assess for JVD due to habitus   Cardiovascular: Normal rate, regular rhythm, normal heart sounds and intact distal pulses.    No murmur heard.  Pulmonary/Chest: Effort normal. She exhibits tenderness (upper sternum and costoschondral angle bilaterally).   Abdominal: Soft.   Obesity limits abdominal exam   Musculoskeletal: Normal range of motion. She exhibits no edema.   Neurological: She is alert and oriented to person, place, and time. No cranial nerve deficit.   Skin: Skin is " warm and dry. No rash noted.   Psychiatric: She has a normal mood and affect. Her behavior is normal. Judgment and thought content normal.   Vitals reviewed.        ECG 12 Lead  Date/Time: 2/23/2018 11:24 AM  Performed by: VÍCTOR GUNTER  Authorized by: VÍCTOR GUNTER   Comparison: compared with previous ECG   Similar to previous ECG  Rhythm: sinus rhythm  Conduction: conduction normal  ST Segments: ST segments normal  T Waves: T waves normal  QRS axis: normal  Other: no other findings  Clinical impression: normal ECG              Assessment:       Diagnosis Plan   1. Atypical chest pain            Plan:       Her pain sounds classically musculoskeletal.  She has reproducible tenderness to palpitation over the bilateral costochondral margins superiorly.  Her pain has improved with NSAIDS.      I do not feel that she needs further evaluation in the absence of risk factors and in the setting of atypical pain. Additionally, she cannot walk on a treadmill due to a tendon injury in her foot.      Her exam is normal, without murmur, and I don't think an echocardiogram would be clinically useful.     Sincerely,       Víctor Gunter MD

## 2018-03-08 ENCOUNTER — HOSPITAL ENCOUNTER (OUTPATIENT)
Dept: PHYSICAL THERAPY | Facility: HOSPITAL | Age: 28
Setting detail: THERAPIES SERIES
Discharge: HOME | End: 2018-03-08

## 2018-03-08 DIAGNOSIS — M25.572 ACUTE LEFT ANKLE PAIN: Primary | ICD-10-CM

## 2018-03-08 PROCEDURE — 97110 THERAPEUTIC EXERCISES: CPT | Performed by: PHYSICAL THERAPIST

## 2018-03-08 PROCEDURE — 97161 PT EVAL LOW COMPLEX 20 MIN: CPT | Performed by: PHYSICAL THERAPIST

## 2018-03-08 NOTE — THERAPY EVALUATION
"    Outpatient Physical Therapy Ortho Initial Evaluation  [facility]     Patient Name: Beatriz Boland  : 1990  MRN: [id number]  Today's Date: 3/8/2018      Visit Date: 2018    Patient Active Problem List   Diagnosis   • Depression   • Developmental delay   • GERD (gastroesophageal reflux disease)   • Scoliosis   • Amblyopia   • Back pain   • Chronic left shoulder pain   • Muscle spasm   • Seasonal allergies   • Panic attacks   • Anxiety        Past Medical History:   Diagnosis Date   • Amblyopia    • Anxiety    • Back pain    • Cleft palate    • Depression    • Developmental delay    • GERD (gastroesophageal reflux disease)    • Impetigo    • Kidney abscess    • Kidney stone    • Muscle spasm    • Obesity (BMI 30-39.9)    • Recurrent otitis media    • Scoliosis    • Sinus infection     recurrent   • Sprain of shoulder, left 2016   • Urinary tract infection         Past Surgical History:   Procedure Laterality Date   • ABSCESS DRAINAGE      kidney   • ADENOIDECTOMY     • EAR TUBES     • PHARYNGEAL FLAP      for speech   • TONSILLECTOMY         Visit Dx:     ICD-10-CM ICD-9-CM   1. Acute left ankle pain M25.572 719.47                 PT Ortho       18 0900    Subjective Comments    Subjective Comments 28 y/o F to clinic today for evaluation of L ankle;MADIHA fall at work ([employer]) on 1/15/18.  Patient reports having xray negative for fracture, no further imaging. Was placed in boot until 18 then transitioned into ankle brace and given composite toed shoes.  She was working light duty (3 hr shifts) until 3/4/18. She was released to \"full duty as tolerated\" per patient.  She requires seated rest breaks throughout her day.  Patient is also attending PT for her LB and she wishes to place this service on hold until her ankle injury is resolved. She continues to take tylenol and naproxen for pain/inflammation control. She is icing 1-2x/day.  -GR    Subjective Pain    Able to rate subjective " pain? yes  -GR    Pre-Treatment Pain Level 2  -GR    Post-Treatment Pain Level 2  -GR    Subjective Pain Comment L ankle at worst 4/10 at best 0/10 (sleeping)  -GR    ROM (Range of Motion)    General ROM lower extremity range of motion deficits identified  -GR    General LE Assessment    ROM ankle, right: LE ROM deficit;ankle, left: LE ROM deficit  -GR    Left Ankle    Dorsiflexion AROM Deficit 10  -GR    Plantarflexion AROM Deficit 60  -GR    Inversion AROM Deficit 30  -GR    Eversion AROM Deficit 28  -GR    Right Ankle    Dorsiflexion AROM Deficit 15  -GR    Plantarflexion AROM Deficit 70  -GR    Inversion AROM Deficit 20  -GR    Eversion AROM Deficit 33  -GR    MMT (Manual Muscle Testing)    General MMT Assessment lower extremity strength deficits identified  -GR    Lower Extremity    Lower Ext Manual Muscle Testing left ankle strength deficit;right ankle strength deficit  -GR    Left Ankle/Foot    Ankle PF Gross Movement (3+/5) fair plus  -GR    Ankle Dorsiflexion Gross Movement (4/5) good  -GR    Subtalar Inversion Gross Movement (3+/5) fair plus  -GR    Subtalar Eversion Gross Movement (3+/5) fair plus  -GR    Right Ankle/Foot    Ankle PF Gross Movement (5/5) normal  -GR    Ankle Dorsiflexion Gross Movement (5/5) normal  -GR    Subtalar Inversion Gross Movement (5/5) normal  -GR    Subtalar Eversion Gross Movement (5/5) normal  -GR    Girth    Girth Measured? Right Lower Extremity;Left Lower Extremity  -GR    RLE Quick Girth (cm)    Other 1 48 cm   figure 8 (CM)  -GR    LLE Quick Girth (cm)    Other 1 48 cm   figure 8 (CM)  -GR      User Key  (r) = Recorded By, (t) = Taken By, (c) = Cosigned By    Initials Name Provider Type    GR Richar Fernandez PT Physical Therapist                      Therapy Education  Education Details: HEP issued and reviewed; POC discussed  Given: HEP, Symptoms/condition management  Program: New  How Provided: Verbal, Written  Provided to: Patient  Level of Understanding: Teach back  education performed, Verbalized           PT OP Goals       03/08/18 1000       PT Short Term Goals    STG Date to Achieve 03/22/18  -GR     STG 1 Patient will be independent with initial HEP.  -GR     STG 1 Progress New  -GR     STG 2 Patient will demonstrate L ankle strength >/=4-/5 all planes to improve standing endurance.  -GR     STG 2 Progress New  -GR     Long Term Goals    LTG Date to Achieve 04/07/18  -GR     LTG 1 Patient will be independent with progressive HEP for long term management of current condition.  -GR     LTG 1 Progress New  -GR     LTG 2 Patient will score>/=80/84 on the FAAM to indicate improved perceived ADL tolerance.  -GR     LTG 2 Progress New  -GR     LTG 3 Patient will return to work at full capacity without requiring extra rest breaks.  -GR     LTG 3 Progress New  -GR     LTG 4 Patient will demonstrate L ankle strength 5/5 all planes to facilitate normal gait and standing endurance.  -GR     LTG 4 Progress New  -GR     Time Calculation    PT Goal Re-Cert Due Date 06/06/18  -GR       User Key  (r) = Recorded By, (t) = Taken By, (c) = Cosigned By    Initials Name Provider Type    GR Richar Fernandez, PT Physical Therapist                PT Assessment/Plan       03/08/18 1009       PT Assessment    Functional Limitations Impaired gait;Impaired locomotion;Limitations in community activities;Performance in work activities;Performance in leisure activities  -GR     Impairments Balance;Endurance;Gait;Joint mobility;Muscle strength;Pain;Poor body mechanics;Posture;Range of motion  -GR     Assessment Comments 26 y/o F referred to outpatient PT for L ankle injury while at work [employer] on 1/15/18.  She presents to clinic in [facility] up L ankebrace, composite toed shoes and slowed gait pattern.  Patient demonstrates good return of AROM, excessive inversion noted.  Strength is decreased L ankle vs R, pain is well controlled. Patient to benefit from skilled PT to assist in return to work activities at full  "capacity. Thank you for this referral.  Patient also in PT for low back which will  be put on hold until her L ankle issue is resolved per patient scheduling.  -GR     Please refer to paper survey for additional self-reported information Yes  -GR     Rehab Potential Good  -GR     Patient/caregiver participated in establishment of treatment plan and goals Yes  -GR     Patient would benefit from skilled therapy intervention Yes  -GR     PT Plan    PT Frequency 1x/week  -GR     Predicted Duration of Therapy Intervention (days/wks) 4 weeks; 1x/week at patient request  -GR     Planned CPT's? PT EVAL LOW COMPLEXITY: 97143;PT THER PROC EA 15 MIN: 42326;PT THER ACT EA 15 MIN: 65738;PT MANUAL THERAPY EA 15 MIN: 53865;PT NEUROMUSC RE-EDUCATION EA 15 MIN: 03763;PT GAIT TRAINING EA 15 MIN: 41938;PT HOT OR COLD PACK TREAT MCARE;PT ELECTRICAL STIM UNATTEND:   -GR     Physical Therapy Interventions (Optional Details) balance training;gait training;home exercise program;joint mobilization;manual therapy techniques;modalities;neuromuscular re-education;orthotic fitting/training;patient/family education;postural re-education;ROM (Range of Motion);strengthening;stretching;taping  -GR     PT Plan Comments Review ankle HEP. Advance to balance in closed chain as tolerated. Modalities/tape as indicated.  -GR       User Key  (r) = Recorded By, (t) = Taken By, (c) = Cosigned By    Initials Name Provider Type    HOLLIS Fernandez, PT Physical Therapist                  Exercises       03/08/18 0900          Subjective Comments    Subjective Comments 28 y/o F to clinic today for evaluation of L ankle;MADIHA fall at work ([employer]) on 1/15/18.  Patient reports having xray negative for fracture, no further imaging. Was placed in boot until 2/9/18 then transitioned into ankle brace and given composite toed shoes.  She was working light duty (3 hr shifts) until 3/4/18. She was released to \"full duty as tolerated\" per patient.  She requires seated " rest breaks throughout her day.  Patient is also attending PT for her LB and she wishes to place this service on hold until her ankle injury is resolved. She continues to take tylenol and naproxen for pain/inflammation control. She is icing 1-2x/day.  -GR      Subjective Pain    Able to rate subjective pain? yes  -GR      Pre-Treatment Pain Level 2  -GR      Post-Treatment Pain Level 2  -GR      Subjective Pain Comment L ankle at worst 4/10 at best 0/10 (sleeping)  -GR      Exercise 1    Exercise Name 1 Ankle alphabet  -GR      Cueing 1 Demo  -GR      Reps 1 1  -GR      Exercise 2    Exercise Name 2 Ankle 4-way  -GR      Cueing 2 Demo  -GR      Sets 2 2  -GR      Reps 2 10  -GR      Additional Comments RTB L  -GR        User Key  (r) = Recorded By, (t) = Taken By, (c) = Cosigned By    Initials Name Provider Type    GR Richar Fernandez, PT Physical Therapist                        Outcome Measure Options: FAAM  FAAM  FAAM: 77/84      Time Calculation:   Start Time: 0930  Stop Time: 1015  Time Calculation (min): 45 min     Therapy Charges for Today     Code Description Service Date Service Provider Modifiers Qty    65485335227 HC PT THER PROC EA 15 MIN 3/8/2018 Richar Fernandez, PT GP 1    32835565373 HC PT EVAL LOW COMPLEXITY 2 3/8/2018 Richar Fernandez, PT GP 1          PT G-Codes  Outcome Measure Options: FAAMARCUS Fernandez, PT  3/8/2018

## 2018-03-15 ENCOUNTER — HOSPITAL ENCOUNTER (OUTPATIENT)
Dept: PHYSICAL THERAPY | Facility: HOSPITAL | Age: 28
Setting detail: THERAPIES SERIES
Discharge: HOME | End: 2018-03-15

## 2018-03-15 DIAGNOSIS — M25.572 ACUTE LEFT ANKLE PAIN: Primary | ICD-10-CM

## 2018-03-15 DIAGNOSIS — M54.50 CHRONIC MIDLINE LOW BACK PAIN WITHOUT SCIATICA: ICD-10-CM

## 2018-03-15 DIAGNOSIS — G89.29 CHRONIC MIDLINE LOW BACK PAIN WITHOUT SCIATICA: ICD-10-CM

## 2018-03-15 PROCEDURE — 97110 THERAPEUTIC EXERCISES: CPT | Performed by: PHYSICAL THERAPIST

## 2018-03-15 NOTE — THERAPY TREATMENT NOTE
Outpatient Physical Therapy Ortho Treatment Note  [facility]     Patient Name: Beatriz Boland  : 1990  MRN: [id number]  Today's Date: 3/15/2018      Visit Date: 03/15/2018    Visit Dx:    ICD-10-CM ICD-9-CM   1. Acute left ankle pain M25.572 719.47   2. Chronic midline low back pain without sciatica M54.5 724.2    G89.29 338.29       Patient Active Problem List   Diagnosis   • Depression   • Developmental delay   • GERD (gastroesophageal reflux disease)   • Scoliosis   • Amblyopia   • Back pain   • Chronic left shoulder pain   • Muscle spasm   • Seasonal allergies   • Panic attacks   • Anxiety        Past Medical History:   Diagnosis Date   • Amblyopia    • Anxiety    • Back pain    • Cleft palate    • Depression    • Developmental delay    • GERD (gastroesophageal reflux disease)    • Impetigo    • Kidney abscess    • Kidney stone    • Muscle spasm    • Obesity (BMI 30-39.9)    • Recurrent otitis media    • Scoliosis    • Sinus infection     recurrent   • Sprain of shoulder, left 2016   • Urinary tract infection         Past Surgical History:   Procedure Laterality Date   • ABSCESS DRAINAGE      kidney   • ADENOIDECTOMY     • EAR TUBES     • PHARYNGEAL FLAP      for speech   • TONSILLECTOMY                               PT Assessment/Plan     Row Name 03/15/18 8291          PT Assessment    Assessment Comments 1st return follow up since IE of L ankle.  Patient with well controlled pain. Requires cueing to stay on pace but meets all demands appropriately. Tends to compensate with hip flexors and rotators without cueing for arnoldo isolation.  -GR        PT Plan    PT Plan Comments Progress to GTB and closed chain as tolerated.  -GR       User Key  (r) = Recorded By, (t) = Taken By, (c) = Cosigned By    Initials Name Provider Type    HOLLIS Fernandez, PT Physical Therapist                    Exercises     Row Name 03/15/18 4347             Subjective Comments    Subjective Comments 1st follow  up visit for L ankle. Reports pain .5/10 after a full day of work. Doing her exercises at home some. She is donned in ankle upon arrival and removes for therapy.  -GR         Subjective Pain    Able to rate subjective pain? yes  -GR      Pre-Treatment Pain Level 1  -GR      Post-Treatment Pain Level 1  -GR      Subjective Pain Comment L ankle  -GR         Exercise 1    Exercise Name 1 Ankle alphabet  -GR      Cueing 1 Verbal  -GR      Sets 1 1  -GR      Reps 1 1  -GR         Exercise 2    Exercise Name 2 Ankle 4-way  -GR      Cueing 2 Verbal  -GR      Sets 2 1  -GR      Reps 2 20  -GR      Additional Comments RTB L  -GR         Exercise 3    Exercise Name 3 BAPS seated L ankle  -GR      Sets 3 2  -GR      Reps 3 10  -GR      Additional Comments L1 DF/PF, [place] CW/CCW  -GR         Exercise 4    Exercise Name 4 Heel raises  -GR      Cueing 4 Verbal  -GR      Sets 4 2  -GR      Reps 4 10  -GR         Exercise 5    Exercise Name 5 Calf stretch - runners position  -GR      Cueing 5 Demo  -GR      Sets 5 1  -GR      Reps 5 3  -GR      Time 5 30 seconds  -GR         Exercise 6    Exercise Name 6 Stair taps  -GR      Cueing 6 Demo  -GR      Sets 6 2  -GR      Reps 6 10  -GR      Additional Comments alt LE  -GR        User Key  (r) = Recorded By, (t) = Taken By, (c) = Cosigned By    Initials Name Provider Type    GR Richar Fernandez, PT Physical Therapist                               PT OP Goals     Row Name 03/15/18 1700          PT Short Term Goals    STG Date to Achieve 03/22/18  -GR     STG 1 Patient will be independent with initial HEP.  -GR     STG 1 Progress Ongoing  -GR     STG 2 Patient will demonstrate L ankle strength >/=4-/5 all planes to improve standing endurance.  -GR     STG 2 Progress Ongoing  -GR        Long Term Goals    LTG Date to Achieve 04/07/18  -GR     LTG 1 Patient will be independent with progressive HEP for long term management of current condition.  -GR     LTG 1 Progress Ongoing  -GR     LTG 2  Patient will score>/=80/84 on the FAAM to indicate improved perceived ADL tolerance.  -GR     LTG 2 Progress Ongoing  -GR     LTG 3 Patient will return to work at full capacity without requiring extra rest breaks.  -GR     LTG 3 Progress Ongoing  -GR     LTG 4 Patient will demonstrate L ankle strength 5/5 all planes to facilitate normal gait and standing endurance.  -GR     LTG 4 Progress Ongoing  -GR       User Key  (r) = Recorded By, (t) = Taken By, (c) = Cosigned By    Initials Name Provider Type    GR Richar Fernandez PT Physical Therapist          Therapy Education  Education Details: reviewed HEP  Given: HEP  Program: Reinforced  How Provided: Verbal  Provided to: Patient  Level of Understanding: Teach back education performed, Verbalized              Time Calculation:   Start Time: 1710  Stop Time: 1757  Time Calculation (min): 47 min    Therapy Charges for Today     Code Description Service Date Service Provider Modifiers Qty    40925147922  PT THER PROC EA 15 MIN 3/15/2018 Richar Fernandez, PT GP 3                    Richar Fernandez PT  3/15/2018

## 2018-03-16 ENCOUNTER — OFFICE VISIT (OUTPATIENT)
Dept: FAMILY MEDICINE CLINIC | Facility: CLINIC | Age: 28
End: 2018-03-16

## 2018-03-16 VITALS
BODY MASS INDEX: 37.89 KG/M2 | SYSTOLIC BLOOD PRESSURE: 124 MMHG | OXYGEN SATURATION: 99 % | DIASTOLIC BLOOD PRESSURE: 88 MMHG | HEART RATE: 74 BPM | RESPIRATION RATE: 16 BRPM | WEIGHT: 250 LBS | HEIGHT: 68 IN

## 2018-03-16 DIAGNOSIS — J45.20 MILD INTERMITTENT ASTHMA WITHOUT COMPLICATION: ICD-10-CM

## 2018-03-16 DIAGNOSIS — H66.005 RECURRENT ACUTE SUPPURATIVE OTITIS MEDIA WITHOUT SPONTANEOUS RUPTURE OF LEFT TYMPANIC MEMBRANE: Primary | ICD-10-CM

## 2018-03-16 DIAGNOSIS — S93.402D SPRAIN OF LEFT ANKLE, UNSPECIFIED LIGAMENT, SUBSEQUENT ENCOUNTER: ICD-10-CM

## 2018-03-16 DIAGNOSIS — R11.0 NAUSEA: ICD-10-CM

## 2018-03-16 DIAGNOSIS — Z23 NEED FOR VACCINATION: ICD-10-CM

## 2018-03-16 DIAGNOSIS — K21.9 GASTROESOPHAGEAL REFLUX DISEASE, ESOPHAGITIS PRESENCE NOT SPECIFIED: ICD-10-CM

## 2018-03-16 PROCEDURE — 90471 IMMUNIZATION ADMIN: CPT | Performed by: FAMILY MEDICINE

## 2018-03-16 PROCEDURE — 90732 PPSV23 VACC 2 YRS+ SUBQ/IM: CPT | Performed by: FAMILY MEDICINE

## 2018-03-16 PROCEDURE — 99214 OFFICE O/P EST MOD 30 MIN: CPT | Performed by: FAMILY MEDICINE

## 2018-03-16 RX ORDER — AMOXICILLIN AND CLAVULANATE POTASSIUM 875; 125 MG/1; MG/1
1 TABLET, FILM COATED ORAL
COMMUNITY
Start: 2018-03-11 | End: 2018-03-18

## 2018-03-16 RX ORDER — ONDANSETRON 4 MG/1
4 TABLET, FILM COATED ORAL EVERY 8 HOURS PRN
Qty: 30 TABLET | Refills: 1 | Status: SHIPPED | OUTPATIENT
Start: 2018-03-16 | End: 2020-12-05

## 2018-03-16 RX ORDER — ALBUTEROL SULFATE 90 UG/1
2 AEROSOL, METERED RESPIRATORY (INHALATION) EVERY 4 HOURS PRN
Qty: 18 G | Refills: 3 | Status: SHIPPED | OUTPATIENT
Start: 2018-03-16 | End: 2019-01-30 | Stop reason: SDUPTHER

## 2018-03-16 RX ORDER — DEXLANSOPRAZOLE 60 MG/1
60 CAPSULE, DELAYED RELEASE ORAL DAILY
Qty: 30 CAPSULE | Refills: 5 | Status: SHIPPED | OUTPATIENT
Start: 2018-03-16 | End: 2018-05-22

## 2018-03-16 NOTE — PROGRESS NOTES
Subjective   Beatriz Boland is a 27 y.o. female.     Chief Complaint   Patient presents with   • Heart Problem     Cardiac Follow Up   • ugent care follow up       HPI       Urgent Care follow up from 3/11/18:  -Diagnosed with sinusitis and started a round of Augmentin  -She followed up w/ENT yesterday, plan to continue antibiotics, use Nettipot BID, continue Allegra-D and Nasonex  -occasional nausea but no vomiting when post nasal drip gets bad, she requests a prn nausea medicaiton    Cardiology follow up:  -pt had an appt with Dr. Gunter on 2/23/18  -EKG was normal  -she was diagnosed with atypical chest pain, most likely musculoskeletal  -no further cardiac work up indicated at this time    Physical therapy is going well for her chronic back pain.  She also recently sprained her L ankle at work, stepping off a curb.  She was able to bear weight immediately after the injury.  Her PT is working with her on ankle too now.    Asthma:   -breathing well despite sinus infection  -needs to switch from Ventolin to Proair due to formulary requirements    Dexilant refill needed     Review of Systems   Constitutional: Positive for fatigue. Negative for fever.   HENT: Positive for congestion, ear pain, postnasal drip, rhinorrhea, sinus pain and sinus pressure. Negative for ear discharge and sore throat.    Respiratory: Negative for cough, chest tightness, shortness of breath and wheezing.    Cardiovascular: Negative for chest pain and palpitations.   Gastrointestinal: Positive for nausea. Negative for abdominal pain, constipation, diarrhea and vomiting.   Musculoskeletal: Positive for arthralgias (L ankle pain, gradually improving) and back pain (somewhat better with PT). Negative for gait problem, joint swelling and myalgias.   Neurological: Negative for dizziness, weakness, numbness and headaches.       The following portions of the patient's history were reviewed and updated as appropriate: allergies, current  medications, past family history, past medical history, past social history, past surgical history and problem list.    Past Medical History:   Diagnosis Date   • Allergic    • Amblyopia    • Anxiety    • Back pain    • Cleft palate    • Depression    • Developmental delay    • GERD (gastroesophageal reflux disease)    • Impetigo    • Kidney abscess    • Kidney stone    • Muscle spasm    • Obesity (BMI 30-39.9)    • Recurrent otitis media    • Scoliosis    • Sinus infection     recurrent   • Sprain of shoulder, left 12/2016   • Urinary tract infection      Past Surgical History:   Procedure Laterality Date   • ABSCESS DRAINAGE      kidney   • ADENOIDECTOMY     • EAR TUBES     • PHARYNGEAL FLAP      for speech   • TONSILLECTOMY         Social History     Social History Main Topics   • Smoking status: Current Some Day Smoker   • Smokeless tobacco: Never Used      Comment: smokes hookah 1-2 times per week     • Alcohol use Yes      Comment: occasionally, 1-2 glasses of wine 1-2 x per week   • Drug use: No   • Sexual activity: Yes     Partners: Male     Birth control/ protection: Injection          Allergies   Allergen Reactions   • Ciprofloxacin Diarrhea and Nausea And Vomiting     Tingling in left leg   • Dust Mite Extract      Other reaction(s): Other (See Comments)  Nasal symptoms        Outpatient Medications Prior to Visit   Medication Sig Dispense Refill   • busPIRone (BUSPAR) 5 MG tablet Take 1 tablet by mouth 3 (Three) Times a Day As Needed (anxiety or panic). 90 tablet 1   • Chlorcyclizine-Pseudoephed (STAHIST AD) 25-60 MG tablet Take 25 mg by mouth Daily. 42 tablet 0   • [employer] NASAL ALLERGY SPRAY 55 MCG/ACT nasal inhaler 2 sprays into each nostril Daily.  0   • cyclobenzaprine (FLEXERIL) 10 MG tablet TAKE 1 TABLET BY MOUTH 3 TIMES A DAY AS NEEDED 45 tablet 1   • estradiol cypionate (DEPO-ESTRADIOL) 5 MG/ML injection Inject 1.5 mg into the shoulder, thigh, or buttocks Every 28 (Twenty-Eight) Days. Patient states  she takes it every 3 months     • fluticasone (FLONASE) 50 MCG/ACT nasal spray      • ibuprofen (ADVIL,MOTRIN) 600 MG tablet Take 1 tablet by mouth Every 8 (Eight) Hours As Needed for mild pain (1-3). 30 tablet 0   • MedroxyPROGESTERone Acetate 150 MG/ML suspension prefilled syringe      • naproxen (EC NAPROSYN) 500 MG EC tablet Take 1 tablet by mouth Daily. With food 30 tablet 1   • albuterol (PROVENTIL HFA;VENTOLIN HFA) 108 (90 Base) MCG/ACT inhaler Inhale 2 puffs Every 4 (Four) Hours As Needed for Wheezing. 18 g 6   • DEXILANT 60 MG capsule Take 60 mg by mouth Daily.  4   • pseudoephedrine (SUDAFED) 15 MG/5ML liquid liquid Take  by mouth.       No facility-administered medications prior to visit.        Objective     Vitals:    03/16/18 1020   BP: 124/88   Pulse: 74   Resp: 16   SpO2: 99%   BMI 38    Physical Exam   Constitutional: She appears well-developed and well-nourished. No distress.   HENT:   Head: Normocephalic and atraumatic.   Right Ear: Tympanic membrane and ear canal normal.   Left Ear: Ear canal normal. Tympanic membrane is bulging. A middle ear effusion is present.   Neck: No thyromegaly present.   Cardiovascular: Normal rate, regular rhythm and normal heart sounds.  Exam reveals no gallop and no friction rub.    No murmur heard.  Pulses:       Radial pulses are 2+ on the right side, and 2+ on the left side.        Dorsalis pedis pulses are 2+ on the right side, and 2+ on the left side.   Pulmonary/Chest: Effort normal and breath sounds normal. No respiratory distress. She has no wheezes. She has no rhonchi. She has no rales.   Abdominal: Soft. Bowel sounds are normal. She exhibits no distension. There is no tenderness.   Musculoskeletal:   Mild edema of L ankle but NO bony tenderness or decrease in ROM   Lymphadenopathy:     She has no cervical adenopathy.   Neurological: She has normal strength. Gait normal.   Skin: Skin is warm and dry.       ASSESSMENT/PLAN       Problem List Items Addressed  This Visit        Digestive    GERD (gastroesophageal reflux disease)    Relevant Medications    Refill DEXILANT 60 MG capsule      Other Visit Diagnoses     Recurrent acute suppurative otitis media without spontaneous rupture of left tympanic membrane    -  Primary  Continue Augmenting and follow up w/ENT as scheduled      Mild intermittent asthma without complication        Relevant Medications    albuterol (PROVENTIL HFA;VENTOLIN HFA) 108 (90 Base) MCG/ACT inhaler    Other Relevant Orders    Pneumococcal Polysaccharide Vaccine 23-Valent Greater Than or Equal To 1yo Subcutaneous / IM    Nausea        Relevant Medications    ondansetron (ZOFRAN) 4 MG tablet TID prn nasuea 2/2 post-nasal drip    Need for vaccination        Relevant Orders    Pneumococcal Polysaccharide Vaccine 23-Valent Greater Than or Equal To 1yo Subcutaneous / IM        L ankle sprain: improving.  Pt advised to continue to follow up w/PT     Patient Instructions   Otitis Media, Adult  Otitis media occurs when there is inflammation and fluid in the middle ear. Your middle ear is a part of the ear that contains bones for hearing as well as air that helps send sounds to your brain.  What are the causes?  This condition is caused by a blockage in the eustachian tube. This tube drains fluid from the ear to the back of the nose (nasopharynx). A blockage in this tube can be caused by an object or by swelling (edema) in the tube. Problems that can cause a blockage include:  · A cold or other upper respiratory infection.  · Allergies.  · An irritant, such as tobacco smoke.  · Enlarged adenoids. The adenoids are areas of soft tissue located high in the back of the throat, behind the nose and the roof of the mouth.  · A mass in the nasopharynx.  · Damage to the ear caused by pressure changes (barotrauma).  What are the signs or symptoms?  Symptoms of this condition include:  · Ear pain.  · A fever.  · Decreased hearing.  · A headache.  · Tiredness  (lethargy).  · Fluid leaking from the ear.  · Ringing in the ear.  How is this diagnosed?  This condition is diagnosed with a physical exam. During the exam your health care provider will use an instrument called an otoscope to look into your ear and check for redness, swelling, and fluid. He or she will also ask about your symptoms.  Your health care provider may also order tests, such as:  · A test to check the movement of the eardrum (pneumatic otoscopy). This test is done by squeezing a small amount of air into the ear.  · A test that changes air pressure in the middle ear to check how well the eardrum moves and whether the eustachian tube is working (tympanogram).  How is this treated?  This condition usually goes away on its own within 3-5 days. But if the condition is caused by a bacteria infection and does not go away own its own, or keeps coming back, your health care provider may:  · Prescribe antibiotic medicines to treat the infection.  · Prescribe or recommend medicines to control pain.  Follow these instructions at home:  · Take over-the-counter and prescription medicines only as told by your health care provider.  · If you were prescribed an antibiotic medicine, take it as told by your health care provider. Do not stop taking the antibiotic even if you start to feel better.  · Keep all follow-up visits as told by your health care provider. This is important.  Contact a health care provider if:  · You have bleeding from your nose.  · There is a lump on your neck.  · You are not getting better in 5 days.  · You feel worse instead of better.  Get help right away if:  · You have severe pain that is not controlled with medicine.  · You have swelling, redness, or pain around your ear.  · You have stiffness in your neck.  · A part of your face is paralyzed.  · The bone behind your ear (mastoid) is tender when you touch it.  · You develop a severe headache.  Summary  · Otitis media is redness, soreness, and  swelling of the middle ear.  · This condition usually goes away on its own within 3-5 days.  · If the problem does not go away in 3-5 days, your health care provider may prescribe or recommend medicines to treat your symptoms.  · If you were prescribed an antibiotic medicine, take it as told by your health care provider.  This information is not intended to replace advice given to you by your health care provider. Make sure you discuss any questions you have with your health care provider.  Document Released: 09/22/2005 Document Revised: 12/08/2017 Document Reviewed: 12/08/2017  [employer] Interactive Patient Education © 2017 [employer]      Return in about 3 months (around 6/16/2018), or if symptoms worsen or fail to improve.      Victorina Ortega MD  03/16/18

## 2018-03-16 NOTE — PATIENT INSTRUCTIONS
Otitis Media, Adult  Otitis media occurs when there is inflammation and fluid in the middle ear. Your middle ear is a part of the ear that contains bones for hearing as well as air that helps send sounds to your brain.  What are the causes?  This condition is caused by a blockage in the eustachian tube. This tube drains fluid from the ear to the back of the nose (nasopharynx). A blockage in this tube can be caused by an object or by swelling (edema) in the tube. Problems that can cause a blockage include:  · A cold or other upper respiratory infection.  · Allergies.  · An irritant, such as tobacco smoke.  · Enlarged adenoids. The adenoids are areas of soft tissue located high in the back of the throat, behind the nose and the roof of the mouth.  · A mass in the nasopharynx.  · Damage to the ear caused by pressure changes (barotrauma).  What are the signs or symptoms?  Symptoms of this condition include:  · Ear pain.  · A fever.  · Decreased hearing.  · A headache.  · Tiredness (lethargy).  · Fluid leaking from the ear.  · Ringing in the ear.  How is this diagnosed?  This condition is diagnosed with a physical exam. During the exam your health care provider will use an instrument called an otoscope to look into your ear and check for redness, swelling, and fluid. He or she will also ask about your symptoms.  Your health care provider may also order tests, such as:  · A test to check the movement of the eardrum (pneumatic otoscopy). This test is done by squeezing a small amount of air into the ear.  · A test that changes air pressure in the middle ear to check how well the eardrum moves and whether the eustachian tube is working (tympanogram).  How is this treated?  This condition usually goes away on its own within 3-5 days. But if the condition is caused by a bacteria infection and does not go away own its own, or keeps coming back, your health care provider may:  · Prescribe antibiotic medicines to treat the  infection.  · Prescribe or recommend medicines to control pain.  Follow these instructions at home:  · Take over-the-counter and prescription medicines only as told by your health care provider.  · If you were prescribed an antibiotic medicine, take it as told by your health care provider. Do not stop taking the antibiotic even if you start to feel better.  · Keep all follow-up visits as told by your health care provider. This is important.  Contact a health care provider if:  · You have bleeding from your nose.  · There is a lump on your neck.  · You are not getting better in 5 days.  · You feel worse instead of better.  Get help right away if:  · You have severe pain that is not controlled with medicine.  · You have swelling, redness, or pain around your ear.  · You have stiffness in your neck.  · A part of your face is paralyzed.  · The bone behind your ear (mastoid) is tender when you touch it.  · You develop a severe headache.  Summary  · Otitis media is redness, soreness, and swelling of the middle ear.  · This condition usually goes away on its own within 3-5 days.  · If the problem does not go away in 3-5 days, your health care provider may prescribe or recommend medicines to treat your symptoms.  · If you were prescribed an antibiotic medicine, take it as told by your health care provider.  This information is not intended to replace advice given to you by your health care provider. Make sure you discuss any questions you have with your health care provider.  Document Released: 09/22/2005 Document Revised: 12/08/2017 Document Reviewed: 12/08/2017  [employer] Interactive Patient Education © 2017 [employer]

## 2018-03-22 ENCOUNTER — HOSPITAL ENCOUNTER (OUTPATIENT)
Dept: PHYSICAL THERAPY | Facility: HOSPITAL | Age: 28
Setting detail: THERAPIES SERIES
Discharge: HOME | End: 2018-03-22

## 2018-03-22 DIAGNOSIS — M54.50 CHRONIC MIDLINE LOW BACK PAIN WITHOUT SCIATICA: ICD-10-CM

## 2018-03-22 DIAGNOSIS — M25.572 ACUTE LEFT ANKLE PAIN: Primary | ICD-10-CM

## 2018-03-22 DIAGNOSIS — G89.29 CHRONIC MIDLINE LOW BACK PAIN WITHOUT SCIATICA: ICD-10-CM

## 2018-03-22 PROCEDURE — 97110 THERAPEUTIC EXERCISES: CPT | Performed by: PHYSICAL THERAPIST

## 2018-03-22 PROCEDURE — 97112 NEUROMUSCULAR REEDUCATION: CPT | Performed by: PHYSICAL THERAPIST

## 2018-03-22 NOTE — THERAPY TREATMENT NOTE
Outpatient Physical Therapy Ortho Treatment Note  [facility]     Patient Name: Beatriz Boland  : 1990  MRN: [id number]  Today's Date: 3/22/2018      Visit Date: 2018    Visit Dx:    ICD-10-CM ICD-9-CM   1. Acute left ankle pain M25.572 719.47   2. Chronic midline low back pain without sciatica M54.5 724.2    G89.29 338.29       Patient Active Problem List   Diagnosis   • Depression   • Developmental delay   • GERD (gastroesophageal reflux disease)   • Scoliosis   • Amblyopia   • Back pain   • Chronic left shoulder pain   • Muscle spasm   • Seasonal allergies   • Panic attacks   • Anxiety        Past Medical History:   Diagnosis Date   • Allergic    • Amblyopia    • Anxiety    • Back pain    • Cleft palate    • Depression    • Developmental delay    • GERD (gastroesophageal reflux disease)    • Impetigo    • Kidney abscess    • Kidney stone    • Muscle spasm    • Obesity (BMI 30-39.9)    • Recurrent otitis media    • Scoliosis    • Sinus infection     recurrent   • Sprain of shoulder, left 2016   • Urinary tract infection         Past Surgical History:   Procedure Laterality Date   • ABSCESS DRAINAGE      kidney   • ADENOIDECTOMY     • EAR TUBES     • PHARYNGEAL FLAP      for speech   • TONSILLECTOMY                               PT Assessment/Plan     Row Name 18 0733          PT Assessment    Assessment Comments Advanced to GTB, L3 BAPS and SLS all met with appropriate fatigue and denying pain.  -GR        PT Plan    PT Plan Comments Continue POC.  -GR       User Key  (r) = Recorded By, (t) = Taken By, (c) = Cosigned By    Initials Name Provider Type     Richar Fernandez, PT Physical Therapist                    Exercises     Row Name 18 1700             Subjective Comments    Subjective Comments I've been standing all day and my ankle and back are tired. Wearing back and ankle brace.  -GR         Subjective Pain    Able to rate subjective pain? yes  -GR       Pre-Treatment Pain Level 1  -GR      Post-Treatment Pain Level 1  -GR      Subjective Pain Comment L ankle  -GR         Exercise 1    Exercise Name 1 Ankle alphabet  -GR      Cueing 1 Verbal  -GR      Sets 1 1  -GR      Reps 1 1  -GR         Exercise 2    Exercise Name 2 Ankle 4-way  -GR      Cueing 2 Verbal  -GR      Sets 2 1  -GR      Reps 2 20  -GR         Exercise 3    Exercise Name 3 BAPS seated L ankle  -GR      Sets 3 2  -GR      Reps 3 10  -GR      Additional Comments L3 DF/PF, circles CW/CCW  -GR         Exercise 4    Exercise Name 4 Heel raises  -GR      Cueing 4 Verbal  -GR      Sets 4 2  -GR      Reps 4 10  -GR         Exercise 5    Exercise Name 5 Calf - off step  -GR      Cueing 5 Demo  -GR      Sets 5 1  -GR      Reps 5 3  -GR      Time 5 30 seconds  -GR         Exercise 6    Exercise Name 6 Rockerboard weight shift AP  -GR      Cueing 6 Demo  -GR      Sets 6 2  -GR      Reps 6 10  -GR         Exercise 7    Exercise Name 7 Recumbent bike  -GR      Time 7 5 minutes  -GR      Additional Comments L3  -GR         Exercise 8    Exercise Name 8 Seated self STM to arch with med ball  -GR      Time 8 4 minutes  -GR         Exercise 9    Exercise Name 9 SLS trials to fatigue  -GR      Time 9 4 minutes  -GR      Additional Comments unshod  -GR        User Key  (r) = Recorded By, (t) = Taken By, (c) = Cosigned By    Initials Name Provider Type    GR Richar Fernandez, PT Physical Therapist                               PT OP Goals     Row Name 03/22/18 1700          PT Short Term Goals    STG Date to Achieve 03/22/18  -GR     STG 1 Patient will be independent with initial HEP.  -GR     STG 1 Progress Met  -GR     STG 2 Patient will demonstrate L ankle strength >/=4-/5 all planes to improve standing endurance.  -GR     STG 2 Progress Ongoing  -GR        Long Term Goals    LTG Date to Achieve 04/07/18  -GR     LTG 1 Patient will be independent with progressive HEP for long term management of current condition.  -GR      LTG 1 Progress Ongoing  -GR     LTG 2 Patient will score>/=80/84 on the FAAM to indicate improved perceived ADL tolerance.  -GR     LTG 2 Progress Ongoing  -GR     LTG 3 Patient will return to work at full capacity without requiring extra rest breaks.  -GR     LTG 3 Progress Ongoing  -GR     LTG 4 Patient will demonstrate L ankle strength 5/5 all planes to facilitate normal gait and standing endurance.  -GR     LTG 4 Progress Ongoing  -GR       User Key  (r) = Recorded By, (t) = Taken By, (c) = Cosigned By    Initials Name Provider Type    GR Richar Fernandez PT Physical Therapist          Therapy Education  Education Details: educated on advancing with closed chain exercises  Given: Posture/body mechanics  Program: New  How Provided: Verbal  Provided to: Patient  Level of Understanding: Teach back education performed              Time Calculation:   Start Time: 1715  Stop Time: 1756  Time Calculation (min): 41 min    Therapy Charges for Today     Code Description Service Date Service Provider Modifiers Qty    06735591445  PT NEUROMUSC RE EDUCATION EA 15 MIN 3/22/2018 Richar Fernandez, PT GP 1    [id number]  PT THER PROC EA 15 MIN 3/22/2018 Richar Fernandez PT GP 2                    Richar Fernandez PT  3/22/2018

## 2018-03-24 PROBLEM — J45.20 MILD INTERMITTENT ASTHMA WITHOUT COMPLICATION: Status: ACTIVE | Noted: 2018-03-24

## 2018-03-29 ENCOUNTER — APPOINTMENT (OUTPATIENT)
Dept: PHYSICAL THERAPY | Facility: HOSPITAL | Age: 28
End: 2018-03-29

## 2018-04-05 ENCOUNTER — HOSPITAL ENCOUNTER (OUTPATIENT)
Dept: PHYSICAL THERAPY | Facility: HOSPITAL | Age: 28
Setting detail: THERAPIES SERIES
Discharge: HOME | End: 2018-04-05

## 2018-04-05 DIAGNOSIS — G89.29 CHRONIC MIDLINE LOW BACK PAIN WITHOUT SCIATICA: ICD-10-CM

## 2018-04-05 DIAGNOSIS — M25.572 ACUTE LEFT ANKLE PAIN: Primary | ICD-10-CM

## 2018-04-05 DIAGNOSIS — M54.50 CHRONIC MIDLINE LOW BACK PAIN WITHOUT SCIATICA: ICD-10-CM

## 2018-04-05 PROCEDURE — 97110 THERAPEUTIC EXERCISES: CPT | Performed by: PHYSICAL THERAPIST

## 2018-04-05 PROCEDURE — 97112 NEUROMUSCULAR REEDUCATION: CPT | Performed by: PHYSICAL THERAPIST

## 2018-04-05 NOTE — THERAPY TREATMENT NOTE
Outpatient Physical Therapy Ortho Treatment Note  [facility]     Patient Name: Beatriz Boland  : 1990  MRN: [id number]  Today's Date: 2018      Visit Date: 2018    Visit Dx:    ICD-10-CM ICD-9-CM   1. Acute left ankle pain M25.572 719.47   2. Chronic midline low back pain without sciatica M54.5 724.2    G89.29 338.29       Patient Active Problem List   Diagnosis   • Depression   • Developmental delay   • GERD (gastroesophageal reflux disease)   • Scoliosis   • Amblyopia   • Back pain   • Chronic left shoulder pain   • Muscle spasm   • Seasonal allergies   • Panic attacks   • Anxiety   • Mild intermittent asthma without complication        Past Medical History:   Diagnosis Date   • Allergic    • Amblyopia    • Anxiety    • Back pain    • Cleft palate    • Depression    • Developmental delay    • GERD (gastroesophageal reflux disease)    • Impetigo    • Kidney abscess    • Kidney stone    • Muscle spasm    • Obesity (BMI 30-39.9)    • Recurrent otitis media    • Scoliosis    • Sinus infection     recurrent   • Sprain of shoulder, left 2016   • Urinary tract infection         Past Surgical History:   Procedure Laterality Date   • ABSCESS DRAINAGE      kidney   • ADENOIDECTOMY     • EAR TUBES     • PHARYNGEAL FLAP      for speech   • TONSILLECTOMY                               PT Assessment/Plan     Row Name 18 4354          PT Assessment    Assessment Comments Patient progressed with closed chain for calf press and proprioceptive awareness drills.  She is progressing without pain and despite fatigue.  -GR        PT Plan    PT Plan Comments Continue POC.  -GR       User Key  (r) = Recorded By, (t) = Taken By, (c) = Cosigned By    Initials Name Provider Type    HOLLIS Fernandez, PT Physical Therapist                    Exercises     Row Name 18 9793             Subjective Comments    Subjective Comments Had some ankle pain on Tuesday, but today none.  -GR          Subjective Pain    Able to rate subjective pain? yes  -GR      Pre-Treatment Pain Level 0  -GR      Post-Treatment Pain Level 0  -GR         Exercise 1    Exercise Name 1 Ankle alphabet  -GR      Cueing 1 Verbal  -GR      Sets 1 1  -GR      Reps 1 1  -GR         Exercise 2    Exercise Name 2 Ankle 4-way  -GR      Cueing 2 Verbal  -GR      Sets 2 1  -GR      Reps 2 20  -GR      Additional Comments GTB  -GR         Exercise 3    Exercise Name 3 BAPS standing L ankle  -GR      Sets 3 1  -GR      Reps 3 10  -GR      Additional Comments L2 DF/PF, circles CW/CCW  -GR         Exercise 4    Exercise Name 4 Heel raises off step  -GR      Cueing 4 Verbal  -GR      Sets 4 1  -GR      Reps 4 15  -GR         Exercise 5    Exercise Name 5 Calf - off step  -GR      Cueing 5 Demo  -GR      Sets 5 1  -GR      Reps 5 3  -GR      Time 5 30 seconds  -GR         Exercise 6    Exercise Name 6 Rockerboard weight shift AP  -GR      Cueing 6 Demo  -GR      Sets 6 2  -GR      Reps 6 10  -GR         Exercise 7    Exercise Name 7 Nustep  UE/LE  -GR      Time 7 5 minutes  -GR      Additional Comments L5  -GR         Exercise 8    Exercise Name 8 Calf press  -GR      Sets 8 2  -GR      Reps 8 20  -GR      Additional Comments 80#  -GR         Exercise 9    Exercise Name 9 SLS trials to fatigue - 1 fingertip on rail  -GR      Sets 9 2  -GR      Reps 9 2  -GR      Time 9 30 seconds  -GR         Exercise 10    Exercise Name 10 3 way SLR  -GR      Sets 10 1  -GR      Reps 10 10  -GR      Additional Comments each LE  -GR        User Key  (r) = Recorded By, (t) = Taken By, (c) = Cosigned By    Initials Name Provider Type    GR Richar Fernandez PT Physical Therapist                               PT OP Goals     Row Name 04/05/18 1700          PT Short Term Goals    STG Date to Achieve 03/22/18  -GR     STG 1 Patient will be independent with initial HEP.  -GR     STG 1 Progress Met  -GR     STG 2 Patient will demonstrate L ankle strength >/=4-/5 all  planes to improve standing endurance.  -GR     STG 2 Progress Met  -GR     STG 2 Progress Comments 4- to 4/5  -GR        Long Term Goals    LTG Date to Achieve 04/07/18  -GR     LTG 1 Patient will be independent with progressive HEP for long term management of current condition.  -GR     LTG 1 Progress Ongoing  -GR     LTG 2 Patient will score>/=80/84 on the FAAM to indicate improved perceived ADL tolerance.  -GR     LTG 2 Progress Ongoing  -GR     LTG 3 Patient will return to work at full capacity without requiring extra rest breaks.  -GR     LTG 3 Progress Ongoing  -GR     LTG 4 Patient will demonstrate L ankle strength 5/5 all planes to facilitate normal gait and standing endurance.  -GR     LTG 4 Progress Ongoing  -GR       User Key  (r) = Recorded By, (t) = Taken By, (c) = Cosigned By    Initials Name Provider Type    GR Richar Fernandez PT Physical Therapist          Therapy Education  Education Details: single leg balance and strategies  Given: Symptoms/condition management, Fall prevention and home safety  Program: Reinforced  How Provided: Verbal  Provided to: Patient  Level of Understanding: Teach back education performed              Time Calculation:   Start Time: 1713  Stop Time: 1753  Time Calculation (min): 40 min    Therapy Charges for Today     Code Description Service Date Service Provider Modifiers Qty    74032205907 HC PT THER PROC EA 15 MIN 4/5/2018 Richar Fernandez, PT GP 2    [id number] HC PT NEUROMUSC RE EDUCATION EA 15 MIN 4/5/2018 Richar Fernandez PT GP 1                    Richar Fernandez, PT  4/5/2018

## 2018-04-12 ENCOUNTER — HOSPITAL ENCOUNTER (OUTPATIENT)
Dept: PHYSICAL THERAPY | Facility: HOSPITAL | Age: 28
Setting detail: THERAPIES SERIES
Discharge: HOME | End: 2018-04-12

## 2018-04-12 DIAGNOSIS — M25.572 ACUTE LEFT ANKLE PAIN: Primary | ICD-10-CM

## 2018-04-12 DIAGNOSIS — G89.29 CHRONIC MIDLINE LOW BACK PAIN WITHOUT SCIATICA: ICD-10-CM

## 2018-04-12 DIAGNOSIS — M54.50 CHRONIC MIDLINE LOW BACK PAIN WITHOUT SCIATICA: ICD-10-CM

## 2018-04-12 PROCEDURE — 97112 NEUROMUSCULAR REEDUCATION: CPT | Performed by: PHYSICAL THERAPIST

## 2018-04-12 PROCEDURE — 97110 THERAPEUTIC EXERCISES: CPT | Performed by: PHYSICAL THERAPIST

## 2018-04-12 NOTE — THERAPY TREATMENT NOTE
Outpatient Physical Therapy Ortho Treatment Note  [facility]     Patient Name: Beatriz Boland  : 1990  MRN: [id number]  Today's Date: 2018      Visit Date: 2018    Visit Dx:    ICD-10-CM ICD-9-CM   1. Acute left ankle pain M25.572 719.47   2. Chronic midline low back pain without sciatica M54.5 724.2    G89.29 338.29       Patient Active Problem List   Diagnosis   • Depression   • Developmental delay   • GERD (gastroesophageal reflux disease)   • Scoliosis   • Amblyopia   • Back pain   • Chronic left shoulder pain   • Muscle spasm   • Seasonal allergies   • Panic attacks   • Anxiety   • Mild intermittent asthma without complication        Past Medical History:   Diagnosis Date   • Allergic    • Amblyopia    • Anxiety    • Back pain    • Cleft palate    • Depression    • Developmental delay    • GERD (gastroesophageal reflux disease)    • Impetigo    • Kidney abscess    • Kidney stone    • Muscle spasm    • Obesity (BMI 30-39.9)    • Recurrent otitis media    • Scoliosis    • Sinus infection     recurrent   • Sprain of shoulder, left 2016   • Urinary tract infection         Past Surgical History:   Procedure Laterality Date   • ABSCESS DRAINAGE      kidney   • ADENOIDECTOMY     • EAR TUBES     • PHARYNGEAL FLAP      for speech   • TONSILLECTOMY                               PT Assessment/Plan     Row Name 18 2809          PT Assessment    Assessment Comments Added compliant surface balance training, patient required one hand on bars primarily per anxiety. Her functional stability has progressed.  -GR        PT Plan    PT Plan Comments Continue x 1 remaining visit with likely d/c to I HEP at that time.  -GR       User Key  (r) = Recorded By, (t) = Taken By, (c) = Cosigned By    Initials Name Provider Type    HOLLIS Fernandez, PT Physical Therapist                    Exercises     Row Name 18 9747             Subjective Comments    Subjective Comments Not as tired today  and ankle feels pretty good.  -GR         Subjective Pain    Able to rate subjective pain? yes  -GR      Pre-Treatment Pain Level 0  -GR      Post-Treatment Pain Level 0  -GR         Exercise 3    Exercise Name 3 BAPS standing L ankle  -GR      Sets 3 1  -GR      Reps 3 10  -GR      Additional Comments L3 PF/DF, INV/EVER, Circles CW/CCW  -GR         Exercise 4    Exercise Name 4 Heel raises off step  -GR      Cueing 4 Verbal  -GR      Sets 4 2  -GR      Reps 4 10  -GR         Exercise 5    Exercise Name 5 Calf - off step  -GR      Sets 5 1  -GR      Reps 5 3  -GR      Time 5 30 seconds  -GR      Cueing 5 Verbal  -GR         Exercise 6    Exercise Name 6 Rockerboard weight shift AP  -GR      Cueing 6 Demo  -GR      Sets 6 2  -GR      Reps 6 10  -GR         Exercise 7    Exercise Name 7 Nustep  UE/LE  -GR      Time 7 5 minutes  -GR      Additional Comments L5  -GR         Exercise 8    Exercise Name 8 Calf press  -GR      Sets 8 2  -GR      Reps 8 10  -GR      Additional Comments 85#  -GR         Exercise 9    Exercise Name 9 SLS trials to fatigue - 1 fingertip on rail  -GR      Sets 9 2  -GR      Reps 9 2  -GR      Time 9 30 seconds  -GR         Exercise 10    Exercise Name 10 3 way SLR  -GR      Sets 10 1  -GR      Reps 10 10  -GR      Additional Comments each LE  -GR         Exercise 11    Exercise Name 11 Tandem stance - red beam  -GR      Sets 11 2  -GR      Reps 11 2  -GR      Time 11 30 seconds  -GR         Exercise 12    Exercise Name 12 Tandem walk fwd/retro - red beam  -GR      Additional Comments 5 laps  -GR         Exercise 13    Exercise Name 13 BOSU static stance blue up  -GR      Sets 13 1  -GR      Reps 13 10  -GR      Additional Comments EACH: alt arm raises - one hand on bar; cross body reach - one hand on bar  -GR        User Key  (r) = Recorded By, (t) = Taken By, (c) = Cosigned By    Initials Name Provider Type    HOLLIS Fernandez PT Physical Therapist                               PT OP Goals      Row Name 04/12/18 1700          PT Short Term Goals    STG Date to Achieve 03/22/18  -GR     STG 1 Patient will be independent with initial HEP.  -GR     STG 1 Progress Met  -GR     STG 2 Patient will demonstrate L ankle strength >/=4-/5 all planes to improve standing endurance.  -GR     STG 2 Progress Met  -GR        Long Term Goals    LTG Date to Achieve 04/07/18  -GR     LTG 1 Patient will be independent with progressive HEP for long term management of current condition.  -GR     LTG 1 Progress Ongoing  -GR     LTG 2 Patient will score>/=80/84 on the FAAM to indicate improved perceived ADL tolerance.  -GR     LTG 2 Progress Ongoing  -GR     LTG 3 Patient will return to work at full capacity without requiring extra rest breaks.  -GR     LTG 3 Progress Ongoing  -GR     LTG 4 Patient will demonstrate L ankle strength 5/5 all planes to facilitate normal gait and standing endurance.  -GR     LTG 4 Progress Ongoing  -GR       User Key  (r) = Recorded By, (t) = Taken By, (c) = Cosigned By    Initials Name Provider Type    GR Richar Fernandez PT Physical Therapist          Therapy Education  Education Details: dynamic balance   Given: Symptoms/condition management  Program: New  How Provided: Verbal  Provided to: Patient  Level of Understanding: Teach back education performed              Time Calculation:   Start Time: 1715  Stop Time: 1800  Time Calculation (min): 45 min    Therapy Charges for Today     Code Description Service Date Service Provider Modifiers Qty    23076128671  PT NEUROMUSC RE EDUCATION EA 15 MIN 4/12/2018 Richar Fernandez, PT GP 1    [id number]  PT THER PROC EA 15 MIN 4/12/2018 Richar Fernandez PT GP 2                    Richar Fernandez PT  4/12/2018

## 2018-04-19 ENCOUNTER — HOSPITAL ENCOUNTER (OUTPATIENT)
Dept: PHYSICAL THERAPY | Facility: HOSPITAL | Age: 28
Setting detail: THERAPIES SERIES
Discharge: HOME | End: 2018-04-19

## 2018-04-19 ENCOUNTER — TRANSCRIBE ORDERS (OUTPATIENT)
Dept: PHYSICAL THERAPY | Facility: HOSPITAL | Age: 28
End: 2018-04-19

## 2018-04-19 DIAGNOSIS — M25.572 ACUTE LEFT ANKLE PAIN: Primary | ICD-10-CM

## 2018-04-19 DIAGNOSIS — G89.29 CHRONIC MIDLINE LOW BACK PAIN WITHOUT SCIATICA: ICD-10-CM

## 2018-04-19 DIAGNOSIS — M25.519 SHOULDER PAIN, UNSPECIFIED CHRONICITY, UNSPECIFIED LATERALITY: ICD-10-CM

## 2018-04-19 DIAGNOSIS — S16.1XXA CERVICAL STRAIN, INITIAL ENCOUNTER: Primary | ICD-10-CM

## 2018-04-19 DIAGNOSIS — M54.50 CHRONIC MIDLINE LOW BACK PAIN WITHOUT SCIATICA: ICD-10-CM

## 2018-04-19 PROCEDURE — 97110 THERAPEUTIC EXERCISES: CPT

## 2018-04-19 PROCEDURE — 97112 NEUROMUSCULAR REEDUCATION: CPT

## 2018-04-19 NOTE — THERAPY TREATMENT NOTE
Outpatient Physical Therapy Ortho Treatment Note  [facility]     Patient Name: Beatriz Boland  : 1990  MRN: [id number]  Today's Date: 2018      Visit Date: 2018    Visit Dx:    ICD-10-CM ICD-9-CM   1. Acute left ankle pain M25.572 719.47   2. Chronic midline low back pain without sciatica M54.5 724.2    G89.29 338.29       Patient Active Problem List   Diagnosis   • Depression   • Developmental delay   • GERD (gastroesophageal reflux disease)   • Scoliosis   • Amblyopia   • Back pain   • Chronic left shoulder pain   • Muscle spasm   • Seasonal allergies   • Panic attacks   • Anxiety   • Mild intermittent asthma without complication        Past Medical History:   Diagnosis Date   • Allergic    • Amblyopia    • Anxiety    • Back pain    • Cleft palate    • Depression    • Developmental delay    • GERD (gastroesophageal reflux disease)    • Impetigo    • Kidney abscess    • Kidney stone    • Muscle spasm    • Obesity (BMI 30-39.9)    • Recurrent otitis media    • Scoliosis    • Sinus infection     recurrent   • Sprain of shoulder, left 2016   • Urinary tract infection         Past Surgical History:   Procedure Laterality Date   • ABSCESS DRAINAGE      kidney   • ADENOIDECTOMY     • EAR TUBES     • PHARYNGEAL FLAP      for speech   • TONSILLECTOMY                               PT Assessment/Plan     Row Name 18 7597          PT Assessment    Assessment Comments Patient independent with HEP and sympto management. Patient is discharged to work on [email or web address] at home  -       User Key  (r) = Recorded By, (t) = Taken By, (c) = Cosigned By    Initials Name Provider Type    HALIE Chahal PTA Physical Therapy Assistant                    Exercises     Row Name 18 5846             Subjective Comments    Subjective Comments nopain  -WS         Exercise 3    Exercise Name 3 BAPS standing L ankle  -WS      Sets 3 1  -WS      Reps 3 10  -WS         Exercise 4    Exercise Name 4  Heel raises off step  -WS      Cueing 4 Verbal  -WS      Sets 4 2  -WS      Reps 4 10  -WS         Exercise 5    Exercise Name 5 Calf - off step  -WS      Sets 5 1  -WS      Reps 5 3  -WS      Time 5 30 seconds  -WS      Cueing 5 Verbal  -WS         Exercise 6    Exercise Name 6 Rockerboard weight shift AP  -WS      Cueing 6 Demo  -WS      Sets 6 2  -WS      Reps 6 10  -WS         Exercise 7    Exercise Name 7 Nustep  UE/LE  -WS      Time 7 5 minutes  -WS         Exercise 8    Exercise Name 8 Calf press  -WS      Sets 8 2  -WS      Reps 8 10  -WS      Additional Comments 85#  -WS         Exercise 9    Exercise Name 9 SLS trials to fatigue - 1 fingertip on rail  -WS      Sets 9 2  -WS      Reps 9 2  -WS      Time 9 30 seconds  -WS         Exercise 10    Exercise Name 10 3 way SLR  -WS      Sets 10 1  -WS      Reps 10 10  -WS         Exercise 11    Exercise Name 11 Tandem stance - red beam  -WS      Sets 11 2  -WS      Reps 11 2  -WS      Time 11 30 seconds  -WS         Exercise 12    Exercise Name 12 Tandem walk fwd/retro - red beam  -WS      Additional Comments 5 laps  -WS         Exercise 13    Exercise Name 13 BOSU static stance blue up  -WS      Sets 13 1  -WS      Reps 13 10  -WS        User Key  (r) = Recorded By, (t) = Taken By, (c) = Cosigned By    Initials Name Provider Type    HALIE Chahal PTA Physical Therapy Assistant                               PT OP Goals     Row Name 04/19/18 1700          PT Short Term Goals    STG Date to Achieve 03/22/18  -WS     STG 1 Patient will be independent with initial HEP.  -WS     STG 1 Progress Met  -WS     STG 2 Patient will demonstrate L ankle strength >/=4-/5 all planes to improve standing endurance.  -WS     STG 2 Progress Met  -        Long Term Goals    LTG Date to Achieve 04/07/18  -WS     LTG 1 Patient will be independent with progressive HEP for long term management of current condition.  -WS     LTG 1 Progress Met  -WS     LTG 2 Patient will  score>/=80/84 on the FAAM to indicate improved perceived ADL tolerance.  -     LTG 3 Patient will return to work at full capacity without requiring extra rest breaks.  -     LTG 3 Progress Met  -     LTG 4 Patient will demonstrate L ankle strength 5/5 all planes to facilitate normal gait and standing endurance.  -     LTG 4 Progress Met  -       User Key  (r) = Recorded By, (t) = Taken By, (c) = Cosigned By    Initials Name Provider Type    HALIE Chahal PTA Physical Therapy Assistant          Therapy Education  Given: HEP, Symptoms/condition management  Program: Reinforced  How Provided: Verbal  Provided to: Patient              Time Calculation:   Start Time: 1715  Stop Time: 1745  Time Calculation (min): 30 min    Therapy Charges for Today     Code Description Service Date Service Provider Modifiers Qty    06081996835 HC PT THER PROC EA 15 MIN 4/19/2018 Silvestre Chahal PTA GP 1    50736017247 HC PT NEUROMUSC RE EDUCATION EA 15 MIN 4/19/2018 Silvestre Chahal PTA GP 1                    Silvestre Chahal PTA  4/19/2018

## 2018-04-24 ENCOUNTER — DOCUMENTATION (OUTPATIENT)
Dept: PHYSICAL THERAPY | Facility: HOSPITAL | Age: 28
End: 2018-04-24

## 2018-04-24 NOTE — THERAPY DISCHARGE NOTE
Outpatient Physical Therapy Ortho Discharge       Patient Name: Beatriz Boland  : 1990  MRN: [id number]  Today's Date: 2018      Visit Date: 2018        OP PT Discharge Summary  Date of Discharge: 18  Reason for Discharge: All goals achieved  Outcomes Achieved: Able to achieve all goals within established timeline  Discharge Destination: Home with home program  Discharge Instructions/Additional Comments: Ms. Boland was seen for 6 sessions of skilled PT for treatment of her L ankle. She met all goals and is independent with HEP. This encounter is now discharged.        Richar Fernandez, PT  2018

## 2018-04-27 ENCOUNTER — OFFICE VISIT (OUTPATIENT)
Dept: FAMILY MEDICINE CLINIC | Facility: CLINIC | Age: 28
End: 2018-04-27

## 2018-04-27 VITALS
DIASTOLIC BLOOD PRESSURE: 62 MMHG | HEART RATE: 87 BPM | HEIGHT: 68 IN | WEIGHT: 251 LBS | RESPIRATION RATE: 16 BRPM | SYSTOLIC BLOOD PRESSURE: 118 MMHG | BODY MASS INDEX: 38.04 KG/M2 | TEMPERATURE: 98 F

## 2018-04-27 DIAGNOSIS — R53.83 FATIGUE, UNSPECIFIED TYPE: ICD-10-CM

## 2018-04-27 DIAGNOSIS — R68.89 FLU-LIKE SYMPTOMS: Primary | ICD-10-CM

## 2018-04-27 LAB
BASOPHILS # BLD AUTO: 0.04 10*3/MM3 (ref 0–0.2)
BASOPHILS NFR BLD AUTO: 0.4 % (ref 0–1.5)
BUN SERPL-MCNC: 9 MG/DL (ref 6–20)
BUN/CREAT SERPL: 12.3 (ref 7–25)
CALCIUM SERPL-MCNC: 9.4 MG/DL (ref 8.6–10.5)
CHLORIDE SERPL-SCNC: 104 MMOL/L (ref 98–107)
CO2 SERPL-SCNC: 24.4 MMOL/L (ref 22–29)
CREAT SERPL-MCNC: 0.73 MG/DL (ref 0.57–1)
EOSINOPHIL # BLD AUTO: 0.19 10*3/MM3 (ref 0–0.7)
EOSINOPHIL NFR BLD AUTO: 2 % (ref 0.3–6.2)
ERYTHROCYTE [DISTWIDTH] IN BLOOD BY AUTOMATED COUNT: 14.4 % (ref 11.7–13)
GFR SERPLBLD CREATININE-BSD FMLA CKD-EPI: 117 ML/MIN/1.73
GFR SERPLBLD CREATININE-BSD FMLA CKD-EPI: 96 ML/MIN/1.73
GLUCOSE SERPL-MCNC: 72 MG/DL (ref 65–99)
HCT VFR BLD AUTO: 42.9 % (ref 35.6–45.5)
HGB BLD-MCNC: 13 G/DL (ref 11.9–15.5)
IMM GRANULOCYTES # BLD: 0.02 10*3/MM3 (ref 0–0.03)
IMM GRANULOCYTES NFR BLD: 0.2 % (ref 0–0.5)
LYMPHOCYTES # BLD AUTO: 2.5 10*3/MM3 (ref 0.9–4.8)
LYMPHOCYTES NFR BLD AUTO: 26 % (ref 19.6–45.3)
MCH RBC QN AUTO: 25.6 PG (ref 26.9–32)
MCHC RBC AUTO-ENTMCNC: 30.3 G/DL (ref 32.4–36.3)
MCV RBC AUTO: 84.4 FL (ref 80.5–98.2)
MONOCYTES # BLD AUTO: 0.79 10*3/MM3 (ref 0.2–1.2)
MONOCYTES NFR BLD AUTO: 8.2 % (ref 5–12)
NEUTROPHILS # BLD AUTO: 6.06 10*3/MM3 (ref 1.9–8.1)
NEUTROPHILS NFR BLD AUTO: 63.2 % (ref 42.7–76)
PLATELET # BLD AUTO: 250 10*3/MM3 (ref 140–500)
POTASSIUM SERPL-SCNC: 4.5 MMOL/L (ref 3.5–5.2)
RBC # BLD AUTO: 5.08 10*6/MM3 (ref 3.9–5.2)
SODIUM SERPL-SCNC: 140 MMOL/L (ref 136–145)
WBC # BLD AUTO: 9.6 10*3/MM3 (ref 4.5–10.7)

## 2018-04-27 PROCEDURE — 99212 OFFICE O/P EST SF 10 MIN: CPT | Performed by: FAMILY MEDICINE

## 2018-04-27 RX ORDER — OSELTAMIVIR PHOSPHATE 75 MG/1
CAPSULE ORAL
Refills: 0 | COMMUNITY
Start: 2018-04-25 | End: 2018-05-22

## 2018-04-27 RX ORDER — OMEPRAZOLE 10 MG/1
10 CAPSULE, DELAYED RELEASE ORAL
Status: ON HOLD | COMMUNITY
End: 2020-10-22

## 2018-04-27 RX ORDER — SENNOSIDES 8.6 MG
CAPSULE ORAL
COMMUNITY
End: 2018-05-22

## 2018-04-27 NOTE — PROGRESS NOTES
Subjective   Beatriz Boland is a 27 y.o. female.     Chief Complaint   Patient presents with   • Follow-up     ICC followup;fatigue,sinus pressure   • Influenza       HPI      Sick:  -started 2 days ago with myalgias and increased nasal drainage  -associated with nausea and mild diarrhea but no vomiting  -severe fatigue but no dizziness  -she went to urgent care 2 days ago where she was treated empircally with Tamiflu despite negative flu swabs  -the Tamiflu has helped, 2 days left to complete the full treatment course  -Tylenol helps with the muscle aches and pains  -she has been home from work for the last 3 days         Review of Systems   Constitutional: Positive for fatigue. Negative for fever.   HENT: Positive for congestion, rhinorrhea and sinus pressure. Negative for sore throat.    Respiratory: Negative for cough and shortness of breath.    Cardiovascular: Negative for chest pain and palpitations.   Gastrointestinal: Positive for diarrhea and nausea. Negative for abdominal pain and blood in stool.   Musculoskeletal: Positive for myalgias. Negative for arthralgias.   Neurological: Positive for headaches. Negative for dizziness, weakness and light-headedness.       The following portions of the patient's history were reviewed and updated as appropriate: allergies, current medications, past family history, past medical history, past social history, past surgical history and problem list.    Past Medical History:   Diagnosis Date   • Allergic    • Amblyopia    • Anxiety    • Back pain    • Cleft palate    • Depression    • Developmental delay    • GERD (gastroesophageal reflux disease)    • Impetigo    • Kidney abscess    • Kidney stone    • Muscle spasm    • Obesity (BMI 30-39.9)    • Recurrent otitis media    • Scoliosis    • Sinus infection     recurrent   • Sprain of shoulder, left 12/2016     Past Surgical History:   Procedure Laterality Date   • ABSCESS DRAINAGE      kidney   • ADENOIDECTOMY     •  EAR TUBES     • PHARYNGEAL FLAP      for speech   • TONSILLECTOMY         Social History       Social History Main Topics   • Smoking status: Current Some Day Smoker   • Smokeless tobacco: Never Used      Comment: smokes hookah 1-2 times per week     • Alcohol use Yes      Comment: occasionally, 1-2 glasses of wine 1-2 x per week   • Drug use: No   • Sexual activity: Yes     Partners: Male     Birth control/ protection: Injection     Social History Narrative    Works at [employer] Sun-Thursday.  Lives at home with her mom and puppy.  Spends a couple of nights per month with her boyfriend.          Allergies   Allergen Reactions   • Ciprofloxacin Diarrhea and Nausea And Vomiting     Tingling in left leg   • Dust Mite Extract      Other reaction(s): Other (See Comments)  Nasal symptoms        Outpatient Medications Prior to Visit   Medication Sig Dispense Refill   • albuterol (PROVENTIL HFA;VENTOLIN HFA) 108 (90 Base) MCG/ACT inhaler Inhale 2 puffs Every 4 (Four) Hours As Needed for Wheezing. 18 g 3   • busPIRone (BUSPAR) 5 MG tablet Take 1 tablet by mouth 3 (Three) Times a Day As Needed (anxiety or panic). 90 tablet 1   • Chlorcyclizine-Pseudoephed (STAHIST AD) 25-60 MG tablet Take 25 mg by mouth Daily. 42 tablet 0   • [employer] NASAL ALLERGY SPRAY 55 MCG/ACT nasal inhaler 2 sprays into each nostril Daily.  0   • cyclobenzaprine (FLEXERIL) 10 MG tablet TAKE 1 TABLET BY MOUTH 3 TIMES A DAY AS NEEDED 45 tablet 1   • DEXILANT 60 MG capsule Take 1 capsule by mouth Daily. 30 capsule 5   • estradiol cypionate (DEPO-ESTRADIOL) 5 MG/ML injection Inject 1.5 mg into the shoulder, thigh, or buttocks Every 28 (Twenty-Eight) Days. Patient states she takes it every 3 months     • fluticasone (FLONASE) 50 MCG/ACT nasal spray      • ibuprofen (ADVIL,MOTRIN) 600 MG tablet Take 1 tablet by mouth Every 8 (Eight) Hours As Needed for mild pain (1-3). 30 tablet 0   • MedroxyPROGESTERone Acetate 150 MG/ML suspension prefilled syringe      • naproxen  (EC NAPROSYN) 500 MG EC tablet Take 1 tablet by mouth Daily. With food 30 tablet 1   • ondansetron (ZOFRAN) 4 MG tablet Take 1 tablet by mouth Every 8 (Eight) Hours As Needed for Nausea or Vomiting. 30 tablet 1   • pseudoephedrine (SUDAFED) 15 MG/5ML liquid liquid Take  by mouth.       Tamiflu 75 mg BID, day 3 of 5    Objective     Vitals:    04/27/18 1131   BP: 118/62   Pulse: 87   Resp: 16   Temp: 98 °F (36.7 °C)   BMI 38    Physical Exam   Constitutional: She appears well-developed and well-nourished. No distress.   HENT:   Head: Normocephalic and atraumatic.   Right Ear: Tympanic membrane is scarred. Tympanic membrane is not bulging. No middle ear effusion.   Left Ear: Tympanic membrane is scarred. Tympanic membrane is not bulging.  No middle ear effusion.   Nose: Mucosal edema and rhinorrhea present.   Mouth/Throat: Oropharynx is clear and moist and mucous membranes are normal.   Narrow, tortuous external ear canals bilaterally   Eyes: Pupils are equal, round, and reactive to light.   amblyopia   Neck: No thyromegaly present.   Cardiovascular: Normal rate, regular rhythm and normal heart sounds.  Exam reveals no gallop and no friction rub.    No murmur heard.  Pulmonary/Chest: Effort normal and breath sounds normal. No respiratory distress. She has no wheezes. She has no rhonchi. She has no rales.   Abdominal: Soft. Bowel sounds are normal. She exhibits no distension. There is no tenderness.   Lymphadenopathy:     She has no cervical adenopathy.   Skin: Skin is warm and dry.       ASSESSMENT/PLAN          Visit Diagnoses     Flu-like symptoms and Fatigue, unspecified type      Relevant Orders    CBC & Differential    Basic Metabolic Panel         Patient Instructions   Finish the Tamiflu and you may return to work on Monday.        Honey in hot tea can help soothe a sore throat.        Get plenty of rest and fluids      Return if symptoms worsen or fail to improve.      Victorina Ortega,  MD  04/27/18

## 2018-04-27 NOTE — PATIENT INSTRUCTIONS
Finish the Tamiflu and you may return to work on Monday.        Honey in hot tea can help soothe a sore throat.        Get plenty of rest and fluids

## 2018-04-30 ENCOUNTER — TELEPHONE (OUTPATIENT)
Dept: FAMILY MEDICINE CLINIC | Facility: CLINIC | Age: 28
End: 2018-04-30

## 2018-04-30 NOTE — TELEPHONE ENCOUNTER
Called and spoke with patient to let her know her labs are normal.     ----- Message from Victorina Ortega MD sent at 4/27/2018  8:25 PM EDT -----  Please contact Beatriz Boland and let pt know all labs looked good.

## 2018-05-03 ENCOUNTER — APPOINTMENT (OUTPATIENT)
Dept: PHYSICAL THERAPY | Facility: HOSPITAL | Age: 28
End: 2018-05-03

## 2018-05-22 ENCOUNTER — TELEPHONE (OUTPATIENT)
Dept: FAMILY MEDICINE CLINIC | Facility: CLINIC | Age: 28
End: 2018-05-22

## 2018-05-22 ENCOUNTER — HOSPITAL ENCOUNTER (EMERGENCY)
Facility: HOSPITAL | Age: 28
Discharge: HOME | End: 2018-05-22
Attending: EMERGENCY MEDICINE | Admitting: EMERGENCY MEDICINE

## 2018-05-22 VITALS
SYSTOLIC BLOOD PRESSURE: 133 MMHG | DIASTOLIC BLOOD PRESSURE: 85 MMHG | TEMPERATURE: 97.5 F | HEART RATE: 67 BPM | WEIGHT: 250 LBS | OXYGEN SATURATION: 99 % | RESPIRATION RATE: 16 BRPM | BODY MASS INDEX: 39.24 KG/M2 | HEIGHT: 67 IN

## 2018-05-22 DIAGNOSIS — F41.9 ANXIETY: ICD-10-CM

## 2018-05-22 DIAGNOSIS — F32.A DEPRESSION, UNSPECIFIED DEPRESSION TYPE: Primary | ICD-10-CM

## 2018-05-22 LAB
AMPHET+METHAMPHET UR QL: NEGATIVE
BARBITURATES UR QL SCN: NEGATIVE
BENZODIAZ UR QL SCN: NEGATIVE
CANNABINOIDS SERPL QL: NEGATIVE
COCAINE UR QL: NEGATIVE
ETHANOL BLD-MCNC: <10 MG/DL (ref 0–10)
ETHANOL UR QL: <0.01 %
METHADONE UR QL SCN: NEGATIVE
OPIATES UR QL: NEGATIVE
OXYCODONE UR QL SCN: NEGATIVE

## 2018-05-22 PROCEDURE — 80307 DRUG TEST PRSMV CHEM ANLYZR: CPT | Performed by: NURSE PRACTITIONER

## 2018-05-22 PROCEDURE — 99283 EMERGENCY DEPT VISIT LOW MDM: CPT

## 2018-05-22 PROCEDURE — 90791 PSYCH DIAGNOSTIC EVALUATION: CPT

## 2018-05-22 PROCEDURE — 36415 COLL VENOUS BLD VENIPUNCTURE: CPT

## 2018-05-22 RX ORDER — BUSPIRONE HYDROCHLORIDE 10 MG/1
10 TABLET ORAL 2 TIMES DAILY
COMMUNITY
End: 2019-01-30 | Stop reason: DRUGHIGH

## 2018-05-22 NOTE — TELEPHONE ENCOUNTER
Patient left a VM asking what dosage of Buspirone she was put on.     Left VM to let her know she was on 5mg three times a day.

## 2018-05-22 NOTE — ED TRIAGE NOTES
"Pt to er stating she is having nervous breakdown and depression.  Pt has been off Buspar for \"couple months\" and has been taking zoloft.  Pt escorted by mother to triage.   "

## 2018-05-22 NOTE — ED PROVIDER NOTES
"EMERGENCY DEPARTMENT ENCOUNTER    CHIEF COMPLAINT  Chief Complaint: Anxiety and depression  History given by:Pt  History limited by:Nothing  Room Number: 41/41  PMD: Victorina Ortega MD      HPI:  Pt is a 27 y.o. female who presents with complaint of anxiety and depression.  PT states that she had a \"nervous breakdown\" this morning that onset \"like a train\" -- anger, depression, anxiety at the same time. Pt states that she is worried about  her mother's broken bone, her puppies, and her boyfriend's children. She reports that she is under a lot of pressure.  Pt states that she has suffered from depression and anxiety for some time and takes prescribed medication.  Pt states that she walked out of her last therapy session 5 years ago and never returned to that therapist. Her PCP referred her to a counselor 8 months ago.  Pt has seen her new counselor twice in 8 months.  Pt states that she takes Buspar whenever she feels the need to take it.     Duration: PTA  Timing:Unchnaged  Location: Psychiatric  Radiation:None  Quality:   Intensity/Severity:\"nervous breakdown\" that onset \"like a train\" this morning -- anger, depression, anxiety at the same time.  Progression:Unchanged  Associated Symptoms:None  Aggravating Factors: Pressure  Alleviating Factors:None  Previous Episodes:Unknown  Treatment before arrival:Unknown    PAST MEDICAL HISTORY  Active Ambulatory Problems     Diagnosis Date Noted   • Depression    • Developmental delay    • GERD (gastroesophageal reflux disease)    • Scoliosis    • Amblyopia    • Back pain    • Chronic left shoulder pain 09/29/2017   • Muscle spasm 09/29/2017   • Seasonal allergies 11/05/2017   • Panic attacks 11/15/2017   • Anxiety 01/04/2018   • Mild intermittent asthma without complication 03/24/2018     Resolved Ambulatory Problems     Diagnosis Date Noted   • Sinus bradycardia 09/29/2014   • Palpitations 11/15/2017   • Acute otitis externa of both ears 11/15/2017   • " Atypical chest pain 01/04/2018     Past Medical History:   Diagnosis Date   • Allergic    • Amblyopia    • Anxiety    • Back pain    • Cleft palate    • Depression    • Developmental delay    • GERD (gastroesophageal reflux disease)    • Impetigo    • Kidney abscess    • Kidney stone    • Muscle spasm    • Obesity (BMI 30-39.9)    • Recurrent otitis media    • Scoliosis    • Sinus infection    • Sprain of shoulder, left 12/2016       PAST SURGICAL HISTORY  Past Surgical History:   Procedure Laterality Date   • ABSCESS DRAINAGE      kidney   • ADENOIDECTOMY     • EAR TUBES     • PHARYNGEAL FLAP      for speech   • TONSILLECTOMY         FAMILY HISTORY  Family History   Problem Relation Age of Onset   • COPD Mother    • Arthritis Mother    • Obesity Mother    • Gestational diabetes Mother    • Cancer Father    • Scoliosis Father    • Rheum arthritis Maternal Aunt    • Diabetes Maternal Uncle    • Alcohol abuse Maternal Uncle    • Rheum arthritis Maternal Grandmother    • COPD Maternal Grandmother    • Stroke Maternal Grandfather        SOCIAL HISTORY  Social History     Social History   • Marital status: Single     Spouse name: N/A   • Number of children: N/A   • Years of education: N/A     Occupational History   • Not on file.     Social History Main Topics   • Smoking status: Current Some Day Smoker   • Smokeless tobacco: Never Used      Comment: smokes hookah 1-2 times per week     • Alcohol use Yes      Comment: occasionally, 1-2 glasses of wine 1-2 x per week   • Drug use: No   • Sexual activity: Yes     Partners: Male     Birth control/ protection: Injection     Other Topics Concern   • Not on file     Social History Narrative    Works at [employer] Sun-Thursday.  Lives at home with her mom and puppy.  Spends a couple of nights per month with her boyfriend.           ALLERGIES  Ciprofloxacin and Dust mite extract    REVIEW OF SYSTEMS  Review of Systems   Constitutional: Negative.  Negative for activity change, appetite  change ( decreased), chills, fatigue and fever.   HENT: Negative.  Negative for congestion, ear pain, rhinorrhea and sore throat.    Eyes: Negative.    Respiratory: Negative.  Negative for cough and shortness of breath.    Cardiovascular: Negative.  Negative for chest pain, palpitations and leg swelling ( pedal).   Gastrointestinal: Negative.  Negative for abdominal pain, constipation, diarrhea, nausea and vomiting.   Endocrine: Negative.    Genitourinary: Negative.  Negative for decreased urine volume, difficulty urinating, dysuria, menstrual problem, pelvic pain, urgency and vaginal discharge.   Musculoskeletal: Negative.  Negative for back pain.   Skin: Negative.  Negative for rash.   Allergic/Immunologic: Negative.    Neurological: Negative.  Negative for dizziness, weakness, light-headedness, numbness and headaches.   Hematological: Negative.    Psychiatric/Behavioral: The patient is nervous/anxious.         Depression   All other systems reviewed and are negative.      PHYSICAL EXAM  ED Triage Vitals   Temp Heart Rate Resp BP SpO2   05/22/18 1102 05/22/18 1102 05/22/18 1102 05/22/18 1105 05/22/18 1102   97.5 °F (36.4 °C) 71 16 149/96 98 %      Temp src Heart Rate Source Patient Position BP Location FiO2 (%)   -- -- 05/22/18 1105 05/22/18 1105 --     Sitting Right arm        Physical Exam   Constitutional: She is oriented to person, place, and time and well-developed, well-nourished, and in no distress. No distress.   HENT:   Head: Normocephalic and atraumatic.   Eyes: EOM are normal. Pupils are equal, round, and reactive to light.   Neck: Normal range of motion. Neck supple.   Cardiovascular: Normal rate, regular rhythm and normal heart sounds.    Pulmonary/Chest: Effort normal and breath sounds normal. No respiratory distress.   Abdominal: Soft. There is no tenderness. There is no rebound and no guarding.   Musculoskeletal: Normal range of motion. She exhibits no edema.   Neurological: She is alert and  "oriented to person, place, and time. She has normal sensation and normal strength.   Skin: Skin is warm and dry. No rash noted.   Psychiatric: Mood and affect normal. She expresses no homicidal and no suicidal ideation.   Nursing note and vitals reviewed.      LAB RESULTS  Recent Results (from the past 24 hour(s))   Ethanol    Collection Time: 05/22/18 11:49 AM   Result Value Ref Range    Ethanol <10 0 - 10 mg/dL    Ethanol % <0.010 %   Urine Drug Screen - Urine, Clean Catch    Collection Time: 05/22/18 11:57 AM   Result Value Ref Range    Amphet/Methamphet, Screen Negative Negative    Barbiturates Screen, Urine Negative Negative    Benzodiazepine Screen, Urine Negative Negative    Cocaine Screen, Urine Negative Negative    Opiate Screen Negative Negative    THC, Screen, Urine Negative Negative    Methadone Screen, Urine Negative Negative    Oxycodone Screen, Urine Negative Negative       I ordered the above labs and reviewed the results      PROGRESS AND CONSULTS  1230  Reviewed pt's history and workup with Dr. Ortega.  After a bedside evaluation; Dr Ortega agrees with the plan of care    1305  Shy from [facility] is here to assess the Pt.     1344  Pt has been assessed.  A plan was put in place regarding medication compliance and follow up with EAP provided through the Pt's employer.        COURSE & MEDICAL DECISION MAKING  Pertinent Labs and Imaging studies that were ordered and reviewed are noted above.  Results were reviewed/discussed with the patient and they were also made aware of online assess.   Pt also made aware that some labs, such as cultures, will not be resulted during ER visit and follow up with PMD is necessary.     MEDICATIONS GIVEN IN ER  Medications - No data to display    /97   Pulse 61   Temp 97.5 °F (36.4 °C)   Resp 16   Ht 170.2 cm (67\")   Wt 113 kg (250 lb)   SpO2 100%   BMI 39.16 kg/m²     Discussed all results and noted any abnormalities with patient.  Discussed absoute " need to recheck abnormalities with PCP    Reviewed implications of results, diagnosis, meds, responsibility to follow up, warning signs and symptoms of possible worsening, potential complications and reasons to return to ER with patient    Discussed plan for discharge, as there is no emergent indication for admission.  Pt is agreeable and understands need for follow up and repeat testing.  Pt is aware that discharge does not mean that nothing is wrong but it indicates no emergency is present and they must continue care with PCP.  Pt is discharged with instructions to follow up with primary care doctor to have their blood pressure rechecked.       DIAGNOSIS  Final diagnoses:   Depression, unspecified depression type   Anxiety       FOLLOW UP   Victorina Ortega MD  [address] [facility] [address] [phone]    Schedule an appointment as soon as possible for a visit              I personally reviewed the past medical history, past surgical history, social history, family history, current medications and allergies as they appear in this chart.  The scribe's note accurately reflects the work and decisions made by me.           Documentation assistance provided by cat Barajas for LIVIA Mccray.  Information recorded by the scribe was done at my direction and has been verified and validated by me.         Deepika Barajas  05/22/18 1402       LIVIA Jones  05/22/18 [id number]

## 2018-05-22 NOTE — DISCHARGE INSTRUCTIONS
Pt instructions:  Rest  Take Buspar as prescribed twice a day  Follow up with Primary Care Doctor for further management and to have your blood pressure rechecked.   Return to ER with pain, swelling, numbness/tingling, fever, chills, weakness, nausea, vomiting, diarrhea, abdominal pain, back pain, urinary concerns, chest pain, shortness of breath, dizziness, headache, worsening of symptoms or other concerns.

## 2018-05-22 NOTE — ED PROVIDER NOTES
The MANJEET and I have discussed this patient's history, physical exam, and treatment plan.  I have reviewed the documentation and personally had a face to face interaction with the patient. I affirm the documentation and agree with the treatment and plan.  The attached note describes my personal findings.    HPI: Pt is a 27 y.o. female who presents to the ED c/o  intermittent anxiety or depression. Patient is not currently seeing a psychiatrist. Patient is only on Buspar prn. Denies fever, chills, or vomiting. Denies illicit drug use.   .     Patient's exam is unremarkable.     Discussion/Plan: Discussed plan for psychiatric evaluation    Documentation assistance provided by cat Hernandez for Dr. Tarik Ortega.  Information recorded by the scribe was done at my direction and has been verified and validated by me.       Vaishali Hernandez  05/22/18 5412       Tarik Ortega MD  05/22/18 0356

## 2018-05-22 NOTE — CONSULTS
A 26 yo white female seen in the ED rm # 41. Pt brought to the ED by her mother. Pt states she had a an anxiety and a depression attack this morning, feels like a nervous breakdown. States she was rocking and had no control over anything. She states she had an episode similar to this the last 2 years. Pt states main problem is stress from her mother. Feels her mother is a hoarder and pt has been trying to help her. Pt then talks about her boyfriend and a custody situation with his 9 yo son. Pt talks about being bullied when she was in school and even after she graduated. No alcohol or drug use, recently quit smoking cigarettes. Her sleep is off and on, appetite okay. Denies any thoughts of harm to self or others. Pt had counseling 5-6 yrs ago with Ricardo Tolentino. Most recently she has seen Belinda at [facility] for counseling but has been concistant with her appointments. Pt is prescribed Buspar by her PCP but has not been taking regularly as prescribed, I discussed with her the importance to take as prescribed for it to help her.  Pt is single, no children, has a boyfriend of 5 yrs. Pt has some college, lives with her mother. 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  Discussed with Petra CANDELARIO and pt is referred to her EAP services at [facility] for outpt counseling.

## 2018-05-23 ENCOUNTER — TELEPHONE (OUTPATIENT)
Dept: SOCIAL WORK | Facility: HOSPITAL | Age: 28
End: 2018-05-23

## 2018-06-15 ENCOUNTER — OFFICE (AMBULATORY)
Dept: URBAN - METROPOLITAN AREA CLINIC 75 | Facility: CLINIC | Age: 28
End: 2018-06-15

## 2018-06-15 VITALS
WEIGHT: 253 LBS | HEART RATE: 82 BPM | SYSTOLIC BLOOD PRESSURE: 130 MMHG | HEIGHT: 67 IN | DIASTOLIC BLOOD PRESSURE: 78 MMHG

## 2018-06-15 DIAGNOSIS — R19.4 CHANGE IN BOWEL HABIT: ICD-10-CM

## 2018-06-15 DIAGNOSIS — K21.9 GASTRO-ESOPHAGEAL REFLUX DISEASE WITHOUT ESOPHAGITIS: ICD-10-CM

## 2018-06-15 DIAGNOSIS — K62.5 HEMORRHAGE OF ANUS AND RECTUM: ICD-10-CM

## 2018-06-15 DIAGNOSIS — R11.2 NAUSEA WITH VOMITING, UNSPECIFIED: ICD-10-CM

## 2018-06-15 PROCEDURE — 99214 OFFICE O/P EST MOD 30 MIN: CPT

## 2018-06-15 RX ORDER — FAMOTIDINE 40 MG/1
TABLET, FILM COATED ORAL
Qty: 30 | Refills: 3 | Status: COMPLETED
End: 2023-05-02

## 2018-06-18 DIAGNOSIS — F41.9 ANXIETY: ICD-10-CM

## 2018-06-18 RX ORDER — BUSPIRONE HYDROCHLORIDE 5 MG/1
5 TABLET ORAL 3 TIMES DAILY PRN
Qty: 90 TABLET | Refills: 0 | Status: SHIPPED | OUTPATIENT
Start: 2018-06-18 | End: 2018-08-08 | Stop reason: SDUPTHER

## 2018-06-20 ENCOUNTER — OFFICE VISIT (OUTPATIENT)
Dept: FAMILY MEDICINE CLINIC | Facility: CLINIC | Age: 28
End: 2018-06-20

## 2018-06-20 VITALS
SYSTOLIC BLOOD PRESSURE: 118 MMHG | DIASTOLIC BLOOD PRESSURE: 84 MMHG | HEIGHT: 67 IN | TEMPERATURE: 97.7 F | HEART RATE: 68 BPM | OXYGEN SATURATION: 99 % | RESPIRATION RATE: 14 BRPM | WEIGHT: 250 LBS | BODY MASS INDEX: 39.24 KG/M2

## 2018-06-20 DIAGNOSIS — H60.393 OTHER INFECTIVE ACUTE OTITIS EXTERNA OF BOTH EARS: Primary | ICD-10-CM

## 2018-06-20 PROCEDURE — 99213 OFFICE O/P EST LOW 20 MIN: CPT | Performed by: FAMILY MEDICINE

## 2018-06-20 NOTE — PROGRESS NOTES
Subjective   Beatriz Boland is a 27 y.o. female.     Chief Complaint   Patient presents with   • Sinusitis       HPI     Sick:  -woke up this morning with a sore throat and green nasal drainage  -she has a hx of recurrent sinusitis and otitis media for which she follows up regularly with ENT  -she uses flonase or stahist every day  -she ran out of zyrtec 2-3 months ago and plans to pick it up from her pharmacy soon         Review of Systems   Constitutional: Negative for activity change, appetite change, fatigue and fever.   HENT: Positive for congestion, hearing loss, postnasal drip, sinus pain, sinus pressure, sneezing and sore throat. Negative for ear discharge, ear pain, trouble swallowing and voice change.    Respiratory: Negative for cough and shortness of breath.    Cardiovascular: Negative for chest pain and palpitations.   Gastrointestinal: Negative for nausea and vomiting.   Skin: Positive for wound (Scratch on L breast itches). Negative for rash.   Neurological: Positive for headaches. Negative for dizziness.       The following portions of the patient's history were reviewed and updated as appropriate: allergies, current medications, past family history, past medical history, past social history, past surgical history and problem list.    Past Medical History:   Diagnosis Date   • Allergic    • Amblyopia    • Anxiety    • Back pain    • Cleft palate    • Depression    • Developmental delay    • GERD (gastroesophageal reflux disease)    • Impetigo    • Kidney abscess    • Kidney stone    • Muscle spasm    • Obesity (BMI 30-39.9)    • Recurrent otitis media    • Scoliosis    • Sinus infection     recurrent   • Sprain of shoulder, left 12/2016     Past Surgical History:   Procedure Laterality Date   • ABSCESS DRAINAGE      kidney   • ADENOIDECTOMY     • EAR TUBES     • PHARYNGEAL FLAP      for speech   • TONSILLECTOMY           Social History     Social History   • Marital status: Single     Social  History Main Topics   • Smoking status: Current Some Day Smoker   • Smokeless tobacco: Never Used      Comment: smokes hookah 1-2 times per week     • Alcohol use Yes      Comment: occasionally, 1-2 glasses of wine 1-2 x per week   • Drug use: No   • Sexual activity: Yes     Partners: Male     Birth control/ protection: Injection     Social History Narrative    Works at [employer], [place] [employer].  Lives at home with her mom and puppy.  Spends a couple of nights per month with her boyfriend.          Allergies   Allergen Reactions   • Ciprofloxacin Diarrhea and Nausea And Vomiting     Tingling in left leg   • Dust Mite Extract      Other reaction(s): Other (See Comments)  Nasal symptoms        Outpatient Medications Prior to Visit   Medication Sig Dispense Refill   • albuterol (PROVENTIL HFA;VENTOLIN HFA) 108 (90 Base) MCG/ACT inhaler Inhale 2 puffs Every 4 (Four) Hours As Needed for Wheezing. 18 g 3   • busPIRone (BUSPAR) 10 MG tablet Take 10 mg by mouth 2 (Two) Times a Day.     • busPIRone (BUSPAR) 5 MG tablet TAKE 1 TABLET BY MOUTH 3 (THREE) TIMES A DAY AS NEEDED (ANXIETY OR PANIC). 90 tablet 0   • Chlorcyclizine-Pseudoephed (STAHIST AD) 25-60 MG tablet Take 25 mg by mouth Daily. 42 tablet 0   • [facility] NASAL ALLERGY SPRAY 55 MCG/ACT nasal inhaler 2 sprays into each nostril Daily.  0   • cyclobenzaprine (FLEXERIL) 10 MG tablet TAKE 1 TABLET BY MOUTH 3 TIMES A DAY AS NEEDED 45 tablet 1   • estradiol cypionate (DEPO-ESTRADIOL) 5 MG/ML injection Inject 1.5 mg into the shoulder, thigh, or buttocks Every 28 (Twenty-Eight) Days. Patient states she takes it every 3 months     • fluticasone (FLONASE) 50 MCG/ACT nasal spray      • ibuprofen (ADVIL,MOTRIN) 600 MG tablet Take 1 tablet by mouth Every 8 (Eight) Hours As Needed for mild pain (1-3). 30 tablet 0   • naproxen (EC NAPROSYN) 500 MG EC tablet Take 1 tablet by mouth Daily. With food 30 tablet 1   • omeprazole (prilOSEC) 10 MG capsule Take 10 mg by mouth.     • ondansetron (ZOFRAN)  4 MG tablet Take 1 tablet by mouth Every 8 (Eight) Hours As Needed for Nausea or Vomiting. 30 tablet 1   • pseudoephedrine (SUDAFED) 15 MG/5ML liquid liquid Take  by mouth.       No facility-administered medications prior to visit.        Objective     Vitals:    06/20/18 1200   BP: 118/84   Pulse: 68   Resp: 14   Temp: 97.7 °F (36.5 °C)   SpO2: 99%     BMI 39    Physical Exam   Constitutional: She appears well-nourished. No distress.   HENT:   Head: Normocephalic and atraumatic.   Right Ear: Tympanic membrane is scarred. Tympanic membrane is not perforated and not bulging.   Left Ear: Tympanic membrane is scarred. Tympanic membrane is not perforated and not bulging.   Nose: Mucosal edema and rhinorrhea present.   Mouth/Throat: Oropharynx is clear and moist and mucous membranes are normal.   Tenderness, edema, and drainage of bilateral external ear canals   Neck: No thyromegaly present.   Cardiovascular: Normal rate, regular rhythm and normal heart sounds.  Exam reveals no gallop and no friction rub.    No murmur heard.  Pulmonary/Chest: Effort normal and breath sounds normal. No respiratory distress. She has no wheezes. She has no rhonchi. She has no rales.   Abdominal: Soft. Bowel sounds are normal. She exhibits no distension. There is no tenderness.   Lymphadenopathy:     She has no cervical adenopathy.        Right: No supraclavicular adenopathy present.        Left: No supraclavicular adenopathy present.   Skin: Skin is warm and dry.   Shallow erythematous linear abrasion over medial aspect of L breast with NO excessive erythema or warmth         ASSESSMENT/PLAN             Visit Diagnoses     Other infective acute otitis externa of both ears    -  Primary    Relevant Medications    10 day course of neomycin-colistin-hydrocortisone-thonzonium (CORTISPORIN-TC) 3.3-3-10-0.5 MG/ML otic suspension            Patient Instructions   Wash the scratch with soap and water twice a day and keep it covered with a band-aid  until it heals.      Ear Drops, Adult  You have been diagnosed with a condition that requires you to put drops of medicine into your ears. Ear drops are a medicine that is placed in the ear. This sheet gives you information about how to use ear drops. Your health care provider may also give you more specific instructions.  Supplies needed:  · Cotton ball.  · Medicine.  How to put ear drops into your ear  1. Wash your hands thoroughly with soap and water.  2. Make sure your ears are clean and dry. If there is any ear wax or drainage at the outermost portion of the ear canal, wipe it out gently with a cotton-tipped applicator.  3. Warm up the medicine by holding it in the palm of your hand for a few minutes.  4. Shake the medicine if it is a suspension.  5. Use the dropper to draw up the medicine.  6. Hold the dropper above your ear canal and put the drops in the affected ear as instructed. Do not put the dropper into your ear at any time. It may help to pull the outer flap of the ear up and back while you put the drops in. Doing this will straighten out the ear canal so the medicine can get into the canal easier.  7. To make sure your ear soaks up the medicine, do either of these things:  ? Lie down with the affected ear facing up for 10 minutes. This will cause the drops to stay in the ear canal and run down and fill the canal.  ? Gently put a cotton ball in your ear canal. Leave enough of the cotton ball out so it can be easily removed. Do not push the cotton ball down into your ear with a cotton-tipped swab or other instrument. You can remove the cotton ball once the medicine has been absorbed.  8. If both ears need the drops, repeat the procedure for the other ear. Your health care provider will let you know if you need to put drops in both ears.  Follow these instructions at home:  · Use the ear drops for as long as directed by your health care provider, even if you begin to feel better.  · Always wash your  hands before and after handling the ear drops.  · Keep the ear drops at room temperature.  · Keep all follow-up visits as told by your health care provider. This is important.  Contact a health care provider if:  · Your condition gets worse.  · Your pain gets worse.  · You notice any unusual drainage from your ear, especially if the drainage has a bad smell.  · You have trouble hearing.  · You have used the ear drops for the amount of time recommended by your health care provider, but your symptoms have not improved.  Get help right away if:  · You experience a form of dizziness in which you feel as if the room is spinning and you feel nauseated (vertigo).  · The outside of your ear becomes red or swollen.  · You develop a severe headache with or without neck stiffness.  Summary  · Ear drops are a medicine that is placed in the ear.  · Put drops in the affected ear as instructed.  · Use the ear drops for as long as directed by your health care provider, even if your symptoms begin to get better.  · Keep all follow-up visits as told by your health care provider. This is important.  This information is not intended to replace advice given to you by your health care provider. Make sure you discuss any questions you have with your health care provider.  Document Released: 12/12/2002 Document Revised: 12/21/2017 Document Reviewed: 12/21/2017  Elsevier Interactive Patient Education © 2017 [employer]      Return if symptoms worsen or fail to improve.      Victorina Ortega MD  06/20/18

## 2018-06-20 NOTE — PATIENT INSTRUCTIONS
Wash the scratch with soap and water twice a day and keep it covered with a band-aid until it heals.      Ear Drops, Adult  You have been diagnosed with a condition that requires you to put drops of medicine into your ears. Ear drops are a medicine that is placed in the ear. This sheet gives you information about how to use ear drops. Your health care provider may also give you more specific instructions.  Supplies needed:  · Cotton ball.  · Medicine.  How to put ear drops into your ear  1. Wash your hands thoroughly with soap and water.  2. Make sure your ears are clean and dry. If there is any ear wax or drainage at the outermost portion of the ear canal, wipe it out gently with a cotton-tipped applicator.  3. Warm up the medicine by holding it in the palm of your hand for a few minutes.  4. Shake the medicine if it is a suspension.  5. Use the dropper to draw up the medicine.  6. Hold the dropper above your ear canal and put the drops in the affected ear as instructed. Do not put the dropper into your ear at any time. It may help to pull the outer flap of the ear up and back while you put the drops in. Doing this will straighten out the ear canal so the medicine can get into the canal easier.  7. To make sure your ear soaks up the medicine, do either of these things:  ? Lie down with the affected ear facing up for 10 minutes. This will cause the drops to stay in the ear canal and run down and fill the canal.  ? Gently put a cotton ball in your ear canal. Leave enough of the cotton ball out so it can be easily removed. Do not push the cotton ball down into your ear with a cotton-tipped swab or other instrument. You can remove the cotton ball once the medicine has been absorbed.  8. If both ears need the drops, repeat the procedure for the other ear. Your health care provider will let you know if you need to put drops in both ears.  Follow these instructions at home:  · Use the ear drops for as long as directed by  your health care provider, even if you begin to feel better.  · Always wash your hands before and after handling the ear drops.  · Keep the ear drops at room temperature.  · Keep all follow-up visits as told by your health care provider. This is important.  Contact a health care provider if:  · Your condition gets worse.  · Your pain gets worse.  · You notice any unusual drainage from your ear, especially if the drainage has a bad smell.  · You have trouble hearing.  · You have used the ear drops for the amount of time recommended by your health care provider, but your symptoms have not improved.  Get help right away if:  · You experience a form of dizziness in which you feel as if the room is spinning and you feel nauseated (vertigo).  · The outside of your ear becomes red or swollen.  · You develop a severe headache with or without neck stiffness.  Summary  · Ear drops are a medicine that is placed in the ear.  · Put drops in the affected ear as instructed.  · Use the ear drops for as long as directed by your health care provider, even if your symptoms begin to get better.  · Keep all follow-up visits as told by your health care provider. This is important.  This information is not intended to replace advice given to you by your health care provider. Make sure you discuss any questions you have with your health care provider.  Document Released: 12/12/2002 Document Revised: 12/21/2017 Document Reviewed: 12/21/2017  Elsevier Interactive Patient Education © 2017 [employer]

## 2018-07-06 ENCOUNTER — OFFICE (AMBULATORY)
Dept: URBAN - METROPOLITAN AREA CLINIC 75 | Facility: CLINIC | Age: 28
End: 2018-07-06

## 2018-07-06 VITALS
HEIGHT: 67 IN | DIASTOLIC BLOOD PRESSURE: 80 MMHG | SYSTOLIC BLOOD PRESSURE: 130 MMHG | WEIGHT: 257 LBS | HEART RATE: 84 BPM

## 2018-07-06 DIAGNOSIS — R19.4 CHANGE IN BOWEL HABIT: ICD-10-CM

## 2018-07-06 DIAGNOSIS — D12.2 BENIGN NEOPLASM OF ASCENDING COLON: ICD-10-CM

## 2018-07-06 DIAGNOSIS — K21.9 GASTRO-ESOPHAGEAL REFLUX DISEASE WITHOUT ESOPHAGITIS: ICD-10-CM

## 2018-07-06 DIAGNOSIS — K62.5 HEMORRHAGE OF ANUS AND RECTUM: ICD-10-CM

## 2018-07-06 DIAGNOSIS — R11.2 NAUSEA WITH VOMITING, UNSPECIFIED: ICD-10-CM

## 2018-07-06 PROCEDURE — 99214 OFFICE O/P EST MOD 30 MIN: CPT

## 2018-07-06 RX ORDER — DICYCLOMINE HYDROCHLORIDE 20 MG/1
TABLET ORAL
Qty: 60 | Refills: 3 | Status: COMPLETED
End: 2023-05-02

## 2018-07-13 ENCOUNTER — OFFICE (AMBULATORY)
Dept: URBAN - METROPOLITAN AREA CLINIC 75 | Facility: CLINIC | Age: 28
End: 2018-07-13

## 2018-07-24 ENCOUNTER — TELEPHONE (OUTPATIENT)
Dept: FAMILY MEDICINE CLINIC | Facility: CLINIC | Age: 28
End: 2018-07-24

## 2018-07-24 NOTE — TELEPHONE ENCOUNTER
Patient left a nasty message saying that she called earlier asking for an appointment because she felt horrible and had to miss work today, and no one has called her back yet.   Dr. Ortega advises that patient needs to go back to her ENT or allergist.

## 2018-08-06 ENCOUNTER — TELEPHONE (OUTPATIENT)
Dept: CARDIOLOGY | Facility: CLINIC | Age: 28
End: 2018-08-06

## 2018-08-06 NOTE — TELEPHONE ENCOUNTER
12:54 PM  Spoke with patient and instructed to get over the counter naproxen BID.  Instructed to take with food and to keep hydrated.  She verbalized understanding.  Jessenia Naqvi RN  Triage nurse

## 2018-08-06 NOTE — TELEPHONE ENCOUNTER
"08/06/18  11:23 AM  Beatriz Boland  1990    Home Phone [phone]       Beatriz Boland is a patient of Dr Gunter (who is off today).  Patient is calling in today with c/o \"sharp chest pain\" that is localized to the left side of breast.  She stated she was getting ready for work when it came on and it has been constant for the last 45 minutes and is still present during the phone call.  The pain doesn't radiate to back, jaw or arm, but she does have some SOA from the episode.    She takes no cardiac meds  She has an apt with [facility] 8/10/18, does she need to be seen sooner?  Does she need testing?    Jessenia Naqvi RN  Triage nurse    "

## 2018-08-06 NOTE — TELEPHONE ENCOUNTER
She was evaluated in February for sharp chest pain that resolved with naproxen twice daily.  She was evaluated by Dr. Víctor Gunter who did not feel that this was cardiac.  If she is having similar symptoms to February, would ask her to try the naproxen twice a day once again with good hydration of water and food when taking the medication.  If her symptoms worsen, she'll need to present to the emergency department.

## 2018-08-08 DIAGNOSIS — F41.9 ANXIETY: ICD-10-CM

## 2018-08-08 RX ORDER — BUSPIRONE HYDROCHLORIDE 5 MG/1
5 TABLET ORAL 3 TIMES DAILY PRN
Qty: 90 TABLET | Refills: 0 | Status: SHIPPED | OUTPATIENT
Start: 2018-08-08 | End: 2019-01-30 | Stop reason: SDUPTHER

## 2018-08-10 ENCOUNTER — OFFICE VISIT (OUTPATIENT)
Dept: CARDIOLOGY | Facility: CLINIC | Age: 28
End: 2018-08-10

## 2018-08-10 VITALS
WEIGHT: 257 LBS | SYSTOLIC BLOOD PRESSURE: 120 MMHG | HEART RATE: 73 BPM | DIASTOLIC BLOOD PRESSURE: 82 MMHG | BODY MASS INDEX: 40.34 KG/M2 | HEIGHT: 67 IN

## 2018-08-10 DIAGNOSIS — IMO0001 CLASS 3 OBESITY WITHOUT SERIOUS COMORBIDITY WITH BODY MASS INDEX (BMI) OF 40.0 TO 44.9 IN ADULT, UNSPECIFIED OBESITY TYPE: ICD-10-CM

## 2018-08-10 DIAGNOSIS — Z72.0 NICOTINE VAPOR PRODUCT USER: ICD-10-CM

## 2018-08-10 DIAGNOSIS — R07.1 CHEST PAIN ON BREATHING: Primary | ICD-10-CM

## 2018-08-10 PROCEDURE — 99214 OFFICE O/P EST MOD 30 MIN: CPT | Performed by: NURSE PRACTITIONER

## 2018-08-10 PROCEDURE — 93000 ELECTROCARDIOGRAM COMPLETE: CPT | Performed by: NURSE PRACTITIONER

## 2018-08-10 NOTE — PROGRESS NOTES
Date of Office Visit: 08/10/2018  Encounter Provider: LIVIA Ordonez  Place of Service: [facility] CARDIOLOGY  Patient Name: Beatriz Boland  :1990    Chief Complaint   Patient presents with   • Chest Pain   :     HPI: Beatriz Boland is a 28 y.o. female who presents today for evaluation of chest pain.  She is a new patient to me and her previous records have been reviewed.  She has a history of recurrent atypical chest pain with numerous emergency department and urgent care visits in the past.  She describes a sharp stabbing chest pain in the upper sternum that occurs with rest and worsens with positional changes.  The pain has been resolved with naproxen in the past.  She was evaluated by Dr. Víctor Gunter in 2018 and was diagnosed with classic muscle skeletal pain reproducible tenderness over the bilateral costochondral margin superiorly.  Dr. Gunter noted that the patient was unable to walk on a treadmill due to a tendon injury in her foot.  The patient had reported a heart murmur in the past and did not appreciate a murmur upon physical examination.  She felt the patient should follow-up with her PCP for muscle skeletal chest pain.    She contacted our office on  with complaints of sharp chest pain on the left side of her breast and mild shortness of breath.  I recommended that she try naproxen twice a day to see if her symptoms would improve.      She presents today for follow-up.  She said the sharp chest pain on the left side began on Monday and was more constant and tender to touch.  After taking the naproxen and her symptoms have resolved.  She said she did have an injury to her left shoulder last year and this could be referred pain.  She does work at [employer] and is constantly lifting boxes which could aggravate the old injury.  The pain is not reproducible today.  She denies shortness of breath, PND, orthopnea, edema, palpitations, dizziness, or  syncope.  She has occasional wheezing which she feels is secondary to asthma.  Blood pressure and heart rate are both normal today.    The following portion of the patient's history were reviewed and updated as appropriate: past medical history, past surgical history, past social history, past family history, allergies, current medications, and problem list.    Past Medical History:   Diagnosis Date   • Allergic    • Amblyopia    • Anxiety    • Back pain    • Cleft palate    • Depression    • Developmental delay    • GERD (gastroesophageal reflux disease)     followed by GI, Dr. Wesley Ferro   • Impetigo    • Kidney abscess    • Kidney stone    • Muscle spasm    • Obesity (BMI 30-39.9)    • Recurrent otitis media    • Scoliosis    • Sinus infection     recurrent   • Sprain of shoulder, left 12/2016       Past Surgical History:   Procedure Laterality Date   • ABSCESS DRAINAGE      kidney   • ADENOIDECTOMY     • EAR TUBES     • PHARYNGEAL FLAP      for speech   • TONSILLECTOMY         Social History     Social History   • Marital status: Single     Spouse name: N/A   • Number of children: N/A   • Years of education: N/A     Occupational History   • Not on file.     Social History Main Topics   • Smoking status: Current Some Day Smoker   • Smokeless tobacco: Never Used      Comment: smokes hookah 1-2 times per week     • Alcohol use Yes      Comment: occasionally, 1-2 glasses of wine 1-2 x per week   • Drug use: No   • Sexual activity: Yes     Partners: Male     Birth control/ protection: Injection       Social History Narrative    Works at [employer] Sun[employer]Thursday.  Lives at home with her mom and puppy.  Spends a couple of nights per month with her boyfriend.         Family History   Problem Relation Age of Onset   • COPD Mother    • Arthritis Mother    • Obesity Mother    • Gestational diabetes Mother    • Cancer Father    • Scoliosis Father    • Rheum arthritis Maternal Aunt    • Diabetes Maternal Uncle    • Alcohol abuse  Maternal Uncle    • Rheum arthritis Maternal Grandmother    • COPD Maternal Grandmother    • Stroke Maternal Grandfather    • Alcohol abuse Paternal Uncle        Review of Systems   Constitution: Negative for chills, diaphoresis, fever, malaise/fatigue, night sweats, weight gain and weight loss.   HENT: Negative for hearing loss, nosebleeds, sore throat and tinnitus.    Eyes: Negative for blurred vision, double vision, pain and visual disturbance.   Cardiovascular: Negative for chest pain, claudication, cyanosis, dyspnea on exertion, irregular heartbeat, leg swelling, near-syncope, orthopnea, palpitations, paroxysmal nocturnal dyspnea and syncope.   Respiratory: Positive for wheezing. Negative for cough, hemoptysis, shortness of breath and snoring.    Endocrine: Negative for cold intolerance, heat intolerance and polyuria.   Hematologic/Lymphatic: Negative for bleeding problem. Does not bruise/bleed easily.   Skin: Negative for color change, dry skin, flushing and itching.   Musculoskeletal: Negative for falls, joint pain, joint swelling, muscle cramps, muscle weakness and myalgias.   Gastrointestinal: Negative for abdominal pain, constipation, heartburn, melena, nausea and vomiting.   Genitourinary: Negative for dysuria and hematuria.   Neurological: Negative for excessive daytime sleepiness, dizziness, light-headedness, loss of balance, numbness, paresthesias, seizures and vertigo.   Psychiatric/Behavioral: Negative for altered mental status, depression, memory loss and substance abuse. The patient does not have insomnia and is not nervous/anxious.    Allergic/Immunologic: Negative for environmental allergies.       Allergies   Allergen Reactions   • Ciprofloxacin Diarrhea and Nausea And Vomiting     Tingling in left leg   • Dust Mite Extract      Other reaction(s): Other (See Comments)  Nasal symptoms         Current Outpatient Prescriptions:   •  albuterol (PROVENTIL HFA;VENTOLIN HFA) 108 (90 Base) MCG/ACT  "inhaler, Inhale 2 puffs Every 4 (Four) Hours As Needed for Wheezing., Disp: 18 g, Rfl: 3  •  busPIRone (BUSPAR) 10 MG tablet, Take 10 mg by mouth 2 (Two) Times a Day., Disp: , Rfl:   •  busPIRone (BUSPAR) 5 MG tablet, TAKE 1 TABLET BY MOUTH 3 (THREE) TIMES A DAY AS NEEDED (ANXIETY OR PANIC)., Disp: 90 tablet, Rfl: 0  •  Chlorcyclizine-Pseudoephed (STAHIST AD) 25-60 MG tablet, Take 25 mg by mouth Daily., Disp: 42 tablet, Rfl: 0  •  [employer] NASAL ALLERGY SPRAY 55 MCG/ACT nasal inhaler, 2 sprays into each nostril Daily., Disp: , Rfl: 0  •  cyclobenzaprine (FLEXERIL) 10 MG tablet, TAKE 1 TABLET BY MOUTH 3 TIMES A DAY AS NEEDED, Disp: 45 tablet, Rfl: 1  •  estradiol cypionate (DEPO-ESTRADIOL) 5 MG/ML injection, Inject 1.5 mg into the shoulder, thigh, or buttocks Every 28 (Twenty-Eight) Days. Patient states she takes it every 3 months, Disp: , Rfl:   •  fluticasone (FLONASE) 50 MCG/ACT nasal spray, , Disp: , Rfl:   •  ibuprofen (ADVIL,MOTRIN) 600 MG tablet, Take 1 tablet by mouth Every 8 (Eight) Hours As Needed for mild pain (1-3)., Disp: 30 tablet, Rfl: 0  •  naproxen (EC NAPROSYN) 500 MG EC tablet, Take 1 tablet by mouth Daily. With food, Disp: 30 tablet, Rfl: 1  •  omeprazole (prilOSEC) 10 MG capsule, Take 10 mg by mouth., Disp: , Rfl:   •  ondansetron (ZOFRAN) 4 MG tablet, Take 1 tablet by mouth Every 8 (Eight) Hours As Needed for Nausea or Vomiting., Disp: 30 tablet, Rfl: 1  •  pseudoephedrine (SUDAFED) 15 MG/5ML liquid liquid, Take  by mouth., Disp: , Rfl:       Objective:     Vitals:    08/10/18 0951   BP: 120/82   BP Location: Left arm   Patient Position: Sitting   Pulse: 73   Weight: 117 kg (257 lb)   Height: 170.2 cm (67\")     Body mass index is 40.25 kg/m².    PHYSICAL EXAM:    Vitals Reviewed.   General Appearance: No acute distress, well developed and well nourished. Obese.   Eyes: Conjunctiva and lids: No erythema, swelling, or discharge. Sclera non-icteric.   HENT: Atraumatic, normocephalic. External eyes, " ears, and nose normal. No hearing loss noted. Mucous membranes normal. Lips not cyanotic. Neck supple with no tenderness.  Respiratory: No signs of respiratory distress. Respiration rhythm and depth normal.   Clear to auscultation. No rales, crackles, rhonchi, or wheezing auscultated.   Cardiovascular:  Jugular Venous Pressure: Normal  Heart Rate and Rhythm: Normal, Heart Sounds: Normal S1 and S2. No S3 or S4 noted.  Murmurs: No murmurs noted. No rubs, thrills, or gallops.   Arterial Pulses: Carotid pulses normal. No carotid bruit noted. Posterior tibialis and dorsalis pedis pulses normal.   Lower Extremities: No edema noted.  Gastrointestinal:  Abdomen soft, non-distended, non-tender. Normal bowel sounds. No hepatomegaly.   Musculoskeletal: Normal movement of extremities  Skin and Nails: General appearance normal. No pallor, cyanosis, diaphoresis. Skin temperature normal. No clubbing of fingernails.   Psychiatric: Patient alert and oriented to person, place, and time. Speech and behavior appropriate. Normal mood and affect.       ECG 12 Lead  Date/Time: 8/10/2018 9:54 AM  Performed by: LATONYA SCHNEIDER  Authorized by: LATONYA SCHNEIDER   Comparison: compared with previous ECG from 2/23/2018  Similar to previous ECG  Rhythm: sinus rhythm  Rate: normal  BPM: 73  Conduction: conduction normal  ST Segments: ST segments normal  T Waves: T waves normal  QRS axis: normal  Other: no other findings  Clinical impression: normal ECG              Assessment:       Diagnosis Plan   1. Chest pain on breathing     2. Nicotine vapor product user     3. Class 3 obesity without serious comorbidity with body mass index (BMI) of 40.0 to 44.9 in adult, unspecified obesity type (CMS/HCC)            Plan:       1.  Atypical Chest pain: She had a recurrent episode of sharp chest pain that worsened with deep inspiration and palpation.  She feels that may be secondary to an old left shoulder injury and it was a strain-she works at [employer]  lifting boxes all day.  Her pain is resolved with naproxen twice daily.  I chose that regimen because she responded well to that in the past with atypical chest pain.  I told her to stop using that medication as of now because her chest pain is resolved.  I told her anytime she takes Aleve she should take it with food and drink plenty of water.  She can follow-up with her PCP for further recommendations.  Blood pressure, heart rate, and EKG are all normal.  She should also be careful with taking ibuprofen frequently.    2.  Nicotine Vapor use: I've recommended smoking cessation.    3.  Obesity: BMI is 40 and she would benefit from a regular exercise and weight loss program.    As always, it has been a pleasure to participate in your patient's care.      Sincerely,         LIVIA Bell

## 2018-09-02 ENCOUNTER — HOSPITAL ENCOUNTER (EMERGENCY)
Facility: HOSPITAL | Age: 28
Discharge: HOME | End: 2018-09-02
Attending: EMERGENCY MEDICINE | Admitting: EMERGENCY MEDICINE

## 2018-09-02 VITALS — OXYGEN SATURATION: 100 % | HEIGHT: 68 IN | HEART RATE: 78 BPM | RESPIRATION RATE: 16 BRPM | TEMPERATURE: 98.6 F

## 2018-09-02 DIAGNOSIS — A49.9 UTI (URINARY TRACT INFECTION), BACTERIAL: Primary | ICD-10-CM

## 2018-09-02 DIAGNOSIS — N39.0 UTI (URINARY TRACT INFECTION), BACTERIAL: Primary | ICD-10-CM

## 2018-09-02 LAB
ANION GAP SERPL CALCULATED.3IONS-SCNC: 13.4 MMOL/L
B-HCG UR QL: NEGATIVE
BACTERIA UR QL AUTO: ABNORMAL /HPF
BASOPHILS # BLD AUTO: 0.03 10*3/MM3 (ref 0–0.2)
BASOPHILS NFR BLD AUTO: 0.3 % (ref 0–1.5)
BILIRUB UR QL STRIP: NEGATIVE
BUN BLD-MCNC: 10 MG/DL (ref 6–20)
BUN/CREAT SERPL: 13.2 (ref 7–25)
CALCIUM SPEC-SCNC: 8.9 MG/DL (ref 8.6–10.5)
CHLORIDE SERPL-SCNC: 105 MMOL/L (ref 98–107)
CLARITY UR: ABNORMAL
CO2 SERPL-SCNC: 22.6 MMOL/L (ref 22–29)
COLOR UR: YELLOW
CREAT BLD-MCNC: 0.76 MG/DL (ref 0.57–1)
DEPRECATED RDW RBC AUTO: 43.8 FL (ref 37–54)
EOSINOPHIL # BLD AUTO: 0.28 10*3/MM3 (ref 0–0.7)
EOSINOPHIL NFR BLD AUTO: 2.6 % (ref 0.3–6.2)
ERYTHROCYTE [DISTWIDTH] IN BLOOD BY AUTOMATED COUNT: 14.5 % (ref 11.7–13)
GFR SERPL CREATININE-BSD FRML MDRD: 91 ML/MIN/1.73
GLUCOSE BLD-MCNC: 101 MG/DL (ref 65–99)
GLUCOSE UR STRIP-MCNC: NEGATIVE MG/DL
HCT VFR BLD AUTO: 41.8 % (ref 35.6–45.5)
HGB BLD-MCNC: 12.8 G/DL (ref 11.9–15.5)
HGB UR QL STRIP.AUTO: ABNORMAL
HYALINE CASTS UR QL AUTO: ABNORMAL /LPF
IMM GRANULOCYTES # BLD: 0 10*3/MM3 (ref 0–0.03)
IMM GRANULOCYTES NFR BLD: 0 % (ref 0–0.5)
KETONES UR QL STRIP: ABNORMAL
LEUKOCYTE ESTERASE UR QL STRIP.AUTO: ABNORMAL
LYMPHOCYTES # BLD AUTO: 3.78 10*3/MM3 (ref 0.9–4.8)
LYMPHOCYTES NFR BLD AUTO: 35.3 % (ref 19.6–45.3)
MCH RBC QN AUTO: 25.4 PG (ref 26.9–32)
MCHC RBC AUTO-ENTMCNC: 30.6 G/DL (ref 32.4–36.3)
MCV RBC AUTO: 83.1 FL (ref 80.5–98.2)
MONOCYTES # BLD AUTO: 1.21 10*3/MM3 (ref 0.2–1.2)
MONOCYTES NFR BLD AUTO: 11.3 % (ref 5–12)
NEUTROPHILS # BLD AUTO: 5.4 10*3/MM3 (ref 1.9–8.1)
NEUTROPHILS NFR BLD AUTO: 50.5 % (ref 42.7–76)
NITRITE UR QL STRIP: NEGATIVE
PH UR STRIP.AUTO: 7 [PH] (ref 5–8)
PLATELET # BLD AUTO: 224 10*3/MM3 (ref 140–500)
PMV BLD AUTO: 9.4 FL (ref 6–12)
POTASSIUM BLD-SCNC: 3.9 MMOL/L (ref 3.5–5.2)
PROT UR QL STRIP: ABNORMAL
RBC # BLD AUTO: 5.03 10*6/MM3 (ref 3.9–5.2)
RBC # UR: ABNORMAL /HPF
REF LAB TEST METHOD: ABNORMAL
SODIUM BLD-SCNC: 141 MMOL/L (ref 136–145)
SP GR UR STRIP: 1.02 (ref 1–1.03)
SQUAMOUS #/AREA URNS HPF: ABNORMAL /HPF
UROBILINOGEN UR QL STRIP: ABNORMAL
WBC NRBC COR # BLD: 10.7 10*3/MM3 (ref 4.5–10.7)
WBC UR QL AUTO: ABNORMAL /HPF

## 2018-09-02 PROCEDURE — 99283 EMERGENCY DEPT VISIT LOW MDM: CPT

## 2018-09-02 PROCEDURE — 81001 URINALYSIS AUTO W/SCOPE: CPT | Performed by: PHYSICIAN ASSISTANT

## 2018-09-02 PROCEDURE — 85025 COMPLETE CBC W/AUTO DIFF WBC: CPT | Performed by: PHYSICIAN ASSISTANT

## 2018-09-02 PROCEDURE — 81025 URINE PREGNANCY TEST: CPT | Performed by: PHYSICIAN ASSISTANT

## 2018-09-02 PROCEDURE — 80048 BASIC METABOLIC PNL TOTAL CA: CPT | Performed by: PHYSICIAN ASSISTANT

## 2018-09-02 RX ORDER — NITROFURANTOIN 25; 75 MG/1; MG/1
100 CAPSULE ORAL 2 TIMES DAILY
Qty: 14 CAPSULE | Refills: 0 | Status: SHIPPED | OUTPATIENT
Start: 2018-09-02 | End: 2018-09-19

## 2018-09-02 RX ORDER — PHENAZOPYRIDINE HYDROCHLORIDE 200 MG/1
200 TABLET, FILM COATED ORAL 3 TIMES DAILY PRN
Qty: 9 TABLET | Refills: 0 | Status: SHIPPED | OUTPATIENT
Start: 2018-09-02 | End: 2019-01-30

## 2018-09-03 NOTE — ED PROVIDER NOTES
Pt presents to the ED c/o increased urinary urgency w/o being able to produce result. She denies n/v/d, CP, SOA, and fevers. On exam, Pt is awake and alert in no acute distress, abd soft non-tender, non-distended.  .     LAB RESULTS AND RADIOLOGY  I have reviewed the patient's labs.      PROCEDURE    PROGRESS NOTES  2055  Spoke to midlevel provider Ricky Cuellar PA-C, about the pt. After performing my own physical exam, I agree w/ the plan of care.      Attestation:    The MANJEET and I have discussed this patient's history, physical exam, and treatment plan.  I have reviewed the documentation and personally had a face to face interaction with the patient. I affirm the documentation and agree with the treatment and plan.  The attached note describes my personal findings.    Documentation assistance provided by cat Booth for Dr. Almaguer. Information recorded by the scribe was done at my direction and has been verified and validated by me.     Alexandria Booth  09/02/18 2055       Ricardo Almaguer MD  09/02/18 0803

## 2018-09-03 NOTE — ED PROVIDER NOTES
" EMERGENCY DEPARTMENT ENCOUNTER    Room Number:  34/34  Time seen: 8:40 PM  PCP: Victorina Ortega MD   Urologist- Dr Jaimes  Historian: patient, family  History Limited By: N/A      HPI:  Chief Complaint: urinary urgency  Context: Beatriz Boland is a 28 y.o. female who presents to the ED c/o urinary urgency that started earlier today. She notes that she has also had decreased urine output. She denies urinary frequency, hematuria, abd pain, flank pain, N/V/D, fevers, and chills. She reports that because her current sx are similar to previous UTI, she started taking leftover macrobid tonight. Pt has no other complaints at this time.     Location: female   Radiation: none  Quality: \"feels like I have to urinate\"  Intensity/Severity: moderate  Duration: started earlier today  Onset quality: gradual  Timing: intermittent  Progression: unchanged  Aggravating Factors: urination  Alleviating Factors: none  Previous Episodes: Pt states that current sx are similar to previous UTI.   Treatment before arrival: Pt reports that because her current sx are similar to previous UTI, she started taking leftover macrobid tonight.  Associated Symptoms: decreased urine output      PAST MEDICAL HISTORY  Active Ambulatory Problems     Diagnosis Date Noted   • Depression    • Developmental delay    • GERD (gastroesophageal reflux disease)    • Scoliosis    • Amblyopia    • Back pain    • Chronic left shoulder pain 09/29/2017   • Muscle spasm 09/29/2017   • Seasonal allergies 11/05/2017   • Panic attacks 11/15/2017   • Anxiety 01/04/2018   • Mild intermittent asthma without complication 03/24/2018   • Chest pain on breathing 08/10/2018   • Nicotine vapor product user 08/10/2018   • Class 3 obesity without serious comorbidity with body mass index (BMI) of 40.0 to 44.9 in adult (CMS/[facility]) 08/10/2018     Resolved Ambulatory Problems     Diagnosis Date Noted   • Sinus bradycardia 09/29/2014   • Palpitations 11/15/2017   • " Acute otitis externa of both ears 11/15/2017   • Atypical chest pain 01/04/2018     Past Medical History:   Diagnosis Date   • Allergic    • Amblyopia    • Anxiety    • Back pain    • Cleft palate    • Depression    • Developmental delay    • GERD (gastroesophageal reflux disease)    • Impetigo    • Kidney abscess    • Kidney stone    • Muscle spasm    • Obesity (BMI 30-39.9)    • Recurrent otitis media    • Scoliosis    • Sinus infection    • Sprain of shoulder, left 12/2016         PAST SURGICAL HISTORY  Past Surgical History:   Procedure Laterality Date   • ABSCESS DRAINAGE      kidney   • ADENOIDECTOMY     • EAR TUBES     • PHARYNGEAL FLAP      for speech   • TONSILLECTOMY           FAMILY HISTORY  Family History   Problem Relation Age of Onset   • COPD Mother    • Arthritis Mother    • Obesity Mother    • Gestational diabetes Mother    • Cancer Father    • Scoliosis Father    • Rheum arthritis Maternal Aunt    • Diabetes Maternal Uncle    • Alcohol abuse Maternal Uncle    • Rheum arthritis Maternal Grandmother    • COPD Maternal Grandmother    • Stroke Maternal Grandfather    • Alcohol abuse Paternal Uncle          SOCIAL HISTORY  Social History     Social History   • Marital status: Single     Spouse name: N/A   • Number of children: N/A   • Years of education: N/A     Occupational History   • Not on file.     Social History Main Topics   • Smoking status: Current Some Day Smoker   • Smokeless tobacco: Never Used      Comment: smokes hookah 1-2 times per week     • Alcohol use Yes      Comment: occasionally, 1-2 glasses of wine 1-2 x per week   • Drug use: No   • Sexual activity: Yes     Partners: Male     Birth control/ protection: Injection     Other Topics Concern   • Not on file     Social History Narrative    Works at [employer].  Lives at home with her mom and puppy.  Spends a couple of nights per month with her boyfriend.           ALLERGIES  Ciprofloxacin and Dust mite extract      REVIEW OF  SYSTEMS  Review of Systems   Constitutional: Negative for chills and fever.   HENT: Negative for sore throat.    Respiratory: Negative for shortness of breath.    Cardiovascular: Negative for chest pain.   Gastrointestinal: Negative for abdominal pain, diarrhea, nausea and vomiting.   Genitourinary: Positive for decreased urine volume and urgency. Negative for dysuria and flank pain.   Musculoskeletal: Negative for back pain.   Skin: Negative for rash.   Neurological: Negative for dizziness.   Psychiatric/Behavioral: The patient is not nervous/anxious.    All other systems reviewed and are negative.           PHYSICAL EXAM  ED Triage Vitals [09/02/18 2025]   Temp Heart Rate Resp BP SpO2   99.5 °F (37.5 °C) 74 18 -- 98 % WNL      Temp src Heart Rate Source Patient Position BP Location FiO2 (%)   Tympanic -- -- -- --       Physical Exam   Constitutional: She is oriented to person, place, and time. No distress.   HENT:   Head: Normocephalic and atraumatic.   Eyes: EOM are normal.   Neck: Normal range of motion. Neck supple.   Cardiovascular: Normal rate, regular rhythm and normal heart sounds.    Pulmonary/Chest: Effort normal and breath sounds normal. No respiratory distress. She has no wheezes. She has no rhonchi. She has no rales.   Abdominal: Soft. There is no tenderness. There is no rebound, no guarding and no CVA tenderness.   Musculoskeletal: Normal range of motion.   Neurological: She is alert and oriented to person, place, and time. She has normal motor skills and normal sensation.   Skin: Skin is warm and dry.   Psychiatric: Mood and affect normal.   Nursing note and vitals reviewed.          LAB RESULTS  Recent Results (from the past 24 hour(s))   Pregnancy, Urine - Urine, Clean Catch    Collection Time: 09/02/18  8:47 PM   Result Value Ref Range    HCG, Urine QL Negative Negative   Urinalysis With Microscopic If Indicated (No Culture) - Urine, Clean Catch    Collection Time: 09/02/18  8:47 PM   Result Value  Ref Range    Color, UA Yellow Yellow, Straw    Appearance, UA Cloudy (A) Clear    pH, UA 7.0 5.0 - 8.0    Specific Gravity, UA 1.025 1.005 - 1.030    Glucose, UA Negative Negative    Ketones, UA Trace (A) Negative    Bilirubin, UA Negative Negative    Blood, UA Moderate (2+) (A) Negative    Protein, UA Trace (A) Negative    Leuk Esterase, UA Large (3+) (A) Negative    Nitrite, UA Negative Negative    Urobilinogen, UA 1.0 E.U./dL 0.2 - 1.0 E.U./dL   Urinalysis, Microscopic Only - Urine, Clean Catch    Collection Time: 09/02/18  8:47 PM   Result Value Ref Range    RBC, UA 21-30 (A) None Seen, 0-2 /HPF    WBC, UA Too Numerous to Count (A) None Seen, 0-2 /HPF    Bacteria, UA 1+ (A) None Seen /HPF    Squamous Epithelial Cells, UA 7-12 (A) None Seen, 0-2 /HPF    Hyaline Casts, UA 0-2 None Seen /LPF    Methodology Manual Light Microscopy    Basic Metabolic Panel    Collection Time: 09/02/18  8:55 PM   Result Value Ref Range    Glucose 101 (H) 65 - 99 mg/dL    BUN 10 6 - 20 mg/dL    Creatinine 0.76 0.57 - 1.00 mg/dL    Sodium 141 136 - 145 mmol/L    Potassium 3.9 3.5 - 5.2 mmol/L    Chloride 105 98 - 107 mmol/L    CO2 22.6 22.0 - 29.0 mmol/L    Calcium 8.9 8.6 - 10.5 mg/dL    eGFR Non African Amer 91 >60 mL/min/1.73    BUN/Creatinine Ratio 13.2 7.0 - 25.0    Anion Gap 13.4 mmol/L   CBC Auto Differential    Collection Time: 09/02/18  8:55 PM   Result Value Ref Range    WBC 10.70 4.50 - 10.70 10*3/mm3    RBC 5.03 3.90 - 5.20 10*6/mm3    Hemoglobin 12.8 11.9 - 15.5 g/dL    Hematocrit 41.8 35.6 - 45.5 %    MCV 83.1 80.5 - 98.2 fL    MCH 25.4 (L) 26.9 - 32.0 pg    MCHC 30.6 (L) 32.4 - 36.3 g/dL    RDW 14.5 (H) 11.7 - 13.0 %    RDW-SD 43.8 37.0 - 54.0 fl    MPV 9.4 6.0 - 12.0 fL    Platelets 224 140 - 500 10*3/mm3    Neutrophil % 50.5 42.7 - 76.0 %    Lymphocyte % 35.3 19.6 - 45.3 %    Monocyte % 11.3 5.0 - 12.0 %    Eosinophil % 2.6 0.3 - 6.2 %    Basophil % 0.3 0.0 - 1.5 %    Immature Grans % 0.0 0.0 - 0.5 %    Neutrophils,  Absolute 5.40 1.90 - 8.10 10*3/mm3    Lymphocytes, Absolute 3.78 0.90 - 4.80 10*3/mm3    Monocytes, Absolute 1.21 (H) 0.20 - 1.20 10*3/mm3    Eosinophils, Absolute 0.28 0.00 - 0.70 10*3/mm3    Basophils, Absolute 0.03 0.00 - 0.20 10*3/mm3    Immature Grans, Absolute 0.00 0.00 - 0.03 10*3/mm3       Ordered the above labs and reviewed the results.          PROCEDURES  Procedures        PROGRESS AND CONSULTS     2042- Ordered UA and blood work for further evaluation.     2135- Rechecked pt. She is resting comfortably and is in no acute distress. Discussed with pt and family about all pertinent results including normal WBC count, normal renal function, and UTI indicated on UA. Informed them of plan to prescribe pt with abx for UTI and pyridium for bladder spasms. Advised pt to well hydrate. Instructed to f/u closely with PMD for recheck. RTER warnings given. Pt understands and agrees with plan. Addressed all questions.    2139- Discussed case with Dr Almaguer who agrees with the plan of care.       MEDICAL DECISION MAKING      MDM  Number of Diagnoses or Management Options     Amount and/or Complexity of Data Reviewed  Clinical lab tests: reviewed and ordered (UA, BUN= 10, creatinine= 0.76, WBC= 10.7)    Patient Progress  Patient progress: stable             DIAGNOSIS  Final diagnoses:   UTI (urinary tract infection), bacterial         DISPOSITION  DISCHARGE    Patient discharged in stable condition.    Reviewed implications of results, diagnosis, meds, responsibility to follow up, warning signs and symptoms of possible worsening, potential complications and reasons to return to ER.    Patient/Family voiced understanding of above instructions.    Discussed plan for discharge, as there is no emergent indication for admission.  Pt/family is agreeable and understands need for follow up and repeat testing.  Pt is aware that discharge does not mean that nothing is wrong but it indicates no emergency is present and they must  continue care with follow-up as given below or physician of their choice.     FOLLOW-UP  Victorina Ortega MD  [address] [facility] [address] [phone]    In 3 days  For further evaluation and treatment if not better      DISCHARGE MEDICATIONS     Medication List      New Prescriptions    nitrofurantoin (macrocrystal-monohydrate) 100 MG capsule  Commonly known as:  MACROBID  Take 1 capsule by mouth 2 (Two) Times a Day.     phenazopyridine 200 MG tablet  Commonly known as:  PYRIDIUM  Take 1 tablet by mouth 3 (Three) Times a Day As Needed for bladder spasms.              Latest Documented Vital Signs:  As of 10:09 PM  HR- 78 Temp- 98.6 °F (37 °C) (Oral) O2 sat- 100%        --  Documentation assistance provided by cat Mccormick for TOM Cuellar.  Information recorded by the scribe was done at my direction and has been verified and validated by me.       Mook Mccormick  09/02/18 2562       Ricky Cuellar III, PA  09/03/18 [id number]

## 2018-09-03 NOTE — DISCHARGE INSTRUCTIONS
Return to the ER with any further concerns, should your condition change/worsen, or should you develop a fever.

## 2018-09-03 NOTE — ED NOTES
Discharge instructions discussed with patient, importance of taking all prescribed antibiotics emphasized.  Patient verbalized understanding.  Pt discharged per MD instruction and in no apparent distress.     Nani Boggs RN  09/02/18 [id number]

## 2018-09-04 ENCOUNTER — APPOINTMENT (OUTPATIENT)
Dept: CARDIOLOGY | Facility: HOSPITAL | Age: 28
End: 2018-09-04

## 2018-09-04 ENCOUNTER — HOSPITAL ENCOUNTER (EMERGENCY)
Facility: HOSPITAL | Age: 28
Discharge: HOME | End: 2018-09-04
Attending: EMERGENCY MEDICINE | Admitting: EMERGENCY MEDICINE

## 2018-09-04 VITALS
WEIGHT: 250 LBS | OXYGEN SATURATION: 99 % | SYSTOLIC BLOOD PRESSURE: 126 MMHG | HEIGHT: 70 IN | RESPIRATION RATE: 16 BRPM | DIASTOLIC BLOOD PRESSURE: 92 MMHG | BODY MASS INDEX: 35.79 KG/M2 | TEMPERATURE: 98.7 F | HEART RATE: 72 BPM

## 2018-09-04 DIAGNOSIS — M79.652 LEFT THIGH PAIN: Primary | ICD-10-CM

## 2018-09-04 PROCEDURE — 99283 EMERGENCY DEPT VISIT LOW MDM: CPT

## 2018-09-04 PROCEDURE — 93971 EXTREMITY STUDY: CPT

## 2018-09-04 RX ORDER — METHOCARBAMOL 500 MG/1
1000 TABLET, FILM COATED ORAL 4 TIMES DAILY
Qty: 40 TABLET | Refills: 0 | Status: SHIPPED | OUTPATIENT
Start: 2018-09-04 | End: 2019-01-30

## 2018-09-04 NOTE — ED NOTES
L thigh pain since yesterday.  No known injury.  No swelling or bruising noted.     Sandie Adams, RN  09/04/18 [id number]

## 2018-09-05 LAB
BH CV LOWER VASCULAR LEFT COMMON FEMORAL AUGMENT: NORMAL
BH CV LOWER VASCULAR LEFT COMMON FEMORAL COMPETENT: NORMAL
BH CV LOWER VASCULAR LEFT COMMON FEMORAL COMPRESS: NORMAL
BH CV LOWER VASCULAR LEFT COMMON FEMORAL PHASIC: NORMAL
BH CV LOWER VASCULAR LEFT COMMON FEMORAL SPONT: NORMAL
BH CV LOWER VASCULAR LEFT DISTAL FEMORAL COMPRESS: NORMAL
BH CV LOWER VASCULAR LEFT GASTRONEMIUS COMPRESS: NORMAL
BH CV LOWER VASCULAR LEFT GREATER SAPH AK COMPRESS: NORMAL
BH CV LOWER VASCULAR LEFT GREATER SAPH BK COMPRESS: NORMAL
BH CV LOWER VASCULAR LEFT MID FEMORAL AUGMENT: NORMAL
BH CV LOWER VASCULAR LEFT MID FEMORAL COMPETENT: NORMAL
BH CV LOWER VASCULAR LEFT MID FEMORAL COMPRESS: NORMAL
BH CV LOWER VASCULAR LEFT MID FEMORAL PHASIC: NORMAL
BH CV LOWER VASCULAR LEFT MID FEMORAL SPONT: NORMAL
BH CV LOWER VASCULAR LEFT PERONEAL COMPRESS: NORMAL
BH CV LOWER VASCULAR LEFT POPLITEAL AUGMENT: NORMAL
BH CV LOWER VASCULAR LEFT POPLITEAL COMPETENT: NORMAL
BH CV LOWER VASCULAR LEFT POPLITEAL COMPRESS: NORMAL
BH CV LOWER VASCULAR LEFT POPLITEAL PHASIC: NORMAL
BH CV LOWER VASCULAR LEFT POPLITEAL SPONT: NORMAL
BH CV LOWER VASCULAR LEFT POSTERIOR TIBIAL COMPRESS: NORMAL
BH CV LOWER VASCULAR LEFT PROXIMAL FEMORAL COMPRESS: NORMAL
BH CV LOWER VASCULAR LEFT SAPHENOFEMORAL JUNCTION AUGMENT: NORMAL
BH CV LOWER VASCULAR LEFT SAPHENOFEMORAL JUNCTION COMPETENT: NORMAL
BH CV LOWER VASCULAR LEFT SAPHENOFEMORAL JUNCTION COMPRESS: NORMAL
BH CV LOWER VASCULAR LEFT SAPHENOFEMORAL JUNCTION PHASIC: NORMAL
BH CV LOWER VASCULAR LEFT SAPHENOFEMORAL JUNCTION SPONT: NORMAL
BH CV LOWER VASCULAR RIGHT COMMON FEMORAL AUGMENT: NORMAL
BH CV LOWER VASCULAR RIGHT COMMON FEMORAL COMPETENT: NORMAL
BH CV LOWER VASCULAR RIGHT COMMON FEMORAL COMPRESS: NORMAL
BH CV LOWER VASCULAR RIGHT COMMON FEMORAL PHASIC: NORMAL
BH CV LOWER VASCULAR RIGHT COMMON FEMORAL SPONT: NORMAL

## 2018-09-05 NOTE — DISCHARGE INSTRUCTIONS
Home, rest, medicine as directed, apply heat or ice to the affected area, home medicine as prescribed, follow up with PCP for recheck. Return to care with further concerns.

## 2018-09-05 NOTE — ED PROVIDER NOTES
Pt presents to the ED c/o L thigh pain that was present yesterday when the pt woke up. Pt denies back pain or urinary sx. Pt denies injury or strain to leg. On exam, there is L anterior thigh tenderness's but skin is normal. Back and hips are nontender. NVID and motor intact.     Attestation:  The MANJEET and I have discussed this patient's history, physical exam, and treatment plan.  I have reviewed the documentation and personally had a face to face interaction with the patient. I affirm the documentation and agree with the treatment and plan.  The attached note describes my personal findings.      Documentation assistance provided by cat Trinh for Dr. Ortega Information recorded by the scribe was done at my direction and has been verified and validated by me.     Arelis Trnih  09/04/18 [id number]       Tarik Ortega MD  09/04/18 [id number]

## 2018-09-05 NOTE — ED PROVIDER NOTES
EMERGENCY DEPARTMENT ENCOUNTER    CHIEF COMPLAINT  Chief Complaint: left thigh pain  History given by: patient  History limited by: nothing  Room Number: 41/41  PMD: Victorina Ortega MD      HPI:  Pt is a 28 y.o. female who presents complaining of left thigh pain that began yesterday morning. Pt reports that she has taken Flexeril and Naproxen and has applied heat compresses PTA with no relief of the pain. Pt denies any known injury or recent trauma. Pt denies fever, chills, chest pain, shortness of air, nausea, vomiting, or urinary symptoms. The patient denies any new medications besides Macrobid which she was prescribed two days ago to treat a UTI. The patient reports that she has taken Macrobid in the past and never had any problems. There are no other complaints at this time.    Duration: since yesterday morning  Onset: gradual  Timing: constant  Location: left thigh  Radiation: none specified  Quality: pain  Intensity/Severity: moderate  Progression: not specified  Associated Symptoms: none specified  Aggravating Factors: none specified  Alleviating Factors: none specified  Previous Episodes: none specified  Treatment before arrival: Pt reports that she has taken Flexeril and Naproxen and has applied heat compresses PTA with no relief of the pain.    PAST MEDICAL HISTORY  Active Ambulatory Problems     Diagnosis Date Noted   • Depression    • Developmental delay    • GERD (gastroesophageal reflux disease)    • Scoliosis    • Amblyopia    • Back pain    • Chronic left shoulder pain 09/29/2017   • Muscle spasm 09/29/2017   • Seasonal allergies 11/05/2017   • Panic attacks 11/15/2017   • Anxiety 01/04/2018   • Mild intermittent asthma without complication 03/24/2018   • Chest pain on breathing 08/10/2018   • Nicotine vapor product user 08/10/2018   • Class 3 obesity without serious comorbidity with body mass index (BMI) of 40.0 to 44.9 in adult (CMS/[facility]) 08/10/2018     Resolved Ambulatory Problems      Diagnosis Date Noted   • Sinus bradycardia 09/29/2014   • Palpitations 11/15/2017   • Acute otitis externa of both ears 11/15/2017   • Atypical chest pain 01/04/2018     Past Medical History:   Diagnosis Date   • Allergic    • Amblyopia    • Anxiety    • Back pain    • Cleft palate    • Depression    • Developmental delay    • GERD (gastroesophageal reflux disease)    • Impetigo    • Kidney abscess    • Kidney stone    • Muscle spasm    • Obesity (BMI 30-39.9)    • Recurrent otitis media    • Scoliosis    • Sinus infection    • Sprain of shoulder, left 12/2016       PAST SURGICAL HISTORY  Past Surgical History:   Procedure Laterality Date   • ABSCESS DRAINAGE      kidney   • ADENOIDECTOMY     • EAR TUBES     • PHARYNGEAL FLAP      for speech   • TONSILLECTOMY         FAMILY HISTORY  Family History   Problem Relation Age of Onset   • COPD Mother    • Arthritis Mother    • Obesity Mother    • Gestational diabetes Mother    • Cancer Father    • Scoliosis Father    • Rheum arthritis Maternal Aunt    • Diabetes Maternal Uncle    • Alcohol abuse Maternal Uncle    • Rheum arthritis Maternal Grandmother    • COPD Maternal Grandmother    • Stroke Maternal Grandfather    • Alcohol abuse Paternal Uncle        SOCIAL HISTORY  Social History     Social History   • Marital status: Single     Spouse name: N/A   • Number of children: N/A   • Years of education: N/A     Occupational History   • Not on file.     Social History Main Topics   • Smoking status: Current Some Day Smoker   • Smokeless tobacco: Never Used      Comment: smokes hookah 1-2 times per week     • Alcohol use Yes      Comment: occasionally, 1-2 glasses of wine 1-2 x per week   • Drug use: No   • Sexual activity: Yes     Partners: Male     Birth control/ protection: Injection     Other Topics Concern   • Not on file     Social History Narrative    Works at [employer] Sun-Thursday.  Lives at home with her mom and puppy.  Spends a couple of nights per month with her  boyfriend.         ALLERGIES  Ciprofloxacin and Dust mite extract    REVIEW OF SYSTEMS  Review of Systems   Constitutional: Negative for chills and fever.   HENT: Negative for sore throat.    Eyes: Negative.    Respiratory: Negative for cough and shortness of breath.    Cardiovascular: Negative for chest pain.   Gastrointestinal: Negative for abdominal pain, diarrhea, nausea and vomiting.   Genitourinary: Negative for dysuria and frequency.   Musculoskeletal: Positive for myalgias (left thigh). Negative for neck pain.   Skin: Negative for rash.   Allergic/Immunologic: Negative.    Neurological: Negative for weakness, numbness and headaches.   Hematological: Negative.    Psychiatric/Behavioral: Negative.    All other systems reviewed and are negative.      PHYSICAL EXAM  ED Triage Vitals [09/04/18 1947]   Temp Heart Rate Resp BP SpO2   98.7 °F (37.1 °C) 96 16 -- 100 %      Temp src Heart Rate Source Patient Position BP Location FiO2 (%)   -- -- -- -- --       Physical Exam   Constitutional: She is oriented to person, place, and time. No distress.   HENT:   Head: Normocephalic and atraumatic.   Eyes: Pupils are equal, round, and reactive to light. EOM are normal.   Neck: Normal range of motion. Neck supple.   Cardiovascular: Normal rate, regular rhythm and normal heart sounds.  Exam reveals no gallop and no friction rub.    No murmur heard.  Pulmonary/Chest: Effort normal and breath sounds normal. No respiratory distress. She has no decreased breath sounds. She has no wheezes. She has no rhonchi. She has no rales. She exhibits no tenderness.   Abdominal: Soft. She exhibits no distension and no mass. There is no tenderness. There is no rebound and no guarding.   Musculoskeletal: Normal range of motion. She exhibits no edema.        Left upper leg: She exhibits tenderness (mild tenderness to the dorsal and lateral aspect of the left thigh). She exhibits no swelling.   Neurological: She is alert and oriented to person,  place, and time. She has normal sensation and normal strength.   The patient is neurovascularly intact distally.   Skin: Skin is warm and dry. No rash noted. No erythema (there is no erythema or warmth to the left thigh).   Psychiatric: Mood and affect normal.   Nursing note and vitals reviewed.        PROCEDURES  Procedures      PROGRESS AND CONSULTS  2054 Reviewed the patient's history and workup with Dr. Jordan MD. After a bedside evaluation, they agree with the plan of care.    2056 Ordered Doppler for further evaluation.    2127 Rechecked the patient who is resting comfortably and in NAD. Discussed the unremarkable doppler. Also discussed the plan for discharge with a prescription for Robaxin. Advised the patient to f/u with her PCP if her pain persists. Pt understands and agrees with the plan, all questions answered.    MEDICAL DECISION MAKING  Results were reviewed/discussed with the patient and they were also made aware of online access. Pt also made aware that some labs, such as cultures, will not be resulted during ER visit and follow up with PMD is necessary.     MDM  Number of Diagnoses or Management Options  Left thigh pain:      Amount and/or Complexity of Data Reviewed  Tests in the radiology section of CPT®: ordered and reviewed (Doppler: negative)  Decide to obtain previous medical records or to obtain history from someone other than the patient: yes  Review and summarize past medical records: yes (The patient was seen in the ED on 09/02/18 for a bacterial UTI.)  Independent visualization of images, tracings, or specimens: yes    Patient Progress  Patient progress: stable         DIAGNOSIS  Final diagnoses:   Left thigh pain       DISPOSITION  DISCHARGE    Patient discharged in stable condition.    Reviewed implications of results, diagnosis, meds, responsibility to follow up, warning signs and symptoms of possible worsening, potential complications and reasons to return to ER, including any new  or worsening symptoms.    Patient/Family voiced understanding of above instructions.    Discussed plan for discharge, as there is no emergent indication for admission. Patient referred to primary care provider for BP management due to today's BP. Pt/family is agreeable and understands need for follow up and repeat testing.  Pt is aware that discharge does not mean that nothing is wrong but it indicates no emergency is present that requires admission and they must continue care with follow-up as given below or physician of their choice.     FOLLOW-UP  Victorina Ortega MD  [address] [facility] [address] [phone]    Schedule an appointment as soon as possible for a visit in 1 week           Medication List      New Prescriptions    methocarbamol 500 MG tablet  Commonly known as:  ROBAXIN  Take 2 tablets by mouth 4 (Four) Times a Day.        Stop    cyclobenzaprine 10 MG tablet  Commonly known as:  FLEXERIL     ibuprofen 600 MG tablet  Commonly known as:  ADVIL,MOTRIN              Latest Documented Vital Signs:  As of 10:33 PM  BP- 126/92 HR- 72 Temp- 98.7 °F (37.1 °C) O2 sat- 99%    --  Documentation assistance provided by cat Salazar for Zackery Solis PA-C.  Information recorded by the scribe was done at my direction and has been verified and validated by me.          Kesha Salazar  09/04/18 [id number]       Zackery Solis PA  09/04/18 [id number]

## 2018-09-05 NOTE — PROGRESS NOTES
[facility] doppler completed.  Preliminary results:  LT leg is negative for DVT and STP.  Results given to Dr. Ortega.

## 2018-09-19 ENCOUNTER — OFFICE VISIT (OUTPATIENT)
Dept: FAMILY MEDICINE CLINIC | Facility: CLINIC | Age: 28
End: 2018-09-19

## 2018-09-19 VITALS
OXYGEN SATURATION: 99 % | HEART RATE: 68 BPM | TEMPERATURE: 97.9 F | DIASTOLIC BLOOD PRESSURE: 80 MMHG | HEIGHT: 70 IN | BODY MASS INDEX: 36.94 KG/M2 | SYSTOLIC BLOOD PRESSURE: 120 MMHG | WEIGHT: 258 LBS | RESPIRATION RATE: 16 BRPM

## 2018-09-19 DIAGNOSIS — H66.006 RECURRENT ACUTE SUPPURATIVE OTITIS MEDIA WITHOUT SPONTANEOUS RUPTURE OF TYMPANIC MEMBRANE OF BOTH SIDES: Primary | ICD-10-CM

## 2018-09-19 DIAGNOSIS — J01.00 ACUTE NON-RECURRENT MAXILLARY SINUSITIS: ICD-10-CM

## 2018-09-19 PROCEDURE — 99213 OFFICE O/P EST LOW 20 MIN: CPT | Performed by: FAMILY MEDICINE

## 2018-09-19 RX ORDER — AZITHROMYCIN 250 MG/1
TABLET, FILM COATED ORAL
Refills: 0 | COMMUNITY
Start: 2018-08-16 | End: 2019-01-30

## 2018-09-19 NOTE — PROGRESS NOTES
Subjective   Beatriz Boland is a 28 y.o. female.     History of Present Illness   Beatriz is a pt of Dr Ortega.  She states she has had sinus pain/pressure, sneezing, chills and fatigue x 2 days.  Her ENT rx'd a zpak yesterday and she is on day 2.  She notes that she has pain in both ears and has a hx of ear infections. The pain is getting worse and started a few days ago.    The following portions of the patient's history were reviewed and updated as appropriate: allergies, current medications, past medical history, past social history and problem list.    Review of Systems   Constitutional: Positive for chills and fatigue.   HENT: Positive for sinus pain and sinus pressure.    All other systems reviewed and are negative.      Objective   Physical Exam   Constitutional: She appears well-developed.   HENT:   Bilateral superative TMs   Neck: Normal range of motion.   Cardiovascular: Normal rate.    Pulmonary/Chest: Effort normal.   Lymphadenopathy:     She has cervical adenopathy.   Neurological: She is alert.   Nursing note and vitals reviewed.      Assessment/Plan   Beatriz was seen today for uri.    Diagnoses and all orders for this visit:    Recurrent acute suppurative otitis media without spontaneous rupture of tympanic membrane of both sides  -     neomycin-polymyxin-hydrocortisone (CORTISPORIN) 3.5-73849-5 otic solution; Administer 3 drops into both ears 3 (Three) Times a Day.    Acute non-recurrent maxillary sinusitis    Finish up the Zpak and will start an ear drop. Advised f/u with us in a week if not improving.

## 2018-09-20 NOTE — PATIENT INSTRUCTIONS
I have advised finishing the Zpak and starting the eardrop prescribed.  Please call if not improving in a week.

## 2018-10-06 NOTE — TELEPHONE ENCOUNTER
----- Message from Jabari Alonso sent at 9/7/2017  3:08 PM EDT -----  Contact: Beatriz Boland  Pt would like for someone to give her a call due to her lack of energy and muscle spasms. She is wanting opinions on taking something other than pills/vitamins for the lack of energy and the muscle spasms. Pt does have an upcoming f/u appt w/Dr Ortega on 9/25/17.  
09/04/2018

## 2018-12-26 ENCOUNTER — TRANSCRIBE ORDERS (OUTPATIENT)
Dept: PHYSICAL THERAPY | Facility: HOSPITAL | Age: 28
End: 2018-12-26

## 2018-12-26 DIAGNOSIS — M25.519 SHOULDER PAIN, UNSPECIFIED CHRONICITY, UNSPECIFIED LATERALITY: Primary | ICD-10-CM

## 2018-12-26 DIAGNOSIS — M43.6 NECK STIFFNESS: ICD-10-CM

## 2019-01-04 ENCOUNTER — HOSPITAL ENCOUNTER (OUTPATIENT)
Dept: PHYSICAL THERAPY | Facility: HOSPITAL | Age: 29
Setting detail: THERAPIES SERIES
Discharge: HOME | End: 2019-01-04
Attending: ORTHOPAEDIC SURGERY

## 2019-01-04 DIAGNOSIS — M54.6 CHRONIC BILATERAL THORACIC BACK PAIN: ICD-10-CM

## 2019-01-04 DIAGNOSIS — Z74.09 IMPAIRED MOBILITY AND ADLS: Primary | ICD-10-CM

## 2019-01-04 DIAGNOSIS — Z78.9 IMPAIRED MOBILITY AND ADLS: Primary | ICD-10-CM

## 2019-01-04 DIAGNOSIS — G89.29 CHRONIC BILATERAL THORACIC BACK PAIN: ICD-10-CM

## 2019-01-04 PROCEDURE — 97162 PT EVAL MOD COMPLEX 30 MIN: CPT

## 2019-01-04 PROCEDURE — 97110 THERAPEUTIC EXERCISES: CPT

## 2019-01-04 NOTE — THERAPY EVALUATION
"    Outpatient Physical Therapy Ortho Initial Evaluation  [facility]     Patient Name: Beatriz Boland  : 1990  MRN: [id number]  Today's Date: 2019      Visit Date: 2019    Patient Active Problem List   Diagnosis   • Depression   • Developmental delay   • GERD (gastroesophageal reflux disease)   • Scoliosis   • Amblyopia   • Back pain   • Chronic left shoulder pain   • Muscle spasm   • Seasonal allergies   • Panic attacks   • Anxiety   • Mild intermittent asthma without complication   • Chest pain on breathing   • Nicotine vapor product user   • Class 3 obesity without serious comorbidity with body mass index (BMI) of 40.0 to 44.9 in adult        Past Medical History:   Diagnosis Date   • Allergic    • Amblyopia    • Anxiety    • Back pain    • Cleft palate    • Depression    • Developmental delay    • GERD (gastroesophageal reflux disease)     followed by GI, Dr. Wesley Ferro   • Impetigo    • Kidney abscess    • Kidney stone    • Muscle spasm    • Obesity (BMI 30-39.9)    • Recurrent otitis media    • Scoliosis    • Sinus infection     recurrent   • Sprain of shoulder, left 2016        Past Surgical History:   Procedure Laterality Date   • ABSCESS DRAINAGE      kidney   • ADENOIDECTOMY     • EAR TUBES     • PHARYNGEAL FLAP      for speech   • TONSILLECTOMY         Visit Dx:     ICD-10-CM ICD-9-CM   1. Impaired mobility and ADLs Z74.09 799.89   2. Chronic bilateral thoracic back pain M54.6 724.1    G89.29 338.29       Patient History     Row Name 19 1000             History    Chief Complaint  Pain  -CA      Type of Pain  Back pain  -CA      Date Current Problem(s) Began  18  -CA      Brief Description of Current Complaint  Ms. Boland is a 28 y.o. female who presents today with mid-thoracic pain. Pt reports pain has been intermittent over the past year, she reports she feels like there is a \"knot\" in her shoulder and mid back. Pt reports she has seen a masseus for her " pain before, which helped to decrease her mid back pain at that time. Pt denies n/t. Pt reports she has slight scoloiosis in lumbar spine. Pt reports increased pain with reaching overhead, working all day at [employer], or repetitive use/lifting objects. Pt reports pain relief with heat. Pt has been seen here previous for LBP. PMH includes lumbar scoliosis, plantar fasciitis, phryngeal flap, kidney abscess.   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  No  -CA      What position do you sleep in?  Right sidelying;Left sidelying;Supine  -CA         Fall Risk Assessment    Any falls in the past year:  No  -CA         Daily Activities    Primary Language  English  -CA      Are you able to read  Yes  -CA      Are you able to write  Yes  -CA      How does patient learn best?  Listening;Reading;Demonstration  -CA      Teaching needs identified  Home Exercise Program;Management of Condition  -CA      Patient is concerned about/has problems with  Repetitive movements of the hand, arm, shoulder;Reaching over head;Performing job responsibilities/community activities (work, school,  -CA      Does patient have problems with the following?  None  -CA      Barriers to learning  None  -CA      Pt Participated in POC and Goals  Yes  -CA         Safety    Are you being hurt, hit, or frightened by anyone at home or in your life?  No  -CA      Are you being neglected by a caregiver  No  -CA        User Key  (r) = Recorded By, (t) = Taken By, (c) = Cosigned By    Initials Name Provider Type     Melissa Humphrey, ZELDA Physical Therapist          PT Ortho     Row Name 01/04/19 1000       Posture/Observations    Posture/Observations Comments  fwd head, rounded shoulders  -CA       Sensory Screen for Light Touch- Upper Quarter Clearing    C4 (posterior shoulder)  Intact  -CA    C5 (lateral upper arm)  Intact  -CA    C6 (tip of thumb)  Intact  -CA    C7 (tip of 3rd finger)  Intact  -CA    C8 (tip of 5th finger)  Intact  -CA    T1 (medial lower arm)  Intact  -CA       Myotomal Screen- Upper Quarter Clearing    Shoulder flexion (C5)  WNL  -CA    Elbow flexion/wrist extension (C6)  WNL  -CA    Elbow extension/wrist flexion (C7)  WNL  -CA    Finger flexion/ (C8)  WNL  -CA    Finger abduction (T1)  WNL  -CA      WNL  -CA       Cervical/Shoulder ROM Screen    Cervical flexion  Normal  -CA    Cervical extension  Normal  -CA    Cervical lateral flexion  Normal  -CA    Cervical rotation  Normal  -CA    Shoulder elevation   Normal  -CA       Cervical Palpation    Upper Traps  Bilateral:;Tender;Guarded/taut;Trigger point  -CA    Middle Traps  Bilateral:;Tender;Guarded/taut;Trigger point  -CA       Thoracic Accessory Motions    Pa glide- Upper thoracic  Hypomobile  -CA    Pa glide- Middle thoracic  Hypomobile  -CA    Pa glide- Lower thoracic  Hypomobile  -CA       Sensation    Sensation WNL?  WNL  -CA       Upper Extremity Flexibility    Upper Trapezius  Bilateral:;Moderately limited  -CA      User Key  (r) = Recorded By, (t) = Taken By, (c) = Cosigned By    Initials Name Provider Type    Melissa Humphrey, PT Physical Therapist                      Therapy Education  Given: HEP, Symptoms/condition management, Pain management, Posture/body mechanics  Program: New  How Provided: Verbal, Demonstration, Written  Provided to: Patient  Level of Understanding: Verbalized, Demonstrated     PT OP Goals     Row Name 01/04/19 1000          PT Short Term Goals    STG Date to Achieve  02/01/19  -CA     STG 1  Patient  will be independent with initial HEP.  -CA     STG 1 Progress  New  -CA     STG 2  Pt will report </=2/10 upper/mid thoracic back pain with ADLs  -CA     STG 2 Progress  New  -CA        Long Term Goals    LTG Date to Achieve  03/01/19  -CA     LTG 1  Patient will be independent with progressive HEP for long term management of current condition.  -CA     LTG 1 Progress  New  -CA     LTG 2  Pt will report 0/10 upper/mid thoracic back pain with ADLs  -CA     LTG 2 Progress  New  -CA     LTG 3  Pt will report ability to work one full shift at work at [employer], with out increase in upper/mid back pain.  -CA     LTG 3 Progress  New  -CA       User Key  (r) = Recorded By, (t) = Taken By, (c) = Cosigned By    Initials Name Provider Type    Melissa Humphrey, PT Physical Therapist          PT Assessment/Plan     Row Name 01/04/19 1100          PT Assessment    Functional Limitations  Performance in work activities;Performance in self-care ADL;Performance in leisure activities;Limitations in functional capacity and performance;Limitation in home management;Limitations in community activities  -CA     Impairments  Posture;Poor body mechanics;Pain;Muscle strength;Joint mobility  -CA     Assessment Comments  Beatriz Boland is a 28 y.o. female referred to physical therapy for upper-mid thoracic back pain. She presents with a Evolving clinical presentation, along with comorbidities of scoliosis and personal factors of relies on others for rides that may impact her progress in the plan of care. Pt presents today with pain, decreased upper trap flexibility, upper trap trigger points, and thoracic spine hypomobility . These impairments limit her ability to work and perform ADLs without pain. Pt will benefit from skilled PT to address the previous impairments and return to PLOF.  -CA     Please refer to paper survey for additional self-reported information  Yes  -CA     Rehab Potential  Good  -CA     Patient/caregiver participated  in establishment of treatment plan and goals  Yes  -CA     Patient would benefit from skilled therapy intervention  Yes  -CA        PT Plan    PT Frequency  2x/week  -CA     Predicted Duration of Therapy Intervention (Therapy Eval)  4-6 weeks  -CA     Planned CPT's?  PT EVAL MOD COMPLELITY: 13908;PT RE-EVAL: 14425;PT THER PROC EA 15 MIN: 36526;PT THER ACT EA 15 MIN: 69966;PT MANUAL THERAPY EA 15 MIN: 94674;PT NEUROMUSC RE-EDUCATION EA 15 MIN: 61339;PT AQUATIC THERAPY EA 15 MIN: 32654;PT SELF CARE/HOME MGMT/TRAIN EA 15: 05591;PT ELECTRICAL STIM UNATTEND: ;PT ULTRASOUND EA 15 MIN: 15147;PT TRACTION CERVICAL: 55622;PT HOT/COLD PACK WC NONMCARE: 89786;PT IONTOPHORESIS EA 15 MIN: 81913  -CA     Physical Therapy Interventions (Optional Details)  aquatics exercise;bed mobility training;dry needling;gait training;home exercise program;lumbar stabilization;manual therapy techniques;modalities;neuromuscular re-education;patient/family education;postural re-education;ROM (Range of Motion);strengthening;swiss ball techniques;stretching;taping;transfer training  -CA     PT Plan Comments  PT POC includes manual therapy, postural education, work ergonomics/lifting education, UE/parascapular strengthening, and modalities as needed. Next visit consider manual therapy to upper traps, t-spine mobs, and adding rows and shoulder extension.  -CA       User Key  (r) = Recorded By, (t) = Taken By, (c) = Cosigned By    Initials Name Provider Type    Melissa Humphrey, PT Physical Therapist            Exercises     Row Name 01/04/19 1000             Total Minutes    11750 - PT Therapeutic Exercise Minutes  15  -CA         Exercise 1    Exercise Name 1  SL arc  -CA      Cueing 1  Verbal;Demo  -CA      Reps 1  10  -CA      Time 1  3s  -CA         Exercise 2    Exercise Name 2  Scap squeeze  -CA      Cueing 2  Verbal  -CA      Reps 2  10  -CA      Time 2  3s  -CA         Exercise 3    Exercise Name 3  chin tuck  -CA      Cueing 3  Verbal   -CA      Reps 3  5  -CA      Time 3  3s  -CA        User Key  (r) = Recorded By, (t) = Taken By, (c) = Cosigned By    Initials Name Provider Type    Melissa Humphrey PT Physical Therapist                        Outcome Measure Options: Disabilities of the Arm, Shoulder, and Hand (DASH)  DASH  Open a tight or new jar.: No Difficulty  Write: No Difficulty  Turn a key: No Difficulty  Prepare a meal: No Difficulty  Push open a heavy door: No Difficulty  Place an object on a shelf above your head: No Difficulty  Do heavy household chores (e.g., wash walls, wash floors): Mild Difficulty  Garden or do yard work: No Difficulty  Make a bed: No Difficulty  Carry a shopping bag or briefcase: No Difficulty  Carry a heavy object (over 10 lbs): Mild Difficulty  Change a lightbulb overhead: No Difficulty  Wash or blow dry your hair: No Difficulty  Wash your back: No Difficulty  Put on a pullover sweater: No Difficulty  Use a knife to cut food: No Difficulty  Recreational activities in which require little effort (e.g., cardplaying, knitting, etc.): No Difficulty  Recreational activities in which you take some force or impact through your arm, should or hand (e.g. golf, hammering, tennis, etc.): No Difficulty  Recreational Activities in which you move your arm freely (e.g., frisbee, badminton, etc.): No Difficulty  Manage transportation needs (getting from one place to another): No Difficulty  Sexual Activities: No Difficulty  During the past week, to what extent has your arm, shoulder, or hand problem interfered with your normal social activites with family, friends, neighbors or groups?: Not at all  During the past week, were you limited in your work or other regular daily activities as a result of your arm, shoulder or hand problem?: Not limited at all  Arm, Shoulder, or hand pain: Mild  Arm, shoulder or hand pain when you performed any specific activity: Mild  Tingling (pins and needles) in your arm, shoulder, or hand:  None  Weakness in your arm, shoulder or hand: None  Stiffness in your arm, shoulder or hand: None  During the past week, how much difficulty have you had sleeping because of the pain in your arm, shoulder or hand?: No difficulty  I feel less capable, less confident or less useful because of my arm, shoulder or hand problem: Strongly disagree  DASH Sum : 34  Number of Questions Answered: 30  DASH Score: 3.33         Time Calculation:     Therapy Suggested Charges     Code   Minutes Charges    96811 (CPT®) Hc Pt Neuromusc Re Education Ea 15 Min      83352 (CPT®) Hc Pt Ther Proc Ea 15 Min 15 1    64472 (CPT®) Hc Gait Training Ea 15 Min      48811 (CPT®) Hc Pt Therapeutic Act Ea 15 Min      39292 (CPT®) Hc Pt Manual Therapy Ea 15 Min      16041 (CPT®) Hc Pt Ther Massage- Per 15 Min      07279 (CPT®) Hc Pt Iontophoresis Ea 15 Min      98717 (CPT®) Hc Pt Elec Stim Ea-Per 15 Min      02810 (CPT®) Hc Pt Ultrasound Ea 15 Min      44521 (CPT®) Hc Pt Self Care/Mgmt/Train Ea 15 Min      39891 (CPT®) Hc Pt Prosthetic (S) Train Initial Encounter, Each 15 Min      37775 (CPT®) Hc Orthotic(S) Mgmt/Train Initial Encounter, Each 15min      99933 (CPT®) Hc Pt Aquatic Therapy Ea 15 Min      39350 (CPT®) Hc Pt Orthotic(S)/Prosthetic(S) Encounter, Each 15 Min       (CPT®) Hc Pt Electrical Stim Unattended      Total  15 1          Start Time: 1015  Stop Time: 1055  Time Calculation (min): 40 min  Total Timed Code Minutes- PT: 10 minute(s)     Therapy Charges for Today     Code Description Service Date Service Provider Modifiers Qty    42790781296 HC PT THER PROC EA 15 MIN 1/4/2019 Melissa Moore, PT GP 1    20900149379 HC PT EVAL MOD COMPLEXITY 2 1/4/2019 Melissa Moore, PT GP 1          PT G-Codes  Outcome Measure Options: Disabilities of the Arm, Shoulder, and Hand (DASH)         Melissa Moore, PT  1/4/2019

## 2019-01-11 ENCOUNTER — HOSPITAL ENCOUNTER (OUTPATIENT)
Dept: PHYSICAL THERAPY | Facility: HOSPITAL | Age: 29
Setting detail: THERAPIES SERIES
Discharge: HOME | End: 2019-01-11

## 2019-01-11 DIAGNOSIS — M54.6 CHRONIC BILATERAL THORACIC BACK PAIN: ICD-10-CM

## 2019-01-11 DIAGNOSIS — Z74.09 IMPAIRED MOBILITY AND ADLS: Primary | ICD-10-CM

## 2019-01-11 DIAGNOSIS — G89.29 CHRONIC BILATERAL THORACIC BACK PAIN: ICD-10-CM

## 2019-01-11 DIAGNOSIS — M54.50 CHRONIC MIDLINE LOW BACK PAIN WITHOUT SCIATICA: ICD-10-CM

## 2019-01-11 DIAGNOSIS — M25.572 ACUTE LEFT ANKLE PAIN: ICD-10-CM

## 2019-01-11 DIAGNOSIS — Z78.9 IMPAIRED MOBILITY AND ADLS: Primary | ICD-10-CM

## 2019-01-11 DIAGNOSIS — G89.29 CHRONIC MIDLINE LOW BACK PAIN WITHOUT SCIATICA: ICD-10-CM

## 2019-01-11 PROCEDURE — 97110 THERAPEUTIC EXERCISES: CPT | Performed by: PHYSICAL THERAPIST

## 2019-01-11 NOTE — THERAPY TREATMENT NOTE
Outpatient Physical Therapy Ortho Treatment Note  [facility]     Patient Name: Beatriz Boland  : 1990  MRN: [id number]  Today's Date: 2019      Visit Date: 2019    Visit Dx:    ICD-10-CM ICD-9-CM   1. Impaired mobility and ADLs Z74.09 799.89   2. Chronic bilateral thoracic back pain M54.6 724.1    G89.29 338.29   3. Acute left ankle pain M25.572 719.47   4. Chronic midline low back pain without sciatica M54.5 724.2    G89.29 338.29       Patient Active Problem List   Diagnosis   • Depression   • Developmental delay   • GERD (gastroesophageal reflux disease)   • Scoliosis   • Amblyopia   • Back pain   • Chronic left shoulder pain   • Muscle spasm   • Seasonal allergies   • Panic attacks   • Anxiety   • Mild intermittent asthma without complication   • Chest pain on breathing   • Nicotine vapor product user   • Class 3 obesity without serious comorbidity with body mass index (BMI) of 40.0 to 44.9 in adult        Past Medical History:   Diagnosis Date   • Allergic    • Amblyopia    • Anxiety    • Back pain    • Cleft palate    • Depression    • Developmental delay    • GERD (gastroesophageal reflux disease)     followed by GI, Dr. Wesley Ferro   • Impetigo    • Kidney abscess    • Kidney stone    • Muscle spasm    • Obesity (BMI 30-39.9)    • Recurrent otitis media    • Scoliosis    • Sinus infection     recurrent   • Sprain of shoulder, left 2016        Past Surgical History:   Procedure Laterality Date   • ABSCESS DRAINAGE      kidney   • ADENOIDECTOMY     • EAR TUBES     • PHARYNGEAL FLAP      for speech   • TONSILLECTOMY                         PT Assessment/Plan     Row Name 19 1027          PT Assessment    Assessment Comments  Patient returns for 1st follow up reporting compliance to HEP and requiring moderate verbal tactile and demo cues to correctly return.  Per time constraints held on manual this session and increased therapeutic exercises.  -GR        PT Plan    PT  Plan Comments  Initiate manual.  -GR       User Key  (r) = Recorded By, (t) = Taken By, (c) = Cosigned By    Initials Name Provider Type    GR Richar Fernandez, PT Physical Therapist              Exercises     Row Name 01/11/19 0900             Subjective Comments    Subjective Comments  Appt time 0930, arrival time 0944. States she has knot that's just not releasing.  Got a little dizzy with the arc last time because she didn't eat before coming so made sure she had something to eat today.  -GR         Subjective Pain    Able to rate subjective pain?  yes  -GR      Pre-Treatment Pain Level  3  -GR         Total Minutes    27164 - PT Therapeutic Exercise Minutes  30  -GR         Exercise 1    Exercise Name 1  SL arc  -GR      Cueing 1  Verbal;Demo  -GR      Reps 1  15  -GR      Time 1  3s  -GR         Exercise 2    Exercise Name 2  Scap squeeze  -GR      Cueing 2  Verbal;Demo  -GR      Reps 2  15  -GR      Time 2  3s  -GR         Exercise 3    Exercise Name 3  chin tuck  -GR      Cueing 3  Verbal;Demo  -GR      Reps 3  15  -GR      Time 3  5   -GR      Additional Comments  supine with wedge (perheart burn)  -GR         Exercise 4    Exercise Name 4  UBE  -GR      Time 4  4 minutes  -GR      Additional Comments  L1  -GR         Exercise 5    Exercise Name 5  Reverse shoulder rolls  -GR      Cueing 5  Demo  -GR      Sets 5  1  -GR      Reps 5  15  -GR         Exercise 6    Exercise Name 6  Doorway stretch  -GR      Cueing 6  Demo  -GR      Sets 6  1  -GR      Reps 6  3  -GR      Time 6  20 sec  -GR      Additional Comments  hands low  -GR         Exercise 7    Exercise Name 7  Row  -GR      Cueing 7  Demo  -GR      Sets 7  1  -GR      Reps 7  15  -GR      Additional Comments  YTB  -GR         Exercise 8    Exercise Name 8  B horizontal abduction YTB  -GR      Sets 8  1  -GR      Reps 8  15  -GR      Additional Comments  supine over wedge (for heartburn)  -GR        User Key  (r) = Recorded By, (t) = Taken By, (c) =  Cosigned By    Initials Name Provider Type    Richar Posada, PT Physical Therapist                         PT OP Goals     Row Name 01/11/19 1000          PT Short Term Goals    STG Date to Achieve  02/01/19  -GR     STG 1  Patient will be independent with initial HEP.  -GR     STG 1 Progress  Ongoing  -GR     STG 2  Pt will report </=2/10 upper/mid thoracic back pain with ADLs  -GR     STG 2 Progress  Ongoing  -GR        Long Term Goals    LTG Date to Achieve  03/01/19  -GR     LTG 1  Patient will be independent with progressive HEP for long term management of current condition.  -GR     LTG 1 Progress  Ongoing  -GR     LTG 2  Pt will report 0/10 upper/mid thoracic back pain with ADLs  -GR     LTG 2 Progress  Ongoing  -GR     LTG 3  Pt will report ability to work one full shift at work at [employer], with out increase in upper/mid back pain.  -GR     LTG 3 Progress  Ongoing  -GR       User Key  (r) = Recorded By, (t) = Taken By, (c) = Cosigned By    Initials Name Provider Type    Richar Posada PT Physical Therapist          Therapy Education  Education Details: continue initial HEP              Time Calculation:   Start Time: 0944  Stop Time: 1014  Time Calculation (min): 30 min  Total Timed Code Minutes- PT: 30 minute(s)  Therapy Suggested Charges     Code   Minutes Charges    40587 (CPT®) Hc Pt Neuromusc Re Education Ea 15 Min      51827 (CPT®) Hc Pt Ther Proc Ea 15 Min 30 2    16000 (CPT®) Hc Gait Training Ea 15 Min      46630 (CPT®) Hc Pt Therapeutic Act Ea 15 Min      66348 (CPT®) Hc Pt Manual Therapy Ea 15 Min      85187 (CPT®) Hc Pt Ther Massage- Per 15 Min      72793 (CPT®) Hc Pt Iontophoresis Ea 15 Min      92756 (CPT®) Hc Pt Elec Stim Ea-Per 15 Min      03638 (CPT®) Hc Pt Ultrasound Ea 15 Min      74621 (CPT®) Hc Pt Self Care/Mgmt/Train Ea 15 Min      98796 (CPT®) Hc Pt Prosthetic (S) Train Initial Encounter, Each 15 Min      06823 (CPT®) Hc Orthotic(S) Mgmt/Train Initial Encounter, Each 15min       53435 (CPT®) Hc Pt Aquatic Therapy Ea 15 Min      29113 (CPT®) Hc Pt Orthotic(S)/Prosthetic(S) Encounter, Each 15 Min       (CPT®) Hc Pt Electrical Stim Unattended      Total  30 2        Therapy Charges for Today     Code Description Service Date Service Provider Modifiers Qty    77575118526 HC PT THER PROC EA 15 MIN 1/11/2019 Richar Fernandez, PT GP 2                    Richar Fernandez, PT  1/11/2019

## 2019-01-17 ENCOUNTER — HOSPITAL ENCOUNTER (EMERGENCY)
Facility: HOSPITAL | Age: 29
Discharge: HOME | End: 2019-01-17
Attending: EMERGENCY MEDICINE | Admitting: EMERGENCY MEDICINE

## 2019-01-17 VITALS
RESPIRATION RATE: 18 BRPM | OXYGEN SATURATION: 99 % | DIASTOLIC BLOOD PRESSURE: 94 MMHG | SYSTOLIC BLOOD PRESSURE: 128 MMHG | TEMPERATURE: 98.5 F | HEIGHT: 67 IN | HEART RATE: 78 BPM | BODY MASS INDEX: 39.24 KG/M2 | WEIGHT: 250 LBS

## 2019-01-17 DIAGNOSIS — R45.89 DIFFICULTY COPING: Primary | ICD-10-CM

## 2019-01-17 DIAGNOSIS — R45.4 ANGER: ICD-10-CM

## 2019-01-17 LAB
ETHANOL BLD-MCNC: <10 MG/DL (ref 0–10)
ETHANOL UR QL: <0.01 %
HCG SERPL QL: NEGATIVE
TSH SERPL DL<=0.05 MIU/L-ACNC: 0.96 MIU/ML (ref 0.27–4.2)

## 2019-01-17 PROCEDURE — 36415 COLL VENOUS BLD VENIPUNCTURE: CPT | Performed by: PHYSICIAN ASSISTANT

## 2019-01-17 PROCEDURE — 90791 PSYCH DIAGNOSTIC EVALUATION: CPT | Performed by: SOCIAL WORKER

## 2019-01-17 PROCEDURE — 99282 EMERGENCY DEPT VISIT SF MDM: CPT

## 2019-01-17 PROCEDURE — 84703 CHORIONIC GONADOTROPIN ASSAY: CPT | Performed by: PHYSICIAN ASSISTANT

## 2019-01-17 PROCEDURE — 80307 DRUG TEST PRSMV CHEM ANLYZR: CPT | Performed by: PHYSICIAN ASSISTANT

## 2019-01-17 PROCEDURE — 84443 ASSAY THYROID STIM HORMONE: CPT | Performed by: PHYSICIAN ASSISTANT

## 2019-01-17 NOTE — ED NOTES
Pt states that this am she started to have anxiety and depression. Reports mood swings. Denies si or hi.      Gabi De Souza, RN  01/17/19 3131

## 2019-01-17 NOTE — ED PROVIDER NOTES
MD ATTESTATION NOTE    The MANJEET and I have discussed this patient's history, physical exam, and treatment plan.  I have reviewed the documentation and personally had a face to face interaction with the patient. I affirm the documentation and agree with the treatment and plan.  The attached note describes my personal findings.        Pt presents to the ED with anxiety and depression onset about 2 weeks ago. Pt reports that she has been stressed recently due to issues with her mother. Pt denies headache, chest pain, dyspnea, SI, and HI.     On physical exam, pt is alert and oriented x3. Pt appears agitated. Heart is RRR. Lungs are CTAB.    [facility] has evaluated pt at bedside and has cleared pt for discharge. Pt was offered IOP and additional resources, which pt has declined.           Documentation assistance provided by Jayshree Mccormick. Information recorded by the scribe was done at my direction and has been verified and validated by me.     Entered by Jayshree Mccormick, acting as scribe for Dr. Ti MD.           Jayshree Mccormick  01/17/19 0889       Bala Luke MD  01/17/19 2577

## 2019-01-17 NOTE — CONSULTS
"29 y/o cauc single female coming to the ED due to increased anxiety, anger, frustration w/ family, work, and friends.  Patient states she can't sleep well. She states appetite fluctuates but no weight changes. She denies any current or past hx of suicidal thoughts or suicide attempts. She reports multiple losses in family and friends due to death over the past year or so. She reports not feeling appreciated by her co workers and picked on causing her to work more than the other who don't put as much effort in their jobs.  She believes her supervisor gets on her for little things.  She is constantly angry w/ her mother who has fill the house with lots of belongs and refuses to get rid of the belongs. She states her mother has accused her of stealing some of mother's things when she has tried to decrease the clutter of things. She is angry w/ her mother b/c of her mother's insurance not paying for her medical expenses in the past and she has the bills now.  Her mother explained that the mother had to get an individual plan for her as mother was retiring and it cost her $1,000 a month for the ins. Patient is now on her employer's insurance.  She is angry that her friends are \"going on with their lives and leaving me behind.\"  She is angry w/ her aunt and mother b/c they will eventually die.      Patient has seen counselors before. She has tried medicating in the past. There is no hx of IP JUAN or mental health treatment. She quit seeing the counseling due to finances.  She reports 1-2 drinks of ETOH / week.  No hx of blackouts.  She Vaps a couple times/week.      Patient lives part of the time w/ her mother and will stay in a hotel w/ her BF of seven years as well. She reports that it is difficult to move around the house due to the amount of belongs that her mother has accumulated over time.    Patient is alert and O x 4. She was tearful at times. She is angry. She denies any SI or HI. She states that she came here b/c " she wants help for her mother. Attempts to get patient to think about coping skills to help her were futile. She became more upset b/c she believes other people need to change.  Patient's mother was in the room throughout the eval w/ patient's permission.  Offered patient IOP and she declined stating she wants help for her mother.  She was offered resources and declined. Patient was also informed of EAP services through her work.  Discussed w/ Dr. Cunningham as well as  TOM Corona.  Mother expressed appreciation for time and information.

## 2019-01-17 NOTE — ED PROVIDER NOTES
EMERGENCY DEPARTMENT ENCOUNTER    Room Number:  34/34  Date seen:  1/17/2019  Time seen: 11:38 AM  PCP: Victorina Ortega MD  Historian: patient      HPI:  Chief complaint: anxiety and depression  Context: Beatriz Boland is a 28 y.o. female who presents to the ED c/o anxiety and depression that started 2 weeks ago, but got acutely worse this morning. Pt states she has been depressed and anxious because she has been intermittently sick for the last week and has had stressful issues with her mother and at work. Pt admits to diarrhea. Pt denies any thoughts of suicide or self-inflicted harm. Pt's mother states that pt has been angrier than usual lately. Pt states she is able to sleep, but wakes up feeling tired. Pt denies hx of thyroid problems. Pt denies smoking, drinking, and taking any illicit drugs. Pt states she is taking buspar currently.     MEDICAL RECORD REVIEW     Pt was seen at [facility] ED for anxiety and depression in May 2018      ALLERGIES  Ciprofloxacin and Dust mite extract    PAST MEDICAL HISTORY  Active Ambulatory Problems     Diagnosis Date Noted   • Depression    • Developmental delay    • GERD (gastroesophageal reflux disease)    • Scoliosis    • Amblyopia    • Back pain    • Chronic left shoulder pain 09/29/2017   • Muscle spasm 09/29/2017   • Seasonal allergies 11/05/2017   • Panic attacks 11/15/2017   • Anxiety 01/04/2018   • Mild intermittent asthma without complication 03/24/2018   • Chest pain on breathing 08/10/2018   • Nicotine vapor product user 08/10/2018   • Class 3 obesity without serious comorbidity with body mass index (BMI) of 40.0 to 44.9 in adult 08/10/2018     Resolved Ambulatory Problems     Diagnosis Date Noted   • Sinus bradycardia 09/29/2014   • Palpitations 11/15/2017   • Acute otitis externa of both ears 11/15/2017   • Atypical chest pain 01/04/2018     Past Medical History:   Diagnosis Date   • Allergic    • Amblyopia    • Anxiety    • Back pain    • Cleft  palate    • Depression    • Developmental delay    • GERD (gastroesophageal reflux disease)    • Impetigo    • Kidney abscess    • Kidney stone    • Muscle spasm    • Obesity (BMI 30-39.9)    • Recurrent otitis media    • Scoliosis    • Sinus infection    • Sprain of shoulder, left 12/2016       PAST SURGICAL HISTORY  Past Surgical History:   Procedure Laterality Date   • ABSCESS DRAINAGE      kidney   • ADENOIDECTOMY     • EAR TUBES     • PHARYNGEAL FLAP      for speech   • TONSILLECTOMY         FAMILY HISTORY  Family History   Problem Relation Age of Onset   • COPD Mother    • Arthritis Mother    • Obesity Mother    • Gestational diabetes Mother    • Cancer Father    • Scoliosis Father    • Rheum arthritis Maternal Aunt    • Diabetes Maternal Uncle    • Alcohol abuse Maternal Uncle    • Rheum arthritis Maternal Grandmother    • COPD Maternal Grandmother    • Stroke Maternal Grandfather    • Alcohol abuse Paternal Uncle        SOCIAL HISTORY  Social History     Socioeconomic History   • Marital status: Single     Spouse name: Not on file   • Number of children: Not on file   • Years of education: Not on file   • Highest education level: Not on file   Social Needs   • Financial resource strain: Not on file   • Food insecurity - worry: Not on file   • Food insecurity - inability: Not on file   • Transportation needs - medical: Not on file   • Transportation needs - non-medical: Not on file   Occupational History   • Not on file   Tobacco Use   • Smoking status: Current Some Day Smoker   • Smokeless tobacco: Never Used   • Tobacco comment: smokes hookah 1-2 times per week     Substance and Sexual Activity   • Alcohol use: Yes     Comment: occasionally, 1-2 glasses of wine 1-2 x per week   • Drug use: No   • Sexual activity: Yes     Partners: Male     Birth control/protection: Injection   Other Topics Concern   • Not on file   Social History Narrative                 REVIEW OF SYSTEMS  Review of Systems    Constitutional: Negative for fever.   HENT: Negative for sore throat.    Respiratory: Negative for cough and shortness of breath.    Cardiovascular: Negative for chest pain.   Gastrointestinal: Positive for diarrhea. Negative for abdominal pain and vomiting.   Genitourinary: Negative for dysuria.   Musculoskeletal: Negative for neck pain.   Skin: Negative for rash.   Neurological: Negative for weakness, numbness and headaches.   Psychiatric/Behavioral: Positive for agitation and dysphoric mood. Negative for self-injury and suicidal ideas.   All other systems reviewed and are negative.          PHYSICAL EXAM  ED Triage Vitals   Temp Heart Rate Resp BP SpO2   01/17/19 1127 01/17/19 1127 01/17/19 1127 01/17/19 1135 01/17/19 1127   98.5 °F (36.9 °C) 68 18 146/96 99 %      Temp src Heart Rate Source Patient Position BP Location FiO2 (%)   -- -- -- -- --            Physical Exam   Constitutional: She is oriented to person, place, and time. No distress.   HENT:   Head: Normocephalic and atraumatic.   Eyes: EOM are normal. Pupils are equal, round, and reactive to light.   Neck: Normal range of motion. Neck supple.   Cardiovascular: Normal rate, regular rhythm and normal heart sounds.   Pulmonary/Chest: Effort normal and breath sounds normal. No respiratory distress.   Abdominal: Soft. There is no tenderness. There is no rebound and no guarding.   Musculoskeletal: Normal range of motion. She exhibits no edema.   Neurological: She is alert and oriented to person, place, and time. She has normal sensation and normal strength.   Skin: Skin is warm and dry. No rash noted.   Psychiatric: Mood and affect normal.   Nursing note and vitals reviewed.        LAB RESULTS  Recent Results (from the past 24 hour(s))   TSH    Collection Time: 01/17/19 12:40 PM   Result Value Ref Range    TSH 0.960 0.270 - 4.200 mIU/mL   Ethanol    Collection Time: 01/17/19 12:40 PM   Result Value Ref Range    Ethanol <10 0 - 10 mg/dL    Ethanol % <0.010  "%   hCG, Serum, Qualitative    Collection Time: 01/17/19  1:03 PM   Result Value Ref Range    HCG Qualitative Negative Negative       I ordered the above labs and reviewed the results             COURSE & MEDICAL DECISION MAKING  Pertinent Labs and Imaging studies that were ordered and reviewed are noted above.  Results were reviewed/discussed with the patient and they were also made aware of online access.  Pt also made aware that some labs, such as cultures, will not be resulted during ER visit and follow up with PMD is necessary.     Pt not suicidal     PROGRESS AND CONSULTS    Progress Notes:    1152 UDS and labs ordered for further evaluation.     1154  Reviewed pt's history and workup with Dr. Sultana (ER physician).  After a bedside evaluation, Dr. Luke agrees with the plan of care.    1410 Lazaro from [facility] has seen and evaluated patient.   Pt refuses outpt resources. Pt is not suicidal. Pt will follow up with family doctor.     1418 Spoke with Dr. Luke, who evaluated pt. After a bedside evaluation, Dr. Luke agrees with the plan of care.      Disposition vitals:  /94   Pulse 78   Temp 98.5 °F (36.9 °C)   Resp 18   Ht 170.2 cm (67\")   Wt 113 kg (250 lb)   SpO2 99%   BMI 39.16 kg/m²         DIAGNOSIS  Final diagnoses:   Difficulty coping   Anger         DISPOSITION  DISCHARGE    Patient discharged in stable condition.    Reviewed implications of results, diagnosis, meds, responsibility to follow up, warning signs and symptoms of possible worsening, potential complications and reasons to return to ER.    Patient/Family voiced understanding of above instructions.    Discussed plan for discharge, as there is no emergent indication for admission. Patient referred to primary care provider for BP management due to today's BP. Pt/family is agreeable and understands need for follow up and repeat testing.  Pt is aware that discharge does not mean that nothing is wrong but it indicates no " emergency is present that requires admission and they must continue care with follow-up as given below or physician of their choice.     FOLLOW-UP  Victorina Ortega MD  [address] [facility] [address] [phone]    Schedule an appointment as soon as possible for a visit            Medication List      No changes were made to your prescriptions during this visit.           FOLLOW UP   Victorina Ortega MD  [address] [facility] [address] [phone]    Schedule an appointment as soon as possible for a visit             RX     Medication List      No changes were made to your prescriptions during this visit.         Documentation assistance provided by cat Roach for Cj Johnson PA-C.  Information recorded by the scribe was done at my direction and has been verified and validated by me.           Hai Roach  01/17/19 1456       Cj Johnson PA  01/17/19 1945

## 2019-01-18 ENCOUNTER — HOSPITAL ENCOUNTER (OUTPATIENT)
Dept: PHYSICAL THERAPY | Facility: HOSPITAL | Age: 29
Setting detail: THERAPIES SERIES
Discharge: HOME | End: 2019-01-18

## 2019-01-18 DIAGNOSIS — G89.29 CHRONIC BILATERAL THORACIC BACK PAIN: ICD-10-CM

## 2019-01-18 DIAGNOSIS — Z74.09 IMPAIRED MOBILITY AND ADLS: Primary | ICD-10-CM

## 2019-01-18 DIAGNOSIS — Z78.9 IMPAIRED MOBILITY AND ADLS: Primary | ICD-10-CM

## 2019-01-18 DIAGNOSIS — M54.6 CHRONIC BILATERAL THORACIC BACK PAIN: ICD-10-CM

## 2019-01-18 PROCEDURE — 97140 MANUAL THERAPY 1/> REGIONS: CPT | Performed by: PHYSICAL THERAPIST

## 2019-01-18 PROCEDURE — 97110 THERAPEUTIC EXERCISES: CPT | Performed by: PHYSICAL THERAPIST

## 2019-01-18 NOTE — THERAPY TREATMENT NOTE
"    Outpatient Physical Therapy Ortho Treatment Note  [facility]     Patient Name: Beatriz Boland  : 1990  MRN: [id number]  Today's Date: 2019      Visit Date: 2019    Visit Dx:    ICD-10-CM ICD-9-CM   1. Impaired mobility and ADLs Z74.09 799.89   2. Chronic bilateral thoracic back pain M54.6 724.1    G89.29 338.29       Patient Active Problem List   Diagnosis   • Depression   • Developmental delay   • GERD (gastroesophageal reflux disease)   • Scoliosis   • Amblyopia   • Back pain   • Chronic left shoulder pain   • Muscle spasm   • Seasonal allergies   • Panic attacks   • Anxiety   • Mild intermittent asthma without complication   • Chest pain on breathing   • Nicotine vapor product user   • Class 3 obesity without serious comorbidity with body mass index (BMI) of 40.0 to 44.9 in adult        Past Medical History:   Diagnosis Date   • Allergic    • Amblyopia    • Anxiety    • Back pain    • Cleft palate    • Depression    • Developmental delay    • GERD (gastroesophageal reflux disease)     followed by GI, Dr. Wesley Ferro   • Impetigo    • Kidney abscess    • Kidney stone    • Muscle spasm    • Obesity (BMI 30-39.9)    • Recurrent otitis media    • Scoliosis    • Sinus infection     recurrent   • Sprain of shoulder, left 2016        Past Surgical History:   Procedure Laterality Date   • ABSCESS DRAINAGE      kidney   • ADENOIDECTOMY     • EAR TUBES     • PHARYNGEAL FLAP      for speech   • TONSILLECTOMY                         PT Assessment/Plan     Row Name 19 1315          PT Assessment    Assessment Comments  Pain is well controlled today. Added t/spine PA mobs for hypermobility which patient reported \"eased\" her movement after. She requires redirection and pacing quite frequently.  -GR        PT Plan    PT Plan Comments  Continue POC. Assess response to manual.  Add chest press and supine PNF (over wedge per heart burn)  -GR       User Key  (r) = Recorded By, (t) = Taken " By, (c) = Cosigned By    Initials Name Provider Type    GR Richar Fernandez T, PT Physical Therapist              Exercises     Row Name 01/18/19 1300 01/18/19 1100          Subjective Comments    Subjective Comments  --  No pain today - wants to know if she can have some of her old LB exercises. Doing HEP for her t/spine which she feels is helping.  -GR        Subjective Pain    Able to rate subjective pain?  --  yes  -GR     Pre-Treatment Pain Level  --  0  -GR        Total Minutes    73709 - PT Therapeutic Exercise Minutes  --  30  -GR     24361 - PT Manual Therapy Minutes  10  -GR  --        Exercise 1    Exercise Name 1  --  SL arc  -GR     Cueing 1  --  Verbal;Demo  -GR     Reps 1  --  15  -GR     Time 1  --  --  -GR        Exercise 2    Exercise Name 2  --  Scap squeeze  -GR     Cueing 2  --  Verbal;Demo  -GR     Reps 2  --  15  -GR     Time 2  --  3s  -GR        Exercise 3    Exercise Name 3  --  chin tuck  -GR     Cueing 3  --  Verbal;Demo  -GR     Reps 3  --  15  -GR     Time 3  --  5   -GR     Additional Comments  --  supine with wedge (perheart burn)  -GR        Exercise 4    Exercise Name 4  --  UBE  -GR     Time 4  --  4 minutes  -GR        Exercise 5    Exercise Name 5  --  Reverse shoulder rolls  -GR     Cueing 5  --  Demo  -GR     Sets 5  --  1  -GR     Reps 5  --  15  -GR     Additional Comments  --  2#  -GR        Exercise 6    Exercise Name 6  --  Doorway stretch  -GR     Cueing 6  --  Demo  -GR     Sets 6  --  1  -GR     Reps 6  --  3  -GR     Time 6  --  20 sec  -GR        Exercise 7    Exercise Name 7  --  Rows  -GR     Cueing 7  --  Demo  -GR     Sets 7  --  1  -GR     Reps 7  --  15  -GR     Additional Comments  --  RTB  -GR        Exercise 8    Exercise Name 8  --  B horizontal abduction YTB  -GR     Sets 8  --  1  -GR     Reps 8  --  15  -GR     Additional Comments  --  supine over wedge (for heartburn)  -GR        Exercise 9    Exercise Name 9  --  B shoulder extension  -GR     Cueing 9   --  Demo  -GR     Sets 9  --  1  -GR     Reps 9  --  15  -GR     Additional Comments  --  RTB  -GR       User Key  (r) = Recorded By, (t) = Taken By, (c) = Cosigned By    Initials Name Provider Type    Richar Posada, PT Physical Therapist                        Manual Rx (last 36 hours)      Manual Treatments     Row Name 01/18/19 1300             Total Minutes    25708 - PT Manual Therapy Minutes  10  -GR         Manual Rx 1    Manual Rx 1 Location  thoracic spine prone  -GR      Manual Rx 1 Type  PA mobs  -GR      Manual Rx 1 Grade  II-III  -GR        User Key  (r) = Recorded By, (t) = Taken By, (c) = Cosigned By    Initials Name Provider Type    Richar Posada, PT Physical Therapist          PT OP Goals     Row Name 01/18/19 1300          PT Short Term Goals    STG Date to Achieve  02/01/19  -GR     STG 1  Patient will be independent with initial HEP.  -GR     STG 1 Progress  Ongoing  -GR     STG 2  Pt will report </=2/10 upper/mid thoracic back pain with ADLs  -GR     STG 2 Progress  Ongoing  -GR     STG 2 Progress Comments  0/10 on 1/18  -GR        Long Term Goals    LTG Date to Achieve  03/01/19  -GR     LTG 1  Patient will be independent with progressive HEP for long term management of current condition.  -GR     LTG 1 Progress  Ongoing  -GR     LTG 2  Pt will report 0/10 upper/mid thoracic back pain with ADLs  -GR     LTG 2 Progress  Ongoing  -GR     LTG 3  Pt will report ability to work one full shift at work at [employer], with out increase in upper/mid back pain.  -GR     LTG 3 Progress  Ongoing  -GR       User Key  (r) = Recorded By, (t) = Taken By, (c) = Cosigned By    Initials Name Provider Type    Richar Posada, PT Physical Therapist          Therapy Education  Education Details: manual techniques to improve thoracic mobility; patient requested previous handouts from low back encounter, accomodated as able after session              Time Calculation:   Start Time: 1145  Stop Time:  1225  Time Calculation (min): 40 min  Total Timed Code Minutes- PT: 40 minute(s)  Therapy Suggested Charges     Code   Minutes Charges    68492 (CPT®) Hc Pt Neuromusc Re Education Ea 15 Min      24514 (CPT®) Hc Pt Ther Proc Ea 15 Min 30 2    75846 (CPT®) Hc Gait Training Ea 15 Min      54586 (CPT®) Hc Pt Therapeutic Act Ea 15 Min      50135 (CPT®) Hc Pt Manual Therapy Ea 15 Min 10 1    07749 (CPT®) Hc Pt Ther Massage- Per 15 Min      84682 (CPT®) Hc Pt Iontophoresis Ea 15 Min      23606 (CPT®) Hc Pt Elec Stim Ea-Per 15 Min      34601 (CPT®) Hc Pt Ultrasound Ea 15 Min      88268 (CPT®) Hc Pt Self Care/Mgmt/Train Ea 15 Min      49724 (CPT®) Hc Pt Prosthetic (S) Train Initial Encounter, Each 15 Min      94699 (CPT®) Hc Orthotic(S) Mgmt/Train Initial Encounter, Each 15min      31778 (CPT®) Hc Pt Aquatic Therapy Ea 15 Min      89591 (CPT®) Hc Pt Orthotic(S)/Prosthetic(S) Encounter, Each 15 Min       (CPT®) Hc Pt Electrical Stim Unattended      Total  40 3        Therapy Charges for Today     Code Description Service Date Service Provider Modifiers Qty    24205927678 HC PT THER PROC EA 15 MIN 1/18/2019 Richar Fernandez, PT GP 2    21271866743 HC PT MANUAL THERAPY EA 15 MIN 1/18/2019 Richar Fernandez, PT GP 1                    Richar Fernandez, PT  1/18/2019

## 2019-01-25 ENCOUNTER — HOSPITAL ENCOUNTER (OUTPATIENT)
Dept: PHYSICAL THERAPY | Facility: HOSPITAL | Age: 29
Setting detail: THERAPIES SERIES
Discharge: HOME | End: 2019-01-25

## 2019-01-25 DIAGNOSIS — Z74.09 IMPAIRED MOBILITY AND ADLS: Primary | ICD-10-CM

## 2019-01-25 DIAGNOSIS — Z78.9 IMPAIRED MOBILITY AND ADLS: Primary | ICD-10-CM

## 2019-01-25 DIAGNOSIS — M54.6 CHRONIC BILATERAL THORACIC BACK PAIN: ICD-10-CM

## 2019-01-25 DIAGNOSIS — G89.29 CHRONIC BILATERAL THORACIC BACK PAIN: ICD-10-CM

## 2019-01-25 PROCEDURE — 97140 MANUAL THERAPY 1/> REGIONS: CPT

## 2019-01-25 PROCEDURE — 97110 THERAPEUTIC EXERCISES: CPT

## 2019-01-25 NOTE — THERAPY TREATMENT NOTE
Outpatient Physical Therapy Ortho Treatment Note  [facility]     Patient Name: Beatriz Boland  : 1990  MRN: [id number]  Today's Date: 2019      Visit Date: 2019    Visit Dx:    ICD-10-CM ICD-9-CM   1. Impaired mobility and ADLs Z74.09 799.89   2. Chronic bilateral thoracic back pain M54.6 724.1    G89.29 338.29       Patient Active Problem List   Diagnosis   • Depression   • Developmental delay   • GERD (gastroesophageal reflux disease)   • Scoliosis   • Amblyopia   • Back pain   • Chronic left shoulder pain   • Muscle spasm   • Seasonal allergies   • Panic attacks   • Anxiety   • Mild intermittent asthma without complication   • Chest pain on breathing   • Nicotine vapor product user   • Class 3 obesity without serious comorbidity with body mass index (BMI) of 40.0 to 44.9 in adult        Past Medical History:   Diagnosis Date   • Allergic    • Amblyopia    • Anxiety    • Back pain    • Cleft palate    • Depression    • Developmental delay    • GERD (gastroesophageal reflux disease)     followed by GI, Dr. Wesley Ferro   • Impetigo    • Kidney abscess    • Kidney stone    • Muscle spasm    • Obesity (BMI 30-39.9)    • Recurrent otitis media    • Scoliosis    • Sinus infection     recurrent   • Sprain of shoulder, left 2016        Past Surgical History:   Procedure Laterality Date   • ABSCESS DRAINAGE      kidney   • ADENOIDECTOMY     • EAR TUBES     • PHARYNGEAL FLAP      for speech   • TONSILLECTOMY                         PT Assessment/Plan     Row Name 19 1029          PT Assessment    Assessment Comments  Pt continues to report dec pain levels and inc due to stress rather than use. Continued UE strengthening, thoracic mobility exercises with cuing to stay on task throughout and improve form.   -LS        PT Plan    PT Plan Comments  Continue posterior chain strengthening, manual to improve T-spine mobility.   -LS       User Key  (r) = Recorded By, (t) = Taken By, (c) =  Cosigned By    Initials Name Provider Type    LS Jewell Allen, PT Physical Therapist              Exercises     Row Name 01/25/19 1000 01/25/19 0900          Subjective Comments    Subjective Comments  I had a pretty stressful day at work yesterday so I have a little soreness.   -LS  --        Subjective Pain    Able to rate subjective pain?  yes  -LS  --     Pre-Treatment Pain Level  2  -LS  --        Total Minutes    81685 - PT Therapeutic Exercise Minutes  30  -LS  --     88297 - PT Manual Therapy Minutes  --  10  -LS        Exercise 1    Exercise Name 1  SL arc  -LS  --     Cueing 1  Verbal;Demo  -LS  --     Reps 1  15  -LS  --        Exercise 2    Exercise Name 2  Scap squeeze  -LS  --     Cueing 2  Verbal;Demo  -LS  --     Reps 2  15  -LS  --     Time 2  3s  -LS  --        Exercise 3    Exercise Name 3  chin tuck  -LS  --     Cueing 3  Verbal;Demo  -LS  --     Reps 3  15  -LS  --     Time 3  5   -LS  --     Additional Comments  supine on wedge  -LS  --        Exercise 4    Exercise Name 4  UBE  -LS  --     Time 4  4 minutes  -LS  --     Additional Comments  L1  -LS  --        Exercise 5    Exercise Name 5  Reverse shoulder rolls  -LS  --     Cueing 5  Demo  -LS  --     Sets 5  1  -LS  --     Reps 5  15  -LS  --     Additional Comments  2#  -LS  --        Exercise 6    Exercise Name 6  Doorway stretch  -LS  --     Cueing 6  Demo  -LS  --     Sets 6  1  -LS  --     Reps 6  3  -LS  --     Time 6  20 sec  -LS  --        Exercise 7    Exercise Name 7  Rows  -LS  --     Cueing 7  Demo  -LS  --     Sets 7  2  -LS  --     Reps 7  10  -LS  --     Additional Comments  RTB  -LS  --        Exercise 8    Exercise Name 8  B horizontal abduction YTB  -LS  --     Sets 8  1  -LS  --     Reps 8  15  -LS  --     Additional Comments  supine on wedge  -LS  --        Exercise 9    Exercise Name 9  B shoulder extension  -LS  --     Cueing 9  Demo  -LS  --     Sets 9  2  -LS  --     Reps 9  10  -LS  --     Additional Comments  RTB   -LS  --        Exercise 10    Exercise Name 10  supine chest press  -LS  --     Sets 10  2  -LS  --     Reps 10  10  -LS  --     Additional Comments  2# dowel  -LS  --        Exercise 11    Exercise Name 11  supine D2 flexion  -LS  --     Sets 11  2  -LS  --     Reps 11  10  -LS  --     Additional Comments  YTB; B 10 each  -LS  --       User Key  (r) = Recorded By, (t) = Taken By, (c) = Cosigned By    Initials Name Provider Type    Jewell Hwang, PT Physical Therapist                        Manual Rx (last 36 hours)      Manual Treatments     Row Name 01/25/19 0900             Total Minutes    94852 - PT Manual Therapy Minutes  10  -LS         Manual Rx 1    Manual Rx 1 Location  thoracic spine prone  -LS      Manual Rx 1 Type  PA mobs  -LS      Manual Rx 1 Grade  II-III  -LS        User Key  (r) = Recorded By, (t) = Taken By, (c) = Cosigned By    Initials Name Provider Type    Jewell Hwang, PT Physical Therapist          PT OP Goals     Row Name 01/25/19 1000          PT Short Term Goals    STG Date to Achieve  02/01/19  -LS     STG 1  Patient will be independent with initial HEP.  -LS     STG 1 Progress  Ongoing  -LS     STG 2  Pt will report </=2/10 upper/mid thoracic back pain with ADLs  -LS     STG 2 Progress  Ongoing  -LS     STG 2 Progress Comments  2/10 today  -LS        Long Term Goals    LTG Date to Achieve  03/01/19  -LS     LTG 1  Patient will be independent with progressive HEP for long term management of current condition.  -LS     LTG 1 Progress  Ongoing  -LS     LTG 2  Pt will report 0/10 upper/mid thoracic back pain with ADLs  -LS     LTG 2 Progress  Ongoing  -LS     LTG 3  Pt will report ability to work one full shift at work at [employer], with out increase in upper/mid back pain.  -LS     LTG 3 Progress  Ongoing  -LS       User Key  (r) = Recorded By, (t) = Taken By, (c) = Cosigned By    Initials Name Provider Type    Jewell Hwang, PT Physical Therapist          Therapy Education  Given:  Symptoms/condition management, HEP  Program: Reinforced  How Provided: Demonstration, Verbal  Provided to: Patient  Level of Understanding: Teach back education performed, Verbalized, Demonstrated              Time Calculation:   Start Time: 1000  Stop Time: 1045  Time Calculation (min): 45 min  Total Timed Code Minutes- PT: 43 minute(s)  Therapy Suggested Charges     Code   Minutes Charges    61653 (CPT®) Hc Pt Neuromusc Re Education Ea 15 Min      87970 (CPT®) Hc Pt Ther Proc Ea 15 Min 30 2    55579 (CPT®) Hc Gait Training Ea 15 Min      89253 (CPT®) Hc Pt Therapeutic Act Ea 15 Min      25038 (CPT®) Hc Pt Manual Therapy Ea 15 Min 10 1    75365 (CPT®) Hc Pt Ther Massage- Per 15 Min      30123 (CPT®) Hc Pt Iontophoresis Ea 15 Min      52278 (CPT®) Hc Pt Elec Stim Ea-Per 15 Min      92732 (CPT®) Hc Pt Ultrasound Ea 15 Min      42477 (CPT®) Hc Pt Self Care/Mgmt/Train Ea 15 Min      84677 (CPT®) Hc Pt Prosthetic (S) Train Initial Encounter, Each 15 Min      18633 (CPT®) Hc Orthotic(S) Mgmt/Train Initial Encounter, Each 15min      94623 (CPT®) Hc Pt Aquatic Therapy Ea 15 Min      98133 (CPT®) Hc Pt Orthotic(S)/Prosthetic(S) Encounter, Each 15 Min       (CPT®) Hc Pt Electrical Stim Unattended      Total  40 3        Therapy Charges for Today     Code Description Service Date Service Provider Modifiers Qty    70119013997 HC PT THER PROC EA 15 MIN 1/25/2019 Jewell Allen, PT GP 2    64137770518 HC PT MANUAL THERAPY EA 15 MIN 1/25/2019 Jewell Allen, PT GP 1                    Jewell Allen, PT  1/25/2019

## 2019-01-30 ENCOUNTER — OFFICE VISIT (OUTPATIENT)
Dept: FAMILY MEDICINE CLINIC | Facility: CLINIC | Age: 29
End: 2019-01-30

## 2019-01-30 VITALS
SYSTOLIC BLOOD PRESSURE: 110 MMHG | HEART RATE: 73 BPM | WEIGHT: 249 LBS | BODY MASS INDEX: 39.08 KG/M2 | RESPIRATION RATE: 20 BRPM | OXYGEN SATURATION: 99 % | DIASTOLIC BLOOD PRESSURE: 78 MMHG | HEIGHT: 67 IN

## 2019-01-30 DIAGNOSIS — M62.838 MUSCLE SPASMS OF BOTH LOWER EXTREMITIES: ICD-10-CM

## 2019-01-30 DIAGNOSIS — Z23 NEED FOR VACCINATION: ICD-10-CM

## 2019-01-30 DIAGNOSIS — J45.20 MILD INTERMITTENT ASTHMA WITHOUT COMPLICATION: ICD-10-CM

## 2019-01-30 DIAGNOSIS — F41.0 SEVERE ANXIETY WITH PANIC: Primary | ICD-10-CM

## 2019-01-30 DIAGNOSIS — R31.9 HEMATURIA, UNSPECIFIED TYPE: ICD-10-CM

## 2019-01-30 DIAGNOSIS — R10.9 SIDE PAIN: ICD-10-CM

## 2019-01-30 LAB
BILIRUB BLD-MCNC: NEGATIVE MG/DL
CLARITY, POC: ABNORMAL
COLOR UR: YELLOW
GLUCOSE UR STRIP-MCNC: NEGATIVE MG/DL
KETONES UR QL: NEGATIVE
LEUKOCYTE EST, POC: NEGATIVE
NITRITE UR-MCNC: NEGATIVE MG/ML
PH UR: 6 [PH] (ref 5–8)
PROT UR STRIP-MCNC: NEGATIVE MG/DL
RBC # UR STRIP: ABNORMAL /UL
SP GR UR: 1.01 (ref 1–1.03)
UROBILINOGEN UR QL: NORMAL

## 2019-01-30 PROCEDURE — 90715 TDAP VACCINE 7 YRS/> IM: CPT | Performed by: FAMILY MEDICINE

## 2019-01-30 PROCEDURE — 81002 URINALYSIS NONAUTO W/O SCOPE: CPT | Performed by: FAMILY MEDICINE

## 2019-01-30 PROCEDURE — 90472 IMMUNIZATION ADMIN EACH ADD: CPT | Performed by: FAMILY MEDICINE

## 2019-01-30 PROCEDURE — 99214 OFFICE O/P EST MOD 30 MIN: CPT | Performed by: FAMILY MEDICINE

## 2019-01-30 PROCEDURE — 90471 IMMUNIZATION ADMIN: CPT | Performed by: FAMILY MEDICINE

## 2019-01-30 PROCEDURE — 90674 CCIIV4 VAC NO PRSV 0.5 ML IM: CPT | Performed by: FAMILY MEDICINE

## 2019-01-30 RX ORDER — ALBUTEROL SULFATE 90 UG/1
2 AEROSOL, METERED RESPIRATORY (INHALATION) EVERY 4 HOURS PRN
Qty: 18 G | Refills: 3 | Status: SHIPPED | OUTPATIENT
Start: 2019-01-30

## 2019-01-30 RX ORDER — CYCLOBENZAPRINE HCL 5 MG
10 TABLET ORAL 3 TIMES DAILY PRN
Qty: 90 TABLET | Refills: 5 | Status: SHIPPED | OUTPATIENT
Start: 2019-01-30 | End: 2021-05-06 | Stop reason: SDUPTHER

## 2019-01-30 RX ORDER — BUSPIRONE HYDROCHLORIDE 15 MG/1
15 TABLET ORAL 3 TIMES DAILY PRN
Qty: 90 TABLET | Refills: 5 | Status: SHIPPED | OUTPATIENT
Start: 2019-01-30 | End: 2019-01-31 | Stop reason: RX

## 2019-01-30 RX ORDER — CYCLOBENZAPRINE HCL 5 MG
5 TABLET ORAL 3 TIMES DAILY PRN
COMMUNITY
End: 2019-01-30 | Stop reason: SDUPTHER
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
## 2019-01-31 LAB
BACTERIA UR CULT: NORMAL
BACTERIA UR CULT: NORMAL

## 2019-01-31 RX ORDER — BUSPIRONE HYDROCHLORIDE 7.5 MG/1
15 TABLET ORAL 3 TIMES DAILY
Qty: 30 TABLET | Refills: 2 | Status: ON HOLD | OUTPATIENT
Start: 2019-01-31 | End: 2020-10-22

## 2019-02-01 ENCOUNTER — APPOINTMENT (OUTPATIENT)
Dept: PHYSICAL THERAPY | Facility: HOSPITAL | Age: 29
End: 2019-02-01

## 2019-02-05 ENCOUNTER — OFFICE VISIT (OUTPATIENT)
Dept: FAMILY MEDICINE CLINIC | Facility: CLINIC | Age: 29
End: 2019-02-05

## 2019-02-05 VITALS
HEIGHT: 67 IN | SYSTOLIC BLOOD PRESSURE: 122 MMHG | BODY MASS INDEX: 39.08 KG/M2 | RESPIRATION RATE: 14 BRPM | OXYGEN SATURATION: 99 % | DIASTOLIC BLOOD PRESSURE: 86 MMHG | WEIGHT: 249 LBS | HEART RATE: 99 BPM

## 2019-02-05 DIAGNOSIS — H60.503 ACUTE OTITIS EXTERNA OF BOTH EARS, UNSPECIFIED TYPE: Primary | ICD-10-CM

## 2019-02-05 PROCEDURE — 99213 OFFICE O/P EST LOW 20 MIN: CPT | Performed by: FAMILY MEDICINE

## 2019-02-05 NOTE — PATIENT INSTRUCTIONS
"Use the Cortisporin otic solution three times a day (morning, afternoon, and before bed) for one week.      Otitis Externa  Otitis externa is an infection of the outer ear canal. The outer ear canal is the area between the outside of the ear and the eardrum. Otitis externa is sometimes called \"swimmer's ear.\"  Follow these instructions at home:  · If you were given antibiotic ear drops, use them as told by your doctor. Do not stop using them even if your condition gets better.  · Take over-the-counter and prescription medicines only as told by your doctor.  · Keep all follow-up visits as told by your doctor. This is important.  How is this prevented?  · Keep your ear dry. Use the corner of a towel to dry your ear after you swim or bathe.  · Try not to scratch or put things in your ear. Doing these things makes it easier for germs to grow in your ear.  · Avoid swimming in lakes, dirty water, or pools that may not have the right amount of a chemical called chlorine.  · Consider making ear drops and putting 3 or 4 drops in each ear after you swim. Ask your doctor about how you can make ear drops.  Contact a doctor if:  · You have a fever.  · After 3 days your ear is still red, swollen, or painful.  · After 3 days you still have pus coming from your ear.  · Your redness, swelling, or pain gets worse.  · You have a really bad headache.  · You have redness, swelling, pain, or tenderness behind your ear.  This information is not intended to replace advice given to you by your health care provider. Make sure you discuss any questions you have with your health care provider.  Document Released: 06/05/2009 Document Revised: 01/12/2017 Document Reviewed: 09/26/2016  [employer] Interactive Patient Education © 2018 [employer]    "

## 2019-02-05 NOTE — PROGRESS NOTES
"Subjective   Beatriz Boland is a 28 y.o. female.     Chief Complaint   Patient presents with   • Earache       HPI   27 y/o female presenting to the clinic today c/o left ear pain that started 1-2 days ago.   She reports that she feels \"popping\" in the left ear. She feels some fullness in the left ear and states that she has had some tinnitus in the left ear as well.     She denies any drainage from the left ear. She notes that she has been using a Q-tip in the left ear.     She denies any pain or ringing in the right ear.     Patient was dx with an ear infection (otitis externa) in Sept. 2018. She was prescribed Cortisporin otic solution.          Review of Systems   Constitutional: Negative for activity change, appetite change, fatigue, fever and unexpected weight change.   HENT: Positive for ear pain (left ear) and tinnitus. Negative for congestion and hearing loss.    Eyes: Negative for pain, itching and visual disturbance.   Respiratory: Negative for chest tightness.    Gastrointestinal: Negative for constipation, diarrhea, nausea and vomiting.   Genitourinary: Negative for dysuria and urgency.   Skin: Negative for rash.   Neurological: Negative for dizziness, light-headedness, numbness and headaches.   Psychiatric/Behavioral: Negative for sleep disturbance. The patient is not nervous/anxious.        The following portions of the patient's history were reviewed and updated as appropriate: allergies, current medications, past family history, past medical history, past social history, past surgical history and problem list.    Past Medical History:   Diagnosis Date   • Allergic    • Amblyopia    • Anxiety    • Back pain    • Cleft palate    • Depression    • Developmental delay    • GERD (gastroesophageal reflux disease)     followed by GI, Dr. Wesley Ferro   • Impetigo    • Kidney abscess    • Kidney stone    • Muscle spasm    • Obesity (BMI 30-39.9)    • Recurrent otitis media    • Scoliosis    • Sinus " infection     recurrent   • Sprain of shoulder, left 12/2016         Social History     Tobacco Use   • Smoking status: Current Some Day Smoker   • Smokeless tobacco: Never Used   • Tobacco comment: smokes hookah 1-2 times per week     Substance and Sexual Activity   • Alcohol use: Yes     Comment: occasionally, 1-2 glasses of wine 1-2 x per week   • Drug use: No   • Sexual activity: Yes     Partners: Male     Birth control/protection: Injection       Allergies   Allergen Reactions   • Ciprofloxacin Diarrhea and Nausea And Vomiting     Tingling in left leg   • Dust Mite Extract      Other reaction(s): Other (See Comments)  Nasal symptoms        Outpatient Medications Prior to Visit   Medication Sig Dispense Refill   • albuterol sulfate  (90 Base) MCG/ACT inhaler Inhale 2 puffs Every 4 (Four) Hours As Needed for Wheezing. 18 g 3   • busPIRone (BUSPAR) 7.5 MG tablet Take 2 tablets by mouth 3 (Three) Times a Day. 30 tablet 2   • Chlorcyclizine-Pseudoephed (STAHIST AD) 25-60 MG tablet Take 25 mg by mouth Daily. 42 tablet 0   • [employer] NASAL ALLERGY SPRAY 55 MCG/ACT nasal inhaler 2 sprays into each nostril Daily.  0   • cyclobenzaprine (FLEXERIL) 5 MG tablet Take 2 tablets by mouth 3 (Three) Times a Day As Needed for Muscle Spasms. 90 tablet 5   • estradiol cypionate (DEPO-ESTRADIOL) 5 MG/ML injection Inject 1.5 mg into the shoulder, thigh, or buttocks Every 28 (Twenty-Eight) Days. Patient states she takes it every 3 months     • fluticasone (FLONASE) 50 MCG/ACT nasal spray      • naproxen (EC NAPROSYN) 500 MG EC tablet Take 1 tablet by mouth Daily. With food 30 tablet 1   • neomycin-polymyxin-hydrocortisone (CORTISPORIN) 3.5-05942-9 otic solution Administer 3 drops into both ears 3 (Three) Times a Day. 10 mL 0   • omeprazole (prilOSEC) 10 MG capsule Take 10 mg by mouth.     • ondansetron (ZOFRAN) 4 MG tablet Take 1 tablet by mouth Every 8 (Eight) Hours As Needed for Nausea or Vomiting. 30 tablet 1   • pseudoephedrine  "(SUDAFED) 15 MG/5ML liquid liquid Take  by mouth.       No facility-administered medications prior to visit.        Objective     Vitals:    02/05/19 1314   BP: 122/86   Pulse: 99   Resp: 14   SpO2: 99%       Physical Exam   Constitutional: She appears well-developed and well-nourished. No distress.   HENT:   Head: Normocephalic and atraumatic.   Right Ear: Tympanic membrane normal.   Left Ear: Tympanic membrane normal.   Mouth/Throat: Oropharynx is clear and moist.   Scant purulent debris of bilateral external ear canals   Cardiovascular: Normal rate, regular rhythm, S1 normal, S2 normal and normal heart sounds. Exam reveals no gallop.   No murmur heard.  Pulses:       Radial pulses are 2+ on the right side, and 2+ on the left side.   Pulmonary/Chest: Effort normal and breath sounds normal. No respiratory distress. She has no wheezes. She has no rhonchi. She has no rales.   Skin: Skin is warm and dry.       ASSESSMENT/PLAN             Visit Diagnoses     Acute otitis externa of both ears, unspecified type    -  Primary  7 day course of cortisporin eardrops TID  Pt advised that she may use the left over ear drops she has at home if she has a sufficient quantity              Patient Instructions   Use the Cortisporin otic solution three times a day (morning, afternoon, and before bed) for one week.      Otitis Externa  Otitis externa is an infection of the outer ear canal. The outer ear canal is the area between the outside of the ear and the eardrum. Otitis externa is sometimes called \"swimmer's ear.\"  Follow these instructions at home:  · If you were given antibiotic ear drops, use them as told by your doctor. Do not stop using them even if your condition gets better.  · Take over-the-counter and prescription medicines only as told by your doctor.  · Keep all follow-up visits as told by your doctor. This is important.  How is this prevented?  · Keep your ear dry. Use the corner of a towel to dry your ear after you " swim or bathe.  · Try not to scratch or put things in your ear. Doing these things makes it easier for germs to grow in your ear.  · Avoid swimming in lakes, dirty water, or pools that may not have the right amount of a chemical called chlorine.  · Consider making ear drops and putting 3 or 4 drops in each ear after you swim. Ask your doctor about how you can make ear drops.  Contact a doctor if:  · You have a fever.  · After 3 days your ear is still red, swollen, or painful.  · After 3 days you still have pus coming from your ear.  · Your redness, swelling, or pain gets worse.  · You have a really bad headache.  · You have redness, swelling, pain, or tenderness behind your ear.  This information is not intended to replace advice given to you by your health care provider. Make sure you discuss any questions you have with your health care provider.  Document Released: 06/05/2009 Document Revised: 01/12/2017 Document Reviewed: 09/26/2016  [employer] Interactive Patient Education © 2018 [employer]      Return if symptoms worsen or fail to improve.    I,Fran Espinal, am scribing for, and in the presence of,Victorina Ortega MD. 2/5/2019 1:29 PM    I, Victorina Ortega MD   personally, performed the services described in this documentation, as scribed by,Fran Espinal, in my presence, and it is both accurate and complete.    Victorina Ortega MD  02/05/19  1:41 PM

## 2019-02-08 ENCOUNTER — HOSPITAL ENCOUNTER (OUTPATIENT)
Dept: PHYSICAL THERAPY | Facility: HOSPITAL | Age: 29
Setting detail: THERAPIES SERIES
Discharge: HOME | End: 2019-02-08

## 2019-02-08 PROCEDURE — 97140 MANUAL THERAPY 1/> REGIONS: CPT | Performed by: PHYSICAL THERAPIST

## 2019-02-08 PROCEDURE — 97110 THERAPEUTIC EXERCISES: CPT | Performed by: PHYSICAL THERAPIST

## 2019-02-08 NOTE — THERAPY RE-EVALUATION
Outpatient Physical Therapy Ortho Re-Evaluation  [facility]     Patient Name: Beatriz Boland  : 1990  MRN: [id number]  Today's Date: 2019      Visit Date: 2019    Patient Active Problem List   Diagnosis   • Depression   • Developmental delay   • GERD (gastroesophageal reflux disease)   • Scoliosis   • Amblyopia   • Back pain   • Chronic left shoulder pain   • Muscle spasm   • Seasonal allergies   • Panic attacks   • Anxiety   • Mild intermittent asthma without complication   • Chest pain on breathing   • Nicotine vapor product user   • Class 3 obesity without serious comorbidity with body mass index (BMI) of 40.0 to 44.9 in adult        Past Medical History:   Diagnosis Date   • Allergic    • Amblyopia    • Anxiety    • Back pain    • Cleft palate    • Depression    • Developmental delay    • GERD (gastroesophageal reflux disease)     followed by GI, Dr. Wesley Ferro   • Impetigo    • Kidney abscess    • Kidney stone    • Muscle spasm    • Obesity (BMI 30-39.9)    • Recurrent otitis media    • Scoliosis    • Sinus infection     recurrent   • Sprain of shoulder, left 2016        Past Surgical History:   Procedure Laterality Date   • ABSCESS DRAINAGE      kidney   • ADENOIDECTOMY     • EAR TUBES     • PHARYNGEAL FLAP      for speech   • TONSILLECTOMY         Visit Dx:   No diagnosis found.                              PT OP Goals     Row Name 19 1000          PT Short Term Goals    STG Date to Achieve  19  -GR     STG 1  Patient will be independent with initial HEP.  -GR     STG 1 Progress  Met  -GR     STG 2  Pt will report </=2/10 upper/mid thoracic back pain with ADLs  -GR     STG 2 Progress  Met  -GR        Long Term Goals    LTG Date to Achieve  19  -GR     LTG 1  Patient will be independent with progressive HEP for long term management of current condition.  -GR     LTG 1 Progress  Ongoing  -GR     LTG 2  Pt will report 0/10 upper/mid thoracic back pain with  ADLs  -GR     LTG 2 Progress  Ongoing  -GR     LTG 2 Progress Comments  2/10  -GR     LTG 3  Pt will report ability to work one full shift at work at [employer], with out increase in upper/mid back pain.  -GR     LTG 3 Progress  Ongoing  -GR     LTG 3 Progress Comments  Sunday- Thursday  -GR       User Key  (r) = Recorded By, (t) = Taken By, (c) = Cosigned By    Initials Name Provider Type    Richar Posada, PT Physical Therapist          PT Assessment/Plan     Row Name 02/08/19 1241          PT Assessment    Assessment Comments  Patient has attended 5 sessions of skilled PT for treatment of midthoracic and B shoulder pain. Pain has reduced to 2/10 on the VAS from a 4/10.  She is progressing with a home program and continues to required skilled tactile, verbal and demonstration cueing.  Patient may benefit from 1x/week PT for 2-3 more weeks.  -GR        PT Plan    PT Plan Comments  Continue at 1x/week x 2-3 visits.  -GR       User Key  (r) = Recorded By, (t) = Taken By, (c) = Cosigned By    Initials Name Provider Type    Richar Posada, PT Physical Therapist            Exercises     Row Name 02/08/19 1100 02/08/19 1000          Subjective Comments    Subjective Comments  --  Missed last week due to podiatry appt.  No new changes to her upper back.  -GR        Subjective Pain    Able to rate subjective pain?  --  yes  -GR     Pre-Treatment Pain Level  --  2  -GR        Total Minutes    79148 - PT Therapeutic Exercise Minutes  --  30  -GR     05590 - PT Manual Therapy Minutes  10  -GR  --        Exercise 1    Exercise Name 1  --  SL arc  -GR     Cueing 1  --  Verbal;Demo  -GR     Reps 1  --  15  -GR        Exercise 2    Exercise Name 2  --  Scap squeeze  -GR     Cueing 2  --  Verbal;Demo  -GR     Reps 2  --  15  -GR     Time 2  --  3s  -GR        Exercise 3    Exercise Name 3  --  chin tuck  -GR     Cueing 3  --  Verbal;Demo  -GR     Reps 3  --  15  -GR     Time 3  --  5   -GR     Additional Comments  --  supine  on wedge  -GR        Exercise 4    Exercise Name 4  --  Nustep L5 UE/LE  -GR     Time 4  --  5 minutes  -GR        Exercise 5    Exercise Name 5  --  Reverse shoulder rolls  -GR     Cueing 5  --  Demo  -GR     Sets 5  --  1  -GR     Reps 5  --  20  -GR     Additional Comments  --  3#  -GR        Exercise 6    Exercise Name 6  --  Doorway stretch  -GR     Cueing 6  --  Demo  -GR     Sets 6  --  1  -GR     Reps 6  --  3  -GR     Time 6  --  20 sec  -GR        Exercise 7    Exercise Name 7  --  Rows  -GR     Cueing 7  --  Demo  -GR     Sets 7  --  2  -GR     Reps 7  --  10  -GR     Additional Comments  --  GTB  -GR        Exercise 8    Exercise Name 8  --  B horizontal abduction YTB  -GR     Sets 8  --  1  -GR     Reps 8  --  20  -GR     Additional Comments  --  RTB  -GR        Exercise 9    Exercise Name 9  --  B shoulder extension  -GR     Cueing 9  --  Demo  -GR     Sets 9  --  2  -GR     Reps 9  --  10  -GR     Additional Comments  --  GTB  -GR        Exercise 10    Exercise Name 10  --  supine chest press  -GR     Sets 10  --  2  -GR     Reps 10  --  10  -GR     Additional Comments  --  2# dowel  -GR        Exercise 11    Exercise Name 11  --  supine D2 flexion  -GR     Sets 11  --  2  -GR     Reps 11  --  10  -GR     Additional Comments  --  RTB   -GR       User Key  (r) = Recorded By, (t) = Taken By, (c) = Cosigned By    Initials Name Provider Type    Richar Posada, PT Physical Therapist           Manual Rx (last 36 hours)      Manual Treatments     Row Name 02/08/19 1100             Total Minutes    83026 - PT Manual Therapy Minutes  10  -GR         Manual Rx 1    Manual Rx 1 Location  thoracic spine prone  -GR      Manual Rx 1 Type  PA mobs  -GR      Manual Rx 1 Grade  II-III  -GR        User Key  (r) = Recorded By, (t) = Taken By, (c) = Cosigned By    Initials Name Provider Type    Richar Posada, PT Physical Therapist                                Time Calculation:     Therapy Suggested Charges      Code   Minutes Charges    14450 (CPT®) Hc Pt Neuromusc Re Education Ea 15 Min      70083 (CPT®) Hc Pt Ther Proc Ea 15 Min 30 2    54574 (CPT®) Hc Gait Training Ea 15 Min      94310 (CPT®) Hc Pt Therapeutic Act Ea 15 Min      55438 (CPT®) Hc Pt Manual Therapy Ea 15 Min 10 1    02042 (CPT®) Hc Pt Ther Massage- Per 15 Min      12399 (CPT®) Hc Pt Iontophoresis Ea 15 Min      01912 (CPT®) Hc Pt Elec Stim Ea-Per 15 Min      09462 (CPT®) Hc Pt Ultrasound Ea 15 Min      83808 (CPT®) Hc Pt Self Care/Mgmt/Train Ea 15 Min      73109 (CPT®) Hc Pt Prosthetic (S) Train Initial Encounter, Each 15 Min      33726 (CPT®) Hc Orthotic(S) Mgmt/Train Initial Encounter, Each 15min      61727 (CPT®) Hc Pt Aquatic Therapy Ea 15 Min      18762 (CPT®) Hc Pt Orthotic(S)/Prosthetic(S) Encounter, Each 15 Min       (CPT®) Hc Pt Electrical Stim Unattended      Total  40 3          Start Time: 1015  Stop Time: 1100  Time Calculation (min): 45 min  Total Timed Code Minutes- PT: 40 minute(s)     Therapy Charges for Today     Code Description Service Date Service Provider Modifiers Qty    95524028749 HC PT MANUAL THERAPY EA 15 MIN 2/8/2019 Richar Fernandez, PT GP 1    74336974813 HC PT THER PROC EA 15 MIN 2/8/2019 Richar Fernandez, PT GP 2                    Richar Fernandez, PT  2/8/2019

## 2019-02-22 ENCOUNTER — APPOINTMENT (OUTPATIENT)
Dept: PHYSICAL THERAPY | Facility: HOSPITAL | Age: 29
End: 2019-02-22

## 2019-03-01 ENCOUNTER — HOSPITAL ENCOUNTER (OUTPATIENT)
Dept: PHYSICAL THERAPY | Facility: HOSPITAL | Age: 29
Setting detail: THERAPIES SERIES
Discharge: HOME | End: 2019-03-01

## 2019-03-01 DIAGNOSIS — M54.6 CHRONIC BILATERAL THORACIC BACK PAIN: ICD-10-CM

## 2019-03-01 DIAGNOSIS — Z78.9 IMPAIRED MOBILITY AND ADLS: Primary | ICD-10-CM

## 2019-03-01 DIAGNOSIS — Z74.09 IMPAIRED MOBILITY AND ADLS: Primary | ICD-10-CM

## 2019-03-01 DIAGNOSIS — G89.29 CHRONIC BILATERAL THORACIC BACK PAIN: ICD-10-CM

## 2019-03-01 PROCEDURE — 97140 MANUAL THERAPY 1/> REGIONS: CPT | Performed by: PHYSICAL THERAPIST

## 2019-03-01 PROCEDURE — 97110 THERAPEUTIC EXERCISES: CPT | Performed by: PHYSICAL THERAPIST

## 2019-03-01 NOTE — THERAPY TREATMENT NOTE
Outpatient Physical Therapy Ortho Treatment Note  [facility]     Patient Name: Beatriz Boland  : 1990  MRN: [id number]  Today's Date: 3/1/2019      Visit Date: 2019    Visit Dx:    ICD-10-CM ICD-9-CM   1. Impaired mobility and ADLs Z74.09 799.89   2. Chronic bilateral thoracic back pain M54.6 724.1    G89.29 338.29       Patient Active Problem List   Diagnosis   • Depression   • Developmental delay   • GERD (gastroesophageal reflux disease)   • Scoliosis   • Amblyopia   • Back pain   • Chronic left shoulder pain   • Muscle spasm   • Seasonal allergies   • Panic attacks   • Anxiety   • Mild intermittent asthma without complication   • Chest pain on breathing   • Nicotine vapor product user   • Class 3 obesity without serious comorbidity with body mass index (BMI) of 40.0 to 44.9 in adult        Past Medical History:   Diagnosis Date   • Allergic    • Amblyopia    • Anxiety    • Back pain    • Cleft palate    • Depression    • Developmental delay    • GERD (gastroesophageal reflux disease)     followed by GI, Dr. Wesley Ferro   • Impetigo    • Kidney abscess    • Kidney stone    • Muscle spasm    • Obesity (BMI 30-39.9)    • Recurrent otitis media    • Scoliosis    • Sinus infection     recurrent   • Sprain of shoulder, left 2016        Past Surgical History:   Procedure Laterality Date   • ABSCESS DRAINAGE      kidney   • ADENOIDECTOMY     • EAR TUBES     • PHARYNGEAL FLAP      for speech   • TONSILLECTOMY                         PT Assessment/Plan     Row Name 19 1330          PT Assessment    Assessment Comments  Patient near independent with HEP. Advanced to wall push-up and seated position for scapular strengthening.  Finds relief x 24 hrs following manual.  Plan to d/c at next visit.  -GR        PT Plan    PT Plan Comments  D/c to I HEP at next visit.  -GR       User Key  (r) = Recorded By, (t) = Taken By, (c) = Cosigned By    Initials Name Provider Type    HOLLIS Fernandez  Richar MUIR, PT Physical Therapist              Exercises     Row Name 03/01/19 1300 03/01/19 1200          Subjective Comments    Subjective Comments  --  Cancelled last visit due to work conflicts.  -GR        Subjective Pain    Able to rate subjective pain?  --  yes  -GR     Pre-Treatment Pain Level  --  1  -GR        Total Minutes    95133 - PT Therapeutic Exercise Minutes  --  35  -GR     33872 - PT Manual Therapy Minutes  8  -GR  --        Exercise 1    Exercise Name 1  --  SL arc  -GR     Cueing 1  --  Verbal;Demo  -GR     Reps 1  --  15  -GR     Additional Comments  --  3#  -GR        Exercise 3    Exercise Name 3  --  chin tuck  -GR     Cueing 3  --  Verbal;Demo  -GR     Reps 3  --  20  -GR     Time 3  --  5   -GR     Additional Comments  --  seated  -GR        Exercise 4    Exercise Name 4  --  Nustep L5 UE/LE  -GR     Time 4  --  5 minutes  -GR        Exercise 5    Exercise Name 5  --  Reverse shoulder rolls  -GR     Cueing 5  --  Demo  -GR     Sets 5  --  1  -GR     Reps 5  --  20  -GR     Additional Comments  --  3#  -GR        Exercise 6    Exercise Name 6  --  Doorway stretch  -GR     Cueing 6  --  Demo  -GR     Sets 6  --  1  -GR     Reps 6  --  3  -GR     Time 6  --  20 sec  -GR     Additional Comments  --  mid and low  -GR        Exercise 7    Exercise Name 7  --  Rows  -GR     Cueing 7  --  Demo  -GR     Sets 7  --  2  -GR     Reps 7  --  10  -GR     Additional Comments  --  GTB  -GR        Exercise 8    Exercise Name 8  --  B horizontal abduction   -GR     Sets 8  --  1  -GR     Reps 8  --  20  -GR     Additional Comments  --  seated RTB  -GR        Exercise 9    Exercise Name 9  --  B shoulder extension  -GR     Cueing 9  --  Demo  -GR     Sets 9  --  2  -GR     Reps 9  --  10  -GR     Additional Comments  --  GTB  -GR        Exercise 10    Exercise Name 10  --   chest press  -GR     Sets 10  --  2  -GR     Reps 10  --  10  -GR     Additional Comments  --  3# seated  -GR        Exercise 11     Exercise Name 11  --   D2 flexion  -GR     Sets 11  --  2  -GR     Reps 11  --  10  -GR     Additional Comments  --  RTB seated  -GR        Exercise 12    Exercise Name 12  --  Wall push-up  -GR     Cueing 12  --  Demo  -GR     Reps 12  --  2x10  -GR       User Key  (r) = Recorded By, (t) = Taken By, (c) = Cosigned By    Initials Name Provider Type    Richar Posada, PT Physical Therapist                        Manual Rx (last 36 hours)      Manual Treatments     Row Name 03/01/19 1300             Total Minutes    91987 - PT Manual Therapy Minutes  8  -GR         Manual Rx 1    Manual Rx 1 Location  thoracic spine prone  -GR      Manual Rx 1 Type  PA mobs  -GR      Manual Rx 1 Grade  II-III  -GR        User Key  (r) = Recorded By, (t) = Taken By, (c) = Cosigned By    Initials Name Provider Type    Richar Posada, PT Physical Therapist          PT OP Goals     Row Name 03/01/19 1300          PT Short Term Goals    STG Date to Achieve  02/01/19  -GR     STG 1  Patient will be independent with initial HEP.  -GR     STG 1 Progress  Met  -GR     STG 2  Pt will report </=2/10 upper/mid thoracic back pain with ADLs  -GR     STG 2 Progress  Met  -GR        Long Term Goals    LTG Date to Achieve  03/01/19  -GR     LTG 1  Patient will be independent with progressive HEP for long term management of current condition.  -GR     LTG 1 Progress  Ongoing  -GR     LTG 2  Pt will report 0/10 upper/mid thoracic back pain with ADLs  -GR     LTG 2 Progress  Ongoing  -GR     LTG 3  Pt will report ability to work one full shift at work at [employer], with out increase in upper/mid back pain.  -GR     LTG 3 Progress  Ongoing  -GR       User Key  (r) = Recorded By, (t) = Taken By, (c) = Cosigned By    Initials Name Provider Type    Richar Posada, PT Physical Therapist          Therapy Education  Education Details: plan to d/c next visit - receptive              Time Calculation:   Start Time: 1220  Stop Time: 1305  Time  Calculation (min): 45 min  Total Timed Code Minutes- PT: 40 minute(s)  Therapy Suggested Charges     Code   Minutes Charges    11598 (CPT®) Hc Pt Neuromusc Re Education Ea 15 Min      38887 (CPT®) Hc Pt Ther Proc Ea 15 Min 35 2    34026 (CPT®) Hc Gait Training Ea 15 Min      31389 (CPT®) Hc Pt Therapeutic Act Ea 15 Min      91119 (CPT®) Hc Pt Manual Therapy Ea 15 Min 8 1    32670 (CPT®) Hc Pt Ther Massage- Per 15 Min      86386 (CPT®) Hc Pt Iontophoresis Ea 15 Min      72199 (CPT®) Hc Pt Elec Stim Ea-Per 15 Min      48981 (CPT®) Hc Pt Ultrasound Ea 15 Min      93577 (CPT®) Hc Pt Self Care/Mgmt/Train Ea 15 Min      53812 (CPT®) Hc Pt Prosthetic (S) Train Initial Encounter, Each 15 Min      45886 (CPT®) Hc Orthotic(S) Mgmt/Train Initial Encounter, Each 15min      40482 (CPT®) Hc Pt Aquatic Therapy Ea 15 Min      18200 (CPT®) Hc Pt Orthotic(S)/Prosthetic(S) Encounter, Each 15 Min       (CPT®) Hc Pt Electrical Stim Unattended      Total  43 3        Therapy Charges for Today     Code Description Service Date Service Provider Modifiers Qty    56513125864 HC PT THER PROC EA 15 MIN 3/1/2019 Richar Fernandez, PT GP 2    46453124623 HC PT MANUAL THERAPY EA 15 MIN 3/1/2019 Richar Fernandez, PT GP 1                    Richar Fernandez, PT  3/1/2019

## 2019-03-06 ENCOUNTER — HOSPITAL ENCOUNTER (EMERGENCY)
Facility: HOSPITAL | Age: 29
Discharge: HOME | End: 2019-03-06
Attending: EMERGENCY MEDICINE | Admitting: EMERGENCY MEDICINE

## 2019-03-06 ENCOUNTER — APPOINTMENT (OUTPATIENT)
Dept: CT IMAGING | Facility: HOSPITAL | Age: 29
End: 2019-03-06

## 2019-03-06 VITALS
TEMPERATURE: 98.4 F | WEIGHT: 255 LBS | SYSTOLIC BLOOD PRESSURE: 115 MMHG | DIASTOLIC BLOOD PRESSURE: 77 MMHG | OXYGEN SATURATION: 97 % | HEIGHT: 67 IN | RESPIRATION RATE: 17 BRPM | HEART RATE: 68 BPM | BODY MASS INDEX: 40.02 KG/M2

## 2019-03-06 DIAGNOSIS — R10.9 LEFT FLANK PAIN: Primary | ICD-10-CM

## 2019-03-06 DIAGNOSIS — N20.0 RENAL STONE: ICD-10-CM

## 2019-03-06 DIAGNOSIS — N39.0 ACUTE UTI: ICD-10-CM

## 2019-03-06 DIAGNOSIS — N28.1 BILATERAL RENAL CYSTS: ICD-10-CM

## 2019-03-06 LAB
ALBUMIN SERPL-MCNC: 3.6 G/DL (ref 3.5–5.2)
ALBUMIN/GLOB SERPL: 1.2 G/DL
ALP SERPL-CCNC: 61 U/L (ref 39–117)
ALT SERPL W P-5'-P-CCNC: 18 U/L (ref 1–33)
ANION GAP SERPL CALCULATED.3IONS-SCNC: 10.8 MMOL/L
AST SERPL-CCNC: 13 U/L (ref 1–32)
BACTERIA UR QL AUTO: ABNORMAL /HPF
BASOPHILS # BLD AUTO: 0.05 10*3/MM3 (ref 0–0.2)
BASOPHILS NFR BLD AUTO: 0.6 % (ref 0–1.5)
BILIRUB SERPL-MCNC: 0.2 MG/DL (ref 0.1–1.2)
BILIRUB UR QL STRIP: NEGATIVE
BUN BLD-MCNC: 8 MG/DL (ref 6–20)
BUN/CREAT SERPL: 10.1 (ref 7–25)
CALCIUM SPEC-SCNC: 9.2 MG/DL (ref 8.6–10.5)
CHLORIDE SERPL-SCNC: 108 MMOL/L (ref 98–107)
CLARITY UR: CLEAR
CO2 SERPL-SCNC: 23.2 MMOL/L (ref 22–29)
COLOR UR: YELLOW
CREAT BLD-MCNC: 0.79 MG/DL (ref 0.57–1)
DEPRECATED RDW RBC AUTO: 43.3 FL (ref 37–54)
EOSINOPHIL # BLD AUTO: 0.26 10*3/MM3 (ref 0–0.4)
EOSINOPHIL NFR BLD AUTO: 3.1 % (ref 0.3–6.2)
ERYTHROCYTE [DISTWIDTH] IN BLOOD BY AUTOMATED COUNT: 14 % (ref 12.3–15.4)
GFR SERPL CREATININE-BSD FRML MDRD: 87 ML/MIN/1.73
GLOBULIN UR ELPH-MCNC: 3.1 GM/DL
GLUCOSE BLD-MCNC: 69 MG/DL (ref 65–99)
GLUCOSE UR STRIP-MCNC: NEGATIVE MG/DL
HCG SERPL QL: NEGATIVE
HCT VFR BLD AUTO: 42.4 % (ref 34–46.6)
HGB BLD-MCNC: 12.9 G/DL (ref 12–15.9)
HGB UR QL STRIP.AUTO: NEGATIVE
HYALINE CASTS UR QL AUTO: ABNORMAL /LPF
IMM GRANULOCYTES # BLD AUTO: 0.02 10*3/MM3 (ref 0–0.05)
IMM GRANULOCYTES NFR BLD AUTO: 0.2 % (ref 0–0.5)
KETONES UR QL STRIP: NEGATIVE
LEUKOCYTE ESTERASE UR QL STRIP.AUTO: ABNORMAL
LIPASE SERPL-CCNC: 24 U/L (ref 13–60)
LYMPHOCYTES # BLD AUTO: 2.54 10*3/MM3 (ref 0.7–3.1)
LYMPHOCYTES NFR BLD AUTO: 30.8 % (ref 19.6–45.3)
MCH RBC QN AUTO: 25.9 PG (ref 26.6–33)
MCHC RBC AUTO-ENTMCNC: 30.4 G/DL (ref 31.5–35.7)
MCV RBC AUTO: 85 FL (ref 79–97)
MONOCYTES # BLD AUTO: 0.84 10*3/MM3 (ref 0.1–0.9)
MONOCYTES NFR BLD AUTO: 10.2 % (ref 5–12)
NEUTROPHILS # BLD AUTO: 4.55 10*3/MM3 (ref 1.4–7)
NEUTROPHILS NFR BLD AUTO: 55.1 % (ref 42.7–76)
NITRITE UR QL STRIP: NEGATIVE
NRBC BLD AUTO-RTO: 0 /100 WBC (ref 0–0)
PH UR STRIP.AUTO: 6 [PH] (ref 5–8)
PLATELET # BLD AUTO: 231 10*3/MM3 (ref 140–450)
PMV BLD AUTO: 9.2 FL (ref 6–12)
POTASSIUM BLD-SCNC: 4.2 MMOL/L (ref 3.5–5.2)
PROT SERPL-MCNC: 6.7 G/DL (ref 6–8.5)
PROT UR QL STRIP: NEGATIVE
RBC # BLD AUTO: 4.99 10*6/MM3 (ref 3.77–5.28)
RBC # UR: ABNORMAL /HPF
REF LAB TEST METHOD: ABNORMAL
SODIUM BLD-SCNC: 142 MMOL/L (ref 136–145)
SP GR UR STRIP: 1.02 (ref 1–1.03)
SQUAMOUS #/AREA URNS HPF: ABNORMAL /HPF
UROBILINOGEN UR QL STRIP: ABNORMAL
WBC NRBC COR # BLD: 8.26 10*3/MM3 (ref 3.4–10.8)
WBC UR QL AUTO: ABNORMAL /HPF

## 2019-03-06 PROCEDURE — 87086 URINE CULTURE/COLONY COUNT: CPT | Performed by: EMERGENCY MEDICINE

## 2019-03-06 PROCEDURE — 74177 CT ABD & PELVIS W/CONTRAST: CPT

## 2019-03-06 PROCEDURE — 81001 URINALYSIS AUTO W/SCOPE: CPT | Performed by: EMERGENCY MEDICINE

## 2019-03-06 PROCEDURE — 80053 COMPREHEN METABOLIC PANEL: CPT | Performed by: EMERGENCY MEDICINE

## 2019-03-06 PROCEDURE — 25010000002 IOPAMIDOL 61 % SOLUTION: Performed by: EMERGENCY MEDICINE

## 2019-03-06 PROCEDURE — 84703 CHORIONIC GONADOTROPIN ASSAY: CPT | Performed by: EMERGENCY MEDICINE

## 2019-03-06 PROCEDURE — 99284 EMERGENCY DEPT VISIT MOD MDM: CPT

## 2019-03-06 PROCEDURE — 83690 ASSAY OF LIPASE: CPT | Performed by: EMERGENCY MEDICINE

## 2019-03-06 PROCEDURE — 85025 COMPLETE CBC W/AUTO DIFF WBC: CPT | Performed by: EMERGENCY MEDICINE

## 2019-03-06 RX ORDER — DEXLANSOPRAZOLE 60 MG/1
60 CAPSULE, DELAYED RELEASE ORAL DAILY
COMMUNITY

## 2019-03-06 RX ORDER — CETIRIZINE HYDROCHLORIDE 10 MG/1
10 TABLET ORAL DAILY
COMMUNITY

## 2019-03-06 RX ORDER — SODIUM CHLORIDE 0.9 % (FLUSH) 0.9 %
10 SYRINGE (ML) INJECTION AS NEEDED
Status: DISCONTINUED | OUTPATIENT
Start: 2019-03-06 | End: 2019-03-06 | Stop reason: HOSPADM

## 2019-03-06 RX ORDER — NITROFURANTOIN 25; 75 MG/1; MG/1
100 CAPSULE ORAL 2 TIMES DAILY
Qty: 14 CAPSULE | Refills: 0 | Status: SHIPPED | OUTPATIENT
Start: 2019-03-06 | End: 2019-09-01 | Stop reason: SDUPTHER

## 2019-03-06 RX ADMIN — IOPAMIDOL 85 ML: 612 INJECTION, SOLUTION INTRAVENOUS at 14:02

## 2019-03-06 NOTE — ED PROVIDER NOTES
" EMERGENCY DEPARTMENT ENCOUNTER    CHIEF COMPLAINT  Chief Complaint: \"sharp\", left sided flank pain  History given by: patient  History limited by: none  Room Number: 11/11  PMD: Victorina Ortega MD  Urologist: Dr. Walt Jaimes    HPI:  Pt is a 28 y.o. female who presents complaining of \"sharp\", L-sided flank pain that started one month ago. Pt saw her Urologist Dr. Jaimes for the same pain and was instructed to take tylenol, which the pt did without relief. Dr. Jaimes thought the pt might have a renal cyst, but the pt did not undergo any imaging studies. Pt denies fever, chills, cough, SOA, nausea, and vomiting. Pt is currently taking Depo for birth control and denies any chance that she could be pregnant.     Duration:  One month  Onset: gradual  Timing: constant  Location: L flank  Radiation: none  Quality: \"sharp\", L-sided flank pain  Intensity/Severity: moderate  Progression: unchanged  Associated Symptoms: none  Aggravating Factors: none  Alleviating Factors: none  Previous Episodes: none  Treatment before arrival: Pt has been taking tylenol without relief    PAST MEDICAL HISTORY  Active Ambulatory Problems     Diagnosis Date Noted   • Depression    • Developmental delay    • GERD (gastroesophageal reflux disease)    • Scoliosis    • Amblyopia    • Back pain    • Chronic left shoulder pain 09/29/2017   • Muscle spasm 09/29/2017   • Seasonal allergies 11/05/2017   • Panic attacks 11/15/2017   • Anxiety 01/04/2018   • Mild intermittent asthma without complication 03/24/2018   • Chest pain on breathing 08/10/2018   • Nicotine vapor product user 08/10/2018   • Class 3 obesity without serious comorbidity with body mass index (BMI) of 40.0 to 44.9 in adult 08/10/2018     Resolved Ambulatory Problems     Diagnosis Date Noted   • Sinus bradycardia 09/29/2014   • Palpitations 11/15/2017   • Acute otitis externa of both ears 11/15/2017   • Atypical chest pain 01/04/2018     Past Medical History:   Diagnosis " Date   • Allergic    • Amblyopia    • Anxiety    • Back pain    • Cleft palate    • Depression    • Developmental delay    • GERD (gastroesophageal reflux disease)    • Impetigo    • Kidney abscess    • Kidney stone    • Muscle spasm    • Obesity (BMI 30-39.9)    • Recurrent otitis media    • Scoliosis    • Sinus infection    • Sprain of shoulder, left 12/2016       PAST SURGICAL HISTORY  Past Surgical History:   Procedure Laterality Date   • ABSCESS DRAINAGE      kidney   • ADENOIDECTOMY     • EAR TUBES     • PHARYNGEAL FLAP      for speech   • TONSILLECTOMY         FAMILY HISTORY  Family History   Problem Relation Age of Onset   • COPD Mother    • Arthritis Mother    • Obesity Mother    • Gestational diabetes Mother    • Cancer Father    • Scoliosis Father    • Rheum arthritis Maternal Aunt    • Diabetes Maternal Uncle    • Alcohol abuse Maternal Uncle    • Rheum arthritis Maternal Grandmother    • COPD Maternal Grandmother    • Stroke Maternal Grandfather    • Alcohol abuse Paternal Uncle        SOCIAL HISTORY  Social History     Socioeconomic History   • Marital status: Single     Spouse name: Not on file   • Number of children: Not on file   • Years of education: Not on file   • Highest education level: Not on file   Social Needs   • Financial resource strain: Not on file   • Food insecurity - worry: Not on file   • Food insecurity - inability: Not on file   • Transportation needs - medical: Not on file   • Transportation needs - non-medical: Not on file   Occupational History   • Not on file   Tobacco Use   • Smoking status: Current Some Day Smoker   • Smokeless tobacco: Never Used   • Tobacco comment: smokes hookah 1-2 times per week     Substance and Sexual Activity   • Alcohol use: Yes     Comment: occasionally, 1-2 glasses of wine 1-2 x per week   • Drug use: No   • Sexual activity: Yes     Partners: Male     Birth control/protection: Injection   Other Topics Concern   • Not on file   Social History  "Narrative             ALLERGIES  Ciprofloxacin and Dust mite extract    REVIEW OF SYSTEMS  Review of Systems   Constitutional: Negative for chills and fever.   HENT: Negative for sore throat.    Eyes: Negative.    Respiratory: Negative for cough and shortness of breath.    Cardiovascular: Negative for chest pain.   Gastrointestinal: Negative for abdominal pain, diarrhea, nausea and vomiting.   Genitourinary: Positive for flank pain (\"sharp\", L-sided). Negative for dysuria.   Musculoskeletal: Negative for neck pain.   Skin: Negative for rash.   Neurological: Negative for weakness, numbness and headaches.   Hematological: Negative.    Psychiatric/Behavioral: Negative.    All other systems reviewed and are negative.      PHYSICAL EXAM  ED Triage Vitals   Temp Heart Rate Resp BP SpO2   03/06/19 1124 03/06/19 1124 03/06/19 1124 03/06/19 1140 03/06/19 1124   98 °F (36.7 °C) 68 18 140/91 100 %      Temp src Heart Rate Source Patient Position BP Location FiO2 (%)   03/06/19 1124 -- 03/06/19 1140 03/06/19 1140 --   Tympanic  Lying Right arm        Physical Exam   Constitutional: She is oriented to person, place, and time. No distress.   HENT:   Head: Normocephalic and atraumatic.   Eyes: EOM are normal. Pupils are equal, round, and reactive to light.   Neck: Normal range of motion. Neck supple.   Cardiovascular: Normal rate, regular rhythm, normal heart sounds and intact distal pulses.   Pulmonary/Chest: Effort normal and breath sounds normal. No respiratory distress.   Abdominal: Soft. There is no tenderness. There is no rebound and no guarding.   Musculoskeletal: Normal range of motion. She exhibits no edema.   Pt has L flank tenderness.    Neurological: She is alert and oriented to person, place, and time. She has normal sensation and normal strength. She has a normal Straight Leg Raise Test.   Skin: Skin is warm and dry. No rash noted.   Psychiatric: Mood and affect normal.   Nursing note and vitals reviewed.      LAB " RESULTS  Lab Results (last 24 hours)     Procedure Component Value Units Date/Time    Urinalysis With Microscopic If Indicated (No Culture) - Urine, Clean Catch [[id number]]  (Abnormal) Collected:  03/06/19 1205    Specimen:  Urine, Clean Catch Updated:  03/06/19 1226     Color, UA Yellow     Appearance, UA Clear     pH, UA 6.0     Specific Gravity, UA 1.016     Glucose, UA Negative     Ketones, UA Negative     Bilirubin, UA Negative     Blood, UA Negative     Protein, UA Negative     Leuk Esterase, UA Moderate (2+)     Nitrite, UA Negative     Urobilinogen, UA 0.2 E.U./dL    Urinalysis, Microscopic Only - Urine, Clean Catch [[id number]]  (Abnormal) Collected:  03/06/19 1205    Specimen:  Urine, Clean Catch Updated:  03/06/19 1226     RBC, UA 0-2 /HPF      WBC, UA 13-20 /HPF      Bacteria, UA 1+ /HPF      Squamous Epithelial Cells, UA 3-6 /HPF      Hyaline Casts, UA 0-2 /LPF      Methodology Automated Microscopy    Urine Culture - Urine, Urine, Clean Catch [[id number]] Collected:  03/06/19 1205    Specimen:  Urine, Clean Catch Updated:  03/06/19 1250    hCG, Serum, Qualitative [[id number]]  (Normal) Collected:  03/06/19 1210    Specimen:  Blood Updated:  03/06/19 1257     HCG Qualitative Negative    CBC & Differential [[id number]] Collected:  03/06/19 1213    Specimen:  Blood Updated:  03/06/19 1227    Narrative:       The following orders were created for panel order CBC & Differential.  Procedure                               Abnormality         Status                     ---------                               -----------         ------                     CBC Auto Differential[[id number]]        Abnormal            Final result                 Please view results for these tests on the individual orders.    Comprehensive Metabolic Panel [[id number]]  (Abnormal) Collected:  03/06/19 1213    Specimen:  Blood from Arm, Right Updated:  03/06/19 1246     Glucose 69 mg/dL      BUN 8 mg/dL      Creatinine 0.79 mg/dL       Sodium 142 mmol/L      Potassium 4.2 mmol/L      Chloride 108 mmol/L      CO2 23.2 mmol/L      Calcium 9.2 mg/dL      Total Protein 6.7 g/dL      Albumin 3.60 g/dL      ALT (SGPT) 18 U/L      AST (SGOT) 13 U/L      Alkaline Phosphatase 61 U/L      Total Bilirubin 0.2 mg/dL      eGFR Non African Amer 87 mL/min/1.73      Globulin 3.1 gm/dL      A/G Ratio 1.2 g/dL      BUN/Creatinine Ratio 10.1     Anion Gap 10.8 mmol/L     Narrative:       GFR Normal >60  Chronic Kidney Disease <60  Kidney Failure <15    Lipase [[id number]]  (Normal) Collected:  03/06/19 1213    Specimen:  Blood from Arm, Right Updated:  03/06/19 1246     Lipase 24 U/L     CBC Auto Differential [[id number]]  (Abnormal) Collected:  03/06/19 1213    Specimen:  Blood from Arm, Right Updated:  03/06/19 1227     WBC 8.26 10*3/mm3      RBC 4.99 10*6/mm3      Hemoglobin 12.9 g/dL      Hematocrit 42.4 %      MCV 85.0 fL      MCH 25.9 pg      MCHC 30.4 g/dL      RDW 14.0 %      RDW-SD 43.3 fl      MPV 9.2 fL      Platelets 231 10*3/mm3      Neutrophil % 55.1 %      Lymphocyte % 30.8 %      Monocyte % 10.2 %      Eosinophil % 3.1 %      Basophil % 0.6 %      Immature Grans % 0.2 %      Neutrophils, Absolute 4.55 10*3/mm3      Lymphocytes, Absolute 2.54 10*3/mm3      Monocytes, Absolute 0.84 10*3/mm3      Eosinophils, Absolute 0.26 10*3/mm3      Basophils, Absolute 0.05 10*3/mm3      Immature Grans, Absolute 0.02 10*3/mm3      nRBC 0.0 /100 WBC           I ordered the above labs and reviewed the results    RADIOLOGY  CT Abdomen Pelvis With Contrast   Preliminary Result   Right nephrolithiasis. There are no left renal stones or ureteral stones   and there is no hydronephrosis bilaterally. There are several renal   cysts bilaterally. No acute abnormality seen. Please follow-up as   clinically indicated.       Discussed with Dr. Ortega.             CT A/P - there are bilateral renal cysts, there are non-obstructing stones in R kidney, no evidence of pyelonephritis,  no acute changes  I ordered the above noted radiological studies. Interpreted by radiologist. Discussed with radiologist Dr. Carl. Reviewed by me in PACS.       PROCEDURES  Procedures      PROGRESS AND CONSULTS     1201 CT A/P and Labs ordered for further evaluation.     1223 UA ordered for further evaluation.     1248 Urine Culture ordered for further evaluation.    1440 Rechecked pt. Pt is resting comfortably. Notified pt of lab results and imaging studies (pt has a UTI, bilateral renal cysts, and non-obstructing R renal stones). Discussed the plan to discharge the pt home with prescriptions for macrobid. I instructed the pt to keep her scheduled appointment with Dr. Jaimes. Pt understands and agrees with the plan, all questions answered.    MEDICAL DECISION MAKING  Results were reviewed/discussed with the patient and they were also made aware of online access. Pt also made aware that some labs, such as cultures, will not be resulted during ER visit and follow up with PMD is necessary.     MDM  Number of Diagnoses or Management Options     Amount and/or Complexity of Data Reviewed  Clinical lab tests: ordered and reviewed (WBC, UA - 13-20)  Tests in the radiology section of CPT®: reviewed and ordered (CT A/P - there are bilateral renal cysts, there are non-obstructing stones in R kidney, no evidence of pyelonephritis, no acute changes)  Discussion of test results with the performing providers: yes (Dr. Carl (Radiologist))  Independent visualization of images, tracings, or specimens: yes           DIAGNOSIS  Final diagnoses:   Left flank pain   Acute UTI   Bilateral renal cysts   Renal stone- right sided       DISPOSITION  DISCHARGE    Patient discharged in stable condition.    Reviewed implications of results, diagnosis, meds, responsibility to follow up, warning signs and symptoms of possible worsening, potential complications and reasons to return to ER.    Patient/Family voiced understanding of above  instructions.    Discussed plan for discharge, as there is no emergent indication for admission. Patient referred to primary care provider for BP management due to today's BP. Pt/family is agreeable and understands need for follow up and repeat testing.  Pt is aware that discharge does not mean that nothing is wrong but it indicates no emergency is present that requires admission and they must continue care with follow-up as given below or physician of their choice.     FOLLOW-UP  Dedrick Jaimes MD  [address] [id number] [phone]      keep scheduled appointment         Medication List      New Prescriptions    nitrofurantoin (macrocrystal-monohydrate) 100 MG capsule  Commonly known as:  MACROBID  Take 1 capsule by mouth 2 (Two) Times a Day.              Latest Documented Vital Signs:  As of 2:46 PM  BP- 114/96 HR- 68 Temp- 98 °F (36.7 °C) (Tympanic) O2 sat- 90%    --  Documentation assistance provided by cat Roach for Dr. Ortega.  Information recorded by the scribe was done at my direction and has been verified and validated by me.     Hai Roach  03/06/19 1894       Tarik Ortega MD  03/06/19 8011

## 2019-03-06 NOTE — ED TRIAGE NOTES
Left sided hip/flank pain. Pt has follow up with MD Friday, CT scan. Came to ER today stating pain so bad I can't wait

## 2019-03-08 ENCOUNTER — HOSPITAL ENCOUNTER (OUTPATIENT)
Dept: PHYSICAL THERAPY | Facility: HOSPITAL | Age: 29
Setting detail: THERAPIES SERIES
Discharge: HOME | End: 2019-03-08

## 2019-03-08 DIAGNOSIS — G89.29 CHRONIC BILATERAL THORACIC BACK PAIN: ICD-10-CM

## 2019-03-08 DIAGNOSIS — Z74.09 IMPAIRED MOBILITY AND ADLS: Primary | ICD-10-CM

## 2019-03-08 DIAGNOSIS — Z78.9 IMPAIRED MOBILITY AND ADLS: Primary | ICD-10-CM

## 2019-03-08 DIAGNOSIS — M54.6 CHRONIC BILATERAL THORACIC BACK PAIN: ICD-10-CM

## 2019-03-08 LAB — BACTERIA SPEC AEROBE CULT: NORMAL

## 2019-03-08 PROCEDURE — 97110 THERAPEUTIC EXERCISES: CPT

## 2019-03-08 NOTE — THERAPY DISCHARGE NOTE
Outpatient Physical Therapy Ortho Treatment Note/Discharge Summary  [facility]     Patient Name: Beatriz Boland  : 1990  MRN: [id number]  Today's Date: 3/8/2019      Visit Date: 2019    Visit Dx:    ICD-10-CM ICD-9-CM   1. Impaired mobility and ADLs Z74.09 799.89   2. Chronic bilateral thoracic back pain M54.6 724.1    G89.29 338.29       Patient Active Problem List   Diagnosis   • Depression   • Developmental delay   • GERD (gastroesophageal reflux disease)   • Scoliosis   • Amblyopia   • Back pain   • Chronic left shoulder pain   • Muscle spasm   • Seasonal allergies   • Panic attacks   • Anxiety   • Mild intermittent asthma without complication   • Chest pain on breathing   • Nicotine vapor product user   • Class 3 obesity without serious comorbidity with body mass index (BMI) of 40.0 to 44.9 in adult        Past Medical History:   Diagnosis Date   • Allergic    • Amblyopia    • Anxiety    • Back pain    • Cleft palate    • Depression    • Developmental delay    • GERD (gastroesophageal reflux disease)     followed by GI, Dr. Wesley Ferro   • Impetigo    • Kidney abscess    • Kidney stone    • Muscle spasm    • Obesity (BMI 30-39.9)    • Recurrent otitis media    • Scoliosis    • Sinus infection     recurrent   • Sprain of shoulder, left 2016        Past Surgical History:   Procedure Laterality Date   • ABSCESS DRAINAGE      kidney   • ADENOIDECTOMY     • EAR TUBES     • PHARYNGEAL FLAP      for speech   • TONSILLECTOMY                         PT Assessment/Plan     Row Name 19 1227          PT Assessment    Assessment Comments  Pt participated in 7 skilled sessions to address T spine/mid scapular pain. She has progressed well with manual therapy and postural strengthening exercises and reports 0/10 pain with ADLs and work duties. She requires occasional cuing for improving scapular retraction but is much more aware of posture and able to self correct in seated position. Pt  is compliant with HEP. We reviewed, finalized, and issued final HEP today and pt verbalizes understanding. She is appropriate for d/c to independent program at this time.   -LB        PT Plan    PT Plan Comments  d/c to independent program.   -LB       User Key  (r) = Recorded By, (t) = Taken By, (c) = Cosigned By    Initials Name Provider Type    LB Jewell Gamez, PT Physical Therapist              Exercises     Row Name 03/08/19 1100             Subjective Comments    Subjective Comments  I am doing ok. I have a cyst on my kidney.  -LB         Subjective Pain    Able to rate subjective pain?  yes  -LB      Pre-Treatment Pain Level  0  -LB         Total Minutes    50003 - PT Therapeutic Exercise Minutes  35  -LB         Exercise 1    Exercise Name 1  held due to pain in low back  -LB      Cueing 1  --  -LB      Reps 1  --  -LB      Additional Comments  --  -LB         Exercise 3    Exercise Name 3  chin tuck  -LB      Cueing 3  Verbal;Demo  -LB      Reps 3  20  -LB      Time 3  5   -LB      Additional Comments  seated  -LB         Exercise 4    Exercise Name 4  Nustep L5 UE/LE  -LB      Time 4  5 minutes  -LB         Exercise 5    Exercise Name 5  Reverse shoulder rolls  -LB      Cueing 5  Demo  -LB      Sets 5  1  -LB      Reps 5  20  -LB      Additional Comments  3#  -LB         Exercise 6    Exercise Name 6  Doorway stretch  -LB      Cueing 6  Demo  -LB      Sets 6  1  -LB      Reps 6  3  -LB      Time 6  20 sec  -LB      Additional Comments  mid and low  -LB         Exercise 7    Exercise Name 7  Rows  -LB      Cueing 7  Demo  -LB      Sets 7  2  -LB      Reps 7  10  -LB      Additional Comments  GTB  -LB         Exercise 8    Exercise Name 8  B horizontal abduction   -LB      Sets 8  1  -LB      Reps 8  20  -LB      Additional Comments  seated RTB  -LB         Exercise 9    Exercise Name 9  B shoulder extension  -LB      Cueing 9  Demo  -LB      Sets 9  2  -LB      Reps 9  10  -LB      Additional Comments  GTB   -LB         Exercise 10    Exercise Name 10   chest press  -LB      Sets 10  2  -LB      Reps 10  10  -LB      Additional Comments  3# seated  -LB         Exercise 11    Exercise Name 11   D2 flexion  -LB      Sets 11  2  -LB      Reps 11  10  -LB      Additional Comments  RTB seated  -LB         Exercise 12    Exercise Name 12  Wall push-up  -LB      Cueing 12  Demo  -LB      Reps 12  2x10  -LB        User Key  (r) = Recorded By, (t) = Taken By, (c) = Cosigned By    Initials Name Provider Type    LB Jewell Gamez, PT Physical Therapist                         PT OP Goals     Row Name 03/08/19 1100          PT Short Term Goals    STG Date to Achieve  02/01/19  -LB     STG 1  Patient will be independent with initial HEP.  -LB     STG 1 Progress  Met  -LB     STG 2  Pt will report </=2/10 upper/mid thoracic back pain with ADLs  -LB     STG 2 Progress  Met  -LB        Long Term Goals    LTG Date to Achieve  03/01/19  -LB     LTG 1  Patient will be independent with progressive HEP for long term management of current condition.  -LB     LTG 1 Progress  Met  -LB     LTG 1 Progress Comments  Issued finalized program today.  -LB     LTG 2  Pt will report 0/10 upper/mid thoracic back pain with ADLs  -LB     LTG 2 Progress  Met  -LB     LTG 3  Pt will report ability to work one full shift at work at [employer], with out increase in upper/mid back pain.  -LB     LTG 3 Progress  Met  -LB     LTG 3 Progress Comments  Pt with 0/10 pain following last 3 shifts.  -LB       User Key  (r) = Recorded By, (t) = Taken By, (c) = Cosigned By    Initials Name Provider Type    Jewell Cervantes PT Physical Therapist          Therapy Education  Education Details: issued finalized HEP, discussed posture with lifting tasks, seated position  Given: HEP, Symptoms/condition management, Posture/body mechanics  Program: Reinforced  How Provided: Verbal, Demonstration, Written  Provided to: Patient  Level of Understanding: Teach back education performed,  Verbalized, Demonstrated              Time Calculation:   Start Time: 1130  Stop Time: 1205  Time Calculation (min): 35 min  Total Timed Code Minutes- PT: 34 minute(s)  Therapy Suggested Charges     Code   Minutes Charges    51958 (CPT®) Hc Pt Neuromusc Re Education Ea 15 Min      21015 (CPT®) Hc Pt Ther Proc Ea 15 Min 35 2    12825 (CPT®) Hc Gait Training Ea 15 Min      06611 (CPT®) Hc Pt Therapeutic Act Ea 15 Min      50264 (CPT®) Hc Pt Manual Therapy Ea 15 Min      77718 (CPT®) Hc Pt Ther Massage- Per 15 Min      02851 (CPT®) Hc Pt Iontophoresis Ea 15 Min      25329 (CPT®) Hc Pt Elec Stim Ea-Per 15 Min      05168 (CPT®) Hc Pt Ultrasound Ea 15 Min      33072 (CPT®) Hc Pt Self Care/Mgmt/Train Ea 15 Min      13215 (CPT®) Hc Pt Prosthetic (S) Train Initial Encounter, Each 15 Min      79414 (CPT®) Hc Orthotic(S) Mgmt/Train Initial Encounter, Each 15min      41735 (CPT®) Hc Pt Aquatic Therapy Ea 15 Min      81670 (CPT®) Hc Pt Orthotic(S)/Prosthetic(S) Encounter, Each 15 Min       (CPT®) Hc Pt Electrical Stim Unattended      Total  35 2        Therapy Charges for Today     Code Description Service Date Service Provider Modifiers Qty    93213813561 HC PT THER PROC EA 15 MIN 3/8/2019 Jewell Gamez, PT GP 2                OP PT Discharge Summary  Date of Discharge: 03/08/19  Reason for Discharge: All goals achieved  Outcomes Achieved: Able to achieve all goals within established timeline  Discharge Destination: Home with home program  Discharge Instructions/Additional Comments: see assessment       Jewell Gamez PT  3/8/2019

## 2019-06-20 ENCOUNTER — TELEPHONE (OUTPATIENT)
Dept: FAMILY MEDICINE CLINIC | Facility: CLINIC | Age: 29
End: 2019-06-20

## 2019-06-20 NOTE — TELEPHONE ENCOUNTER
Left detailed message for patient     ----- Message from Shaniqua Sheffield sent at 6/18/2019  8:09 AM EDT -----      ----- Message -----  From: Scott Mackey MD  Sent: 6/17/2019   4:18 PM  To: Shaniqua Sheffield    That would be ok    ----- Message -----  From: Shaniqua Sheffield  Sent: 6/17/2019   3:20 PM  To: Scott Mackey MD    WILL YOU TAKE AS A NEW PT?  CALL [phone]

## 2019-08-09 ENCOUNTER — OFFICE (AMBULATORY)
Dept: URBAN - METROPOLITAN AREA CLINIC 75 | Facility: CLINIC | Age: 29
End: 2019-08-09

## 2019-08-09 VITALS
HEART RATE: 72 BPM | WEIGHT: 254 LBS | DIASTOLIC BLOOD PRESSURE: 76 MMHG | HEIGHT: 67 IN | SYSTOLIC BLOOD PRESSURE: 138 MMHG

## 2019-08-09 DIAGNOSIS — K21.9 GASTRO-ESOPHAGEAL REFLUX DISEASE WITHOUT ESOPHAGITIS: ICD-10-CM

## 2019-08-09 PROCEDURE — 99213 OFFICE O/P EST LOW 20 MIN: CPT | Performed by: INTERNAL MEDICINE

## 2019-08-09 RX ORDER — DEXLANSOPRAZOLE 60 MG/1
60 CAPSULE, DELAYED RELEASE ORAL
Qty: 90 | Refills: 3 | Status: ACTIVE
Start: 2019-08-09

## 2019-09-01 ENCOUNTER — APPOINTMENT (OUTPATIENT)
Dept: GENERAL RADIOLOGY | Facility: HOSPITAL | Age: 29
End: 2019-09-01

## 2019-09-01 ENCOUNTER — HOSPITAL ENCOUNTER (EMERGENCY)
Facility: HOSPITAL | Age: 29
Discharge: HOME | End: 2019-09-01
Attending: EMERGENCY MEDICINE | Admitting: EMERGENCY MEDICINE

## 2019-09-01 VITALS
BODY MASS INDEX: 38.45 KG/M2 | SYSTOLIC BLOOD PRESSURE: 114 MMHG | WEIGHT: 245 LBS | HEART RATE: 81 BPM | HEIGHT: 67 IN | OXYGEN SATURATION: 94 % | RESPIRATION RATE: 16 BRPM | DIASTOLIC BLOOD PRESSURE: 76 MMHG | TEMPERATURE: 98.4 F

## 2019-09-01 DIAGNOSIS — N39.0 ACUTE UTI: Primary | ICD-10-CM

## 2019-09-01 DIAGNOSIS — R50.9 FEBRILE ILLNESS, ACUTE: ICD-10-CM

## 2019-09-01 LAB
ALBUMIN SERPL-MCNC: 3.7 G/DL (ref 3.5–5.2)
ALBUMIN/GLOB SERPL: 1 G/DL
ALP SERPL-CCNC: 71 U/L (ref 39–117)
ALT SERPL W P-5'-P-CCNC: 10 U/L (ref 1–33)
ANION GAP SERPL CALCULATED.3IONS-SCNC: 12.5 MMOL/L (ref 5–15)
AST SERPL-CCNC: 11 U/L (ref 1–32)
BACTERIA UR QL AUTO: ABNORMAL /HPF
BASOPHILS # BLD AUTO: 0.03 10*3/MM3 (ref 0–0.2)
BASOPHILS NFR BLD AUTO: 0.3 % (ref 0–1.5)
BILIRUB SERPL-MCNC: 0.4 MG/DL (ref 0.2–1.2)
BILIRUB UR QL STRIP: NEGATIVE
BUN BLD-MCNC: 7 MG/DL (ref 6–20)
BUN/CREAT SERPL: 8.4 (ref 7–25)
CALCIUM SPEC-SCNC: 9 MG/DL (ref 8.6–10.5)
CHLORIDE SERPL-SCNC: 100 MMOL/L (ref 98–107)
CLARITY UR: ABNORMAL
CO2 SERPL-SCNC: 20.5 MMOL/L (ref 22–29)
COLOR UR: ABNORMAL
CREAT BLD-MCNC: 0.83 MG/DL (ref 0.57–1)
D-LACTATE SERPL-SCNC: 0.7 MMOL/L (ref 0.5–2)
DEPRECATED RDW RBC AUTO: 41.1 FL (ref 37–54)
EOSINOPHIL # BLD AUTO: 0.01 10*3/MM3 (ref 0–0.4)
EOSINOPHIL NFR BLD AUTO: 0.1 % (ref 0.3–6.2)
ERYTHROCYTE [DISTWIDTH] IN BLOOD BY AUTOMATED COUNT: 13.6 % (ref 12.3–15.4)
GFR SERPL CREATININE-BSD FRML MDRD: 82 ML/MIN/1.73
GLOBULIN UR ELPH-MCNC: 3.6 GM/DL
GLUCOSE BLD-MCNC: 99 MG/DL (ref 65–99)
GLUCOSE UR STRIP-MCNC: NEGATIVE MG/DL
HCT VFR BLD AUTO: 40.2 % (ref 34–46.6)
HGB BLD-MCNC: 12.8 G/DL (ref 12–15.9)
HGB UR QL STRIP.AUTO: ABNORMAL
HOLD SPECIMEN: NORMAL
HOLD SPECIMEN: NORMAL
HYALINE CASTS UR QL AUTO: ABNORMAL /LPF
IMM GRANULOCYTES # BLD AUTO: 0.08 10*3/MM3 (ref 0–0.05)
IMM GRANULOCYTES NFR BLD AUTO: 0.7 % (ref 0–0.5)
KETONES UR QL STRIP: NEGATIVE
LEUKOCYTE ESTERASE UR QL STRIP.AUTO: ABNORMAL
LYMPHOCYTES # BLD AUTO: 1.67 10*3/MM3 (ref 0.7–3.1)
LYMPHOCYTES NFR BLD AUTO: 14.4 % (ref 19.6–45.3)
MCH RBC QN AUTO: 26.4 PG (ref 26.6–33)
MCHC RBC AUTO-ENTMCNC: 31.8 G/DL (ref 31.5–35.7)
MCV RBC AUTO: 82.9 FL (ref 79–97)
MONOCYTES # BLD AUTO: 1.11 10*3/MM3 (ref 0.1–0.9)
MONOCYTES NFR BLD AUTO: 9.6 % (ref 5–12)
NEUTROPHILS # BLD AUTO: 8.72 10*3/MM3 (ref 1.7–7)
NEUTROPHILS NFR BLD AUTO: 74.9 % (ref 42.7–76)
NITRITE UR QL STRIP: NEGATIVE
NRBC BLD AUTO-RTO: 0 /100 WBC (ref 0–0.2)
PH UR STRIP.AUTO: 6 [PH] (ref 5–8)
PLATELET # BLD AUTO: 200 10*3/MM3 (ref 140–450)
PMV BLD AUTO: 10 FL (ref 6–12)
POTASSIUM BLD-SCNC: 3.4 MMOL/L (ref 3.5–5.2)
PROT SERPL-MCNC: 7.3 G/DL (ref 6–8.5)
PROT UR QL STRIP: ABNORMAL
RBC # BLD AUTO: 4.85 10*6/MM3 (ref 3.77–5.28)
RBC # UR: ABNORMAL /HPF
REF LAB TEST METHOD: ABNORMAL
SODIUM BLD-SCNC: 133 MMOL/L (ref 136–145)
SP GR UR STRIP: 1.02 (ref 1–1.03)
SQUAMOUS #/AREA URNS HPF: ABNORMAL /HPF
UROBILINOGEN UR QL STRIP: ABNORMAL
WBC NRBC COR # BLD: 11.62 10*3/MM3 (ref 3.4–10.8)
WBC UR QL AUTO: ABNORMAL /HPF
WHOLE BLOOD HOLD SPECIMEN: NORMAL
WHOLE BLOOD HOLD SPECIMEN: NORMAL

## 2019-09-01 PROCEDURE — 71046 X-RAY EXAM CHEST 2 VIEWS: CPT

## 2019-09-01 PROCEDURE — 99283 EMERGENCY DEPT VISIT LOW MDM: CPT

## 2019-09-01 PROCEDURE — 81001 URINALYSIS AUTO W/SCOPE: CPT | Performed by: EMERGENCY MEDICINE

## 2019-09-01 PROCEDURE — 96365 THER/PROPH/DIAG IV INF INIT: CPT

## 2019-09-01 PROCEDURE — 83605 ASSAY OF LACTIC ACID: CPT | Performed by: EMERGENCY MEDICINE

## 2019-09-01 PROCEDURE — 85025 COMPLETE CBC W/AUTO DIFF WBC: CPT | Performed by: EMERGENCY MEDICINE

## 2019-09-01 PROCEDURE — 87086 URINE CULTURE/COLONY COUNT: CPT | Performed by: EMERGENCY MEDICINE

## 2019-09-01 PROCEDURE — 87040 BLOOD CULTURE FOR BACTERIA: CPT | Performed by: EMERGENCY MEDICINE

## 2019-09-01 PROCEDURE — 25010000002 CEFTRIAXONE PER 250 MG: Performed by: EMERGENCY MEDICINE

## 2019-09-01 PROCEDURE — 80053 COMPREHEN METABOLIC PANEL: CPT | Performed by: EMERGENCY MEDICINE

## 2019-09-01 RX ORDER — NITROFURANTOIN 25; 75 MG/1; MG/1
100 CAPSULE ORAL 2 TIMES DAILY
Qty: 14 CAPSULE | Refills: 0 | Status: SHIPPED | OUTPATIENT
Start: 2019-09-01 | End: 2020-09-04

## 2019-09-01 RX ORDER — SODIUM CHLORIDE 0.9 % (FLUSH) 0.9 %
10 SYRINGE (ML) INJECTION AS NEEDED
Status: DISCONTINUED | OUTPATIENT
Start: 2019-09-01 | End: 2019-09-01 | Stop reason: HOSPADM

## 2019-09-01 RX ORDER — ACETAMINOPHEN 500 MG
1000 TABLET ORAL ONCE
Status: COMPLETED | OUTPATIENT
Start: 2019-09-01 | End: 2019-09-01

## 2019-09-01 RX ORDER — ONDANSETRON 4 MG/1
4 TABLET, ORALLY DISINTEGRATING ORAL ONCE
Status: COMPLETED | OUTPATIENT
Start: 2019-09-01 | End: 2019-09-01

## 2019-09-01 RX ORDER — CEFTRIAXONE SODIUM 1 G/50ML
1 INJECTION, SOLUTION INTRAVENOUS ONCE
Status: COMPLETED | OUTPATIENT
Start: 2019-09-01 | End: 2019-09-01

## 2019-09-01 RX ADMIN — ACETAMINOPHEN 1000 MG: 500 TABLET ORAL at 14:04

## 2019-09-01 RX ADMIN — CEFTRIAXONE SODIUM 1 G: 1 INJECTION, SOLUTION INTRAVENOUS at 18:31

## 2019-09-01 RX ADMIN — ONDANSETRON 4 MG: 4 TABLET, ORALLY DISINTEGRATING ORAL at 14:04

## 2019-09-01 RX ADMIN — SODIUM CHLORIDE 1000 ML: 9 INJECTION, SOLUTION INTRAVENOUS at 16:16

## 2019-09-01 NOTE — ED PROVIDER NOTES
EMERGENCY DEPARTMENT ENCOUNTER    Room Number:  36/36  Date of encounter:  9/1/2019  PCP: David Starr MD  Historian: Pt      HPI:  Chief Complaint: Multiple complaints  A complete HPI/ROS/PMH/PSH/SH/FH are unobtainable due to: none    Context: Beatriz Boland is a 29 y.o. female who presents to the ED with multiple complaints. She reports nausea and vomiting for the past two days. She reports lower back pain, chills, dry cough, body aches, and HA. She was found to have a fever of 102.6F at triage. She states she has not had much to eat for the past two days. She denies any  Sx, constipation, or diarrhea. She states her urine smelled funny earlier today. She denies any recent ill contacts.         PAST MEDICAL HISTORY  Active Ambulatory Problems     Diagnosis Date Noted   • Depression    • Developmental delay    • GERD (gastroesophageal reflux disease)    • Scoliosis    • Amblyopia    • Back pain    • Chronic left shoulder pain 09/29/2017   • Muscle spasm 09/29/2017   • Seasonal allergies 11/05/2017   • Panic attacks 11/15/2017   • Anxiety 01/04/2018   • Mild intermittent asthma without complication 03/24/2018   • Chest pain on breathing 08/10/2018   • Nicotine vapor product user 08/10/2018   • Class 3 obesity without serious comorbidity with body mass index (BMI) of 40.0 to 44.9 in adult 08/10/2018     Resolved Ambulatory Problems     Diagnosis Date Noted   • Sinus bradycardia 09/29/2014   • Palpitations 11/15/2017   • Acute otitis externa of both ears 11/15/2017   • Atypical chest pain 01/04/2018     Past Medical History:   Diagnosis Date   • Allergic    • Amblyopia    • Anxiety    • Back pain    • Cleft palate    • Depression    • Developmental delay    • GERD (gastroesophageal reflux disease)    • Impetigo    • Kidney abscess    • Kidney stone    • Muscle spasm    • Obesity (BMI 30-39.9)    • Recurrent otitis media    • Scoliosis    • Sinus infection    • Sprain of shoulder, left 12/2016         PAST  SURGICAL HISTORY  Past Surgical History:   Procedure Laterality Date   • ABSCESS DRAINAGE      kidney   • ADENOIDECTOMY     • EAR TUBES     • PHARYNGEAL FLAP      for speech   • TONSILLECTOMY           FAMILY HISTORY  Family History   Problem Relation Age of Onset   • COPD Mother    • Arthritis Mother    • Obesity Mother    • Gestational diabetes Mother    • Cancer Father    • Scoliosis Father    • Rheum arthritis Maternal Aunt    • Diabetes Maternal Uncle    • Alcohol abuse Maternal Uncle    • Rheum arthritis Maternal Grandmother    • COPD Maternal Grandmother    • Stroke Maternal Grandfather    • Alcohol abuse Paternal Uncle          SOCIAL HISTORY  Social History     Socioeconomic History   • Marital status: Single     Spouse name: Not on file   • Number of children: Not on file   • Years of education: Not on file   • Highest education level: Not on file   Tobacco Use   • Smoking status: Current Some Day Smoker   • Smokeless tobacco: Never Used   • Tobacco comment: smokes hookah 1-2 times per week     Substance and Sexual Activity   • Alcohol use: Yes     Comment: occasionally, 1-2 glasses of wine 1-2 x per week   • Drug use: No   • Sexual activity: Yes     Partners: Male     Birth control/protection: Injection   Social History Narrative               ALLERGIES  Ciprofloxacin and Dust mite extract        REVIEW OF SYSTEMS  Review of Systems   Constitutional: Positive for chills. Negative for fever.   HENT: Negative for sore throat.    Eyes: Negative.    Respiratory: Positive for cough (dry). Negative for shortness of breath.    Cardiovascular: Negative for chest pain.   Gastrointestinal: Positive for nausea and vomiting. Negative for abdominal pain, constipation and diarrhea.   Genitourinary: Negative for difficulty urinating and dysuria.   Musculoskeletal: Positive for back pain (lower) and myalgias (generalized). Negative for neck pain.   Skin: Negative for rash.   Allergic/Immunologic: Negative.     Neurological: Positive for headaches. Negative for weakness and numbness.   Hematological: Negative.    Psychiatric/Behavioral: Negative.    All other systems reviewed and are negative.           PHYSICAL EXAM    I have reviewed the triage vital signs and nursing notes.    ED Triage Vitals   Temp Heart Rate Resp BP SpO2   09/01/19 1356 09/01/19 1356 09/01/19 1356 09/01/19 1600 09/01/19 1356   (!) 102.6 °F (39.2 °C) (!) 128 19 122/74 99 %      Temp src Heart Rate Source Patient Position BP Location FiO2 (%)   09/01/19 1356 09/01/19 1831 -- -- --   Tympanic Monitor            Physical Exam  GENERAL: mildly distressed  HENT: nares patent, oropharynx is clear and moist.  Tympanic membranes are normal bilaterally.  Her sinuses are nontender to palpation.  NECK: supple, no lymphadenopathy.  Does not have a stiff neck.  No meningismus.  EYES: no scleral icterus  CV: regular rhythm, regular rate, no murmur  RESPIRATORY: normal effort.  Clear to auscultation bilaterally.  ABDOMEN: soft, non-tender, non-distended, normoactive bowel sounds  MUSCULOSKELETAL: no deformity  NEURO: alert, moves all extremities, follows commands  SKIN: warm, dry.  No rash appreciated.        LAB RESULTS  Recent Results (from the past 24 hour(s))   Light Blue Top    Collection Time: 09/01/19  4:15 PM   Result Value Ref Range    Extra Tube hold for add-on    Green Top (Gel)    Collection Time: 09/01/19  4:15 PM   Result Value Ref Range    Extra Tube Hold for add-ons.    Lavender Top    Collection Time: 09/01/19  4:15 PM   Result Value Ref Range    Extra Tube hold for add-on    Gold Top - SST    Collection Time: 09/01/19  4:15 PM   Result Value Ref Range    Extra Tube Hold for add-ons.    Comprehensive Metabolic Panel    Collection Time: 09/01/19  4:15 PM   Result Value Ref Range    Glucose 99 65 - 99 mg/dL    BUN 7 6 - 20 mg/dL    Creatinine 0.83 0.57 - 1.00 mg/dL    Sodium 133 (L) 136 - 145 mmol/L    Potassium 3.4 (L) 3.5 - 5.2 mmol/L    Chloride  100 98 - 107 mmol/L    CO2 20.5 (L) 22.0 - 29.0 mmol/L    Calcium 9.0 8.6 - 10.5 mg/dL    Total Protein 7.3 6.0 - 8.5 g/dL    Albumin 3.70 3.50 - 5.20 g/dL    ALT (SGPT) 10 1 - 33 U/L    AST (SGOT) 11 1 - 32 U/L    Alkaline Phosphatase 71 39 - 117 U/L    Total Bilirubin 0.4 0.2 - 1.2 mg/dL    eGFR Non African Amer 82 >60 mL/min/1.73    Globulin 3.6 gm/dL    A/G Ratio 1.0 g/dL    BUN/Creatinine Ratio 8.4 7.0 - 25.0    Anion Gap 12.5 5.0 - 15.0 mmol/L   Lactic Acid, Plasma    Collection Time: 09/01/19  4:15 PM   Result Value Ref Range    Lactate 0.7 0.5 - 2.0 mmol/L   CBC Auto Differential    Collection Time: 09/01/19  4:15 PM   Result Value Ref Range    WBC 11.62 (H) 3.40 - 10.80 10*3/mm3    RBC 4.85 3.77 - 5.28 10*6/mm3    Hemoglobin 12.8 12.0 - 15.9 g/dL    Hematocrit 40.2 34.0 - 46.6 %    MCV 82.9 79.0 - 97.0 fL    MCH 26.4 (L) 26.6 - 33.0 pg    MCHC 31.8 31.5 - 35.7 g/dL    RDW 13.6 12.3 - 15.4 %    RDW-SD 41.1 37.0 - 54.0 fl    MPV 10.0 6.0 - 12.0 fL    Platelets 200 140 - 450 10*3/mm3    Neutrophil % 74.9 42.7 - 76.0 %    Lymphocyte % 14.4 (L) 19.6 - 45.3 %    Monocyte % 9.6 5.0 - 12.0 %    Eosinophil % 0.1 (L) 0.3 - 6.2 %    Basophil % 0.3 0.0 - 1.5 %    Immature Grans % 0.7 (H) 0.0 - 0.5 %    Neutrophils, Absolute 8.72 (H) 1.70 - 7.00 10*3/mm3    Lymphocytes, Absolute 1.67 0.70 - 3.10 10*3/mm3    Monocytes, Absolute 1.11 (H) 0.10 - 0.90 10*3/mm3    Eosinophils, Absolute 0.01 0.00 - 0.40 10*3/mm3    Basophils, Absolute 0.03 0.00 - 0.20 10*3/mm3    Immature Grans, Absolute 0.08 (H) 0.00 - 0.05 10*3/mm3    nRBC 0.0 0.0 - 0.2 /100 WBC   Urinalysis With Microscopic If Indicated (No Culture) - Urine, Clean Catch    Collection Time: 09/01/19  5:44 PM   Result Value Ref Range    Color, UA Dark Yellow (A) Yellow, Straw    Appearance, UA Cloudy (A) Clear    pH, UA 6.0 5.0 - 8.0    Specific Gravity, UA 1.019 1.005 - 1.030    Glucose, UA Negative Negative    Ketones, UA Negative Negative    Bilirubin, UA Negative  Negative    Blood, UA Moderate (2+) (A) Negative    Protein,  mg/dL (2+) (A) Negative    Leuk Esterase, UA Moderate (2+) (A) Negative    Nitrite, UA Negative Negative    Urobilinogen, UA 1.0 E.U./dL 0.2 - 1.0 E.U./dL   Urinalysis, Microscopic Only - Urine, Clean Catch    Collection Time: 09/01/19  5:44 PM   Result Value Ref Range    RBC, UA 13-20 (A) None Seen, 0-2 /HPF    WBC, UA Too Numerous to Count (A) None Seen, 0-2 /HPF    Bacteria, UA 4+ (A) None Seen /HPF    Squamous Epithelial Cells, UA 0-2 None Seen, 0-2 /HPF    Hyaline Casts, UA 31-50 None Seen /LPF    Methodology Automated Microscopy        Ordered the above labs and independently reviewed the results.        RADIOLOGY  Xr Chest 2 View    Result Date: 9/1/2019  XR CHEST 2 VW-  HISTORY: Female who is 29 years-old,  fever, cough  TECHNIQUE: Frontal and lateral views of the chest  COMPARISON: 01/28/2017  FINDINGS: Heart, mediastinum and pulmonary vasculature are unremarkable. Generous cardiac fat pad at the right cardiophrenic angle in correlation with the CT from 03/06/2019. No focal pulmonary consolidation, pleural effusion, or pneumothorax. No acute osseous process.      No evidence for acute pulmonary process. Follow-up as clinical indications persist.  This report was finalized on 9/1/2019 4:38 PM by Dr. Nando Vergara M.D.        I ordered the above noted radiological studies. Reviewed by me and discussed with radiologist.  See dictation for official radiology interpretation.      PROCEDURES    Procedures      MEDICATIONS GIVEN IN ER    Medications   sodium chloride 0.9 % flush 10 mL (not administered)   acetaminophen (TYLENOL) tablet 1,000 mg (1,000 mg Oral Given 9/1/19 1404)   ondansetron ODT (ZOFRAN-ODT) disintegrating tablet 4 mg (4 mg Oral Given 9/1/19 1404)   sodium chloride 0.9 % bolus 1,000 mL (0 mL Intravenous Stopped 9/1/19 1831)   cefTRIAXone (ROCEPHIN) IVPB 1 g (0 g Intravenous Stopped 9/1/19 1908)         PROGRESS, DATA  "ANALYSIS, CONSULTS, AND MEDICAL DECISION MAKING    All labs have been independently reviewed by me.  All radiology studies have been reviewed by me and discussed with radiologist dictating the report.   EKG's independently viewed and interpreted by me.  Discussion below represents my analysis of pertinent findings related to patient's condition, differential diagnosis, treatment plan and final disposition.    1828  Rechecked pt. Pt is resting comfortably. Notified pt of lab work results, including UA that shows bacteria and WBCs, CBC that shows mildly elevated WBC, and normal lactate; and CXR that is negative acute. Informed the pt that her Sx sounded viral in nature but her UA is suggestive of a UTI. She denies dysuria, urgency, or urinary frequency. She states her urine hasn't smelled the same and smelled like \"garlic\" yesterday. Informed the pt that blood cultures are pending and will take a couple of days to come back. Discussed the plan to discharge the pt home with prescriptions for Abx. Will give dose of Abx here. I instructed the pt to f/u with her PCP. Pt understands and agrees with the plan, all questions answered.             AS OF 7:15 PM VITALS:    BP - 114/76  HR - 81  TEMP - 98.4 °F (36.9 °C) (Oral)  02 SATS - 94%        DIAGNOSIS  Final diagnoses:   Acute UTI   Febrile illness, acute         DISPOSITION  DISCHARGE    Patient discharged in stable condition.    Reviewed implications of results, diagnosis, meds, responsibility to follow up, warning signs and symptoms of possible worsening, potential complications and reasons to return to ER.    Patient/Family voiced understanding of above instructions.    Discussed plan for discharge, as there is no emergent indication for admission. Patient referred to primary care provider for BP management due to today's BP. Pt/family is agreeable and understands need for follow up and repeat testing.  Pt is aware that discharge does not mean that nothing is wrong " but it indicates no emergency is present that requires admission and they must continue care with follow-up as given below or physician of their choice.     FOLLOW-UP  David Starr MD  [address] [id number] [phone]    Schedule an appointment as soon as possible for a visit            Medication List      No changes were made to your prescriptions during this visit.           --  Documentation assistance provided by cat Coppola for Dr. Galloway.  Information recorded by the scribe was done at my direction and has been verified and validated by me.            Noah Coppola  09/01/19 1914       Scott Galloway MD  09/01/19 3286

## 2019-09-01 NOTE — ED TRIAGE NOTES
Pt reports N/V x3 days, also c/o back pain and migraine and cough. Pt arrives aaox4, abc's intact, NAD noted.

## 2019-09-01 NOTE — ED NOTES
Pt to ED with c/o body aches, chills, dry cough, low back pain and foul-smelling urine. Febrile on arrival.      Ameena Guerin RN  09/01/19 [id number]

## 2019-09-01 NOTE — DISCHARGE INSTRUCTIONS
Use Tylenol or ibuprofen as needed for aches, pains or fever.  Use the antibiotic until completed.  Please return to the emergency department if your symptoms worsen.

## 2019-09-02 LAB — BACTERIA SPEC AEROBE CULT: NO GROWTH

## 2019-09-06 LAB
BACTERIA SPEC AEROBE CULT: NORMAL
BACTERIA SPEC AEROBE CULT: NORMAL

## 2020-02-14 ENCOUNTER — OFFICE (AMBULATORY)
Dept: URBAN - METROPOLITAN AREA CLINIC 75 | Facility: CLINIC | Age: 30
End: 2020-02-14

## 2020-02-14 VITALS
SYSTOLIC BLOOD PRESSURE: 138 MMHG | HEIGHT: 67 IN | WEIGHT: 255 LBS | DIASTOLIC BLOOD PRESSURE: 78 MMHG | HEART RATE: 68 BPM

## 2020-02-14 DIAGNOSIS — E66.9 OBESITY, UNSPECIFIED: ICD-10-CM

## 2020-02-14 DIAGNOSIS — K21.9 GASTRO-ESOPHAGEAL REFLUX DISEASE WITHOUT ESOPHAGITIS: ICD-10-CM

## 2020-02-14 PROCEDURE — 99213 OFFICE O/P EST LOW 20 MIN: CPT | Performed by: INTERNAL MEDICINE

## 2020-02-14 RX ORDER — DEXLANSOPRAZOLE 60 MG/1
60 CAPSULE, DELAYED RELEASE ORAL
Qty: 90 | Refills: 3 | Status: ACTIVE
Start: 2019-08-09

## 2020-05-19 ENCOUNTER — HOSPITAL ENCOUNTER (EMERGENCY)
Facility: HOSPITAL | Age: 30
Discharge: HOME | End: 2020-05-19
Attending: EMERGENCY MEDICINE | Admitting: EMERGENCY MEDICINE

## 2020-05-19 ENCOUNTER — APPOINTMENT (OUTPATIENT)
Dept: GENERAL RADIOLOGY | Facility: HOSPITAL | Age: 30
End: 2020-05-19

## 2020-05-19 VITALS
SYSTOLIC BLOOD PRESSURE: 117 MMHG | HEART RATE: 66 BPM | WEIGHT: 250 LBS | DIASTOLIC BLOOD PRESSURE: 77 MMHG | BODY MASS INDEX: 39.24 KG/M2 | OXYGEN SATURATION: 99 % | HEIGHT: 67 IN | TEMPERATURE: 98.8 F | RESPIRATION RATE: 16 BRPM

## 2020-05-19 DIAGNOSIS — K59.00 CONSTIPATION, UNSPECIFIED CONSTIPATION TYPE: Primary | ICD-10-CM

## 2020-05-19 PROCEDURE — 99283 EMERGENCY DEPT VISIT LOW MDM: CPT

## 2020-05-19 PROCEDURE — 74018 RADEX ABDOMEN 1 VIEW: CPT

## 2020-05-19 RX ORDER — POLYETHYLENE GLYCOL 3350 17 G/17G
17 POWDER, FOR SOLUTION ORAL DAILY
Qty: 30 EACH | Refills: 0 | Status: SHIPPED | OUTPATIENT
Start: 2020-05-19 | End: 2020-09-04

## 2020-05-19 NOTE — ED PROVIDER NOTES
" EMERGENCY DEPARTMENT ENCOUNTER    Room Number:  FERNANDEZ/E  Date of encounter:  5/19/2020  PCP: David Starr MD  Historian: Patient      HPI:  Chief Complaint: Constipation  A complete HPI/ROS/PMH/PSH/SH/FH are unobtainable due to: Nothing    Context: Beatriz Boland is a 29 y.o. female who presents to the ED c/o constipation or \"incomplete bowel movements\" for the last few days.  Patient says she has had some tenesmus, along with frequent BMs that are very small and she never gets the sense that she is completed.  She is had no redd abdominal pain, no nausea or vomiting, no diarrhea, no blood in the stools.  She has not tried anything over-the-counter to remedy this      PAST MEDICAL HISTORY  Active Ambulatory Problems     Diagnosis Date Noted   • Depression    • Developmental delay    • GERD (gastroesophageal reflux disease)    • Scoliosis    • Amblyopia    • Back pain    • Chronic left shoulder pain 09/29/2017   • Muscle spasm 09/29/2017   • Seasonal allergies 11/05/2017   • Panic attacks 11/15/2017   • Anxiety 01/04/2018   • Mild intermittent asthma without complication 03/24/2018   • Chest pain on breathing 08/10/2018   • Nicotine vapor product user 08/10/2018   • Class 3 obesity without serious comorbidity with body mass index (BMI) of 40.0 to 44.9 in adult 08/10/2018     Resolved Ambulatory Problems     Diagnosis Date Noted   • Sinus bradycardia 09/29/2014   • Palpitations 11/15/2017   • Acute otitis externa of both ears 11/15/2017   • Atypical chest pain 01/04/2018     Past Medical History:   Diagnosis Date   • Allergic    • Cleft palate    • Impetigo    • Kidney abscess    • Kidney stone    • Obesity (BMI 30-39.9)    • Recurrent otitis media    • Sinus infection    • Sprain of shoulder, left 12/2016         PAST SURGICAL HISTORY  Past Surgical History:   Procedure Laterality Date   • ABSCESS DRAINAGE      kidney   • ADENOIDECTOMY     • EAR TUBES     • PHARYNGEAL FLAP      for speech   • TONSILLECTOMY "           FAMILY HISTORY  Family History   Problem Relation Age of Onset   • COPD Mother    • Arthritis Mother    • Obesity Mother    • Gestational diabetes Mother    • Cancer Father    • Scoliosis Father    • Rheum arthritis Maternal Aunt    • Diabetes Maternal Uncle    • Alcohol abuse Maternal Uncle    • Rheum arthritis Maternal Grandmother    • COPD Maternal Grandmother    • Stroke Maternal Grandfather    • Alcohol abuse Paternal Uncle          SOCIAL HISTORY  Social History     Socioeconomic History   • Marital status: Single     Spouse name: Not on file   • Number of children: Not on file   • Years of education: Not on file   • Highest education level: Not on file   Tobacco Use   • Smoking status: Current Some Day Smoker   • Smokeless tobacco: Never Used   • Tobacco comment: smokes hookah 1-2 times per week     Substance and Sexual Activity   • Alcohol use: Yes     Comment: occasionally, 1-2 glasses of wine 1-2 x per week   • Drug use: No   • Sexual activity: Yes     Partners: Male     Birth control/protection: Injection   Social History Narrative               ALLERGIES  Ciprofloxacin and Dust mite extract        REVIEW OF SYSTEMS  Review of Systems     All systems reviewed and negative except for those discussed in HPI.       PHYSICAL EXAM    I have reviewed the triage vital signs and nursing notes.    ED Triage Vitals [05/19/20 1434]   Temp Heart Rate Resp BP SpO2   98.8 °F (37.1 °C) 102 18 (!) 160/102 99 %      Temp src Heart Rate Source Patient Position BP Location FiO2 (%)   Skin -- -- -- --       Physical Exam  GENERAL: not distressed  HENT: nares patent  EYES: no scleral icterus  CV: regular rhythm, regular rate  RESPIRATORY: normal effort  ABDOMEN: soft, nontender to palpation, bowel sounds are present mUSCULOSKELETAL: no deformity  NEURO: alert, moves all extremities, follows commands  SKIN: warm, dry        LAB RESULTS  No results found for this or any previous visit (from the past 24  hour(s)).    Ordered the above labs and independently reviewed the results.        RADIOLOGY  Xr Abdomen Kub    Result Date: 5/19/2020  XR ABDOMEN KUB-  INDICATIONS: Constipation  TECHNIQUE: Supine views of the abdomen  COMPARISON: None available  FINDINGS:   The bowel gas pattern is nonobstructive. Small amount of stool is apparent in the proximal colon. No supine evidence for free intraperitoneal gas. No definite urolithiasis. If there is further clinical concern, CT can be obtained for further examination.       As described.  This report was finalized on 5/19/2020 3:14 PM by Dr. Nando Vergara M.D.        I ordered the above noted radiological studies. Reviewed by me and discussed with radiologist.  See dictation for official radiology interpretation.      PROCEDURES    Procedures      MEDICATIONS GIVEN IN ER    Medications - No data to display      PROGRESS, DATA ANALYSIS, CONSULTS, AND MEDICAL DECISION MAKING    All labs have been independently reviewed by me.  All radiology studies have been reviewed by me and discussed with radiologist dictating the report.   EKG's independently viewed and interpreted by me.  Discussion below represents my analysis of pertinent findings related to patient's condition, differential diagnosis, treatment plan and final disposition.        ED Course as of May 19 1758   Tue May 19, 2020   1758 KUB shows nonspecific bowel gas pattern with no evidence of fecal impaction or obstruction    [DP]      ED Course User Index  [DP] Anoop Becker MD           AS OF 17:58 VITALS:    BP - 117/77  HR - 66  TEMP - 98.8 °F (37.1 °C) (Skin)  O2 SATS - 99%        DIAGNOSIS  Final diagnoses:   Constipation, unspecified constipation type         DISPOSITION  Discharge           Anoop Becker MD  05/19/20 1758

## 2020-09-04 ENCOUNTER — APPOINTMENT (OUTPATIENT)
Dept: GENERAL RADIOLOGY | Facility: HOSPITAL | Age: 30
End: 2020-09-04

## 2020-09-04 ENCOUNTER — HOSPITAL ENCOUNTER (INPATIENT)
Facility: HOSPITAL | Age: 30
LOS: 4 days | Discharge: HOME | End: 2020-09-08
Attending: EMERGENCY MEDICINE | Admitting: HOSPITALIST

## 2020-09-04 DIAGNOSIS — E87.6 HYPOKALEMIA: ICD-10-CM

## 2020-09-04 DIAGNOSIS — A41.9 SEPSIS, DUE TO UNSPECIFIED ORGANISM, UNSPECIFIED WHETHER ACUTE ORGAN DYSFUNCTION PRESENT (HCC): Primary | ICD-10-CM

## 2020-09-04 DIAGNOSIS — N17.9 AKI (ACUTE KIDNEY INJURY) (HCC): ICD-10-CM

## 2020-09-04 DIAGNOSIS — N39.0 ACUTE UTI: ICD-10-CM

## 2020-09-04 PROBLEM — E66.9 OBESITY (BMI 30-39.9): Status: ACTIVE | Noted: 2020-09-04

## 2020-09-04 PROBLEM — Z72.0 TOBACCO ABUSE: Status: ACTIVE | Noted: 2020-09-04

## 2020-09-04 LAB
ALBUMIN SERPL-MCNC: 3.1 G/DL (ref 3.5–5.2)
ALBUMIN/GLOB SERPL: 0.8 G/DL
ALP SERPL-CCNC: 76 U/L (ref 39–117)
ALT SERPL W P-5'-P-CCNC: 11 U/L (ref 1–33)
ANION GAP SERPL CALCULATED.3IONS-SCNC: 11.1 MMOL/L (ref 5–15)
ANION GAP SERPL CALCULATED.3IONS-SCNC: 11.3 MMOL/L (ref 5–15)
ANION GAP SERPL CALCULATED.3IONS-SCNC: 11.3 MMOL/L (ref 5–15)
ANION GAP SERPL CALCULATED.3IONS-SCNC: 15.7 MMOL/L (ref 5–15)
AST SERPL-CCNC: 10 U/L (ref 1–32)
B PARAPERT DNA SPEC QL NAA+PROBE: NOT DETECTED
B PERT DNA SPEC QL NAA+PROBE: NOT DETECTED
BACTERIA BLD CULT: ABNORMAL
BACTERIA UR QL AUTO: ABNORMAL /HPF
BACTERIA UR QL AUTO: ABNORMAL /HPF
BASOPHILS # BLD AUTO: 0.06 10*3/MM3 (ref 0–0.2)
BASOPHILS NFR BLD AUTO: 0.2 % (ref 0–1.5)
BILIRUB SERPL-MCNC: 0.8 MG/DL (ref 0–1.2)
BILIRUB UR QL STRIP: NEGATIVE
BILIRUB UR QL STRIP: NEGATIVE
BUN SERPL-MCNC: 16 MG/DL (ref 6–20)
BUN SERPL-MCNC: 18 MG/DL (ref 6–20)
BUN SERPL-MCNC: 19 MG/DL (ref 6–20)
BUN SERPL-MCNC: 20 MG/DL (ref 6–20)
BUN/CREAT SERPL: 11 (ref 7–25)
BUN/CREAT SERPL: 11.2 (ref 7–25)
BUN/CREAT SERPL: 11.4 (ref 7–25)
BUN/CREAT SERPL: 12 (ref 7–25)
C PNEUM DNA NPH QL NAA+NON-PROBE: NOT DETECTED
CALCIUM SPEC-SCNC: 8.1 MG/DL (ref 8.6–10.5)
CALCIUM SPEC-SCNC: 8.2 MG/DL (ref 8.6–10.5)
CALCIUM SPEC-SCNC: 8.6 MG/DL (ref 8.6–10.5)
CALCIUM SPEC-SCNC: 9 MG/DL (ref 8.6–10.5)
CHLORIDE SERPL-SCNC: 105 MMOL/L (ref 98–107)
CHLORIDE SERPL-SCNC: 106 MMOL/L (ref 98–107)
CHLORIDE SERPL-SCNC: 107 MMOL/L (ref 98–107)
CHLORIDE SERPL-SCNC: 97 MMOL/L (ref 98–107)
CK SERPL-CCNC: 60 U/L (ref 20–180)
CLARITY UR: ABNORMAL
CLARITY UR: CLEAR
CO2 SERPL-SCNC: 17.9 MMOL/L (ref 22–29)
CO2 SERPL-SCNC: 18.3 MMOL/L (ref 22–29)
CO2 SERPL-SCNC: 19.7 MMOL/L (ref 22–29)
CO2 SERPL-SCNC: 19.7 MMOL/L (ref 22–29)
COLOR UR: ABNORMAL
COLOR UR: YELLOW
CREAT SERPL-MCNC: 1.43 MG/DL (ref 0.57–1)
CREAT SERPL-MCNC: 1.63 MG/DL (ref 0.57–1)
CREAT SERPL-MCNC: 1.66 MG/DL (ref 0.57–1)
CREAT SERPL-MCNC: 1.67 MG/DL (ref 0.57–1)
CRP SERPL-MCNC: 36.36 MG/DL (ref 0–0.5)
CRP SERPL-MCNC: 40.39 MG/DL (ref 0–0.5)
D-LACTATE SERPL-SCNC: 1.4 MMOL/L (ref 0.5–2)
DEPRECATED RDW RBC AUTO: 39.7 FL (ref 37–54)
DEPRECATED RDW RBC AUTO: 41.9 FL (ref 37–54)
EOSINOPHIL # BLD AUTO: 0 10*3/MM3 (ref 0–0.4)
EOSINOPHIL NFR BLD AUTO: 0 % (ref 0.3–6.2)
ERYTHROCYTE [DISTWIDTH] IN BLOOD BY AUTOMATED COUNT: 13.1 % (ref 12.3–15.4)
ERYTHROCYTE [DISTWIDTH] IN BLOOD BY AUTOMATED COUNT: 13.4 % (ref 12.3–15.4)
FLUAV H1 2009 PAND RNA NPH QL NAA+PROBE: NOT DETECTED
FLUAV H1 HA GENE NPH QL NAA+PROBE: NOT DETECTED
FLUAV H3 RNA NPH QL NAA+PROBE: NOT DETECTED
FLUAV SUBTYP SPEC NAA+PROBE: NOT DETECTED
FLUBV RNA ISLT QL NAA+PROBE: NOT DETECTED
GFR SERPL CREATININE-BSD FRML MDRD: 36 ML/MIN/1.73
GFR SERPL CREATININE-BSD FRML MDRD: 37 ML/MIN/1.73
GFR SERPL CREATININE-BSD FRML MDRD: 37 ML/MIN/1.73
GFR SERPL CREATININE-BSD FRML MDRD: 43 ML/MIN/1.73
GLOBULIN UR ELPH-MCNC: 4.1 GM/DL
GLUCOSE SERPL-MCNC: 109 MG/DL (ref 65–99)
GLUCOSE SERPL-MCNC: 117 MG/DL (ref 65–99)
GLUCOSE SERPL-MCNC: 118 MG/DL (ref 65–99)
GLUCOSE SERPL-MCNC: 122 MG/DL (ref 65–99)
GLUCOSE UR STRIP-MCNC: NEGATIVE MG/DL
GLUCOSE UR STRIP-MCNC: NEGATIVE MG/DL
HADV DNA SPEC NAA+PROBE: NOT DETECTED
HCG SERPL QL: NEGATIVE
HCOV 229E RNA SPEC QL NAA+PROBE: NOT DETECTED
HCOV HKU1 RNA SPEC QL NAA+PROBE: NOT DETECTED
HCOV NL63 RNA SPEC QL NAA+PROBE: NOT DETECTED
HCOV OC43 RNA SPEC QL NAA+PROBE: NOT DETECTED
HCT VFR BLD AUTO: 34.2 % (ref 34–46.6)
HCT VFR BLD AUTO: 38.3 % (ref 34–46.6)
HGB BLD-MCNC: 11.1 G/DL (ref 12–15.9)
HGB BLD-MCNC: 12.2 G/DL (ref 12–15.9)
HGB UR QL STRIP.AUTO: ABNORMAL
HGB UR QL STRIP.AUTO: NEGATIVE
HMPV RNA NPH QL NAA+NON-PROBE: NOT DETECTED
HPIV1 RNA SPEC QL NAA+PROBE: NOT DETECTED
HPIV2 RNA SPEC QL NAA+PROBE: NOT DETECTED
HPIV3 RNA NPH QL NAA+PROBE: NOT DETECTED
HPIV4 P GENE NPH QL NAA+PROBE: NOT DETECTED
HYALINE CASTS UR QL AUTO: ABNORMAL /LPF
HYALINE CASTS UR QL AUTO: ABNORMAL /LPF
IMM GRANULOCYTES # BLD AUTO: 0.94 10*3/MM3 (ref 0–0.05)
IMM GRANULOCYTES NFR BLD AUTO: 3.4 % (ref 0–0.5)
KETONES UR QL STRIP: ABNORMAL
KETONES UR QL STRIP: NEGATIVE
LDH SERPL-CCNC: 199 U/L (ref 135–214)
LEUKOCYTE ESTERASE UR QL STRIP.AUTO: ABNORMAL
LEUKOCYTE ESTERASE UR QL STRIP.AUTO: ABNORMAL
LYMPHOCYTES # BLD AUTO: 1.36 10*3/MM3 (ref 0.7–3.1)
LYMPHOCYTES NFR BLD AUTO: 5 % (ref 19.6–45.3)
M PNEUMO IGG SER IA-ACNC: NOT DETECTED
MAGNESIUM SERPL-MCNC: 1.8 MG/DL (ref 1.6–2.6)
MCH RBC QN AUTO: 27.3 PG (ref 26.6–33)
MCH RBC QN AUTO: 27.4 PG (ref 26.6–33)
MCHC RBC AUTO-ENTMCNC: 31.9 G/DL (ref 31.5–35.7)
MCHC RBC AUTO-ENTMCNC: 32.5 G/DL (ref 31.5–35.7)
MCV RBC AUTO: 84 FL (ref 79–97)
MCV RBC AUTO: 86.1 FL (ref 79–97)
MONOCYTES # BLD AUTO: 2.11 10*3/MM3 (ref 0.1–0.9)
MONOCYTES NFR BLD AUTO: 7.7 % (ref 5–12)
NEUTROPHILS NFR BLD AUTO: 22.97 10*3/MM3 (ref 1.7–7)
NEUTROPHILS NFR BLD AUTO: 83.7 % (ref 42.7–76)
NITRITE UR QL STRIP: NEGATIVE
NITRITE UR QL STRIP: NEGATIVE
NRBC BLD AUTO-RTO: 0 /100 WBC (ref 0–0.2)
PH UR STRIP.AUTO: 6.5 [PH] (ref 5–8)
PH UR STRIP.AUTO: <=5 [PH] (ref 5–8)
PLATELET # BLD AUTO: 187 10*3/MM3 (ref 140–450)
PLATELET # BLD AUTO: 204 10*3/MM3 (ref 140–450)
PMV BLD AUTO: 9.8 FL (ref 6–12)
PMV BLD AUTO: 9.9 FL (ref 6–12)
POTASSIUM SERPL-SCNC: 3.2 MMOL/L (ref 3.5–5.2)
POTASSIUM SERPL-SCNC: 3.2 MMOL/L (ref 3.5–5.2)
POTASSIUM SERPL-SCNC: 3.4 MMOL/L (ref 3.5–5.2)
POTASSIUM SERPL-SCNC: 3.4 MMOL/L (ref 3.5–5.2)
PROCALCITONIN SERPL-MCNC: 2.64 NG/ML (ref 0–0.25)
PROCALCITONIN SERPL-MCNC: 3.15 NG/ML (ref 0–0.25)
PROT SERPL-MCNC: 7.2 G/DL (ref 6–8.5)
PROT UR QL STRIP: ABNORMAL
PROT UR QL STRIP: ABNORMAL
RBC # BLD AUTO: 4.07 10*6/MM3 (ref 3.77–5.28)
RBC # BLD AUTO: 4.45 10*6/MM3 (ref 3.77–5.28)
RBC # UR: ABNORMAL /HPF
RBC # UR: ABNORMAL /HPF
REF LAB TEST METHOD: ABNORMAL
REF LAB TEST METHOD: ABNORMAL
RHINOVIRUS RNA SPEC NAA+PROBE: NOT DETECTED
RSV RNA NPH QL NAA+NON-PROBE: NOT DETECTED
SARS-COV-2 RNA NPH QL NAA+NON-PROBE: NOT DETECTED
SODIUM SERPL-SCNC: 131 MMOL/L (ref 136–145)
SODIUM SERPL-SCNC: 135 MMOL/L (ref 136–145)
SODIUM SERPL-SCNC: 136 MMOL/L (ref 136–145)
SODIUM SERPL-SCNC: 138 MMOL/L (ref 136–145)
SP GR UR STRIP: 1.01 (ref 1–1.03)
SP GR UR STRIP: 1.02 (ref 1–1.03)
SQUAMOUS #/AREA URNS HPF: ABNORMAL /HPF
SQUAMOUS #/AREA URNS HPF: ABNORMAL /HPF
TROPONIN T SERPL-MCNC: <0.01 NG/ML (ref 0–0.03)
UROBILINOGEN UR QL STRIP: ABNORMAL
UROBILINOGEN UR QL STRIP: ABNORMAL
WBC # BLD AUTO: 20.67 10*3/MM3 (ref 3.4–10.8)
WBC # BLD AUTO: 27.44 10*3/MM3 (ref 3.4–10.8)
WBC UR QL AUTO: ABNORMAL /HPF
WBC UR QL AUTO: ABNORMAL /HPF

## 2020-09-04 PROCEDURE — 86140 C-REACTIVE PROTEIN: CPT | Performed by: EMERGENCY MEDICINE

## 2020-09-04 PROCEDURE — 93010 ELECTROCARDIOGRAM REPORT: CPT | Performed by: INTERNAL MEDICINE

## 2020-09-04 PROCEDURE — 80048 BASIC METABOLIC PNL TOTAL CA: CPT | Performed by: NURSE PRACTITIONER

## 2020-09-04 PROCEDURE — 81001 URINALYSIS AUTO W/SCOPE: CPT | Performed by: EMERGENCY MEDICINE

## 2020-09-04 PROCEDURE — 83605 ASSAY OF LACTIC ACID: CPT | Performed by: EMERGENCY MEDICINE

## 2020-09-04 PROCEDURE — 85025 COMPLETE CBC W/AUTO DIFF WBC: CPT | Performed by: EMERGENCY MEDICINE

## 2020-09-04 PROCEDURE — 99285 EMERGENCY DEPT VISIT HI MDM: CPT

## 2020-09-04 PROCEDURE — 87186 SC STD MICRODIL/AGAR DIL: CPT | Performed by: EMERGENCY MEDICINE

## 2020-09-04 PROCEDURE — 87150 DNA/RNA AMPLIFIED PROBE: CPT | Performed by: EMERGENCY MEDICINE

## 2020-09-04 PROCEDURE — 71045 X-RAY EXAM CHEST 1 VIEW: CPT

## 2020-09-04 PROCEDURE — 87040 BLOOD CULTURE FOR BACTERIA: CPT | Performed by: EMERGENCY MEDICINE

## 2020-09-04 PROCEDURE — 83615 LACTATE (LD) (LDH) ENZYME: CPT | Performed by: EMERGENCY MEDICINE

## 2020-09-04 PROCEDURE — 80048 BASIC METABOLIC PNL TOTAL CA: CPT | Performed by: HOSPITALIST

## 2020-09-04 PROCEDURE — 25010000002 ONDANSETRON PER 1 MG: Performed by: NURSE PRACTITIONER

## 2020-09-04 PROCEDURE — 94799 UNLISTED PULMONARY SVC/PX: CPT

## 2020-09-04 PROCEDURE — 25010000002 PIPERACILLIN SOD-TAZOBACTAM PER 1 G: Performed by: NURSE PRACTITIONER

## 2020-09-04 PROCEDURE — 80053 COMPREHEN METABOLIC PANEL: CPT | Performed by: EMERGENCY MEDICINE

## 2020-09-04 PROCEDURE — 86140 C-REACTIVE PROTEIN: CPT | Performed by: NURSE PRACTITIONER

## 2020-09-04 PROCEDURE — 84145 PROCALCITONIN (PCT): CPT | Performed by: EMERGENCY MEDICINE

## 2020-09-04 PROCEDURE — 84484 ASSAY OF TROPONIN QUANT: CPT | Performed by: EMERGENCY MEDICINE

## 2020-09-04 PROCEDURE — 25010000002 PIPERACILLIN SOD-TAZOBACTAM PER 1 G: Performed by: EMERGENCY MEDICINE

## 2020-09-04 PROCEDURE — 25010000002 ONDANSETRON PER 1 MG: Performed by: EMERGENCY MEDICINE

## 2020-09-04 PROCEDURE — 25010000002 VANCOMYCIN 10 G RECONSTITUTED SOLUTION: Performed by: EMERGENCY MEDICINE

## 2020-09-04 PROCEDURE — 84145 PROCALCITONIN (PCT): CPT | Performed by: NURSE PRACTITIONER

## 2020-09-04 PROCEDURE — 0202U NFCT DS 22 TRGT SARS-COV-2: CPT | Performed by: EMERGENCY MEDICINE

## 2020-09-04 PROCEDURE — 93005 ELECTROCARDIOGRAM TRACING: CPT | Performed by: EMERGENCY MEDICINE

## 2020-09-04 PROCEDURE — 85027 COMPLETE CBC AUTOMATED: CPT | Performed by: NURSE PRACTITIONER

## 2020-09-04 PROCEDURE — 84703 CHORIONIC GONADOTROPIN ASSAY: CPT | Performed by: EMERGENCY MEDICINE

## 2020-09-04 PROCEDURE — 87086 URINE CULTURE/COLONY COUNT: CPT | Performed by: HOSPITALIST

## 2020-09-04 PROCEDURE — 83735 ASSAY OF MAGNESIUM: CPT | Performed by: HOSPITALIST

## 2020-09-04 PROCEDURE — 81001 URINALYSIS AUTO W/SCOPE: CPT | Performed by: HOSPITALIST

## 2020-09-04 PROCEDURE — 82550 ASSAY OF CK (CPK): CPT | Performed by: HOSPITALIST

## 2020-09-04 RX ORDER — ALBUTEROL SULFATE 2.5 MG/3ML
2.5 SOLUTION RESPIRATORY (INHALATION) EVERY 6 HOURS PRN
Status: DISCONTINUED | OUTPATIENT
Start: 2020-09-04 | End: 2020-09-08 | Stop reason: HOSPADM

## 2020-09-04 RX ORDER — BISACODYL 10 MG
10 SUPPOSITORY, RECTAL RECTAL DAILY PRN
Status: DISCONTINUED | OUTPATIENT
Start: 2020-09-04 | End: 2020-09-08 | Stop reason: HOSPADM

## 2020-09-04 RX ORDER — ALUMINA, MAGNESIA, AND SIMETHICONE 2400; 2400; 240 MG/30ML; MG/30ML; MG/30ML
15 SUSPENSION ORAL EVERY 6 HOURS PRN
Status: DISCONTINUED | OUTPATIENT
Start: 2020-09-04 | End: 2020-09-08 | Stop reason: HOSPADM

## 2020-09-04 RX ORDER — ONDANSETRON 2 MG/ML
4 INJECTION INTRAMUSCULAR; INTRAVENOUS EVERY 6 HOURS PRN
Status: DISCONTINUED | OUTPATIENT
Start: 2020-09-04 | End: 2020-09-08 | Stop reason: HOSPADM

## 2020-09-04 RX ORDER — BISACODYL 5 MG/1
5 TABLET, DELAYED RELEASE ORAL DAILY PRN
Status: DISCONTINUED | OUTPATIENT
Start: 2020-09-04 | End: 2020-09-08 | Stop reason: HOSPADM

## 2020-09-04 RX ORDER — ARIPIPRAZOLE 5 MG/1
5 TABLET ORAL NIGHTLY
COMMUNITY

## 2020-09-04 RX ORDER — ARIPIPRAZOLE 5 MG/1
5 TABLET ORAL DAILY
Status: DISCONTINUED | OUTPATIENT
Start: 2020-09-04 | End: 2020-09-08 | Stop reason: HOSPADM

## 2020-09-04 RX ORDER — POTASSIUM CHLORIDE 1.5 G/1.77G
40 POWDER, FOR SOLUTION ORAL AS NEEDED
Status: DISCONTINUED | OUTPATIENT
Start: 2020-09-04 | End: 2020-09-08 | Stop reason: HOSPADM

## 2020-09-04 RX ORDER — CYCLOBENZAPRINE HCL 10 MG
10 TABLET ORAL 3 TIMES DAILY PRN
Status: DISCONTINUED | OUTPATIENT
Start: 2020-09-04 | End: 2020-09-08 | Stop reason: HOSPADM

## 2020-09-04 RX ORDER — PANTOPRAZOLE SODIUM 40 MG/1
40 TABLET, DELAYED RELEASE ORAL EVERY MORNING
Status: DISCONTINUED | OUTPATIENT
Start: 2020-09-04 | End: 2020-09-04

## 2020-09-04 RX ORDER — SODIUM CHLORIDE 0.9 % (FLUSH) 0.9 %
10 SYRINGE (ML) INJECTION AS NEEDED
Status: DISCONTINUED | OUTPATIENT
Start: 2020-09-04 | End: 2020-09-08 | Stop reason: HOSPADM

## 2020-09-04 RX ORDER — FAMOTIDINE 10 MG/ML
20 INJECTION, SOLUTION INTRAVENOUS EVERY 12 HOURS SCHEDULED
Status: DISCONTINUED | OUTPATIENT
Start: 2020-09-04 | End: 2020-09-08 | Stop reason: HOSPADM

## 2020-09-04 RX ORDER — SODIUM CHLORIDE 9 MG/ML
75 INJECTION, SOLUTION INTRAVENOUS CONTINUOUS
Status: DISCONTINUED | OUTPATIENT
Start: 2020-09-04 | End: 2020-09-08

## 2020-09-04 RX ORDER — VENLAFAXINE 25 MG/1
25 TABLET ORAL 2 TIMES DAILY WITH MEALS
Status: DISCONTINUED | OUTPATIENT
Start: 2020-09-04 | End: 2020-09-08 | Stop reason: HOSPADM

## 2020-09-04 RX ORDER — ONDANSETRON 4 MG/1
4 TABLET, FILM COATED ORAL EVERY 8 HOURS PRN
Status: DISCONTINUED | OUTPATIENT
Start: 2020-09-04 | End: 2020-09-08 | Stop reason: HOSPADM

## 2020-09-04 RX ORDER — POTASSIUM CHLORIDE 750 MG/1
40 CAPSULE, EXTENDED RELEASE ORAL AS NEEDED
Status: DISCONTINUED | OUTPATIENT
Start: 2020-09-04 | End: 2020-09-08 | Stop reason: HOSPADM

## 2020-09-04 RX ORDER — ACETAMINOPHEN 325 MG/1
650 TABLET ORAL EVERY 6 HOURS PRN
Status: DISCONTINUED | OUTPATIENT
Start: 2020-09-04 | End: 2020-09-08 | Stop reason: HOSPADM

## 2020-09-04 RX ORDER — CETIRIZINE HYDROCHLORIDE 10 MG/1
10 TABLET ORAL DAILY
Status: DISCONTINUED | OUTPATIENT
Start: 2020-09-04 | End: 2020-09-08 | Stop reason: HOSPADM

## 2020-09-04 RX ORDER — SUCRALFATE 1 G/1
1 TABLET ORAL
Status: DISCONTINUED | OUTPATIENT
Start: 2020-09-04 | End: 2020-09-08 | Stop reason: HOSPADM

## 2020-09-04 RX ORDER — ACETAMINOPHEN 325 MG/1
650 TABLET ORAL EVERY 4 HOURS PRN
Status: DISCONTINUED | OUTPATIENT
Start: 2020-09-04 | End: 2020-09-08 | Stop reason: HOSPADM

## 2020-09-04 RX ORDER — ONDANSETRON 2 MG/ML
8 INJECTION INTRAMUSCULAR; INTRAVENOUS ONCE
Status: COMPLETED | OUTPATIENT
Start: 2020-09-04 | End: 2020-09-04

## 2020-09-04 RX ORDER — ACETAMINOPHEN 500 MG
1000 TABLET ORAL ONCE
Status: COMPLETED | OUTPATIENT
Start: 2020-09-04 | End: 2020-09-04

## 2020-09-04 RX ORDER — FLUTICASONE PROPIONATE 50 MCG
2 SPRAY, SUSPENSION (ML) NASAL DAILY
Status: DISCONTINUED | OUTPATIENT
Start: 2020-09-04 | End: 2020-09-08 | Stop reason: HOSPADM

## 2020-09-04 RX ORDER — BUSPIRONE HYDROCHLORIDE 15 MG/1
15 TABLET ORAL 3 TIMES DAILY
Status: DISCONTINUED | OUTPATIENT
Start: 2020-09-04 | End: 2020-09-08 | Stop reason: HOSPADM

## 2020-09-04 RX ORDER — VENLAFAXINE 25 MG/1
25 TABLET ORAL NIGHTLY
COMMUNITY

## 2020-09-04 RX ORDER — ONDANSETRON 4 MG/1
4 TABLET, FILM COATED ORAL EVERY 6 HOURS PRN
Status: DISCONTINUED | OUTPATIENT
Start: 2020-09-04 | End: 2020-09-08 | Stop reason: HOSPADM

## 2020-09-04 RX ADMIN — ACETAMINOPHEN 1000 MG: 500 TABLET ORAL at 03:24

## 2020-09-04 RX ADMIN — BUSPIRONE HYDROCHLORIDE 15 MG: 15 TABLET ORAL at 17:14

## 2020-09-04 RX ADMIN — SODIUM CHLORIDE 1000 ML: 9 INJECTION, SOLUTION INTRAVENOUS at 03:25

## 2020-09-04 RX ADMIN — TAZOBACTAM SODIUM AND PIPERACILLIN SODIUM 3.38 G: 375; 3 INJECTION, SOLUTION INTRAVENOUS at 20:40

## 2020-09-04 RX ADMIN — CETIRIZINE HYDROCHLORIDE 10 MG: 10 TABLET ORAL at 11:53

## 2020-09-04 RX ADMIN — SUCRALFATE 1 G: 1 TABLET ORAL at 20:40

## 2020-09-04 RX ADMIN — SODIUM CHLORIDE 3390 ML: 9 INJECTION, SOLUTION INTRAVENOUS at 04:27

## 2020-09-04 RX ADMIN — FAMOTIDINE 20 MG: 10 INJECTION INTRAVENOUS at 13:54

## 2020-09-04 RX ADMIN — VENLAFAXINE 25 MG: 25 TABLET ORAL at 11:53

## 2020-09-04 RX ADMIN — ONDANSETRON 8 MG: 2 INJECTION INTRAMUSCULAR; INTRAVENOUS at 03:25

## 2020-09-04 RX ADMIN — SUCRALFATE 1 G: 1 TABLET ORAL at 17:14

## 2020-09-04 RX ADMIN — BUSPIRONE HYDROCHLORIDE 15 MG: 15 TABLET ORAL at 20:40

## 2020-09-04 RX ADMIN — ONDANSETRON 4 MG: 2 INJECTION INTRAMUSCULAR; INTRAVENOUS at 13:59

## 2020-09-04 RX ADMIN — ARIPIPRAZOLE 5 MG: 5 TABLET ORAL at 11:53

## 2020-09-04 RX ADMIN — SODIUM CHLORIDE 125 ML/HR: 9 INJECTION, SOLUTION INTRAVENOUS at 11:54

## 2020-09-04 RX ADMIN — POTASSIUM CHLORIDE 40 MEQ: 1.5 POWDER, FOR SOLUTION ORAL at 17:14

## 2020-09-04 RX ADMIN — POTASSIUM CHLORIDE 40 MEQ: 1.5 POWDER, FOR SOLUTION ORAL at 15:00

## 2020-09-04 RX ADMIN — BUSPIRONE HYDROCHLORIDE 15 MG: 15 TABLET ORAL at 11:53

## 2020-09-04 RX ADMIN — FAMOTIDINE 20 MG: 10 INJECTION INTRAVENOUS at 20:40

## 2020-09-04 RX ADMIN — VANCOMYCIN HYDROCHLORIDE 2250 MG: 10 INJECTION, POWDER, LYOPHILIZED, FOR SOLUTION INTRAVENOUS at 05:00

## 2020-09-04 RX ADMIN — TAZOBACTAM SODIUM AND PIPERACILLIN SODIUM 3.38 G: 375; 3 INJECTION, SOLUTION INTRAVENOUS at 04:27

## 2020-09-04 RX ADMIN — VENLAFAXINE 25 MG: 25 TABLET ORAL at 17:14

## 2020-09-04 RX ADMIN — TAZOBACTAM SODIUM AND PIPERACILLIN SODIUM 3.38 G: 375; 3 INJECTION, SOLUTION INTRAVENOUS at 13:54

## 2020-09-04 RX ADMIN — PANTOPRAZOLE SODIUM 40 MG: 40 TABLET, DELAYED RELEASE ORAL at 11:53

## 2020-09-04 NOTE — H&P
Patient Name:  Beatriz Boland  YOB: 1990  MRN:  [id number]  Admit Date:  9/4/2020  Patient Care Team:  David Starr MD as PCP - General (Family Medicine)  Maria Ines Eisenberg MD as Consulting Physician (Otolaryngology)  Wesley Ferro MD as Consulting Physician (Gastroenterology)  Chao Mayberry MD (Ophthalmology)  Shannon Burr PA (Physician Assistant)  Colette Caal APRN as Nurse Practitioner (Family Medicine)  Víctor Gunter MD as Consulting Physician (Cardiology)  Dedrick Jaimes MD as Consulting Physician (Urology)  Nacho Pacheco MD as Consulting Physician (Hand Surgery)  Francisco Bond MD as Consulting Physician (Obstetrics and Gynecology)  Elyse Larkin APRN (Family Medicine)      Subjective   History Present Illness     Chief Complaint   Patient presents with   • Fever   • Cough   • Headache        Patient is a 30 year old female with a history of GERD, anxiety, depression, asthma, tobacco use and obesity who presents with N/V/D x 3 days with fever and chills.  Patient with limited fund of knowledge due to developmental mental delay. She lives with mom who is not at  bedside. Patient  reports painful urination, urinary frequency, urgency that started 7 days ago. She denies pelvic pain, flank pain or abdominal pain.  She denies hematuria or vaginal discharge. She reports having at least 3 episodes of vomiting and 2 episodes of diarrhea per day. She denies the presence of blood in either. She has not had any nausea, vomiting or diarrhea today. She reports that she has been able to keep very little food or water down in several days with minimal appetite.  Denies cough, wheezing, shortness of breath, chest pain, rash. Patient reports of symptoms currently not quite consistent.          History of Present Illness  Review of Systems   Constitutional: Positive for activity change, appetite change, chills, fatigue and fever. Negative for  unexpected weight change.   HENT: Negative for congestion and trouble swallowing.    Eyes: Negative for visual disturbance.   Respiratory: Negative for cough, choking, chest tightness and shortness of breath.    Cardiovascular: Negative for chest pain, palpitations and leg swelling.   Gastrointestinal: Positive for nausea and vomiting. Negative for abdominal distention, abdominal pain, blood in stool, constipation and diarrhea.   Endocrine: Negative for polydipsia, polyphagia and polyuria.   Genitourinary: Positive for dysuria, frequency and urgency. Negative for difficulty urinating, vaginal bleeding and vaginal discharge.   Musculoskeletal: Negative for back pain.   Skin: Negative for color change.   Allergic/Immunologic: Negative for immunocompromised state.   Neurological: Negative for dizziness, weakness and headaches.   Hematological: Does not bruise/bleed easily.   Psychiatric/Behavioral: Negative.  Negative for confusion.        Personal History     Past Medical History:   Diagnosis Date   • Allergic    • Amblyopia    • Anxiety    • Back pain    • Cleft palate    • Depression    • Developmental delay    • GERD (gastroesophageal reflux disease)     followed by GI, Dr. Wesley Ferro   • Impetigo    • Kidney abscess    • Kidney stone    • Muscle spasm    • Obesity (BMI 30-39.9)    • Recurrent otitis media    • Scoliosis    • Sinus infection     recurrent   • Sprain of shoulder, left 12/2016     Past Surgical History:   Procedure Laterality Date   • ABSCESS DRAINAGE      kidney   • ADENOIDECTOMY     • EAR TUBES     • PHARYNGEAL FLAP      for speech   • TONSILLECTOMY       Family History   Problem Relation Age of Onset   • COPD Mother    • Arthritis Mother    • Obesity Mother    • Gestational diabetes Mother    • Cancer Father    • Scoliosis Father    • Rheum arthritis Maternal Aunt    • Diabetes Maternal Uncle    • Alcohol abuse Maternal Uncle    • Rheum arthritis Maternal Grandmother    • COPD Maternal  Grandmother    • Stroke Maternal Grandfather    • Alcohol abuse Paternal Uncle      Social History     Tobacco Use   • Smoking status: Current Some Day Smoker   • Smokeless tobacco: Never Used   • Tobacco comment: smokes hookah 1-2 times per week     Substance Use Topics   • Alcohol use: Yes     Comment: occasionally, 1-2 glasses of wine 1-2 x per week   • Drug use: No     No current facility-administered medications on file prior to encounter.      Current Outpatient Medications on File Prior to Encounter   Medication Sig Dispense Refill   • Acetaminophen (TYLENOL) 325 MG capsule Take 325 mg by mouth As Needed.     • albuterol sulfate  (90 Base) MCG/ACT inhaler Inhale 2 puffs Every 4 (Four) Hours As Needed for Wheezing. 18 g 3   • ARIPiprazole (ABILIFY) 5 MG tablet Take 5 mg by mouth Daily.     • busPIRone (BUSPAR) 7.5 MG tablet Take 2 tablets by mouth 3 (Three) Times a Day. 30 tablet 2   • cetirizine (zyrTEC) 10 MG tablet Take 10 mg by mouth Daily.     • [employer] NASAL ALLERGY SPRAY 55 MCG/ACT nasal inhaler 2 sprays into each nostril Daily.  0   • cyclobenzaprine (FLEXERIL) 5 MG tablet Take 2 tablets by mouth 3 (Three) Times a Day As Needed for Muscle Spasms. 90 tablet 5   • dexlansoprazole (DEXILANT) 60 MG capsule Take 60 mg by mouth Daily.     • estradiol cypionate (DEPO-ESTRADIOL) 5 MG/ML injection Inject 1.5 mg into the shoulder, thigh, or buttocks Every 28 (Twenty-Eight) Days. Patient states she takes it every 3 months     • fluticasone (FLONASE) 50 MCG/ACT nasal spray      • naproxen (EC NAPROSYN) 500 MG EC tablet Take 1 tablet by mouth Daily. With food 30 tablet 1   • omeprazole (prilOSEC) 10 MG capsule Take 10 mg by mouth.     • ondansetron (ZOFRAN) 4 MG tablet Take 1 tablet by mouth Every 8 (Eight) Hours As Needed for Nausea or Vomiting. 30 tablet 1   • venlafaxine (EFFEXOR) 25 MG tablet Take 25 mg by mouth 2 (Two) Times a Day.     • [DISCONTINUED] polyethylene glycol (MIRALAX) packet Take 17 g by mouth  Daily. 30 each 0   • Chlorcyclizine-Pseudoephed (STAHIST AD) 25-60 MG tablet Take 25 mg by mouth Daily. 42 tablet 0   • [DISCONTINUED] neomycin-polymyxin-hydrocortisone (CORTISPORIN) 3.5-97747-3 otic solution Administer 3 drops into both ears 3 (Three) Times a Day. 10 mL 0   • [DISCONTINUED] nitrofurantoin, macrocrystal-monohydrate, (MACROBID) 100 MG capsule Take 1 capsule by mouth 2 (Two) Times a Day. 14 capsule 0   • [DISCONTINUED] pseudoephedrine (SUDAFED) 15 MG/5ML liquid liquid Take  by mouth.       Allergies   Allergen Reactions   • Ciprofloxacin Diarrhea and Nausea And Vomiting     Tingling in left leg   • Dust Mite Extract      Other reaction(s): Other (See Comments)  Nasal symptoms       Objective    Objective     Vital Signs  Temp:  [97.7 °F (36.5 °C)-103.2 °F (39.6 °C)] 97.7 °F (36.5 °C)  Heart Rate:  [] 80  Resp:  [20-21] 20  BP: (87-97)/(51-74) 90/74  SpO2:  [96 %-97 %] 97 %  on   ;   Device (Oxygen Therapy): room air  Body mass index is 39.16 kg/m².    Physical Exam   Constitutional: She is oriented to person, place, and time. She appears well-developed and well-nourished. No distress.   Ill but non toxic appearing. Looks stated age. obese   HENT:   Head: Normocephalic and atraumatic.   Eyes: Pupils are equal, round, and reactive to light.   Cardiovascular: Normal rate, regular rhythm and normal heart sounds.   Pulmonary/Chest: Effort normal and breath sounds normal. No respiratory distress. She has no wheezes.   Abdominal: Soft. Bowel sounds are normal. She exhibits no shifting dullness, no distension, no pulsatile liver, no fluid wave, no abdominal bruit, no ascites, no pulsatile midline mass and no mass. There is no hepatosplenomegaly. There is no tenderness. There is no rigidity, no guarding and no CVA tenderness. No hernia.   Soft round obese non tender   Musculoskeletal: Normal range of motion.   Neurological: She is alert and oriented to person, place, and time.   Skin: Skin is warm and  dry.   Psychiatric: She has a normal mood and affect. Her behavior is normal. Thought content normal.   Nursing note and vitals reviewed.      Results Review:  I reviewed the patient's new clinical results.  I reviewed the patient's new imaging results and agree with the interpretation.  I reviewed the patient's other test results and agree with the interpretation  I personally viewed and interpreted the patient's EKG/Telemetry data  Discussed with ED provider.    Lab Results (last 24 hours)     Procedure Component Value Units Date/Time    CBC & Differential [[id number]] Collected:  09/04/20 0329    Specimen:  Blood Updated:  09/04/20 0352    Narrative:       The following orders were created for panel order CBC & Differential.  Procedure                               Abnormality         Status                     ---------                               -----------         ------                     CBC Auto Differential[[id number]]        Abnormal            Final result                 Please view results for these tests on the individual orders.    Comprehensive Metabolic Panel [[id number]]  (Abnormal) Collected:  09/04/20 0329    Specimen:  Blood Updated:  09/04/20 0418     Glucose 117 mg/dL      BUN 20 mg/dL      Creatinine 1.67 mg/dL      Sodium 131 mmol/L      Potassium 3.2 mmol/L      Chloride 97 mmol/L      CO2 18.3 mmol/L      Calcium 9.0 mg/dL      Total Protein 7.2 g/dL      Albumin 3.10 g/dL      ALT (SGPT) 11 U/L      AST (SGOT) 10 U/L      Alkaline Phosphatase 76 U/L      Total Bilirubin 0.8 mg/dL      eGFR Non African Amer 36 mL/min/1.73      Globulin 4.1 gm/dL      A/G Ratio 0.8 g/dL      BUN/Creatinine Ratio 12.0     Anion Gap 15.7 mmol/L     Narrative:       GFR Normal >60  Chronic Kidney Disease <60  Kidney Failure <15      Lactate Dehydrogenase [[id number]]  (Normal) Collected:  09/04/20 0329    Specimen:  Blood Updated:  09/04/20 0418      U/L     Procalcitonin [[id number]]  (Abnormal)  "Collected:  09/04/20 0329    Specimen:  Blood Updated:  09/04/20 0435     Procalcitonin 2.64 ng/mL     Narrative:       As a Marker for Sepsis (Non-Neonates):   1. <0.5 ng/mL represents a low risk of severe sepsis and/or septic shock.  1. >2 ng/mL represents a high risk of severe sepsis and/or septic shock.    As a Marker for Lower Respiratory Tract Infections that require antibiotic therapy:  PCT on Admission     Antibiotic Therapy             6-12 Hrs later  > 0.5                Strongly Recommended            >0.25 - <0.5         Recommended  0.1 - 0.25           Discouraged                   Remeasure/reassess PCT  <0.1                 Strongly Discouraged          Remeasure/reassess PCT      As 28 day mortality risk marker: \"Change in Procalcitonin Result\" (> 80 % or <=80 %) if Day 0 (or Day 1) and Day 4 values are available. Refer to http://www.[employer]pct-calculator.com/   Change in PCT <=80 %   A decrease of PCT levels below or equal to 80 % defines a positive change in PCT test result representing a higher risk for 28-day all-cause mortality of patients diagnosed with severe sepsis or septic shock.  Change in PCT > 80 %   A decrease of PCT levels of more than 80 % defines a negative change in PCT result representing a lower risk for 28-day all-cause mortality of patients diagnosed with severe sepsis or septic shock.                Results may be falsely decreased if patient taking Biotin.     C-reactive Protein [[id number]]  (Abnormal) Collected:  09/04/20 0329    Specimen:  Blood Updated:  09/04/20 0430     C-Reactive Protein 40.39 mg/dL     hCG, Serum, Qualitative [[id number]]  (Normal) Collected:  09/04/20 0329    Specimen:  Blood Updated:  09/04/20 0411     HCG Qualitative Negative    Troponin [[id number]]  (Normal) Collected:  09/04/20 0329    Specimen:  Blood Updated:  09/04/20 0422     Troponin T <0.010 ng/mL     Narrative:       Troponin T Reference Range:  <= 0.03 ng/mL-   Negative for AMI  >0.03 " ng/mL-     Abnormal for myocardial necrosis.  Clinicians would have to utilize clinical acumen, EKG, Troponin and serial changes to determine if it is an Acute Myocardial Infarction or myocardial injury due to an underlying chronic condition.       Results may be falsely decreased if patient taking Biotin.      CBC Auto Differential [[id number]]  (Abnormal) Collected:  09/04/20 0329    Specimen:  Blood Updated:  09/04/20 0352     WBC 27.44 10*3/mm3      RBC 4.45 10*6/mm3      Hemoglobin 12.2 g/dL      Hematocrit 38.3 %      MCV 86.1 fL      MCH 27.4 pg      MCHC 31.9 g/dL      RDW 13.4 %      RDW-SD 41.9 fl      MPV 9.8 fL      Platelets 204 10*3/mm3      Neutrophil % 83.7 %      Lymphocyte % 5.0 %      Monocyte % 7.7 %      Eosinophil % 0.0 %      Basophil % 0.2 %      Immature Grans % 3.4 %      Neutrophils, Absolute 22.97 10*3/mm3      Lymphocytes, Absolute 1.36 10*3/mm3      Monocytes, Absolute 2.11 10*3/mm3      Eosinophils, Absolute 0.00 10*3/mm3      Basophils, Absolute 0.06 10*3/mm3      Immature Grans, Absolute 0.94 10*3/mm3      nRBC 0.0 /100 WBC     Respiratory Panel PCR w/COVID-19(SARS-CoV-2) ANNIA/LORIE/SALTY/PAD/COR/MAD In-House, NP Swab in [facility], [place], 3-4 HR TAT - Swab, Nasopharynx [[id number]]  (Normal) Collected:  09/04/20 0330    Specimen:  Swab from Nasopharynx Updated:  09/04/20 0454     ADENOVIRUS, PCR Not Detected     Coronavirus 229E Not Detected     Coronavirus HKU1 Not Detected     Coronavirus NL63 Not Detected     Coronavirus OC43 Not Detected     COVID19 Not Detected     Human Metapneumovirus Not Detected     Human Rhinovirus/Enterovirus Not Detected     Influenza A PCR Not Detected     Influenza A H1 Not Detected     Influenza A H1 2009 PCR Not Detected     Influenza A H3 Not Detected     Influenza B PCR Not Detected     Parainfluenza Virus 1 Not Detected     Parainfluenza Virus 2 Not Detected     Parainfluenza Virus 3 Not Detected     Parainfluenza Virus 4 Not Detected     RSV, PCR Not Detected      Bordetella pertussis pcr Not Detected     Bordetella parapertussis PCR Not Detected     Chlamydophila pneumoniae PCR Not Detected     Mycoplasma pneumo by PCR Not Detected    Narrative:       Fact sheet for providers: https://docs.[employer]/wp-content/uploads/AJX9744-1415-IC3.1-EUA-Provider-Fact-Sheet-3.pdf    Fact sheet for patients: https://docs.[employer]/wp-content/uploads/UVC2375-2082-YZ0.1-EUA-Patient-Fact-Sheet-1.pdf    Blood Culture - Blood, Arm, Left [[id number]] Collected:  09/04/20 0425    Specimen:  Blood from Arm, Left Updated:  09/04/20 0430    Blood Culture - Blood, Arm, Right [[id number]] Collected:  09/04/20 0425    Specimen:  Blood from Arm, Right Updated:  09/04/20 0430    Lactic Acid, Plasma [[id number]]  (Normal) Collected:  09/04/20 0425    Specimen:  Blood Updated:  09/04/20 0450     Lactate 1.4 mmol/L     Urinalysis With Microscopic If Indicated (No Culture) - Urine, Clean Catch [[id number]]  (Abnormal) Collected:  09/04/20 0501    Specimen:  Urine, Clean Catch Updated:  09/04/20 0529     Color, UA Dark Yellow     Appearance, UA Turbid     pH, UA <=5.0     Specific Gravity, UA 1.016     Glucose, UA Negative     Ketones, UA Trace     Bilirubin, UA Negative     Blood, UA Large (3+)     Protein, UA >=300 mg/dL (3+)     Leuk Esterase, UA Large (3+)     Nitrite, UA Negative     Urobilinogen, UA 1.0 E.U./dL    Urinalysis, Microscopic Only - Urine, Clean Catch [[id number]]  (Abnormal) Collected:  09/04/20 0501    Specimen:  Urine, Clean Catch Updated:  09/04/20 0546     RBC, UA       Unable to determine due to loaded field     /HPF     WBC, UA Too Numerous to Count /HPF      Bacteria, UA       Unable to determine due to loaded field     /HPF     Squamous Epithelial Cells, UA Too Numerous to Count /HPF      Hyaline Casts, UA None Seen /LPF      Methodology Manual Light Microscopy        Results from last 7 days   Lab Units 09/04/20  0956 09/04/20  0329   WBC 10*3/mm3 20.67* 27.44*   HEMOGLOBIN g/dL  11.1* 12.2   HEMATOCRIT % 34.2 38.3   PLATELETS 10*3/mm3 187 204     Results from last 7 days   Lab Units 09/04/20  0956 09/04/20  0329   SODIUM mmol/L 135* 131*   POTASSIUM mmol/L 3.2* 3.2*   CHLORIDE mmol/L 106 97*   CO2 mmol/L 17.9* 18.3*   BUN mg/dL 19 20   CREATININE mg/dL 1.66* 1.67*   CALCIUM mg/dL 8.1* 9.0   BILIRUBIN mg/dL  --  0.8   ALK PHOS U/L  --  76   ALT (SGPT) U/L  --  11   AST (SGOT) U/L  --  10   GLUCOSE mg/dL 109* 117*         Imaging Results (Last 24 Hours)     Procedure Component Value Units Date/Time    XR Chest AP [[id number]] Collected:  09/04/20 0714     Updated:  09/04/20 0714    Narrative:       UPRIGHT CHEST     HISTORY: 30-year-old female with fever. Covid evaluation.     COMPARISON: AP chest 09/01/2019.     FINDINGS: The cardiomediastinal silhouette is within normal limits  allowing for some limitation due to patient's body type. The lungs  appear clear of focal airspace disease and there is no evidence for  pulmonary edema or pleural effusion.       Impression:       No evidence for active disease in the chest.                ECG 12 Lead    (Results Pending)        Assessment/Plan     Active Hospital Problems    Diagnosis  POA   • Sepsis (CMS/HCC) [A41.9]  Yes   • Acute UTI (urinary tract infection) [N39.0]  Yes   • Hypokalemia [E87.6]  Yes   • WILIAN (acute kidney injury) (CMS/HCC) [N17.9]  Yes   • Mild intermittent asthma without complication [J45.20]  Yes   • GERD without esophagitis [K21.9]  Yes      Resolved Hospital Problems   No resolved problems to display.       Patient is a 30 year old female who presented to ER with tachycardia, hypotension and a fever of 103.2. She received APAP, NS bolus for sepsis protocol, vancomycin and Zosyn overnight and at the time of this exam is NSR, mildly hypotensive, 97% on RA and afebrile. Procal 2.64, CRP 40.39 elevated. Normal lactic acid. CT a/p not performed in ER but benign exam.       UTI/Urosepsis   · Check Blood cx x 2, Add Urine cx, not  obtained with original specimen  · Continue  IV zosyn until urine culture obtained. No hx of ESBL in EMR  · NS IVF, APAP for fever, surveillance labs  · N/V likely due to urosepsis. Resolved today  · No flank or pelvic tenderness on exam    Hyponatremia/Hypokalemia  · Secondary to GI losses and lack of po intake  · K+ - 3.2, Na+ 131-->135, Potassium replacement protocol, continue IVF    WILIAN   · Baseline Crt 0.8 currently 1.66  · Prerenal vs ATN  · Continue IVF and if no improvement on repeat BMP this afternoon will check urine studies with renal consult.     GERD   · Continue Pepcid instead of PPI, complains of Acid reflux now add carafate. Hold NSAIDs. Not on naproxen as listed on home med rec.   · She takes either dexilant or prilosec whichever is available.     Asthma   ·  no wheezing or respiratory distress on exam.   · Continue PRN albuterol nebs, cetirizine    Anxiety/Depression - Continue effexor, abilify, buspar    Tobacco abuse - Educate on smoking cessation, nicotine patch    ·    · I discussed the patient's findings and my recommendations with patient and nursing staff.    VTE Prophylaxis - SCDs.  Code Status - Full code.       LIVIA Chong  [facility]  09/04/20  08:58

## 2020-09-04 NOTE — PLAN OF CARE
Vss, A&O, RO, NSR/ST. Up with standby assist to bathroom. IVF infusing @125/hr. Replacing potassium per protocol. Pt had one episode of emesis today, symptoms relieved with PRN zofran. Loose stools. Straight cath obtained for urine culture. CTM

## 2020-09-04 NOTE — PROGRESS NOTES
"Pharmacokinetic Consult - Zosyn Dosing    Beatriz Boland is a 30 y.o. female who is on day 1 pharmacy to dose Zosyn for UTI.  Pharmacy dosing Zosyn per LIVIA Thorne's request.   Other antimicrobials: none    Relevant clinical data and objective history reviewed:  170.2 cm (67\")  113 kg (250 lb)  Body mass index is 39.16 kg/m².     She has a past medical history of Allergic, Amblyopia, Anxiety, Back pain, Cleft palate, Depression, Developmental delay, GERD (gastroesophageal reflux disease), Impetigo, Kidney abscess, Kidney stone, Muscle spasm, Obesity (BMI 30-39.9), Recurrent otitis media, Scoliosis, Sinus infection, and Sprain of shoulder, left (12/2016).    Allergies as of 09/04/2020 - Reviewed 09/04/2020   Allergen Reaction Noted   • Ciprofloxacin Diarrhea and Nausea And Vomiting 10/07/2016   • Dust mite extract  10/07/2016     Vital Signs (last 24 hours)       09/03 0700  -  09/04 0659 09/04 0700  -  09/04 1243   Most Recent    Temp (°F) 97.7 -  103.2    97.6 -  98.9     98.9 (37.2)    Heart Rate 80 -  161    67 -  96     96    Resp 20 -  21    18 -  20     18    BP 87/58 -  97/58    93/66 -  117/83     117/83    SpO2 (%) 96 -  97    97 -  98     98        Estimated Creatinine Clearance: 64.9 mL/min (A) (by C-G formula based on SCr of 1.66 mg/dL (H)).  Results from last 7 days   Lab Units 09/04/20  0956 09/04/20  0329   CREATININE mg/dL 1.66* 1.67*     Results from last 7 days   Lab Units 09/04/20  0956 09/04/20  0329   WBC 10*3/mm3 20.67* 27.44*     Baseline culture/source/susceptibility:   9/4: RVP negative  9/4: Blood cx x2 in process  9/4: New urinalysis, with possible culture in process    Anti-Infectives (From admission, onward)    Ordered     Dose/Rate Route Frequency Start Stop    09/04/20 1241  piperacillin-tazobactam (ZOSYN) 3.375 g in iso-osmotic dextrose 50 ml (premix)     Ordering Provider:  Beatriz Navarro APRN    3.375 g  over 4 Hours Intravenous Every 8 Hours 09/04/20 1300 " 09/07/20 1329    09/04/20 1204  Pharmacy to Dose Zosyn     Ordering Provider:  Beatriz Navarro APRN     Does not apply Continuous PRN 09/04/20 1203 09/07/20 1202    09/04/20 0411  vancomycin 2250 mg/500 mL 0.9% NS IVPB ([facility])     Ordering Provider:  Aron Benitez MD    20 mg/kg × 113 kg Intravenous Once 09/04/20 0413 09/04/20 0500    09/04/20 0411  piperacillin-tazobactam (ZOSYN) 3.375 g in iso-osmotic dextrose 50 ml (premix)     Ordering Provider:  Aron Benitez MD    3.375 g  over 30 Minutes Intravenous Once 09/04/20 0413 09/04/20 0505           Assessment/Plan  Zosyn 3.375g q8h EI as per [facility] dosing guidelines as estimated CrCl 65 at this time. Spoke with nurse, patient is producing urine even in setting of WILIAN, repeat BMP to be collected this afternoon to evaluate renal function changes.     Pharmacy will continue to follow daily while on Zosyn and adjust as needed.     Thanks,   Elba Malone [facility]

## 2020-09-04 NOTE — ED NOTES
IV -RAC was d/c'd due to infiltration.    Rachel Adkins RN  09/04/20 0439       Rachel Adkins RN  09/04/20 1065

## 2020-09-04 NOTE — ED PROVIDER NOTES
EMERGENCY DEPARTMENT ENCOUNTER    Room Number:  05/05  Date of encounter:  9/4/2020  PCP: David Starr MD    HPI:  Context: Beatriz Boland is a 30 y.o. female who presents to the ED c/o chief complaint of  nausea vomiting.  Patient reports that she began to feel ill Tuesday evening.  Patient complains of nonproductive cough, dyspnea on exertion, occasional, no dyspnea at rest.  Patient denies any runny nose, congestion, sore throat, no loss of sense of smell or taste.  Patient has been having daily emesis, 3 times daily, nonbloody nonbilious.  Patient denies abdominal pain.  Patient reports 1-2 episodes of diarrhea a day, no blood or mucus.  Denies any dysuria, no hematuria, no increase in urinary frequency or urgency.  Patient has had shakes chills and subjective fevers.  Patient is a smoker, has not smoked for the last several days.    MEDICAL HISTORY REVIEW  Reviewed in EPIC      PAST MEDICAL HISTORY  Active Ambulatory Problems     Diagnosis Date Noted   • Depression    • Developmental delay    • GERD (gastroesophageal reflux disease)    • Scoliosis    • Amblyopia    • Back pain    • Chronic left shoulder pain 09/29/2017   • Muscle spasm 09/29/2017   • Seasonal allergies 11/05/2017   • Panic attacks 11/15/2017   • Anxiety 01/04/2018   • Mild intermittent asthma without complication 03/24/2018   • Chest pain on breathing 08/10/2018   • Nicotine vapor product user 08/10/2018   • Class 3 obesity without serious comorbidity with body mass index (BMI) of 40.0 to 44.9 in adult 08/10/2018     Resolved Ambulatory Problems     Diagnosis Date Noted   • Sinus bradycardia 09/29/2014   • Palpitations 11/15/2017   • Acute otitis externa of both ears 11/15/2017   • Atypical chest pain 01/04/2018     Past Medical History:   Diagnosis Date   • Allergic    • Cleft palate    • Impetigo    • Kidney abscess    • Kidney stone    • Obesity (BMI 30-39.9)    • Recurrent otitis media    • Sinus infection    • Sprain of  shoulder, left 12/2016       PAST SURGICAL HISTORY  Past Surgical History:   Procedure Laterality Date   • ABSCESS DRAINAGE      kidney   • ADENOIDECTOMY     • EAR TUBES     • PHARYNGEAL FLAP      for speech   • TONSILLECTOMY         FAMILY HISTORY  Family History   Problem Relation Age of Onset   • COPD Mother    • Arthritis Mother    • Obesity Mother    • Gestational diabetes Mother    • Cancer Father    • Scoliosis Father    • Rheum arthritis Maternal Aunt    • Diabetes Maternal Uncle    • Alcohol abuse Maternal Uncle    • Rheum arthritis Maternal Grandmother    • COPD Maternal Grandmother    • Stroke Maternal Grandfather    • Alcohol abuse Paternal Uncle        SOCIAL HISTORY  Social History     Socioeconomic History   • Marital status: Single     Spouse name: Not on file   • Number of children: Not on file   • Years of education: Not on file   • Highest education level: Not on file   Tobacco Use   • Smoking status: Current Some Day Smoker   • Smokeless tobacco: Never Used   • Tobacco comment: smokes hookah 1-2 times per week     Substance and Sexual Activity   • Alcohol use: Yes     Comment: occasionally, 1-2 glasses of wine 1-2 x per week   • Drug use: No   • Sexual activity: Yes     Partners: Male     Birth control/protection: Injection   Social History Narrative             ALLERGIES  Ciprofloxacin and Dust mite extract    The patient's allergies have been reviewed    REVIEW OF SYSTEMS  All systems reviewed and negative except for those discussed in HPI.     PHYSICAL EXAM  I have reviewed the triage vital signs and nursing notes.  ED Triage Vitals   Temp Heart Rate Resp BP SpO2   09/04/20 0247 09/04/20 0247 09/04/20 0247 09/04/20 0337 09/04/20 0247   (!) 103.2 °F (39.6 °C) (!) 161 21 (!) 87/59 96 %      Temp src Heart Rate Source Patient Position BP Location FiO2 (%)   09/04/20 0247 -- -- -- --   Tympanic         GENERAL: No acute distress  HENT: NCAT, PERRL, Nares patent  EYES: no scleral icterus  NECK:  trachea midline, no ROM limitations  CV: regular rhythm, tachycardic rate  RESPIRATORY: normal effort, clear to auscultation bilaterally  ABDOMEN: soft, nondistended, nontender palpation  : deferred  MUSCULOSKELETAL: no deformity  NEURO: alert, moves all extremities, follows commands  SKIN: warm, dry    LAB RESULTS  Recent Results (from the past 24 hour(s))   Comprehensive Metabolic Panel    Collection Time: 09/04/20  3:29 AM   Result Value Ref Range    Glucose 117 (H) 65 - 99 mg/dL    BUN 20 6 - 20 mg/dL    Creatinine 1.67 (H) 0.57 - 1.00 mg/dL    Sodium 131 (L) 136 - 145 mmol/L    Potassium 3.2 (L) 3.5 - 5.2 mmol/L    Chloride 97 (L) 98 - 107 mmol/L    CO2 18.3 (L) 22.0 - 29.0 mmol/L    Calcium 9.0 8.6 - 10.5 mg/dL    Total Protein 7.2 6.0 - 8.5 g/dL    Albumin 3.10 (L) 3.50 - 5.20 g/dL    ALT (SGPT) 11 1 - 33 U/L    AST (SGOT) 10 1 - 32 U/L    Alkaline Phosphatase 76 39 - 117 U/L    Total Bilirubin 0.8 0.0 - 1.2 mg/dL    eGFR Non African Amer 36 (L) >60 mL/min/1.73    Globulin 4.1 gm/dL    A/G Ratio 0.8 g/dL    BUN/Creatinine Ratio 12.0 7.0 - 25.0    Anion Gap 15.7 (H) 5.0 - 15.0 mmol/L   Lactate Dehydrogenase    Collection Time: 09/04/20  3:29 AM   Result Value Ref Range     135 - 214 U/L   Procalcitonin    Collection Time: 09/04/20  3:29 AM   Result Value Ref Range    Procalcitonin 2.64 (H) 0.00 - 0.25 ng/mL   C-reactive Protein    Collection Time: 09/04/20  3:29 AM   Result Value Ref Range    C-Reactive Protein 40.39 (H) 0.00 - 0.50 mg/dL   hCG, Serum, Qualitative    Collection Time: 09/04/20  3:29 AM   Result Value Ref Range    HCG Qualitative Negative Negative   Troponin    Collection Time: 09/04/20  3:29 AM   Result Value Ref Range    Troponin T <0.010 0.000 - 0.030 ng/mL   CBC Auto Differential    Collection Time: 09/04/20  3:29 AM   Result Value Ref Range    WBC 27.44 (H) 3.40 - 10.80 10*3/mm3    RBC 4.45 3.77 - 5.28 10*6/mm3    Hemoglobin 12.2 12.0 - 15.9 g/dL    Hematocrit 38.3 34.0 - 46.6 %     MCV 86.1 79.0 - 97.0 fL    MCH 27.4 26.6 - 33.0 pg    MCHC 31.9 31.5 - 35.7 g/dL    RDW 13.4 12.3 - 15.4 %    RDW-SD 41.9 37.0 - 54.0 fl    MPV 9.8 6.0 - 12.0 fL    Platelets 204 140 - 450 10*3/mm3    Neutrophil % 83.7 (H) 42.7 - 76.0 %    Lymphocyte % 5.0 (L) 19.6 - 45.3 %    Monocyte % 7.7 5.0 - 12.0 %    Eosinophil % 0.0 (L) 0.3 - 6.2 %    Basophil % 0.2 0.0 - 1.5 %    Immature Grans % 3.4 (H) 0.0 - 0.5 %    Neutrophils, Absolute 22.97 (H) 1.70 - 7.00 10*3/mm3    Lymphocytes, Absolute 1.36 0.70 - 3.10 10*3/mm3    Monocytes, Absolute 2.11 (H) 0.10 - 0.90 10*3/mm3    Eosinophils, Absolute 0.00 0.00 - 0.40 10*3/mm3    Basophils, Absolute 0.06 0.00 - 0.20 10*3/mm3    Immature Grans, Absolute 0.94 (H) 0.00 - 0.05 10*3/mm3    nRBC 0.0 0.0 - 0.2 /100 WBC   Respiratory Panel PCR w/COVID-19(SARS-CoV-2) [facility] In-House, NP Swab in UTM/VTM, 3-4 HR TAT - Swab, Nasopharynx    Collection Time: 09/04/20  3:30 AM   Result Value Ref Range    ADENOVIRUS, PCR Not Detected Not Detected    Coronavirus 229E Not Detected Not Detected    Coronavirus HKU1 Not Detected Not Detected    Coronavirus NL63 Not Detected Not Detected    Coronavirus OC43 Not Detected Not Detected    COVID19 Not Detected Not Detected - Ref. Range    Human Metapneumovirus Not Detected Not Detected    Human Rhinovirus/Enterovirus Not Detected Not Detected    Influenza A PCR Not Detected Not Detected    Influenza A H1 Not Detected Not Detected    Influenza A H1 2009 PCR Not Detected Not Detected    Influenza A H3 Not Detected Not Detected    Influenza B PCR Not Detected Not Detected    Parainfluenza Virus 1 Not Detected Not Detected    Parainfluenza Virus 2 Not Detected Not Detected    Parainfluenza Virus 3 Not Detected Not Detected    Parainfluenza Virus 4 Not Detected Not Detected    RSV, PCR Not Detected Not Detected    Bordetella pertussis pcr Not Detected Not Detected    Bordetella parapertussis PCR Not Detected Not Detected    Chlamydophila  pneumoniae PCR Not Detected Not Detected    Mycoplasma pneumo by PCR Not Detected Not Detected   Lactic Acid, Plasma    Collection Time: 09/04/20  4:25 AM   Result Value Ref Range    Lactate 1.4 0.5 - 2.0 mmol/L   Urinalysis With Microscopic If Indicated (No Culture) - Urine, Clean Catch    Collection Time: 09/04/20  5:01 AM   Result Value Ref Range    Color, UA Dark Yellow (A) Yellow, Straw    Appearance, UA Turbid (A) Clear    pH, UA <=5.0 5.0 - 8.0    Specific Gravity, UA 1.016 1.005 - 1.030    Glucose, UA Negative Negative    Ketones, UA Trace (A) Negative    Bilirubin, UA Negative Negative    Blood, UA Large (3+) (A) Negative    Protein, UA >=300 mg/dL (3+) (A) Negative    Leuk Esterase, UA Large (3+) (A) Negative    Nitrite, UA Negative Negative    Urobilinogen, UA 1.0 E.U./dL 0.2 - 1.0 E.U./dL   Urinalysis, Microscopic Only - Urine, Clean Catch    Collection Time: 09/04/20  5:01 AM   Result Value Ref Range    RBC, UA Unable to determine due to loaded field (A) None Seen, 0-2 /HPF    WBC, UA Too Numerous to Count (A) None Seen, 0-2 /HPF    Bacteria, UA Unable to determine due to loaded field (A) None Seen /HPF    Squamous Epithelial Cells, UA Too Numerous to Count (A) None Seen, 0-2 /HPF    Hyaline Casts, UA None Seen None Seen /LPF    Methodology Manual Light Microscopy        I ordered the above labs and reviewed the results.    RADIOLOGY  No Radiology Exams Resulted Within Past 24 Hours    I ordered the above noted radiological studies. I reviewed the images and results. I agree with the radiologist interpretation.    PROCEDURES  Procedures    MEDICATIONS GIVEN IN ER  Medications   sodium chloride 0.9 % flush 10 mL (has no administration in time range)   acetaminophen (TYLENOL) tablet 650 mg (has no administration in time range)   bisacodyl (DULCOLAX) EC tablet 5 mg (has no administration in time range)   bisacodyl (DULCOLAX) suppository 10 mg (has no administration in time range)   ondansetron (ZOFRAN)  tablet 4 mg (has no administration in time range)     Or   ondansetron (ZOFRAN) injection 4 mg (has no administration in time range)   aluminum-magnesium hydroxide-simethicone (MAALOX MAX) 400-400-40 MG/5ML suspension 15 mL (has no administration in time range)   sodium chloride 0.9 % bolus 1,000 mL (0 mL Intravenous Stopped 9/4/20 0505)   ondansetron (ZOFRAN) injection 8 mg (8 mg Intravenous Given 9/4/20 0325)   acetaminophen (TYLENOL) tablet 1,000 mg (1,000 mg Oral Given 9/4/20 0324)   sodium chloride 0.9 % bolus 3,390 mL (3,390 mL Intravenous New Bag 9/4/20 0427)   vancomycin 2250 mg/500 mL 0.9% NS IVPB ([facility]) (2,250 mg Intravenous New Bag 9/4/20 0500)   piperacillin-tazobactam (ZOSYN) 3.375 g in iso-osmotic dextrose 50 ml (premix) (0 g Intravenous Stopped 9/4/20 0505)       PROGRESS, DATA ANALYSIS, CONSULTS, AND MEDICAL DECISION MAKING  A complete history and physical exam have been performed.  All available laboratory and imaging results have been reviewed by myself prior to disposition.  Patient was placed in face mask in first look. Patient was wearing facemask when I entered the room and throughout our encounter. I wore full protective equipment throughout this patient encounter including a face mask, and gloves. Hand hygiene was performed before donning protective equipment and after removal when leaving the room.  MDM  After the initial H&P, I discussed pertinent information from history and physical exam with patient/family.  Discussed differential diagnosis.  Discussed plan for ED evaluation/work-up/treatment.  All questions answered.  Patient/family is agreeable with plan.  ED Course as of Sep 04 0602   Fri Sep 04, 2020   0318 Patient was placed in face mask in first look. Patient was wearing facemask when I entered the room and throughout our encounter. I wore full protective equipment throughout this patient encounter including a face mask, eye shield, gown and gloves. Hand hygiene was performed before  donning protective equipment and after removal when leaving the room.        [JG]   0340 EKG independently viewed and contemporaneously interpreted by ED physician. Time: 3:35 AM.  Rate 128.  Interpretation: Sinus tachycardia, borderline left axis deviation, delayed R wave progression, no acute ST changes.    [JG]   0342 Chest x-ray reviewed in PACS, my findings are: Negative for pulmonary infiltrates.    [JG]   0412 Patient febrile, tachycardic, low blood pressure, significant leukocytosis with left shift.  Initiating 30 cc/kg IV fluid bolus, broad-spectrum antibiotics, treating for sepsis.    [JG]   0458 COVID negative, chest x-ray unremarkable, discontinuing isolation precautions.    [JG]   0559 Phone call with [facility], [employer] for [place].  Discussed the patient, relevant history, exam, diagnostics, ED findings/progress, and concerns. They agree to admit the patient to tele under Dr Marc. Care assumed by the admitting physician at this time.        [JG]   0600 Patient reassessed.  Discussed ED findings, differential diagnosis, and the need for admission for evaluation/treatment.  They are agreeable to admission and all questions were answered.        [JG]      ED Course User Index  [JG] Aron Benitez MD     SEPTIC SHOCK FOCUSED EXAM ATTESTATION    I attest that I have reassessed tissue perfusion after the fluid bolus given.    Aron Benitez MD  09/04/20  06:02    AS OF 06:02 VITALS:    BP - 92/51  HR - 92  TEMP - 98.5 °F (36.9 °C) (Oral)  O2 SATS - 97%    DIAGNOSIS  Final diagnoses:   Sepsis, due to unspecified organism, unspecified whether acute organ dysfunction present (CMS/[facility])   WILIAN (acute kidney injury) (CMS/[facility])   Hypokalemia   Acute UTI     Critical care:  Total critical care time of 40 minutes is exclusive of any other billable procedures and includes time spent with direct patient care and observation, retrospective chart review, management of acute condition, and consultation with other  physicians.    DISPOSITION  ADMISSION    Discussed treatment plan and reason for admission with pt/family and admitting physician.  Pt/family voiced understanding of the plan for admission for further testing/treatment as needed.          Aron Benitez MD  09/04/20 0602

## 2020-09-04 NOTE — ED NOTES
Pt to ED from home with c/o n/v, headache, cough and chills for x3 days.  Pt reports worsening fatigue today.  Pt denies known exposure.    Pt wearing mask, staff wearing appropriate PPE.     Camilla Myers RN  09/04/20 0246       Camilla Myers RN  09/04/20 0247

## 2020-09-05 PROBLEM — A41.51 SEPTICEMIA DUE TO E. COLI: Status: ACTIVE | Noted: 2020-09-05

## 2020-09-05 LAB
ANION GAP SERPL CALCULATED.3IONS-SCNC: 7.2 MMOL/L (ref 5–15)
BACTERIA SPEC AEROBE CULT: NO GROWTH
BUN SERPL-MCNC: 10 MG/DL (ref 6–20)
BUN/CREAT SERPL: 7.6 (ref 7–25)
CALCIUM SPEC-SCNC: 8 MG/DL (ref 8.6–10.5)
CHLORIDE SERPL-SCNC: 112 MMOL/L (ref 98–107)
CO2 SERPL-SCNC: 16.8 MMOL/L (ref 22–29)
CREAT SERPL-MCNC: 1.32 MG/DL (ref 0.57–1)
CRP SERPL-MCNC: 33.46 MG/DL (ref 0–0.5)
DEPRECATED RDW RBC AUTO: 39.7 FL (ref 37–54)
ERYTHROCYTE [DISTWIDTH] IN BLOOD BY AUTOMATED COUNT: 13.3 % (ref 12.3–15.4)
GFR SERPL CREATININE-BSD FRML MDRD: 48 ML/MIN/1.73
GLUCOSE SERPL-MCNC: 126 MG/DL (ref 65–99)
HCT VFR BLD AUTO: 29.9 % (ref 34–46.6)
HGB BLD-MCNC: 10.1 G/DL (ref 12–15.9)
MCH RBC QN AUTO: 27.5 PG (ref 26.6–33)
MCHC RBC AUTO-ENTMCNC: 33.8 G/DL (ref 31.5–35.7)
MCV RBC AUTO: 81.5 FL (ref 79–97)
PLATELET # BLD AUTO: 169 10*3/MM3 (ref 140–450)
PMV BLD AUTO: 9.8 FL (ref 6–12)
POTASSIUM SERPL-SCNC: 3.4 MMOL/L (ref 3.5–5.2)
PROCALCITONIN SERPL-MCNC: 2.34 NG/ML (ref 0–0.25)
RBC # BLD AUTO: 3.67 10*6/MM3 (ref 3.77–5.28)
SODIUM SERPL-SCNC: 136 MMOL/L (ref 136–145)
WBC # BLD AUTO: 15.76 10*3/MM3 (ref 3.4–10.8)

## 2020-09-05 PROCEDURE — 85027 COMPLETE CBC AUTOMATED: CPT | Performed by: NURSE PRACTITIONER

## 2020-09-05 PROCEDURE — 80048 BASIC METABOLIC PNL TOTAL CA: CPT | Performed by: NURSE PRACTITIONER

## 2020-09-05 PROCEDURE — 86140 C-REACTIVE PROTEIN: CPT | Performed by: HOSPITALIST

## 2020-09-05 PROCEDURE — 94799 UNLISTED PULMONARY SVC/PX: CPT

## 2020-09-05 PROCEDURE — 25010000002 PIPERACILLIN SOD-TAZOBACTAM PER 1 G: Performed by: NURSE PRACTITIONER

## 2020-09-05 PROCEDURE — 84145 PROCALCITONIN (PCT): CPT | Performed by: HOSPITALIST

## 2020-09-05 RX ADMIN — SODIUM CHLORIDE 125 ML/HR: 9 INJECTION, SOLUTION INTRAVENOUS at 03:36

## 2020-09-05 RX ADMIN — SODIUM CHLORIDE 125 ML/HR: 9 INJECTION, SOLUTION INTRAVENOUS at 17:20

## 2020-09-05 RX ADMIN — BUSPIRONE HYDROCHLORIDE 15 MG: 15 TABLET ORAL at 17:19

## 2020-09-05 RX ADMIN — BUSPIRONE HYDROCHLORIDE 15 MG: 15 TABLET ORAL at 22:29

## 2020-09-05 RX ADMIN — ACETAMINOPHEN 650 MG: 325 TABLET, FILM COATED ORAL at 17:19

## 2020-09-05 RX ADMIN — ACETAMINOPHEN 650 MG: 325 TABLET, FILM COATED ORAL at 08:40

## 2020-09-05 RX ADMIN — FAMOTIDINE 20 MG: 10 INJECTION INTRAVENOUS at 08:41

## 2020-09-05 RX ADMIN — FLUTICASONE PROPIONATE 2 SPRAY: 50 SPRAY, METERED NASAL at 08:42

## 2020-09-05 RX ADMIN — BUSPIRONE HYDROCHLORIDE 15 MG: 15 TABLET ORAL at 08:40

## 2020-09-05 RX ADMIN — VENLAFAXINE 25 MG: 25 TABLET ORAL at 07:41

## 2020-09-05 RX ADMIN — CETIRIZINE HYDROCHLORIDE 10 MG: 10 TABLET ORAL at 08:41

## 2020-09-05 RX ADMIN — TAZOBACTAM SODIUM AND PIPERACILLIN SODIUM 3.38 G: 375; 3 INJECTION, SOLUTION INTRAVENOUS at 14:04

## 2020-09-05 RX ADMIN — SUCRALFATE 1 G: 1 TABLET ORAL at 11:28

## 2020-09-05 RX ADMIN — SUCRALFATE 1 G: 1 TABLET ORAL at 07:28

## 2020-09-05 RX ADMIN — SUCRALFATE 1 G: 1 TABLET ORAL at 17:19

## 2020-09-05 RX ADMIN — POTASSIUM CHLORIDE 40 MEQ: 10 CAPSULE, COATED, EXTENDED RELEASE ORAL at 22:29

## 2020-09-05 RX ADMIN — VENLAFAXINE 25 MG: 25 TABLET ORAL at 17:24

## 2020-09-05 RX ADMIN — TAZOBACTAM SODIUM AND PIPERACILLIN SODIUM 3.38 G: 375; 3 INJECTION, SOLUTION INTRAVENOUS at 05:52

## 2020-09-05 RX ADMIN — TAZOBACTAM SODIUM AND PIPERACILLIN SODIUM 3.38 G: 375; 3 INJECTION, SOLUTION INTRAVENOUS at 22:29

## 2020-09-05 RX ADMIN — ARIPIPRAZOLE 5 MG: 5 TABLET ORAL at 08:41

## 2020-09-05 NOTE — PLAN OF CARE
Problem: Patient Care Overview  Goal: Plan of Care Review  Outcome: Ongoing (interventions implemented as appropriate)  Flowsheets (Taken 9/5/2020 3678)  Progress: improving  Plan of Care Reviewed With: patient  Outcome Summary: Monitor pain,labs,and vitals. No c/o pain on current shift. IV ABX/IV fluids-encouraged PO intake and pulmonary toilet. Will continue to monitor.  Goal: Individualization and Mutuality  Outcome: Ongoing (interventions implemented as appropriate)  Goal: Discharge Needs Assessment  Outcome: Ongoing (interventions implemented as appropriate)  Goal: Interprofessional Rounds/Family Conf  Outcome: Ongoing (interventions implemented as appropriate)

## 2020-09-05 NOTE — PROGRESS NOTES
"DAILY PROGRESS NOTE  [facility]    Patient Identification:  Name: Beatriz Boland  Age: 30 y.o.  Sex: female  :  1990  MRN: [id number]         Primary Care Physician: David Starr MD      Subjective  No new complaints.  She states she is feeling better.  Still with nausea but no vomiting.  States she felt chilled earlier today.  States she is eating well.    Objective:  General Appearance:  Comfortable, well-appearing, not in pain and in no acute distress (Morbidly obese.).    Vital signs: (most recent): Blood pressure 121/91, pulse 87, temperature 97.6 °F (36.4 °C), temperature source Oral, resp. rate 18, height 170.2 cm (67\"), weight 113 kg (250 lb), last menstrual period 2020, SpO2 100 %, not currently breastfeeding.    Lungs:  Normal effort and normal respiratory rate.  Breath sounds clear to auscultation.    Heart: Normal rate.  Regular rhythm.    Abdomen: Abdomen is soft.    Extremities: There is no dependent edema.    Neurological: Patient is alert and oriented to person, place and time.    Skin:  Warm and dry.                Vital signs in last 24 hours:  Temp:  [97.3 °F (36.3 °C)-98.8 °F (37.1 °C)] 97.6 °F (36.4 °C)  Heart Rate:  [] 87  Resp:  [18] 18  BP: (114-139)/(57-91) 121/91    Intake/Output:    Intake/Output Summary (Last 24 hours) at 2020 1712  Last data filed at 2020 1439  Gross per 24 hour   Intake 2330 ml   Output --   Net 2330 ml         Results from last 7 days   Lab Units 20  0603 20  0956 20  0329   WBC 10*3/mm3 15.76* 20.67* 27.44*   HEMOGLOBIN g/dL 10.1* 11.1* 12.2   PLATELETS 10*3/mm3 169 187 204     Results from last 7 days   Lab Units 20  0603 20  1526 20  1128 20  0956 20  0329   SODIUM mmol/L 136 138 136 135* 131*   POTASSIUM mmol/L 3.4* 3.4* 3.4* 3.2* 3.2*   CHLORIDE mmol/L 112* 107 105 106 97*   CO2 mmol/L 16.8* 19.7* 19.7* 17.9* 18.3*   BUN mg/dL 10 16 18 19 20   CREATININE mg/dL " 1.32* 1.43* 1.63* 1.66* 1.67*   GLUCOSE mg/dL 126* 118* 122* 109* 117*   Estimated Creatinine Clearance: 81.6 mL/min (A) (by C-G formula based on SCr of 1.32 mg/dL (H)).  Results from last 7 days   Lab Units 09/05/20  0603 09/04/20  1526 09/04/20  1128 09/04/20  0956 09/04/20  0329   CALCIUM mg/dL 8.0* 8.6 8.2* 8.1* 9.0   ALBUMIN g/dL  --   --   --   --  3.10*   MAGNESIUM mg/dL  --   --   --  1.8  --      Results from last 7 days   Lab Units 09/04/20  0329   ALBUMIN g/dL 3.10*   BILIRUBIN mg/dL 0.8   ALK PHOS U/L 76   AST (SGOT) U/L 10   ALT (SGPT) U/L 11       Assessment:    Septicemia due to E. Coli: Likely of  source.  Improving.  Fever defervesced.  WBC is improving.  C-reactive protein and pro calcitonin improving.  Continue Zosyn and await final sensitivities.    Acute UTI (urinary tract infection): Please see above.    WILIAN (acute kidney injury) (CMS/HCC): Improving.  Continue IV fluids.    Mental developmental delay    GERD without esophagitis    Mild intermittent asthma without complication: Stable.    Hypokalemia: Corrected.    Obesity (BMI 30-39.9)    Tobacco abuse      Plan:  Please see above.    Foster Manjarrez MD  9/5/2020  17:12

## 2020-09-06 LAB
ANION GAP SERPL CALCULATED.3IONS-SCNC: 8.8 MMOL/L (ref 5–15)
ANISOCYTOSIS BLD QL: ABNORMAL
BACTERIA SPEC AEROBE CULT: ABNORMAL
BUN SERPL-MCNC: 7 MG/DL (ref 6–20)
BUN/CREAT SERPL: 6.7 (ref 7–25)
BURR CELLS BLD QL SMEAR: ABNORMAL
CALCIUM SPEC-SCNC: 8.3 MG/DL (ref 8.6–10.5)
CHLORIDE SERPL-SCNC: 111 MMOL/L (ref 98–107)
CO2 SERPL-SCNC: 17.2 MMOL/L (ref 22–29)
CREAT SERPL-MCNC: 1.04 MG/DL (ref 0.57–1)
DEPRECATED RDW RBC AUTO: 40.8 FL (ref 37–54)
ERYTHROCYTE [DISTWIDTH] IN BLOOD BY AUTOMATED COUNT: 13.5 % (ref 12.3–15.4)
GFR SERPL CREATININE-BSD FRML MDRD: 63 ML/MIN/1.73
GLUCOSE SERPL-MCNC: 103 MG/DL (ref 65–99)
GRAM STN SPEC: ABNORMAL
GRAM STN SPEC: ABNORMAL
HCT VFR BLD AUTO: 31.5 % (ref 34–46.6)
HGB BLD-MCNC: 10.1 G/DL (ref 12–15.9)
ISOLATED FROM: ABNORMAL
LYMPHOCYTES # BLD MANUAL: 1.35 10*3/MM3 (ref 0.7–3.1)
LYMPHOCYTES NFR BLD MANUAL: 11.1 % (ref 5–12)
LYMPHOCYTES NFR BLD MANUAL: 14.1 % (ref 19.6–45.3)
MCH RBC QN AUTO: 26.6 PG (ref 26.6–33)
MCHC RBC AUTO-ENTMCNC: 32.1 G/DL (ref 31.5–35.7)
MCV RBC AUTO: 83.1 FL (ref 79–97)
MONOCYTES # BLD AUTO: 1.07 10*3/MM3 (ref 0.1–0.9)
NEUTROPHILS # BLD AUTO: 7.17 10*3/MM3 (ref 1.7–7)
NEUTROPHILS NFR BLD MANUAL: 74.7 % (ref 42.7–76)
PLAT MORPH BLD: NORMAL
PLATELET # BLD AUTO: 179 10*3/MM3 (ref 140–450)
PMV BLD AUTO: 10.2 FL (ref 6–12)
POLYCHROMASIA BLD QL SMEAR: ABNORMAL
POTASSIUM SERPL-SCNC: 3.9 MMOL/L (ref 3.5–5.2)
RBC # BLD AUTO: 3.79 10*6/MM3 (ref 3.77–5.28)
SODIUM SERPL-SCNC: 137 MMOL/L (ref 136–145)
WBC # BLD AUTO: 9.6 10*3/MM3 (ref 3.4–10.8)
WBC MORPH BLD: NORMAL

## 2020-09-06 PROCEDURE — 25010000002 PIPERACILLIN SOD-TAZOBACTAM PER 1 G: Performed by: NURSE PRACTITIONER

## 2020-09-06 PROCEDURE — 85025 COMPLETE CBC W/AUTO DIFF WBC: CPT | Performed by: HOSPITALIST

## 2020-09-06 PROCEDURE — 94799 UNLISTED PULMONARY SVC/PX: CPT

## 2020-09-06 PROCEDURE — 85007 BL SMEAR W/DIFF WBC COUNT: CPT | Performed by: HOSPITALIST

## 2020-09-06 PROCEDURE — 80048 BASIC METABOLIC PNL TOTAL CA: CPT | Performed by: HOSPITALIST

## 2020-09-06 PROCEDURE — 25010000002 CEFTRIAXONE PER 250 MG: Performed by: HOSPITALIST

## 2020-09-06 PROCEDURE — 94618 PULMONARY STRESS TESTING: CPT

## 2020-09-06 PROCEDURE — 87040 BLOOD CULTURE FOR BACTERIA: CPT | Performed by: HOSPITALIST

## 2020-09-06 RX ORDER — CEFTRIAXONE SODIUM 1 G/50ML
1 INJECTION, SOLUTION INTRAVENOUS EVERY 24 HOURS
Status: DISCONTINUED | OUTPATIENT
Start: 2020-09-06 | End: 2020-09-07

## 2020-09-06 RX ADMIN — CEFTRIAXONE SODIUM 1 G: 1 INJECTION, SOLUTION INTRAVENOUS at 21:49

## 2020-09-06 RX ADMIN — POTASSIUM CHLORIDE 40 MEQ: 10 CAPSULE, COATED, EXTENDED RELEASE ORAL at 01:43

## 2020-09-06 RX ADMIN — SUCRALFATE 1 G: 1 TABLET ORAL at 17:19

## 2020-09-06 RX ADMIN — SUCRALFATE 1 G: 1 TABLET ORAL at 10:56

## 2020-09-06 RX ADMIN — SODIUM CHLORIDE 125 ML/HR: 9 INJECTION, SOLUTION INTRAVENOUS at 10:20

## 2020-09-06 RX ADMIN — TAZOBACTAM SODIUM AND PIPERACILLIN SODIUM 3.38 G: 375; 3 INJECTION, SOLUTION INTRAVENOUS at 06:20

## 2020-09-06 RX ADMIN — FAMOTIDINE 20 MG: 10 INJECTION INTRAVENOUS at 21:21

## 2020-09-06 RX ADMIN — SUCRALFATE 1 G: 1 TABLET ORAL at 21:20

## 2020-09-06 RX ADMIN — CETIRIZINE HYDROCHLORIDE 10 MG: 10 TABLET ORAL at 08:25

## 2020-09-06 RX ADMIN — BUSPIRONE HYDROCHLORIDE 15 MG: 15 TABLET ORAL at 17:19

## 2020-09-06 RX ADMIN — FAMOTIDINE 20 MG: 10 INJECTION INTRAVENOUS at 01:43

## 2020-09-06 RX ADMIN — SUCRALFATE 1 G: 1 TABLET ORAL at 01:42

## 2020-09-06 RX ADMIN — BUSPIRONE HYDROCHLORIDE 15 MG: 15 TABLET ORAL at 21:20

## 2020-09-06 RX ADMIN — FLUTICASONE PROPIONATE 2 SPRAY: 50 SPRAY, METERED NASAL at 08:27

## 2020-09-06 RX ADMIN — TAZOBACTAM SODIUM AND PIPERACILLIN SODIUM 3.38 G: 375; 3 INJECTION, SOLUTION INTRAVENOUS at 12:30

## 2020-09-06 RX ADMIN — BUSPIRONE HYDROCHLORIDE 15 MG: 15 TABLET ORAL at 08:25

## 2020-09-06 RX ADMIN — FAMOTIDINE 20 MG: 10 INJECTION INTRAVENOUS at 08:25

## 2020-09-06 RX ADMIN — SODIUM CHLORIDE 125 ML/HR: 9 INJECTION, SOLUTION INTRAVENOUS at 01:42

## 2020-09-06 RX ADMIN — VENLAFAXINE 25 MG: 25 TABLET ORAL at 10:54

## 2020-09-06 RX ADMIN — SUCRALFATE 1 G: 1 TABLET ORAL at 06:20

## 2020-09-06 RX ADMIN — ARIPIPRAZOLE 5 MG: 5 TABLET ORAL at 08:25

## 2020-09-06 NOTE — PLAN OF CARE
Problem: Patient Care Overview  Goal: Plan of Care Review  Outcome: Ongoing (interventions implemented as appropriate)  Flowsheets (Taken 9/6/2020 2767)  Progress: improving  Plan of Care Reviewed With: patient  Outcome Summary: Monitor pain,vitals,and labs. No c/o pain on current shift. IV ABX. IV fluids. Encouraged PO intake and pulmonary toilet. Will continue to monitor.

## 2020-09-06 NOTE — PROGRESS NOTES
"DAILY PROGRESS NOTE  [facility]    Patient Identification:  Name: Beatriz Boland  Age: 30 y.o.  Sex: female  :  1990  MRN: [id number]         Primary Care Physician: David Starr MD      Subjective  Patient with no complaints but her mother states that she is short of breath when she walks.    Objective:  General Appearance:  Comfortable, well-appearing, in no acute distress and not in pain (Morbidly obese.).    Vital signs: (most recent): Blood pressure 111/76, pulse 71, temperature 97.6 °F (36.4 °C), temperature source Oral, resp. rate 18, height 170.2 cm (67\"), weight 113 kg (250 lb), last menstrual period 2020, SpO2 98 %, not currently breastfeeding.    Lungs:  Normal effort and normal respiratory rate.  Breath sounds clear to auscultation.    Heart: Normal rate.  Regular rhythm.    Extremities: There is no dependent edema.    Neurological: Patient is alert and oriented to person, place and time.    Skin:  Warm and dry.                Vital signs in last 24 hours:  Temp:  [97.6 °F (36.4 °C)-100.1 °F (37.8 °C)] 97.6 °F (36.4 °C)  Heart Rate:  [] 71  Resp:  [18-20] 18  BP: (106-126)/(62-94) 111/76    Intake/Output:    Intake/Output Summary (Last 24 hours) at 2020 1824  Last data filed at 2020 0500  Gross per 24 hour   Intake 1260 ml   Output --   Net 1260 ml         Results from last 7 days   Lab Units 20  0339 20  0603 20  0956 20  0329   WBC 10*3/mm3 9.60 15.76* 20.67* 27.44*   HEMOGLOBIN g/dL 10.1* 10.1* 11.1* 12.2   PLATELETS 10*3/mm3 179 169 187 204     Results from last 7 days   Lab Units 20  0339 20  0603 20  1526 20  1128 20  0956 20  0329   SODIUM mmol/L 137 136 138 136 135* 131*   POTASSIUM mmol/L 3.9 3.4* 3.4* 3.4* 3.2* 3.2*   CHLORIDE mmol/L 111* 112* 107 105 106 97*   CO2 mmol/L 17.2* 16.8* 19.7* 19.7* 17.9* 18.3*   BUN mg/dL 7 10 16 18 19 20   CREATININE mg/dL 1.04* 1.32* 1.43* 1.63* " 1.66* 1.67*   GLUCOSE mg/dL 103* 126* 118* 122* 109* 117*   Estimated Creatinine Clearance: 103.6 mL/min (A) (by C-G formula based on SCr of 1.04 mg/dL (H)).  Results from last 7 days   Lab Units 09/06/20  0339 09/05/20  0603 09/04/20  1526 09/04/20  1128 09/04/20  0956 09/04/20  0329   CALCIUM mg/dL 8.3* 8.0* 8.6 8.2* 8.1* 9.0   ALBUMIN g/dL  --   --   --   --   --  3.10*   MAGNESIUM mg/dL  --   --   --   --  1.8  --      Results from last 7 days   Lab Units 09/04/20  0329   ALBUMIN g/dL 3.10*   BILIRUBIN mg/dL 0.8   ALK PHOS U/L 76   AST (SGOT) U/L 10   ALT (SGPT) U/L 11       Assessment:    Septicemia due to E. Coli: Likely of  source.  Improving.  Fever defervesced.  WBC is improving.  C-reactive protein and pro calcitonin improving.    Cultures with E. coli, sensitive to ceftriaxone.  Will switch antibiotics.  Recheck blood cultures.    Acute UTI (urinary tract infection): Please see above.    WILIAN (acute kidney injury) (CMS/[facility]): Improving.  Continue IV fluids.    Mental developmental delay    GERD without esophagitis    Mild intermittent asthma without complication: Stable.    Hypokalemia: Corrected.    Morbid obesity (BMI 30-39.9)    Tobacco abuse    Reported dyspnea on exertion: Check ambulatory O2 sats.      Plan:  Please see above.  Patient has been exceedingly sedentary.  Increase activity.      Foster Manjarrez MD  9/6/2020  18:24

## 2020-09-07 LAB
ANION GAP SERPL CALCULATED.3IONS-SCNC: 10.7 MMOL/L (ref 5–15)
BUN SERPL-MCNC: 5 MG/DL (ref 6–20)
BUN/CREAT SERPL: 5 (ref 7–25)
CALCIUM SPEC-SCNC: 8.3 MG/DL (ref 8.6–10.5)
CHLORIDE SERPL-SCNC: 112 MMOL/L (ref 98–107)
CO2 SERPL-SCNC: 17.3 MMOL/L (ref 22–29)
CREAT SERPL-MCNC: 1.01 MG/DL (ref 0.57–1)
CRP SERPL-MCNC: 18.13 MG/DL (ref 0–0.5)
GFR SERPL CREATININE-BSD FRML MDRD: 65 ML/MIN/1.73
GLUCOSE SERPL-MCNC: 82 MG/DL (ref 65–99)
POTASSIUM SERPL-SCNC: 3.7 MMOL/L (ref 3.5–5.2)
SODIUM SERPL-SCNC: 140 MMOL/L (ref 136–145)

## 2020-09-07 PROCEDURE — 94799 UNLISTED PULMONARY SVC/PX: CPT

## 2020-09-07 PROCEDURE — 80048 BASIC METABOLIC PNL TOTAL CA: CPT | Performed by: HOSPITALIST

## 2020-09-07 PROCEDURE — 86140 C-REACTIVE PROTEIN: CPT | Performed by: HOSPITALIST

## 2020-09-07 PROCEDURE — 25010000003 CEFTRIAXONE PER 250 MG: Performed by: HOSPITALIST

## 2020-09-07 RX ORDER — SACCHAROMYCES BOULARDII 250 MG
250 CAPSULE ORAL 2 TIMES DAILY
Status: DISCONTINUED | OUTPATIENT
Start: 2020-09-07 | End: 2020-09-08 | Stop reason: HOSPADM

## 2020-09-07 RX ORDER — CEFTRIAXONE SODIUM 2 G/50ML
2 INJECTION, SOLUTION INTRAVENOUS EVERY 24 HOURS
Status: DISCONTINUED | OUTPATIENT
Start: 2020-09-07 | End: 2020-09-08 | Stop reason: HOSPADM

## 2020-09-07 RX ADMIN — CEFTRIAXONE SODIUM 2 G: 2 INJECTION, SOLUTION INTRAVENOUS at 20:27

## 2020-09-07 RX ADMIN — SODIUM CHLORIDE, PRESERVATIVE FREE 10 ML: 5 INJECTION INTRAVENOUS at 20:28

## 2020-09-07 RX ADMIN — BUSPIRONE HYDROCHLORIDE 15 MG: 15 TABLET ORAL at 16:41

## 2020-09-07 RX ADMIN — BUSPIRONE HYDROCHLORIDE 15 MG: 15 TABLET ORAL at 20:28

## 2020-09-07 RX ADMIN — VENLAFAXINE 25 MG: 25 TABLET ORAL at 17:57

## 2020-09-07 RX ADMIN — SUCRALFATE 1 G: 1 TABLET ORAL at 09:50

## 2020-09-07 RX ADMIN — SUCRALFATE 1 G: 1 TABLET ORAL at 13:02

## 2020-09-07 RX ADMIN — SUCRALFATE 1 G: 1 TABLET ORAL at 20:28

## 2020-09-07 RX ADMIN — ACETAMINOPHEN 650 MG: 325 TABLET, FILM COATED ORAL at 16:41

## 2020-09-07 RX ADMIN — FAMOTIDINE 20 MG: 10 INJECTION INTRAVENOUS at 09:49

## 2020-09-07 RX ADMIN — SUCRALFATE 1 G: 1 TABLET ORAL at 16:41

## 2020-09-07 RX ADMIN — FAMOTIDINE 20 MG: 10 INJECTION INTRAVENOUS at 20:28

## 2020-09-07 RX ADMIN — Medication 250 MG: at 17:57

## 2020-09-07 RX ADMIN — BUSPIRONE HYDROCHLORIDE 15 MG: 15 TABLET ORAL at 09:50

## 2020-09-07 RX ADMIN — ARIPIPRAZOLE 5 MG: 5 TABLET ORAL at 09:49

## 2020-09-07 RX ADMIN — SODIUM CHLORIDE 75 ML/HR: 9 INJECTION, SOLUTION INTRAVENOUS at 09:48

## 2020-09-07 RX ADMIN — VENLAFAXINE 25 MG: 25 TABLET ORAL at 09:49

## 2020-09-07 RX ADMIN — CETIRIZINE HYDROCHLORIDE 10 MG: 10 TABLET ORAL at 09:50

## 2020-09-07 RX ADMIN — Medication 250 MG: at 20:28

## 2020-09-07 NOTE — PLAN OF CARE
Vss, RA, NSR/ST. No episodes of emesis today; afebrile. IVF infusing at 75/hr. Pt denies pain. Plan is to hopefully discharge patient home tomorrow after transition to oral abx. CTM

## 2020-09-07 NOTE — PLAN OF CARE
Problem: Patient Care Overview  Goal: Plan of Care Review  Outcome: Ongoing (interventions implemented as appropriate)  Flowsheets (Taken 9/7/2020 0350)  Progress: improving  Plan of Care Reviewed With: patient  Outcome Summary: Monitor pain,labs,and vitals. No c/o pain on current shift. IV ABX.IV fluids. Encouraged PO intake and ambulation. Will continue to monitor.

## 2020-09-07 NOTE — PROGRESS NOTES
Name: Beatriz Boland ADMIT: 2020   : 1990  PCP: David Starr MD    MRN: [id number] LOS: 3 days   AGE/SEX: 30 y.o. female  ROOM: [facility]     Subjective   Subjective   Resting in bed. About to take a shower. States she had 4 loose/soft stools yesterday and one today. No nausea or vomiting. No associated fever/chills/abdominal pain. Denies any dyspnea, chest pain or cough. States she usually calls her Urologist (Dr. Dedrick Jaimes) when she feels like she is getting a UTI, but his time she forgot to call him and ended up in the hospital. Reports taking a probiotic in the past as well, but has also forgotten to take this. No further dysuria or urinary symptoms to report.      Objective   Objective   Vital Signs  Temp:  [97.6 °F (36.4 °C)-98.8 °F (37.1 °C)] 97.6 °F (36.4 °C)  Heart Rate:  [71-93] 88  Resp:  [16-18] 16  BP: (109-122)/(75-93) 122/93  SpO2:  [96 %-98 %] 97 %  on   ;   Device (Oxygen Therapy): room air  Body mass index is 39.16 kg/m².     Physical Exam   Constitutional: She is oriented to person, place, and time. She appears well-developed and well-nourished. No distress.   Obese female.   HENT:   Head: Normocephalic and atraumatic.   Nose: Nose normal.   Mouth/Throat: Oropharynx is clear and moist.   Eyes: Conjunctivae are normal. Right eye exhibits no discharge. Left eye exhibits no discharge.   Neck: Normal range of motion. Neck supple.   Cardiovascular: Normal rate, regular rhythm, normal heart sounds and intact distal pulses.   Pulmonary/Chest: Effort normal and breath sounds normal. No respiratory distress.   Abdominal: Soft. Bowel sounds are normal. She exhibits no distension. There is no tenderness.   Musculoskeletal: Normal range of motion. She exhibits no edema.   Neurological: She is alert and oriented to person, place, and time. She exhibits normal muscle tone. Coordination normal.   Skin: Skin is warm and dry. She is not diaphoretic. No erythema.   Psychiatric: She  has a normal mood and affect. Her behavior is normal.   Nursing note and vitals reviewed.     Results Review:       I reviewed the patient's new clinical results.  Results from last 7 days   Lab Units 09/06/20  0339 09/05/20 0603 09/04/20 0956 09/04/20  0329   WBC 10*3/mm3 9.60 15.76* 20.67* 27.44*   HEMOGLOBIN g/dL 10.1* 10.1* 11.1* 12.2   PLATELETS 10*3/mm3 179 169 187 204     Results from last 7 days   Lab Units 09/07/20  0715 09/06/20  0339 09/05/20  0603 09/04/20  1526   SODIUM mmol/L 140 137 136 138   POTASSIUM mmol/L 3.7 3.9 3.4* 3.4*   CHLORIDE mmol/L 112* 111* 112* 107   CO2 mmol/L 17.3* 17.2* 16.8* 19.7*   BUN mg/dL 5* 7 10 16   CREATININE mg/dL 1.01* 1.04* 1.32* 1.43*   GLUCOSE mg/dL 82 103* 126* 118*   Estimated Creatinine Clearance: 106.6 mL/min (A) (by C-G formula based on SCr of 1.01 mg/dL (H)).  Results from last 7 days   Lab Units 09/04/20  0329   ALBUMIN g/dL 3.10*   BILIRUBIN mg/dL 0.8   ALK PHOS U/L 76   AST (SGOT) U/L 10   ALT (SGPT) U/L 11     Results from last 7 days   Lab Units 09/07/20  0715 09/06/20  0339 09/05/20 0603 09/04/20  1526  09/04/20  0956 09/04/20  0329   CALCIUM mg/dL 8.3* 8.3* 8.0* 8.6   < > 8.1* 9.0   ALBUMIN g/dL  --   --   --   --   --   --  3.10*   MAGNESIUM mg/dL  --   --   --   --   --  1.8  --     < > = values in this interval not displayed.     Results from last 7 days   Lab Units 09/05/20  0603 09/04/20  0956 09/04/20  0425 09/04/20  0329   PROCALCITONIN ng/mL 2.34* 3.15*  --  2.64*   LACTATE mmol/L  --   --  1.4  --      No results found for: HGBA1C, POCGLU      ARIPiprazole 5 mg Oral Daily   busPIRone 15 mg Oral TID   cefTRIAXone 1 g Intravenous Q24H   cetirizine 10 mg Oral Daily   famotidine 20 mg Intravenous Q12H   fluticasone 2 spray Each Nare Daily   sucralfate 1 g Oral 4x Daily AC & at Bedtime   venlafaxine 25 mg Oral BID With Meals       sodium chloride 75 mL/hr Last Rate: 75 mL/hr (09/07/20 0948)   Diet Regular       Assessment/Plan     Active Hospital  "Problems    Diagnosis  POA   • **Septicemia due to E. coli (CMS/[facility]) [A41.51]  Yes   • Acute UTI (urinary tract infection) [N39.0]  Yes   • Hypokalemia [E87.6]  Yes   • WILIAN (acute kidney injury) (CMS/[facility]) [N17.9]  Yes   • Obesity (BMI 30-39.9) [E66.9]  Yes   • Tobacco abuse [Z72.0]  Yes   • Mild intermittent asthma without complication [J45.20]  Yes   • GERD without esophagitis [K21.9]  Yes   • Mental developmental delay [F81.9]  Yes      Resolved Hospital Problems   No resolved problems to display.     Septicemia due to E. Coli: Likely of  source. Improving at this time. Fevers resolving. Procal and CRP trending down as well as leukocytosis resolved 9/6. Will repeat labs in AM. Zosyn changed to Rocephin after urine culture sensitive to ceftriaxone. Repeat blood cultures pending to assess sterility. If negative for 24 to 48 hours, will likely transition to PO Levaquin to complete 10 day course. Will add probiotic given some loose stools. Monitor closely.    Acute UTI (urinary tract infection): As above. She follows with Dr. Jaimes with Urology. Discussed close follow up at discharge with him.    WILIAN (acute kidney injury) (CMS/[facility]): Improving.  Continue IV fluids.    Mental developmental delay: Stable, likely affecting some of her medical compliance as she reports \"forgetting\" to call doctors or take her pills.     GERD without esophagitis: Pepcid and Carafate continued. Stable.     Mild intermittent asthma without complication: Stable.    Hypokalemia: Resolved.     Morbid obesity (BMI 30-39.9): Complicates all aspects of care. Encouraged to diet/exercise.     Tobacco abuse: Smoking cessation.    Reported dyspnea on exertion: Walking oximetry okay. No desaturations noted. Suspect that this is multifactorial related to her tobacco abuse, obesity and deconditioning. Would benefit from lifestyle modifications. Recommend close follow up with PCP at discharge.    Discussed with patient, nursing staff and Dr." Loki.       VTE Prophylaxis - SCDs  Code Status - Full code  Disposition - Anticipate discharge tomorrow or Wednesday.       LIVIA Welch  [facility]  09/07/20  11:24

## 2020-09-07 NOTE — PROGRESS NOTES
Exercise Oximetry    Patient Name:Beatriz Boland   MRN: [id number]   Date: 09/07/20             ROOM AIR BASELINE   SpO2%  98   Heart Rate  92   Blood Pressure     EXERCISE ON ROOM AIR SpO2% EXERCISE ON O2 @   LPM SpO2%   1 MINUTE   93 1 MINUTE    2 MINUTES   96 2 MINUTES    3 MINUTES   97 3 MINUTES    4 MINUTES   98 4 MINUTES    5 MINUTES   98 5 MINUTES    6 MINUTES   98 6 MINUTES               Distance Walked - Twice around nurses   station Distance Walked   Dyspnea (Akua Scale)   Dyspnea (Akua Scale)   Fatigue (Akua Scale)   Fatigue (Akua Scale)   SpO2% Post Exercise  99 SpO2% Post Exercise   HR Post Exercise  102 HR Post Exercise   Time to Recovery   Time to Recovery     Comments: Patient's HR did reach 124, RR up to 28. She reported minimal SOA only once after we first started walking. On into walk she stated she was not SOA. Once back to room she said she was breathing fine but appeared a bit SOA while recovering quickly.

## 2020-09-08 VITALS
HEIGHT: 67 IN | HEART RATE: 71 BPM | OXYGEN SATURATION: 97 % | TEMPERATURE: 97.8 F | SYSTOLIC BLOOD PRESSURE: 127 MMHG | BODY MASS INDEX: 39.24 KG/M2 | RESPIRATION RATE: 18 BRPM | WEIGHT: 250 LBS | DIASTOLIC BLOOD PRESSURE: 92 MMHG

## 2020-09-08 PROBLEM — E87.6 HYPOKALEMIA: Status: RESOLVED | Noted: 2020-09-04 | Resolved: 2020-09-08

## 2020-09-08 PROBLEM — N17.9 AKI (ACUTE KIDNEY INJURY): Status: RESOLVED | Noted: 2020-09-04 | Resolved: 2020-09-08

## 2020-09-08 LAB
ALBUMIN SERPL-MCNC: 2.3 G/DL (ref 3.5–5.2)
ALBUMIN/GLOB SERPL: 0.7 G/DL
ALP SERPL-CCNC: 61 U/L (ref 39–117)
ALT SERPL W P-5'-P-CCNC: 35 U/L (ref 1–33)
ANION GAP SERPL CALCULATED.3IONS-SCNC: 9.4 MMOL/L (ref 5–15)
AST SERPL-CCNC: 31 U/L (ref 1–32)
BILIRUB SERPL-MCNC: 0.2 MG/DL (ref 0–1.2)
BUN SERPL-MCNC: 5 MG/DL (ref 6–20)
BUN/CREAT SERPL: 5.8 (ref 7–25)
CALCIUM SPEC-SCNC: 7.9 MG/DL (ref 8.6–10.5)
CHLORIDE SERPL-SCNC: 114 MMOL/L (ref 98–107)
CO2 SERPL-SCNC: 18.6 MMOL/L (ref 22–29)
CREAT SERPL-MCNC: 0.86 MG/DL (ref 0.57–1)
CRP SERPL-MCNC: 13.02 MG/DL (ref 0–0.5)
DEPRECATED RDW RBC AUTO: 43 FL (ref 37–54)
ERYTHROCYTE [DISTWIDTH] IN BLOOD BY AUTOMATED COUNT: 13.6 % (ref 12.3–15.4)
GFR SERPL CREATININE-BSD FRML MDRD: 78 ML/MIN/1.73
GLOBULIN UR ELPH-MCNC: 3.4 GM/DL
GLUCOSE SERPL-MCNC: 91 MG/DL (ref 65–99)
HCT VFR BLD AUTO: 33 % (ref 34–46.6)
HGB BLD-MCNC: 10.5 G/DL (ref 12–15.9)
MCH RBC QN AUTO: 27.2 PG (ref 26.6–33)
MCHC RBC AUTO-ENTMCNC: 31.8 G/DL (ref 31.5–35.7)
MCV RBC AUTO: 85.5 FL (ref 79–97)
PLATELET # BLD AUTO: 203 10*3/MM3 (ref 140–450)
PMV BLD AUTO: 10 FL (ref 6–12)
POTASSIUM SERPL-SCNC: 3.3 MMOL/L (ref 3.5–5.2)
PROCALCITONIN SERPL-MCNC: 0.56 NG/ML (ref 0–0.25)
PROT SERPL-MCNC: 5.7 G/DL (ref 6–8.5)
RBC # BLD AUTO: 3.86 10*6/MM3 (ref 3.77–5.28)
SODIUM SERPL-SCNC: 142 MMOL/L (ref 136–145)
WBC # BLD AUTO: 9.89 10*3/MM3 (ref 3.4–10.8)

## 2020-09-08 PROCEDURE — 84145 PROCALCITONIN (PCT): CPT | Performed by: NURSE PRACTITIONER

## 2020-09-08 PROCEDURE — 93005 ELECTROCARDIOGRAM TRACING: CPT | Performed by: NURSE PRACTITIONER

## 2020-09-08 PROCEDURE — 93010 ELECTROCARDIOGRAM REPORT: CPT | Performed by: INTERNAL MEDICINE

## 2020-09-08 PROCEDURE — 85027 COMPLETE CBC AUTOMATED: CPT | Performed by: NURSE PRACTITIONER

## 2020-09-08 PROCEDURE — 80053 COMPREHEN METABOLIC PANEL: CPT | Performed by: NURSE PRACTITIONER

## 2020-09-08 PROCEDURE — 86140 C-REACTIVE PROTEIN: CPT | Performed by: NURSE PRACTITIONER

## 2020-09-08 RX ORDER — SACCHAROMYCES BOULARDII 250 MG
250 CAPSULE ORAL 2 TIMES DAILY
Qty: 14 CAPSULE | Refills: 0 | Status: SHIPPED | OUTPATIENT
Start: 2020-09-08

## 2020-09-08 RX ORDER — LEVOFLOXACIN 750 MG/1
750 TABLET ORAL DAILY
Qty: 7 TABLET | Refills: 0 | Status: ON HOLD | OUTPATIENT
Start: 2020-09-08 | End: 2020-10-22

## 2020-09-08 RX ADMIN — SUCRALFATE 1 G: 1 TABLET ORAL at 07:53

## 2020-09-08 RX ADMIN — Medication 250 MG: at 08:02

## 2020-09-08 RX ADMIN — SUCRALFATE 1 G: 1 TABLET ORAL at 11:53

## 2020-09-08 RX ADMIN — SODIUM CHLORIDE 75 ML/HR: 9 INJECTION, SOLUTION INTRAVENOUS at 02:30

## 2020-09-08 RX ADMIN — FAMOTIDINE 20 MG: 10 INJECTION INTRAVENOUS at 08:03

## 2020-09-08 RX ADMIN — CETIRIZINE HYDROCHLORIDE 10 MG: 10 TABLET ORAL at 08:00

## 2020-09-08 RX ADMIN — BUSPIRONE HYDROCHLORIDE 15 MG: 15 TABLET ORAL at 08:02

## 2020-09-08 RX ADMIN — ARIPIPRAZOLE 5 MG: 5 TABLET ORAL at 08:03

## 2020-09-08 RX ADMIN — POTASSIUM CHLORIDE 40 MEQ: 10 CAPSULE, COATED, EXTENDED RELEASE ORAL at 07:53

## 2020-09-08 RX ADMIN — POTASSIUM CHLORIDE 40 MEQ: 10 CAPSULE, COATED, EXTENDED RELEASE ORAL at 12:01

## 2020-09-08 RX ADMIN — FLUTICASONE PROPIONATE 2 SPRAY: 50 SPRAY, METERED NASAL at 08:01

## 2020-09-08 NOTE — DISCHARGE SUMMARY
[facility]    Date of Discharge:  9/8/2020    PCP: Elyse Larkin APRN    Discharge Diagnosis:   Active Hospital Problems    Diagnosis  POA   • **Septicemia due to E. coli (CMS/[facility]) [A41.51]  Yes   • Acute UTI (urinary tract infection) [N39.0]  Yes   • Obesity (BMI 30-39.9) [E66.9]  Yes   • Tobacco abuse [Z72.0]  Yes   • Mild intermittent asthma without complication [J45.20]  Yes   • GERD without esophagitis [K21.9]  Yes   • Mental developmental delay [F81.9]  Yes      Resolved Hospital Problems    Diagnosis Date Resolved POA   • Hypokalemia [E87.6] 09/08/2020 Yes   • WILIAN (acute kidney injury) (CMS/[facility]) [N17.9] 09/08/2020 Yes     Procedures Performed  none     Consults     Date and Time Order Name Status Description    9/4/2020 0551 LHA (on-call MD unless specified) Details Completed         Hospital Course  Please see history and physical for details. Patient is a 30 y.o. female that initially presented to the hospital with complaints of nausea, vomiting, fever and chills for 3 days. She was found to be septic on admission. She reported urinary symptoms for about 7 days prior to her admission, but did not seek medical treatment as she was concerned she was not covered with medical insurance due to switching jobs. She was given empiric antibiotics and fluids and her blood cultures eventually grew positive for E.coli. Her procalcitonin and CRP were markedly elevated. Urine cultures grew E.coli sensitive to Rocephin so her antibiotics were de-escalated from Zosyn. Repeat blood cultures have shown sterility for 24 hours and she has remained afebrile. Her urinary symptoms have resolved and her renal function improved after IVF resuscitation. Her inflammatory markers have trended down and she feels much improved on exam. She is no longer nauseated or vomiting and has tolerated a diet.    She did have a known developmental delay on admission and was educated on  proper perineal care and to seek medical advice when urinary symptoms begin. I have advised her to see her already established Urologist, Dr. Dedrick Jaimes, in 1-2 weeks for follow up as well as her PCP in 1 week. She did have some reported dyspnea with exertion, but walking oximetry on room air did not show any acute needs for oxygen at home. This was felt related to her sedentary lifestyle as well as obesity and tobacco abuse. Lifestyle modifications and smoking cessation have been encouraged. She feels ready for discharge today with and will be discharged on oral Levaquin (sensitive on cultures) for 7 more days. I informed her she needs to finish the full course.     I discussed the patient's findings and my recommendations with patient and Dr. Manjarrez.    Condition on Discharge: Improved.     Temp:  [97.8 °F (36.6 °C)-97.9 °F (36.6 °C)] 97.8 °F (36.6 °C)  Heart Rate:  [64-96] 71  Resp:  [16-18] 18  BP: (110-127)/(70-92) 127/92  Body mass index is 39.16 kg/m².    Physical Exam   Constitutional: She is oriented to person, place, and time. She appears well-developed and well-nourished. No distress.   HENT:   Head: Normocephalic and atraumatic.   Nose: Nose normal.   Mouth/Throat: Oropharynx is clear and moist.   Eyes: Conjunctivae are normal. Right eye exhibits no discharge. Left eye exhibits no discharge.   Neck: Normal range of motion. Neck supple.   Cardiovascular: Normal rate, regular rhythm, normal heart sounds and intact distal pulses.   Pulmonary/Chest: Effort normal and breath sounds normal. No respiratory distress.   Abdominal: Soft. Bowel sounds are normal. She exhibits no distension. There is no tenderness.   Musculoskeletal: Normal range of motion. She exhibits no edema.   Neurological: She is alert and oriented to person, place, and time. She exhibits normal muscle tone. Coordination normal.   Skin: Skin is warm and dry. She is not diaphoretic. No erythema.   Psychiatric: She has a normal mood and  affect. Her behavior is normal.   Nursing note and vitals reviewed.        Discharge Medications      New Medications      Instructions Start Date   levoFLOXacin 750 MG tablet  Commonly known as:  LEVAQUIN   750 mg, Oral, Daily      saccharomyces boulardii 250 MG capsule  Commonly known as:  FLORASTOR   250 mg, Oral, 2 Times Daily         Continue These Medications      Instructions Start Date   albuterol sulfate  (90 Base) MCG/ACT inhaler  Commonly known as:  PROVENTIL HFA;VENTOLIN HFA;PROAIR HFA   2 puffs, Inhalation, Every 4 Hours PRN      ARIPiprazole 5 MG tablet  Commonly known as:  ABILIFY   5 mg, Oral, Daily      busPIRone 7.5 MG tablet  Commonly known as:  BUSPAR   15 mg, Oral, 3 Times Daily      cetirizine 10 MG tablet  Commonly known as:  zyrTEC   10 mg, Oral, Daily      Chlorcyclizine-Pseudoephed 25-60 MG tablet  Commonly known as:  Stahist AD   25 mg, Oral, Daily      [facility] Nasal Allergy [place] 55 MCG/ACT nasal inhaler  Generic drug:  Triamcinolone Acetonide   2 sprays, Nasal, Daily      cyclobenzaprine 5 MG tablet  Commonly known as:  FLEXERIL   10 mg, Oral, 3 Times Daily PRN      Dexilant 60 MG capsule  Generic drug:  dexlansoprazole   60 mg, Oral, Daily      estradiol cypionate 5 MG/ML injection  Commonly known as:  DEPO-ESTRADIOL   1.5 mg, Intramuscular, Every 28 Days, Patient states she takes it every 3 months       fluticasone 50 MCG/ACT nasal spray  Commonly known as:  FLONASE   No dose, route, or frequency recorded.      naproxen 500 MG EC tablet  Commonly known as:  EC NAPROSYN   500 mg, Oral, Daily, With food      omeprazole 10 MG capsule  Commonly known as:  prilOSEC   10 mg, Oral      ondansetron 4 MG tablet  Commonly known as:  Zofran   4 mg, Oral, Every 8 Hours PRN      Tylenol 325 MG capsule  Generic drug:  Acetaminophen   325 mg, Oral, As Needed      venlafaxine 25 MG tablet  Commonly known as:  EFFEXOR   25 mg, Oral, 2 Times Daily            Diet Instructions     Diet: Regular       Discharge Diet:  Regular         Activity Instructions     Activity as Tolerated           Additional Instructions for the Follow-ups that You Need to Schedule     Discharge Follow-up with PCP   As directed       Currently Documented PCP:    Elyse Larkin APRN    PCP Phone Number:    [phone]     Follow Up Details:  see in 1 week         Discharge Follow-up with Specified Provider: Dr. Dedrick Jaimes; 2 Weeks   As directed      To:  Dr. Dedrick Jaimes    Follow Up:  2 Weeks    Follow Up Details:  Urology           Follow-up Information     Elyse Larkin APRN .    Specialty:  Family Medicine  Why:  see in 1 week  Contact information:  [address] [place] [address]  [phone]                 Test Results Pending at Discharge   Order Current Status    Blood Culture - Blood, Arm, Left Preliminary result    Blood Culture - Blood, Arm, Right Preliminary result    Blood Culture - Blood, Hand, Right Preliminary result         LIVIA Welch  09/08/20  12:35    Discharge time spent greater than 30 minutes.

## 2020-09-08 NOTE — PLAN OF CARE
Problem: Patient Care Overview  Goal: Plan of Care Review  Outcome: Ongoing (interventions implemented as appropriate)  Flowsheets (Taken 9/8/2020 7549)  Progress: improving  Plan of Care Reviewed With: patient  Goal: Individualization and Mutuality  Outcome: Ongoing (interventions implemented as appropriate)  Goal: Discharge Needs Assessment  Outcome: Ongoing (interventions implemented as appropriate)  Goal: Interprofessional Rounds/Family Conf  Outcome: Ongoing (interventions implemented as appropriate)     Problem: Nausea/Vomiting (Adult)  Goal: Identify Related Risk Factors and Signs and Symptoms  Outcome: Ongoing (interventions implemented as appropriate)  Goal: Symptom Relief  Outcome: Ongoing (interventions implemented as appropriate)  Goal: Adequate Hydration  Outcome: Ongoing (interventions implemented as appropriate)     Problem: Pain, Acute (Adult)  Goal: Identify Related Risk Factors and Signs and Symptoms  Outcome: Ongoing (interventions implemented as appropriate)  Goal: Acceptable Pain Control/Comfort Level  Outcome: Ongoing (interventions implemented as appropriate)

## 2020-09-08 NOTE — PROGRESS NOTES
Discharge Planning Assessment  [facility]     Patient Name: Beatriz Boland  MRN: [id number]  Today's Date: 9/8/2020    Admit Date: 9/4/2020    Discharge Needs Assessment     Row Name 09/08/20 1009       Living Environment    Lives With  parent(s)    Current Living Arrangements  home/apartment/condo    Primary Care Provided by  self    Provides Primary Care For  no one    Family Caregiver if Needed  parent(s)       Resource/Environmental Concerns    Resource/Environmental Concerns  none       Transition Planning    Patient/Family Anticipates Transition to  home    Patient/Family Anticipated Services at Transition  none    Transportation Anticipated  family or friend will provide       Discharge Needs Assessment    Equipment Currently Used at Home  none    Equipment Needed After Discharge  none        Discharge Plan     Row Name 09/08/20 1010       Plan    Plan  Home    Patient/Family in Agreement with Plan  yes    Plan Comments  Met with pt at bedside Introduced self, explained CCP role, facesheet verified.  Pt states she lives with mother and is independent with ADLs. Pt doesn't drive, mother drives to appointments.  Plans to return home at discharge, no identified needs.  ALEJANDRINA Bernard RN        Destination      Coordination has not been started for this encounter.      Durable Medical Equipment      Coordination has not been started for this encounter.      Dialysis/Infusion      Coordination has not been started for this encounter.      Home Medical Care      Coordination has not been started for this encounter.      Therapy      Coordination has not been started for this encounter.      Community Resources      Coordination has not been started for this encounter.          Demographic Summary     Row Name 09/08/20 1009       General Information    Admission Type  inpatient    Arrived From  home    Referral Source  admission list    Reason for Consult  discharge planning    Preferred Language  English        Contact Information    Permission Granted to Share Info With  family/designee Marilu Boland (mother) [phone]        Functional Status     Row Name 09/08/20 1009       Functional Status, IADL    Medications  independent    Meal Preparation  independent    Housekeeping  independent    Laundry  independent    Shopping  independent        Psychosocial    No documentation.       Abuse/Neglect    No documentation.       Legal    No documentation.       Substance Abuse    No documentation.       Patient Forms    No documentation.           Janene Bernard RN

## 2020-09-09 ENCOUNTER — READMISSION MANAGEMENT (OUTPATIENT)
Dept: CALL CENTER | Facility: HOSPITAL | Age: 30
End: 2020-09-09

## 2020-09-09 LAB — BACTERIA SPEC AEROBE CULT: NORMAL

## 2020-09-09 NOTE — PAYOR COMM NOTE
"Beatriz Boland (30 y.o. Female)     ATTN: DISCHARGE SUMMARY, [id number]    [facility] DEPT: FLOYD PERDOMO RN/CCP- [phone], [phone]        Date of Birth Social Security Number Address Home Phone MRN    1990  [id number] [place] [address] [phone] [id number]    [employer]beterian Marital Status          Mormonism Single       Admission Date Admission Type Admitting Provider Attending Provider Department, Room/Bed    9/4/20 Emergency Foster Manjarrez MD  [address], E562/1    Discharge Date Discharge Disposition Discharge Destination        9/8/2020 Home or Self Care              Attending Provider:  (none)   Allergies:  Ciprofloxacin, Dust Mite Extract    Isolation:  None   Infection:  None   Code Status:  Prior    Ht:  170.2 cm (67\")   Wt:  113 kg (250 lb)    Admission Cmt:  None   Principal Problem:  Septicemia due to E. coli (CMS/Formerly [facility]) [A41.51]                 Active Insurance as of 9/4/2020     Primary Coverage     Payor Plan Insurance Group Employer/Plan Group    [employer]UE [employer]OSS [employer] BLUE CROSS BLUE SHIELD PPO [id number]     Payor Plan Address Payor Plan Phone Number Payor Plan Fax Number Effective Dates    [address] [phone]  6/1/2014 - None Entered    [place] [address]       Subscriber Name Subscriber Birth Date Member ID       BEATRIZ BOLAND 1990 [id number]                 Emergency Contacts      (Rel.) Home Phone Work Phone Mobile Phone    Marilu Boland (Mother) [phone] -- [phone]    Tere Gallardo (Friend) [phone] --    Tamiko Modi (Significant Other) -- -- [phone]               Discharge Summary      Lydia Patel APRN at 09/08/20 1052     Attestation signed by Foster Manjarrez MD at 09/08/20 1627    Patient feeling well today.  Exam is benign.  Test results noted.  P.o. potassium then ready for discharge.  Discussed with LIVIA.                                  [place] [facility]    Date of Discharge:  9/8/2020    PCP: Elyse Larkin APRN    Discharge Diagnosis:   Active Hospital Problems    Diagnosis  POA   • **Septicemia due to E. coli (CMS/formerly [facility]) [A41.51]  Yes   • Acute UTI (urinary tract infection) [N39.0]  Yes   • Obesity (BMI 30-39.9) [E66.9]  Yes   • Tobacco abuse [Z72.0]  Yes   • Mild intermittent asthma without complication [J45.20]  Yes   • GERD without esophagitis [K21.9]  Yes   • Mental developmental delay [F81.9]  Yes      Resolved Hospital Problems    Diagnosis Date Resolved POA   • Hypokalemia [E87.6] 09/08/2020 Yes   • WILIAN (acute kidney injury) (CMS/formerly [facility]) [N17.9] 09/08/2020 Yes     Procedures Performed  none     Consults     Date and Time Order Name Status Description    9/4/2020 0551 [facility] (on-call MD unless specified) Details Completed         Hospital Course  Please see history and physical for details. Patient is a 30 y.o. female that initially presented to the hospital with complaints of nausea, vomiting, fever and chills for 3 days. She was found to be septic on admission. She reported urinary symptoms for about 7 days prior to her admission, but did not seek medical treatment as she was concerned she was not covered with medical insurance due to switching jobs. She was given empiric antibiotics and fluids and her blood cultures eventually grew positive for E.coli. Her procalcitonin and CRP were markedly elevated. Urine cultures grew E.coli sensitive to Rocephin so her antibiotics were de-escalated from Zosyn. Repeat blood cultures have shown sterility for 24 hours and she has remained afebrile. Her urinary symptoms have resolved and her renal function improved after IVF resuscitation. Her inflammatory markers have trended down and she feels much improved on exam. She is no longer nauseated or vomiting and has tolerated a diet.    She did have a known developmental delay on admission and was educated on proper perineal care and to  seek medical advice when urinary symptoms begin. I have advised her to see her already established Urologist, Dr. Dedrick Jaimes, in 1-2 weeks for follow up as well as her PCP in 1 week. She did have some reported dyspnea with exertion, but walking oximetry on room air did not show any acute needs for oxygen at home. This was felt related to her sedentary lifestyle as well as obesity and tobacco abuse. Lifestyle modifications and smoking cessation have been encouraged. She feels ready for discharge today with and will be discharged on oral Levaquin (sensitive on cultures) for 7 more days. I informed her she needs to finish the full course.     I discussed the patient's findings and my recommendations with patient and Dr. Manjarrez.    Condition on Discharge: Improved.     Temp:  [97.8 °F (36.6 °C)-97.9 °F (36.6 °C)] 97.8 °F (36.6 °C)  Heart Rate:  [64-96] 71  Resp:  [16-18] 18  BP: (110-127)/(70-92) 127/92  Body mass index is 39.16 kg/m².    Physical Exam   Constitutional: She is oriented to person, place, and time. She appears well-developed and well-nourished. No distress.   HENT:   Head: Normocephalic and atraumatic.   Nose: Nose normal.   Mouth/Throat: Oropharynx is clear and moist.   Eyes: Conjunctivae are normal. Right eye exhibits no discharge. Left eye exhibits no discharge.   Neck: Normal range of motion. Neck supple.   Cardiovascular: Normal rate, regular rhythm, normal heart sounds and intact distal pulses.   Pulmonary/Chest: Effort normal and breath sounds normal. No respiratory distress.   Abdominal: Soft. Bowel sounds are normal. She exhibits no distension. There is no tenderness.   Musculoskeletal: Normal range of motion. She exhibits no edema.   Neurological: She is alert and oriented to person, place, and time. She exhibits normal muscle tone. Coordination normal.   Skin: Skin is warm and dry. She is not diaphoretic. No erythema.   Psychiatric: She has a normal mood and affect. Her behavior is  normal.   Nursing note and vitals reviewed.        Discharge Medications      New Medications      Instructions Start Date   levoFLOXacin 750 MG tablet  Commonly known as:  LEVAQUIN   750 mg, Oral, Daily      saccharomyces boulardii 250 MG capsule  Commonly known as:  FLORASTOR   250 mg, Oral, 2 Times Daily         Continue These Medications      Instructions Start Date   albuterol sulfate  (90 Base) MCG/ACT inhaler  Commonly known as:  PROVENTIL HFA;VENTOLIN HFA;PROAIR HFA   2 puffs, Inhalation, Every 4 Hours PRN      ARIPiprazole 5 MG tablet  Commonly known as:  ABILIFY   5 mg, Oral, Daily      busPIRone 7.5 MG tablet  Commonly known as:  BUSPAR   15 mg, Oral, 3 Times Daily      cetirizine 10 MG tablet  Commonly known as:  zyrTEC   10 mg, Oral, Daily      Chlorcyclizine-Pseudoephed 25-60 MG tablet  Commonly known as:  Stahist AD   25 mg, Oral, Daily      [employer] Nasal Allergy Sabula 55 MCG/ACT nasal inhaler  Generic drug:  Triamcinolone Acetonide   2 sprays, Nasal, Daily      cyclobenzaprine 5 MG tablet  Commonly known as:  FLEXERIL   10 mg, Oral, 3 Times Daily PRN      Dexilant 60 MG capsule  Generic drug:  dexlansoprazole   60 mg, Oral, Daily      estradiol cypionate 5 MG/ML injection  Commonly known as:  DEPO-ESTRADIOL   1.5 mg, Intramuscular, Every 28 Days, Patient states she takes it every 3 months       fluticasone 50 MCG/ACT nasal spray  Commonly known as:  FLONASE   No dose, route, or frequency recorded.      naproxen 500 MG EC tablet  Commonly known as:  EC NAPROSYN   500 mg, Oral, Daily, With food      omeprazole 10 MG capsule  Commonly known as:  prilOSEC   10 mg, Oral      ondansetron 4 MG tablet  Commonly known as:  Zofran   4 mg, Oral, Every 8 Hours PRN      Tylenol 325 MG capsule  Generic drug:  Acetaminophen   325 mg, Oral, As Needed      venlafaxine 25 MG tablet  Commonly known as:  EFFEXOR   25 mg, Oral, 2 Times Daily            Diet Instructions     Diet: Regular      Discharge Diet:  Regular           Activity Instructions     Activity as Tolerated           Additional Instructions for the Follow-ups that You Need to Schedule     Discharge Follow-up with PCP   As directed       Currently Documented PCP:    Elyse Larkin APRN    PCP Phone Number:    [phone]     Follow Up Details:  see in 1 week         Discharge Follow-up with Specified Provider: Dr. Dedrick Jaimes; 2 Weeks   As directed      To:  Dr. Dedrick Jaimes    Follow Up:  2 Weeks    Follow Up Details:  Urology           Follow-up Information     Elyse Larkin APRN .    Specialty:  Family Medicine  Why:  see in 1 week  Contact information:  [address] [place] [address]  [phone]                 Test Results Pending at Discharge   Order Current Status    Blood Culture - Blood, Arm, Left Preliminary result    Blood Culture - Blood, Arm, Right Preliminary result    Blood Culture - Blood, Hand, Right Preliminary result         LIVIA Welch  09/08/20  12:35    Discharge time spent greater than 30 minutes.    Electronically signed by Foster Manjarrez MD at 09/08/20 2257

## 2020-09-09 NOTE — OUTREACH NOTE
Prep Survey      Responses   [facility] [place] facility patient discharged from?  [place]   Is LACE score < 7 ?  No   Eligibility  Readm Mgmt   Discharge diagnosis  **Septicemia due to E. coli    Does the patient have one of the following disease processes/diagnoses(primary or secondary)?  Sepsis   Does the patient have Home health ordered?  No   Is there a DME ordered?  No   Prep survey completed?  Yes          Sarah Bai RN

## 2020-09-11 LAB
BACTERIA SPEC AEROBE CULT: NORMAL
BACTERIA SPEC AEROBE CULT: NORMAL

## 2020-09-14 ENCOUNTER — READMISSION MANAGEMENT (OUTPATIENT)
Dept: CALL CENTER | Facility: HOSPITAL | Age: 30
End: 2020-09-14

## 2020-09-14 NOTE — OUTREACH NOTE
Sepsis Week 1 Survey      Responses   [facility]   COVID-19 Test Status  Negative   Does the patient have one of the following disease processes/diagnoses(primary or secondary)?  Sepsis   Is there a successful TCM telephone encounter documented?  No   Week 1 attempt successful?  Yes   Call start time  1905   Call end time  1910   Discharge diagnosis  **Septicemia due to E. coli    Meds reviewed with patient/caregiver?  Yes   Is the patient having any side effects they believe may be caused by any medication additions or changes?  No   Does the patient have all medications related to this admission filled (includes all antibiotics, inhalers, nebulizers,steroids,etc.)  Yes   Prescription comments  Patient has one dose of antibiotic left   Is the patient taking all medications as directed (includes completed medication regime)?  Yes   Does the patient have a primary care provider?   Yes   Comments regarding PCP  Patient has seen PCP this week.   Does the patient have an appointment with their PCP within 7 days of discharge?  Yes   Has the patient kept scheduled appointments due by today?  Yes   Pulse Ox monitoring  None   Psychosocial issues?  No   Did the patient receive a copy of their discharge instructions?  Yes   Nursing interventions  Reviewed instructions with patient   What is the patient's perception of their health status since discharge?  Improving   Is the patient/caregiver able to teach back Sepsis?  S - Shivering,fever or very cold, E - Extreme pain or generalized discomfort (worst ever,especially abdomen), P - Pale or discolored skin, S - Sleepy, difficult to arouse,confused, I -   I feel like I might die-a feeling of hopelessness, S - Short of breath   Is the patient/caregiver able to teach back the hierarchy of who to call/visit for symptoms/problems? PCP, Specialist, Home health nurse, Urgent Care, ED, 911  Yes   Week 1 call completed?  Yes   Wrap up  additional comments  Patient states she is doing good at this time.           Jackeline Saunders RN

## 2020-09-23 ENCOUNTER — READMISSION MANAGEMENT (OUTPATIENT)
Dept: CALL CENTER | Facility: HOSPITAL | Age: 30
End: 2020-09-23

## 2020-09-23 NOTE — OUTREACH NOTE
Sepsis Week 2 Survey      Responses   [facility]   COVID-19 Test Status  Negative   Does the patient have one of the following disease processes/diagnoses(primary or secondary)?  Sepsis   Week 2 attempt successful?  Yes   Call start time  0942   Call end time  0951   Discharge diagnosis  Septicemia due to E. coli    Meds reviewed with patient/caregiver?  Yes   Is the patient taking all medications as directed (includes completed medication regime)?  Yes   Comments regarding PCP  Pt has f/u with PCP since hospital d/c    Has the patient kept scheduled appointments due by today?  Yes   What is the patient's perception of their health status since discharge?  Improving   Nursing interventions  Nurse provided patient education   Is the patient/caregiver able to teach back Sepsis?  S - Shivering,fever or very cold   Nursing interventions  Nurse provided patient education, Advised patient to call provider [R/T breakthrough bleeding that's not typical]   Is patient/caregiver able to teach back steps to recovery at home?  Eat a balanced diet, Rest and regain strength, Learn about sepsis(sepsis.org)   Is the patient/caregiver able to teach back signs and symptoms of worsening condition:  Fever, Hyperthermia   If the patient is a current smoker, are they able to teach back resources for cessation?  Smoking cessation medications [Smoker. She vapes. ]   Additional teach back comments  Speaking with PCP r/t anxiety rather than vaping. Breakthrough bleeding and on Depo.    Week 2 call completed?  Yes   Wrap up additional comments  Pt has a new job           Alma Cuevas, RN

## 2020-09-30 ENCOUNTER — READMISSION MANAGEMENT (OUTPATIENT)
Dept: CALL CENTER | Facility: HOSPITAL | Age: 30
End: 2020-09-30

## 2020-09-30 NOTE — OUTREACH NOTE
Sepsis Week 3 Survey      Responses   [facility] patient discharged from?  [place]   COVID-19 Test Status  Negative   Does the patient have one of the following disease processes/diagnoses(primary or secondary)?  Sepsis   Week 3 attempt successful?  No   Unsuccessful attempts  Attempt 1          Jodi Harkins RN

## 2020-10-01 ENCOUNTER — READMISSION MANAGEMENT (OUTPATIENT)
Dept: CALL CENTER | Facility: HOSPITAL | Age: 30
End: 2020-10-01

## 2020-10-01 NOTE — OUTREACH NOTE
Sepsis Week 3 Survey      Responses   [facility], [place], operated by [facility] patient discharged from?  [place]   Does the patient have one of the following disease processes/diagnoses(primary or secondary)?  Sepsis   Week 3 attempt successful?  Yes   Call start time  1438   Call end time  1442   Discharge diagnosis  Septicemia due to E. coli    Meds reviewed with patient/caregiver?  Yes   Is the patient having any side effects they believe may be caused by any medication additions or changes?  No   Is the patient taking all medications as directed (includes completed medication regime)?  Yes   Does the patient have a primary care provider?   Yes   Comments regarding PCP  Patient has f/u with PCP and urologist   Does the patient have an appointment with their PCP within 7 days of discharge?  Yes   Has the patient kept scheduled appointments due by today?  Yes   Psychosocial issues?  No   Did the patient receive a copy of their discharge instructions?  Yes   Nursing interventions  Reviewed instructions with patient   What is the patient's perception of their health status since discharge?  Improving   Is the patient/caregiver able to teach back signs and symptoms of worsening condition:  Fever   Is the patient/caregiver able to teach back the hierarchy of who to call/visit for symptoms/problems? PCP, Specialist, Home health nurse, Urgent Care, ED, 911  Yes   Week 3 call completed?  Yes   Wrap up additional comments  Patient states she is doing well.  Her last urninalysis was clear per urology.  She denies any needs at this time.           Jackeline Saunders RN

## 2020-10-13 ENCOUNTER — READMISSION MANAGEMENT (OUTPATIENT)
Dept: CALL CENTER | Facility: HOSPITAL | Age: 30
End: 2020-10-13

## 2020-10-13 NOTE — OUTREACH NOTE
Sepsis Week 4 Survey      Responses   [facility] patient discharged from?  [place]   Does the patient have one of the following disease processes/diagnoses(primary or secondary)?  Sepsis   Week 4 attempt successful?  Yes   Call start time  1659   Call end time  1703   Discharge diagnosis  Septicemia due to E. coli    Is patient permission given to speak with other caregiver?  No   Meds reviewed with patient/caregiver?  Yes   Is the patient having any side effects they believe may be caused by any medication additions or changes?  No   Is the patient taking all medications as directed (includes completed medication regime)?  Yes   Has the patient kept scheduled appointments due by today?  Yes   Is the patient still receiving Home Health Services?  N/A   Psychosocial issues?  No   What is the patient's perception of their health status since discharge?  Improving   Nursing interventions  Nurse provided patient education   Is the patient/caregiver able to teach back Sepsis?  S - Shivering,fever or very cold   Nursing interventions  Nurse provided patient education   Is the patient/caregiver able to teach back signs and symptoms of worsening condition:  Fever   Is the patient/caregiver able to teach back the hierarchy of who to call/visit for symptoms/problems? PCP, Specialist, Home health nurse, Urgent Care, ED, 911  Yes   Week 4 call completed?  Yes   Would the patient like one additional call?  No   Graduated  Yes   Did the patient feel the follow up calls were helpful during their recovery period?  Yes   Was the number of calls appropriate?  Yes          Tayla Coronado RN

## 2020-10-22 ENCOUNTER — HOSPITAL ENCOUNTER (INPATIENT)
Facility: HOSPITAL | Age: 30
LOS: 2 days | Discharge: HOME | End: 2020-10-24
Attending: EMERGENCY MEDICINE | Admitting: INTERNAL MEDICINE

## 2020-10-22 ENCOUNTER — APPOINTMENT (OUTPATIENT)
Dept: GENERAL RADIOLOGY | Facility: HOSPITAL | Age: 30
End: 2020-10-22

## 2020-10-22 ENCOUNTER — APPOINTMENT (OUTPATIENT)
Dept: CT IMAGING | Facility: HOSPITAL | Age: 30
End: 2020-10-22

## 2020-10-22 DIAGNOSIS — N15.1 RENAL ABSCESS, LEFT: ICD-10-CM

## 2020-10-22 DIAGNOSIS — N12 PYELONEPHRITIS OF LEFT KIDNEY: Primary | ICD-10-CM

## 2020-10-22 PROBLEM — A41.9 SEPSIS (HCC): Status: ACTIVE | Noted: 2020-10-22

## 2020-10-22 PROBLEM — J45.909 ASTHMA: Status: ACTIVE | Noted: 2020-10-22

## 2020-10-22 LAB
ALBUMIN SERPL-MCNC: 4 G/DL (ref 3.5–5.2)
ALBUMIN/GLOB SERPL: 1.1 G/DL
ALP SERPL-CCNC: 62 U/L (ref 39–117)
ALT SERPL W P-5'-P-CCNC: 12 U/L (ref 1–33)
ANION GAP SERPL CALCULATED.3IONS-SCNC: 12.2 MMOL/L (ref 5–15)
AST SERPL-CCNC: 14 U/L (ref 1–32)
B PARAPERT DNA SPEC QL NAA+PROBE: NOT DETECTED
B PERT DNA SPEC QL NAA+PROBE: NOT DETECTED
B-HCG UR QL: NEGATIVE
BACTERIA UR QL AUTO: ABNORMAL /HPF
BASOPHILS # BLD AUTO: 0.05 10*3/MM3 (ref 0–0.2)
BASOPHILS NFR BLD AUTO: 0.3 % (ref 0–1.5)
BILIRUB SERPL-MCNC: 0.5 MG/DL (ref 0–1.2)
BILIRUB UR QL STRIP: NEGATIVE
BUN SERPL-MCNC: 8 MG/DL (ref 6–20)
BUN/CREAT SERPL: 8.2 (ref 7–25)
C PNEUM DNA NPH QL NAA+NON-PROBE: NOT DETECTED
CALCIUM SPEC-SCNC: 9.5 MG/DL (ref 8.6–10.5)
CHLORIDE SERPL-SCNC: 102 MMOL/L (ref 98–107)
CLARITY UR: ABNORMAL
CO2 SERPL-SCNC: 20.8 MMOL/L (ref 22–29)
COLOR UR: YELLOW
CREAT SERPL-MCNC: 0.98 MG/DL (ref 0.57–1)
D-LACTATE SERPL-SCNC: 0.7 MMOL/L (ref 0.5–2)
DEPRECATED RDW RBC AUTO: 39.7 FL (ref 37–54)
EOSINOPHIL # BLD AUTO: 0.01 10*3/MM3 (ref 0–0.4)
EOSINOPHIL NFR BLD AUTO: 0.1 % (ref 0.3–6.2)
ERYTHROCYTE [DISTWIDTH] IN BLOOD BY AUTOMATED COUNT: 13.5 % (ref 12.3–15.4)
FLUAV SUBTYP SPEC NAA+PROBE: NOT DETECTED
FLUBV RNA ISLT QL NAA+PROBE: NOT DETECTED
GFR SERPL CREATININE-BSD FRML MDRD: 67 ML/MIN/1.73
GLOBULIN UR ELPH-MCNC: 3.6 GM/DL
GLUCOSE SERPL-MCNC: 96 MG/DL (ref 65–99)
GLUCOSE UR STRIP-MCNC: NEGATIVE MG/DL
HADV DNA SPEC NAA+PROBE: NOT DETECTED
HCOV 229E RNA SPEC QL NAA+PROBE: NOT DETECTED
HCOV HKU1 RNA SPEC QL NAA+PROBE: NOT DETECTED
HCOV NL63 RNA SPEC QL NAA+PROBE: NOT DETECTED
HCOV OC43 RNA SPEC QL NAA+PROBE: NOT DETECTED
HCT VFR BLD AUTO: 38.2 % (ref 34–46.6)
HGB BLD-MCNC: 12.6 G/DL (ref 12–15.9)
HGB UR QL STRIP.AUTO: ABNORMAL
HMPV RNA NPH QL NAA+NON-PROBE: NOT DETECTED
HOLD SPECIMEN: NORMAL
HOLD SPECIMEN: NORMAL
HPIV1 RNA SPEC QL NAA+PROBE: NOT DETECTED
HPIV2 RNA SPEC QL NAA+PROBE: NOT DETECTED
HPIV3 RNA NPH QL NAA+PROBE: NOT DETECTED
HPIV4 P GENE NPH QL NAA+PROBE: NOT DETECTED
HYALINE CASTS UR QL AUTO: ABNORMAL /LPF
IMM GRANULOCYTES # BLD AUTO: 0.12 10*3/MM3 (ref 0–0.05)
IMM GRANULOCYTES NFR BLD AUTO: 0.7 % (ref 0–0.5)
KETONES UR QL STRIP: NEGATIVE
LEUKOCYTE ESTERASE UR QL STRIP.AUTO: ABNORMAL
LYMPHOCYTES # BLD AUTO: 1.76 10*3/MM3 (ref 0.7–3.1)
LYMPHOCYTES NFR BLD AUTO: 9.9 % (ref 19.6–45.3)
M PNEUMO IGG SER IA-ACNC: NOT DETECTED
MCH RBC QN AUTO: 27 PG (ref 26.6–33)
MCHC RBC AUTO-ENTMCNC: 33 G/DL (ref 31.5–35.7)
MCV RBC AUTO: 81.8 FL (ref 79–97)
MONOCYTES # BLD AUTO: 1.41 10*3/MM3 (ref 0.1–0.9)
MONOCYTES NFR BLD AUTO: 7.9 % (ref 5–12)
NEUTROPHILS NFR BLD AUTO: 14.49 10*3/MM3 (ref 1.7–7)
NEUTROPHILS NFR BLD AUTO: 81.1 % (ref 42.7–76)
NITRITE UR QL STRIP: POSITIVE
NRBC BLD AUTO-RTO: 0 /100 WBC (ref 0–0.2)
PH UR STRIP.AUTO: 6 [PH] (ref 5–8)
PLATELET # BLD AUTO: 322 10*3/MM3 (ref 140–450)
PMV BLD AUTO: 8.5 FL (ref 6–12)
POTASSIUM SERPL-SCNC: 3.8 MMOL/L (ref 3.5–5.2)
PROCALCITONIN SERPL-MCNC: 0.13 NG/ML (ref 0–0.25)
PROT SERPL-MCNC: 7.6 G/DL (ref 6–8.5)
PROT UR QL STRIP: NEGATIVE
RBC # BLD AUTO: 4.67 10*6/MM3 (ref 3.77–5.28)
RBC # UR: ABNORMAL /HPF
REF LAB TEST METHOD: ABNORMAL
RHINOVIRUS RNA SPEC NAA+PROBE: NOT DETECTED
RSV RNA NPH QL NAA+NON-PROBE: NOT DETECTED
SARS-COV-2 RNA NPH QL NAA+NON-PROBE: NOT DETECTED
SODIUM SERPL-SCNC: 135 MMOL/L (ref 136–145)
SP GR UR STRIP: 1.01 (ref 1–1.03)
SQUAMOUS #/AREA URNS HPF: ABNORMAL /HPF
UROBILINOGEN UR QL STRIP: ABNORMAL
WBC # BLD AUTO: 17.84 10*3/MM3 (ref 3.4–10.8)
WBC UR QL AUTO: ABNORMAL /HPF
WHOLE BLOOD HOLD SPECIMEN: NORMAL

## 2020-10-22 PROCEDURE — 81001 URINALYSIS AUTO W/SCOPE: CPT | Performed by: EMERGENCY MEDICINE

## 2020-10-22 PROCEDURE — 83605 ASSAY OF LACTIC ACID: CPT | Performed by: EMERGENCY MEDICINE

## 2020-10-22 PROCEDURE — 99285 EMERGENCY DEPT VISIT HI MDM: CPT

## 2020-10-22 PROCEDURE — 0202U NFCT DS 22 TRGT SARS-COV-2: CPT | Performed by: EMERGENCY MEDICINE

## 2020-10-22 PROCEDURE — 87086 URINE CULTURE/COLONY COUNT: CPT | Performed by: EMERGENCY MEDICINE

## 2020-10-22 PROCEDURE — 25010000002 IOPAMIDOL 61 % SOLUTION: Performed by: EMERGENCY MEDICINE

## 2020-10-22 PROCEDURE — 87077 CULTURE AEROBIC IDENTIFY: CPT | Performed by: EMERGENCY MEDICINE

## 2020-10-22 PROCEDURE — 87186 SC STD MICRODIL/AGAR DIL: CPT | Performed by: EMERGENCY MEDICINE

## 2020-10-22 PROCEDURE — 71045 X-RAY EXAM CHEST 1 VIEW: CPT

## 2020-10-22 PROCEDURE — 74177 CT ABD & PELVIS W/CONTRAST: CPT

## 2020-10-22 PROCEDURE — 81025 URINE PREGNANCY TEST: CPT | Performed by: EMERGENCY MEDICINE

## 2020-10-22 PROCEDURE — 25010000002 CEFTRIAXONE PER 250 MG: Performed by: EMERGENCY MEDICINE

## 2020-10-22 PROCEDURE — 80053 COMPREHEN METABOLIC PANEL: CPT | Performed by: EMERGENCY MEDICINE

## 2020-10-22 PROCEDURE — 85025 COMPLETE CBC W/AUTO DIFF WBC: CPT | Performed by: EMERGENCY MEDICINE

## 2020-10-22 PROCEDURE — 87040 BLOOD CULTURE FOR BACTERIA: CPT | Performed by: EMERGENCY MEDICINE

## 2020-10-22 PROCEDURE — 84145 PROCALCITONIN (PCT): CPT | Performed by: EMERGENCY MEDICINE

## 2020-10-22 RX ORDER — SACCHAROMYCES BOULARDII 250 MG
250 CAPSULE ORAL 2 TIMES DAILY
Status: DISCONTINUED | OUTPATIENT
Start: 2020-10-23 | End: 2020-10-24 | Stop reason: HOSPADM

## 2020-10-22 RX ORDER — CEFTRIAXONE SODIUM 2 G/50ML
2 INJECTION, SOLUTION INTRAVENOUS EVERY 24 HOURS
Status: DISCONTINUED | OUTPATIENT
Start: 2020-10-23 | End: 2020-10-23

## 2020-10-22 RX ORDER — SODIUM CHLORIDE 0.9 % (FLUSH) 0.9 %
10 SYRINGE (ML) INJECTION AS NEEDED
Status: DISCONTINUED | OUTPATIENT
Start: 2020-10-22 | End: 2020-10-24 | Stop reason: HOSPADM

## 2020-10-22 RX ORDER — ONDANSETRON 4 MG/1
4 TABLET, FILM COATED ORAL EVERY 6 HOURS PRN
Status: DISCONTINUED | OUTPATIENT
Start: 2020-10-22 | End: 2020-10-24 | Stop reason: HOSPADM

## 2020-10-22 RX ORDER — ONDANSETRON 2 MG/ML
4 INJECTION INTRAMUSCULAR; INTRAVENOUS EVERY 6 HOURS PRN
Status: DISCONTINUED | OUTPATIENT
Start: 2020-10-22 | End: 2020-10-24 | Stop reason: HOSPADM

## 2020-10-22 RX ORDER — BISACODYL 5 MG/1
5 TABLET, DELAYED RELEASE ORAL DAILY PRN
Status: DISCONTINUED | OUTPATIENT
Start: 2020-10-22 | End: 2020-10-24 | Stop reason: HOSPADM

## 2020-10-22 RX ORDER — CEFTRIAXONE SODIUM 1 G/50ML
1 INJECTION, SOLUTION INTRAVENOUS ONCE
Status: COMPLETED | OUTPATIENT
Start: 2020-10-22 | End: 2020-10-22

## 2020-10-22 RX ORDER — CETIRIZINE HYDROCHLORIDE 10 MG/1
10 TABLET ORAL DAILY
Status: DISCONTINUED | OUTPATIENT
Start: 2020-10-23 | End: 2020-10-24 | Stop reason: HOSPADM

## 2020-10-22 RX ORDER — ALBUTEROL SULFATE 2.5 MG/3ML
2.5 SOLUTION RESPIRATORY (INHALATION) EVERY 6 HOURS PRN
Status: DISCONTINUED | OUTPATIENT
Start: 2020-10-22 | End: 2020-10-24 | Stop reason: HOSPADM

## 2020-10-22 RX ORDER — SODIUM CHLORIDE 0.9 % (FLUSH) 0.9 %
10 SYRINGE (ML) INJECTION EVERY 12 HOURS SCHEDULED
Status: DISCONTINUED | OUTPATIENT
Start: 2020-10-23 | End: 2020-10-24 | Stop reason: HOSPADM

## 2020-10-22 RX ORDER — CALCIUM CARBONATE 200(500)MG
2 TABLET,CHEWABLE ORAL 2 TIMES DAILY PRN
Status: DISCONTINUED | OUTPATIENT
Start: 2020-10-22 | End: 2020-10-24 | Stop reason: HOSPADM

## 2020-10-22 RX ORDER — ACETAMINOPHEN 325 MG/1
650 TABLET ORAL EVERY 4 HOURS PRN
Status: DISCONTINUED | OUTPATIENT
Start: 2020-10-22 | End: 2020-10-24 | Stop reason: HOSPADM

## 2020-10-22 RX ORDER — ACETAMINOPHEN 500 MG
1000 TABLET ORAL ONCE
Status: COMPLETED | OUTPATIENT
Start: 2020-10-22 | End: 2020-10-22

## 2020-10-22 RX ORDER — VENLAFAXINE 25 MG/1
25 TABLET ORAL NIGHTLY
Status: DISCONTINUED | OUTPATIENT
Start: 2020-10-23 | End: 2020-10-24 | Stop reason: HOSPADM

## 2020-10-22 RX ORDER — ARIPIPRAZOLE 5 MG/1
5 TABLET ORAL NIGHTLY
Status: DISCONTINUED | OUTPATIENT
Start: 2020-10-23 | End: 2020-10-24 | Stop reason: HOSPADM

## 2020-10-22 RX ORDER — ACETAMINOPHEN 650 MG/1
650 SUPPOSITORY RECTAL EVERY 4 HOURS PRN
Status: DISCONTINUED | OUTPATIENT
Start: 2020-10-22 | End: 2020-10-24 | Stop reason: HOSPADM

## 2020-10-22 RX ORDER — CYCLOBENZAPRINE HCL 10 MG
10 TABLET ORAL 3 TIMES DAILY PRN
Status: DISCONTINUED | OUTPATIENT
Start: 2020-10-22 | End: 2020-10-24 | Stop reason: HOSPADM

## 2020-10-22 RX ORDER — ACETAMINOPHEN 160 MG/5ML
650 SOLUTION ORAL EVERY 4 HOURS PRN
Status: DISCONTINUED | OUTPATIENT
Start: 2020-10-22 | End: 2020-10-24 | Stop reason: HOSPADM

## 2020-10-22 RX ORDER — SODIUM CHLORIDE 9 MG/ML
100 INJECTION, SOLUTION INTRAVENOUS CONTINUOUS
Status: DISCONTINUED | OUTPATIENT
Start: 2020-10-23 | End: 2020-10-24 | Stop reason: HOSPADM

## 2020-10-22 RX ORDER — PANTOPRAZOLE SODIUM 40 MG/1
40 TABLET, DELAYED RELEASE ORAL
Status: DISCONTINUED | OUTPATIENT
Start: 2020-10-23 | End: 2020-10-24 | Stop reason: HOSPADM

## 2020-10-22 RX ADMIN — CEFTRIAXONE SODIUM 1 G: 1 INJECTION, SOLUTION INTRAVENOUS at 21:37

## 2020-10-22 RX ADMIN — SODIUM CHLORIDE 3510 ML: 9 INJECTION, SOLUTION INTRAVENOUS at 18:57

## 2020-10-22 RX ADMIN — IOPAMIDOL 85 ML: 612 INJECTION, SOLUTION INTRAVENOUS at 20:10

## 2020-10-22 RX ADMIN — SODIUM CHLORIDE 100 ML/HR: 9 INJECTION, SOLUTION INTRAVENOUS at 23:24

## 2020-10-22 RX ADMIN — CEFTRIAXONE SODIUM 1 G: 1 INJECTION, SOLUTION INTRAVENOUS at 18:58

## 2020-10-22 RX ADMIN — ACETAMINOPHEN 1000 MG: 500 TABLET, FILM COATED ORAL at 18:58

## 2020-10-22 NOTE — ED PROVIDER NOTES
EMERGENCY DEPARTMENT ENCOUNTER    Room Number:  10/10  Date of encounter:  10/22/2020  PCP: Elyse Larkin APRN  Historian: Patient      HPI:  Chief Complaint: Fevers and chills, dysuria  A complete HPI/ROS/PMH/PSH/SH/FH are unobtainable due to: N/A    Context: Beatriz Boland is a 30 y.o. female who presents to the ED c/o fevers and chills which started last night.  She did not have a working thermometer at home, so she is not sure how high her temperature was.  She also reports urinary frequency and dysuria with cloudy and malodorous urine.  She complains of left flank pain as well.  No nausea, vomiting or diarrhea.  No cough, congestion or trouble breathing.  She had similar symptoms recently when she was admitted with E. coli sepsis from urinary tract infection.      The patient was placed in a mask in triage, hand hygiene was performed before and after my interaction with the patient.  I wore a mask, safety glasses and gloves during my entire interaction with the patient.    PAST MEDICAL HISTORY  Active Ambulatory Problems     Diagnosis Date Noted   • Depression    • Mental developmental delay    • GERD without esophagitis    • Scoliosis    • Amblyopia    • Back pain    • Chronic left shoulder pain 09/29/2017   • Muscle spasm 09/29/2017   • Seasonal allergies 11/05/2017   • Panic attacks 11/15/2017   • Anxiety 01/04/2018   • Mild intermittent asthma without complication 03/24/2018   • Chest pain on breathing 08/10/2018   • Nicotine vapor product user 08/10/2018   • Class 3 obesity without serious comorbidity with body mass index (BMI) of 40.0 to 44.9 in adult 08/10/2018   • Acute UTI (urinary tract infection) 09/04/2020   • Obesity (BMI 30-39.9) 09/04/2020   • Tobacco abuse 09/04/2020   • Septicemia due to E. coli (CMS/[facility]) 09/05/2020     Resolved Ambulatory Problems     Diagnosis Date Noted   • Sinus bradycardia 09/29/2014   • Palpitations 11/15/2017   • Acute otitis externa of both ears  11/15/2017   • Atypical chest pain 01/04/2018   • Hypokalemia 09/04/2020   • WILIAN (acute kidney injury) (CMS/[facility]) 09/04/2020     Past Medical History:   Diagnosis Date   • Allergic    • Cleft palate    • Developmental delay    • GERD (gastroesophageal reflux disease)    • Impetigo    • Kidney abscess    • Kidney stone    • Recurrent otitis media    • Sinus infection    • Sprain of shoulder, left 12/2016         PAST SURGICAL HISTORY  Past Surgical History:   Procedure Laterality Date   • ABSCESS DRAINAGE      kidney   • ADENOIDECTOMY     • EAR TUBES     • PHARYNGEAL FLAP      for speech   • TONSILLECTOMY           FAMILY HISTORY  Family History   Problem Relation Age of Onset   • COPD Mother    • Arthritis Mother    • Obesity Mother    • Gestational diabetes Mother    • Cancer Father    • Scoliosis Father    • Rheum arthritis Maternal Aunt    • Diabetes Maternal Uncle    • Alcohol abuse Maternal Uncle    • Rheum arthritis Maternal Grandmother    • COPD Maternal Grandmother    • Stroke Maternal Grandfather    • Alcohol abuse Paternal Uncle          SOCIAL HISTORY  Social History     Socioeconomic History   • Marital status: Single     Spouse name: Not on file   • Number of children: Not on file   • Years of education: Not on file   • Highest education level: Not on file   Tobacco Use   • Smoking status: Current Some Day Smoker   • Smokeless tobacco: Never Used   • Tobacco comment: smokes hookah 1-2 times per week     Substance and Sexual Activity   • Alcohol use: Yes     Comment: occasionally, 1-2 glasses of wine 1-2 x per week   • Drug use: No   • Sexual activity: Yes     Partners: Male     Birth control/protection: Injection   Social History Narrative               ALLERGIES  Ciprofloxacin and Dust mite extract        REVIEW OF SYSTEMS  Review of Systems   Constitutional: Positive for chills and fever.   HENT: Negative for sore throat.    Eyes: Negative.    Respiratory: Negative for cough and shortness of breath.     Cardiovascular: Negative for chest pain.   Gastrointestinal: Negative for abdominal pain, diarrhea and vomiting.   Genitourinary: Positive for dysuria, flank pain, frequency and urgency.   Musculoskeletal: Positive for arthralgias and myalgias. Negative for neck pain.   Skin: Negative for rash.   Allergic/Immunologic: Negative.    Neurological: Negative for weakness, numbness and headaches.   Hematological: Negative.    Psychiatric/Behavioral: Negative.    All other systems reviewed and are negative.       All systems reviewed and negative except for those discussed in HPI.       PHYSICAL EXAM    I have reviewed the triage vital signs and nursing notes.    ED Triage Vitals   Temp Heart Rate Resp BP SpO2   10/22/20 1833 10/22/20 1833 10/22/20 1833 10/22/20 1847 10/22/20 1833   (!) 103.1 °F (39.5 °C) (!) 121 20 133/89 98 %      Temp src Heart Rate Source Patient Position BP Location FiO2 (%)   10/22/20 1833 10/22/20 1833 -- -- --   Tympanic Monitor          Physical Exam   Constitutional: Pt. is oriented to person, place, and time and well-developed, well-nourished, and in no distress. No distress.   HENT: Normocephalic and atraumatic,  EOM are normal. Pupils are equal, round, and reactive to light. Oropharynx moist/nonerythematous.  Neck: Normal range of motion. Neck supple. No JVD present. No tracheal deviation present. No thyromegaly present.   Cardiovascular: Tachycardic rate, regular rhythm and normal heart sounds. Exam reveals no gallop and no friction rub.   No murmur heard.  Pulmonary/Chest: Effort normal and breath sounds normal. No stridor. No respiratory distress. No wheezes, no rales.   Abdominal: Soft. Bowel sounds are normal. No distension. There is no tenderness. There is no rebound and no guarding.  No CVA tenderness noted.  Musculoskeletal: Normal range of motion. No edema, tenderness or deformity.   Neurological: Pt. is alert and oriented to person, place, and time. Pt. has normal sensation and  normal strength. No cranial nerve deficit. GCS score is 15.   Skin: Skin is warm and dry. No rash noted. Pt. is not diaphoretic. No erythema.   Psychiatric: Mood, affect and judgment normal.   Nursing note and vitals reviewed.        LAB RESULTS  Recent Results (from the past 24 hour(s))   Lactic Acid, Plasma    Collection Time: 10/22/20  6:50 PM    Specimen: Blood   Result Value Ref Range    Lactate 0.7 0.5 - 2.0 mmol/L   Green Top (Gel)    Collection Time: 10/22/20  6:50 PM   Result Value Ref Range    Extra Tube Hold for add-ons.    Lavender Top    Collection Time: 10/22/20  6:50 PM   Result Value Ref Range    Extra Tube hold for add-on    Gold Top - SST    Collection Time: 10/22/20  6:50 PM   Result Value Ref Range    Extra Tube Hold for add-ons.    Comprehensive Metabolic Panel    Collection Time: 10/22/20  6:50 PM    Specimen: Blood   Result Value Ref Range    Glucose 96 65 - 99 mg/dL    BUN 8 6 - 20 mg/dL    Creatinine 0.98 0.57 - 1.00 mg/dL    Sodium 135 (L) 136 - 145 mmol/L    Potassium 3.8 3.5 - 5.2 mmol/L    Chloride 102 98 - 107 mmol/L    CO2 20.8 (L) 22.0 - 29.0 mmol/L    Calcium 9.5 8.6 - 10.5 mg/dL    Total Protein 7.6 6.0 - 8.5 g/dL    Albumin 4.00 3.50 - 5.20 g/dL    ALT (SGPT) 12 1 - 33 U/L    AST (SGOT) 14 1 - 32 U/L    Alkaline Phosphatase 62 39 - 117 U/L    Total Bilirubin 0.5 0.0 - 1.2 mg/dL    eGFR Non African Amer 67 >60 mL/min/1.73    Globulin 3.6 gm/dL    A/G Ratio 1.1 g/dL    BUN/Creatinine Ratio 8.2 7.0 - 25.0    Anion Gap 12.2 5.0 - 15.0 mmol/L   Procalcitonin    Collection Time: 10/22/20  6:50 PM    Specimen: Blood   Result Value Ref Range    Procalcitonin 0.13 0.00 - 0.25 ng/mL   CBC Auto Differential    Collection Time: 10/22/20  6:50 PM    Specimen: Blood   Result Value Ref Range    WBC 17.84 (H) 3.40 - 10.80 10*3/mm3    RBC 4.67 3.77 - 5.28 10*6/mm3    Hemoglobin 12.6 12.0 - 15.9 g/dL    Hematocrit 38.2 34.0 - 46.6 %    MCV 81.8 79.0 - 97.0 fL    MCH 27.0 26.6 - 33.0 pg    MCHC 33.0  31.5 - 35.7 g/dL    RDW 13.5 12.3 - 15.4 %    RDW-SD 39.7 37.0 - 54.0 fl    MPV 8.5 6.0 - 12.0 fL    Platelets 322 140 - 450 10*3/mm3    Neutrophil % 81.1 (H) 42.7 - 76.0 %    Lymphocyte % 9.9 (L) 19.6 - 45.3 %    Monocyte % 7.9 5.0 - 12.0 %    Eosinophil % 0.1 (L) 0.3 - 6.2 %    Basophil % 0.3 0.0 - 1.5 %    Immature Grans % 0.7 (H) 0.0 - 0.5 %    Neutrophils, Absolute 14.49 (H) 1.70 - 7.00 10*3/mm3    Lymphocytes, Absolute 1.76 0.70 - 3.10 10*3/mm3    Monocytes, Absolute 1.41 (H) 0.10 - 0.90 10*3/mm3    Eosinophils, Absolute 0.01 0.00 - 0.40 10*3/mm3    Basophils, Absolute 0.05 0.00 - 0.20 10*3/mm3    Immature Grans, Absolute 0.12 (H) 0.00 - 0.05 10*3/mm3    nRBC 0.0 0.0 - 0.2 /100 WBC   Urinalysis With Culture If Indicated - Urine, Clean Catch    Collection Time: 10/22/20  6:52 PM    Specimen: Urine, Clean Catch   Result Value Ref Range    Color, UA Yellow Yellow, Straw    Appearance, UA Cloudy (A) Clear    pH, UA 6.0 5.0 - 8.0    Specific Gravity, UA 1.015 1.005 - 1.030    Glucose, UA Negative Negative    Ketones, UA Negative Negative    Bilirubin, UA Negative Negative    Blood, UA Trace (A) Negative    Protein, UA Negative Negative    Leuk Esterase, UA Small (1+) (A) Negative    Nitrite, UA Positive (A) Negative    Urobilinogen, UA 0.2 E.U./dL 0.2 - 1.0 E.U./dL   Pregnancy, Urine - Urine, Clean Catch    Collection Time: 10/22/20  6:52 PM    Specimen: Urine, Clean Catch   Result Value Ref Range    HCG, Urine QL Negative Negative   Urinalysis, Microscopic Only - Urine, Clean Catch    Collection Time: 10/22/20  6:52 PM    Specimen: Urine, Clean Catch   Result Value Ref Range    RBC, UA 0-2 None Seen, 0-2 /HPF    WBC, UA 6-12 (A) None Seen, 0-2 /HPF    Bacteria, UA 3+ (A) None Seen /HPF    Squamous Epithelial Cells, UA 0-2 None Seen, 0-2 /HPF    Hyaline Casts, UA None Seen None Seen /LPF    Methodology Automated Microscopy        Ordered the above labs and independently reviewed the results.        RADIOLOGY  Ct  Abdomen Pelvis With Contrast    Result Date: 10/22/2020  CT ABDOMEN PELVIS W CONTRAST-  INDICATIONS: Left flank pain, fever  TECHNIQUE: Radiation dose reduction techniques were utilized, including automated exposure control and exposure modulation based on body size. Enhanced ABDOMEN AND PELVIS CT  COMPARISON: 03/06/2019  FINDINGS:  Ill-defined hazy hypoenhancement at the left upper kidney with adjacent perinephric stranding is compatible with pyelonephritis, follow-up recommended to exclude any possibility of underlying lesion. At the left lower pole, a region of hypodensity measuring 3.4 cm is located in the area of a previously demonstrated 1.7 cm cyst, but irregular hypoenhancement around this area and adjacent perinephric fat stranding raise suspicion for lobar nephronia or abscess within the left lower pole kidney. Multiple small cysts and low densities too small to characterize are seen in each kidney. Areas of focal cortical thinning in the right kidney may be areas of previous infection. A 2 mm nonobstructive calcification is seen in the right kidney. No hydronephrosis.  Otherwise unremarkable appearance of the liver, gallbladder, spleen, adrenal glands, pancreas, kidneys, bladder.  No bowel obstruction or abnormal bowel thickening is identified. Appendix is not appear inflamed.  No free intraperitoneal gas or free fluid.  Left para-aortic adenopathy, for example 1.1 cm short axis, axial image 62, 1.2 cm short axis, axial image 70, previously 0.3 cm, presumably reactive in nature but nonspecific, possibility of neoplasm not excluded, clinical correlation and follow-up recommended (if there is reason to suspect lymphoproliferative disorder, such as lymphoma, positron emission tomography can be obtained for further evaluation). A retrocaval lymph node measuring 1.3 cm short axis on axial image 59, previously 1.1 cm.  Abdominal aorta is not aneurysmal.  The lung bases are clear.  Degenerative changes are seen  in the spine. No acute fracture is identified.          1. Findings compatible with pyelonephritis in the left upper kidney, and lobar nephronia versus abscess in the left lower kidney. 2. Retroperitoneal adenopathy, presumably reactive in nature although nonspecific as described.  Discussed by telephone with Dr. Seth at 2026, 10/22/2020.  This report was finalized on 10/22/2020 8:33 PM by Dr. Nando Vergara M.D.      Xr Chest 1 View    Result Date: 10/22/2020  XR CHEST 1 VW-  10/22/2020  HISTORY: Fever. Chills.  Heart size is mildly enlarged. Lungs appear free of acute infiltrates. Bony structures appear unremarkable.      Mild cardiomegaly.  This report was finalized on 10/22/2020 8:19 PM by Dr. Wesley Soares M.D.        I ordered the above noted radiological studies. Reviewed by me and discussed with radiologist.  See dictation for official radiology interpretation.      PROCEDURES    Procedures      MEDICATIONS GIVEN IN ER    Medications   sodium chloride 0.9 % flush 10 mL (has no administration in time range)   cefTRIAXone (ROCEPHIN) IVPB 1 g (has no administration in time range)   acetaminophen (TYLENOL) tablet 1,000 mg (1,000 mg Oral Given 10/22/20 1858)   cefTRIAXone (ROCEPHIN) IVPB 1 g (0 g Intravenous Stopped 10/22/20 1928)   sodium chloride 0.9 % bolus 3,510 mL (3,510 mL Intravenous New Bag 10/22/20 1857)   iopamidol (ISOVUE-300) 61 % injection 100 mL (85 mL Intravenous Given by Other 10/22/20 2010)         PROGRESS, DATA ANALYSIS, CONSULTS, AND MEDICAL DECISION MAKING    Any/all labs have been independently reviewed by me.  Any/all radiology studies have been reviewed by me and discussed with radiologist dictating the report.   EKG's independently viewed and interpreted by me.  Discussion below represents my analysis of pertinent findings related to patient's condition, differential diagnosis, treatment plan and final disposition.      ED Course as of Oct 22 2043   Thu Oct 22, 2020   1836  Prior record review: The patient was last admitted to this facility in September of this year for sepsis secondary to E. coli and acute urinary tract infection.    [WC]   1904 WBC(!): 17.84 [WC]   1928 Lactate: 0.7 [WC]   1935 HCG, Urine QL: Negative [WC]   1935 Procalcitonin: 0.13 [WC]   1936 Leukocytes, UA(!): Small (1+) [WC]   1936 Nitrite, UA(!): Positive [WC]   1955 WBC, UA(!): 6-12 [WC]   1955 Bacteria, UA(!): 3+ [WC]   2029 CT of the abdomen and pelvis was independently viewed by me and discussed with radiologist (Dr. Vergara)-there is pyelonephritis of the left kidney with a developing abscess.  For official interpretation, see dictated report.    [WC]   2042 Case discussed with Dr. Small ([facility])-he agrees to admit the patient.    [WC]      ED Course User Index  [WC] Silvestre Seth MD       AS OF 20:43 EDT VITALS:    BP - 116/77  HR - 92  TEMP - (!) 103.1 °F (39.5 °C) (Tympanic)  02 SATS - 99%        DIAGNOSIS  Final diagnoses:   Pyelonephritis of left kidney   Renal abscess, left         DISPOSITION  Admitted           Silvestre Seth MD  10/22/20 2043

## 2020-10-22 NOTE — ED NOTES
Pt reports fever, left side flank pain, frequency urination, and cloudy urine.     Patient was placed in face mask during first look triage.  Patient was wearing a face mask throughout encounter.  I wore personal protective equipment throughout the encounter.  Hand hygiene was performed before and after patient encounter.        Rebeca Lowe, JONATHAN  10/22/20 [id number]

## 2020-10-23 LAB
ANION GAP SERPL CALCULATED.3IONS-SCNC: 7.7 MMOL/L (ref 5–15)
BUN SERPL-MCNC: 6 MG/DL (ref 6–20)
BUN/CREAT SERPL: 7.2 (ref 7–25)
CALCIUM SPEC-SCNC: 8.5 MG/DL (ref 8.6–10.5)
CHLORIDE SERPL-SCNC: 107 MMOL/L (ref 98–107)
CO2 SERPL-SCNC: 21.3 MMOL/L (ref 22–29)
CREAT SERPL-MCNC: 0.83 MG/DL (ref 0.57–1)
DEPRECATED RDW RBC AUTO: 39.7 FL (ref 37–54)
ERYTHROCYTE [DISTWIDTH] IN BLOOD BY AUTOMATED COUNT: 13.4 % (ref 12.3–15.4)
GFR SERPL CREATININE-BSD FRML MDRD: 81 ML/MIN/1.73
GLUCOSE SERPL-MCNC: 120 MG/DL (ref 65–99)
HCT VFR BLD AUTO: 32.2 % (ref 34–46.6)
HGB BLD-MCNC: 10.7 G/DL (ref 12–15.9)
MCH RBC QN AUTO: 27 PG (ref 26.6–33)
MCHC RBC AUTO-ENTMCNC: 33.2 G/DL (ref 31.5–35.7)
MCV RBC AUTO: 81.3 FL (ref 79–97)
PLATELET # BLD AUTO: 264 10*3/MM3 (ref 140–450)
PMV BLD AUTO: 8.9 FL (ref 6–12)
POTASSIUM SERPL-SCNC: 3.5 MMOL/L (ref 3.5–5.2)
RBC # BLD AUTO: 3.96 10*6/MM3 (ref 3.77–5.28)
SODIUM SERPL-SCNC: 136 MMOL/L (ref 136–145)
WBC # BLD AUTO: 21.37 10*3/MM3 (ref 3.4–10.8)

## 2020-10-23 PROCEDURE — 94799 UNLISTED PULMONARY SVC/PX: CPT

## 2020-10-23 PROCEDURE — 80048 BASIC METABOLIC PNL TOTAL CA: CPT | Performed by: NURSE PRACTITIONER

## 2020-10-23 PROCEDURE — 25010000002 ENOXAPARIN PER 10 MG: Performed by: NURSE PRACTITIONER

## 2020-10-23 PROCEDURE — 85027 COMPLETE CBC AUTOMATED: CPT | Performed by: NURSE PRACTITIONER

## 2020-10-23 PROCEDURE — 25010000002 PIPERACILLIN SOD-TAZOBACTAM PER 1 G: Performed by: INTERNAL MEDICINE

## 2020-10-23 PROCEDURE — 94640 AIRWAY INHALATION TREATMENT: CPT

## 2020-10-23 PROCEDURE — 25010000002 ONDANSETRON PER 1 MG: Performed by: NURSE PRACTITIONER

## 2020-10-23 RX ORDER — BUDESONIDE AND FORMOTEROL FUMARATE DIHYDRATE 160; 4.5 UG/1; UG/1
2 AEROSOL RESPIRATORY (INHALATION)
Status: DISCONTINUED | OUTPATIENT
Start: 2020-10-23 | End: 2020-10-24 | Stop reason: HOSPADM

## 2020-10-23 RX ADMIN — SODIUM CHLORIDE, PRESERVATIVE FREE 10 ML: 5 INJECTION INTRAVENOUS at 00:31

## 2020-10-23 RX ADMIN — PANTOPRAZOLE SODIUM 40 MG: 40 TABLET, DELAYED RELEASE ORAL at 08:13

## 2020-10-23 RX ADMIN — ACETAMINOPHEN 650 MG: 325 TABLET, FILM COATED ORAL at 09:13

## 2020-10-23 RX ADMIN — Medication 250 MG: at 20:36

## 2020-10-23 RX ADMIN — ONDANSETRON 4 MG: 2 INJECTION INTRAMUSCULAR; INTRAVENOUS at 02:10

## 2020-10-23 RX ADMIN — ARIPIPRAZOLE 5 MG: 5 TABLET ORAL at 00:30

## 2020-10-23 RX ADMIN — Medication 250 MG: at 00:30

## 2020-10-23 RX ADMIN — TAZOBACTAM SODIUM AND PIPERACILLIN SODIUM 3.38 G: 375; 3 INJECTION, SOLUTION INTRAVENOUS at 20:36

## 2020-10-23 RX ADMIN — TAZOBACTAM SODIUM AND PIPERACILLIN SODIUM 3.38 G: 375; 3 INJECTION, SOLUTION INTRAVENOUS at 04:30

## 2020-10-23 RX ADMIN — VENLAFAXINE 25 MG: 25 TABLET ORAL at 00:30

## 2020-10-23 RX ADMIN — CETIRIZINE HYDROCHLORIDE 10 MG: 10 TABLET, FILM COATED ORAL at 08:13

## 2020-10-23 RX ADMIN — Medication 250 MG: at 08:13

## 2020-10-23 RX ADMIN — ENOXAPARIN SODIUM 40 MG: 40 INJECTION SUBCUTANEOUS at 08:13

## 2020-10-23 RX ADMIN — SODIUM CHLORIDE 100 ML/HR: 9 INJECTION, SOLUTION INTRAVENOUS at 21:53

## 2020-10-23 RX ADMIN — TAZOBACTAM SODIUM AND PIPERACILLIN SODIUM 3.38 G: 375; 3 INJECTION, SOLUTION INTRAVENOUS at 11:36

## 2020-10-23 RX ADMIN — PANTOPRAZOLE SODIUM 40 MG: 40 TABLET, DELAYED RELEASE ORAL at 17:25

## 2020-10-23 RX ADMIN — SODIUM CHLORIDE 100 ML/HR: 9 INJECTION, SOLUTION INTRAVENOUS at 11:36

## 2020-10-23 RX ADMIN — VENLAFAXINE 25 MG: 25 TABLET ORAL at 20:36

## 2020-10-23 RX ADMIN — BUDESONIDE AND FORMOTEROL FUMARATE DIHYDRATE 2 PUFF: 160; 4.5 AEROSOL RESPIRATORY (INHALATION) at 20:16

## 2020-10-23 RX ADMIN — ARIPIPRAZOLE 5 MG: 5 TABLET ORAL at 20:36

## 2020-10-23 NOTE — PROGRESS NOTES
"Pharmacy Consult - Lovenox    Beatriz Boland has been consulted for pharmacy to dose enoxaparin for VTE prophylaxis per Ameena Gastelum's request.         Relevant clinical data and objective history reviewed:  30 y.o. female 170.2 cm (67.01\") 114 kg (251 lb 11.2 oz)    Home Anticoagulation: none    Past Medical History:   Diagnosis Date    Allergic     Amblyopia     Anxiety     Back pain     Cleft palate     Depression     Developmental delay     GERD (gastroesophageal reflux disease)     followed by GI, Dr. Wesley Ferro    Impetigo     Kidney abscess     Kidney stone     Muscle spasm     Obesity (BMI 30-39.9)     Recurrent otitis media     Scoliosis     Sinus infection     recurrent    Sprain of shoulder, left 12/2016     is allergic to dust mite extract and ciprofloxacin.    Lab Results   Component Value Date    WBC 17.84 (H) 10/22/2020    HGB 12.6 10/22/2020    HCT 38.2 10/22/2020    MCV 81.8 10/22/2020     10/22/2020     Lab Results   Component Value Date    GLUCOSE 96 10/22/2020    CALCIUM 9.5 10/22/2020     (L) 10/22/2020    K 3.8 10/22/2020    CO2 20.8 (L) 10/22/2020     10/22/2020    BUN 8 10/22/2020    CREATININE 0.98 10/22/2020    EGFRIFAFRI 117 04/27/2018    EGFRIFNONA 67 10/22/2020    BCR 8.2 10/22/2020    ANIONGAP 12.2 10/22/2020       Estimated Creatinine Clearance: 110.4 mL/min (by C-G formula based on SCr of 0.98 mg/dL).    Active Inpatient Anticoagulation Orders: none    Assessment/Plan    Will start patient on 40 mg subcutaneous every Q24 hours, adjusted for renal function. Labs to be ordered to be ordered by clinical pharmacist in morning. Pharmacy will continue to follow.     Emeterio Ann [facility]    "

## 2020-10-23 NOTE — CONSULTS
Urology Consult  Patient Identification:  Name: Beatriz Boland  Age: 30 y.o.  Sex: female  :  1990  MRN: [id number]                       Chief Complaint:  L flank pain, pyelonephritis    History of Present Illness: 31 yo follws w Dr. Theodore palmer recurrent UTIs, had recent LUTI last mo, developed severe L flank pain 2 days ago prompting ER visit where CT showed pyelon L kidney w lobar nephronia. Pt admitted, abx started, feels better today.       Problem List:  Active Hospital Problems    Diagnosis POA   • **Pyelonephritis of left kidney [N12] Yes   • Sepsis (CMS/HCC) [A41.9] Unknown   • Asthma [J45.909] Unknown   • Tobacco abuse [Z72.0] Yes   • Obesity (BMI 30-39.9) [E66.9] Yes   • Mental developmental delay [F81.9] Yes   • GERD without esophagitis [K21.9] Yes   • Depression [F32.9] Yes     Past Medical History:  Past Medical History:   Diagnosis Date   • Allergic    • Amblyopia    • Anxiety    • Back pain    • Cleft palate    • Depression    • Developmental delay    • GERD (gastroesophageal reflux disease)     followed by GI, Dr. Wesley Ferro   • Impetigo    • Kidney abscess    • Kidney stone    • Muscle spasm    • Obesity (BMI 30-39.9)    • Recurrent otitis media    • Scoliosis    • Sinus infection     recurrent   • Sprain of shoulder, left 2016     Past Surgical History:  Past Surgical History:   Procedure Laterality Date   • ABSCESS DRAINAGE      kidney   • ADENOIDECTOMY     • EAR TUBES     • PHARYNGEAL FLAP      for speech   • TONSILLECTOMY        Home Meds:  Medications Prior to Admission   Medication Sig Dispense Refill Last Dose   • Acetaminophen (TYLENOL) 325 MG capsule Take 325 mg by mouth As Needed.      • albuterol sulfate  (90 Base) MCG/ACT inhaler Inhale 2 puffs Every 4 (Four) Hours As Needed for Wheezing. 18 g 3    • ARIPiprazole (ABILIFY) 5 MG tablet Take 5 mg by mouth Every Night.      • cetirizine (zyrTEC) 10 MG tablet Take 10 mg by mouth Daily.      • [facility] NASAL ALLERGY  SPRAY 55 MCG/ACT nasal inhaler 2 sprays into each nostril Daily.  0    • cyclobenzaprine (FLEXERIL) 5 MG tablet Take 2 tablets by mouth 3 (Three) Times a Day As Needed for Muscle Spasms. 90 tablet 5    • dexlansoprazole (DEXILANT) 60 MG capsule Take 60 mg by mouth Daily.      • estradiol cypionate (DEPO-ESTRADIOL) 5 MG/ML injection Inject 1.5 mg into the shoulder, thigh, or buttocks Every 28 (Twenty-Eight) Days. Patient states she takes it every 3 months      • fluticasone (FLONASE) 50 MCG/ACT nasal spray       • ondansetron (ZOFRAN) 4 MG tablet Take 1 tablet by mouth Every 8 (Eight) Hours As Needed for Nausea or Vomiting. 30 tablet 1    • saccharomyces boulardii (FLORASTOR) 250 MG capsule Take 1 capsule by mouth 2 (Two) Times a Day. 14 capsule 0    • venlafaxine (EFFEXOR) 25 MG tablet Take 25 mg by mouth Every Night.        Current Meds:   Current Facility-Administered Medications   Medication Dose Route Frequency Provider Last Rate Last Dose   • acetaminophen (TYLENOL) tablet 650 mg  650 mg Oral Q4H PRN Ameena Wasserman APRN   650 mg at 10/23/20 0913    Or   • acetaminophen (TYLENOL) 160 MG/5ML solution 650 mg  650 mg Oral Q4H PRN Ameena Wasserman APRN        Or   • acetaminophen (TYLENOL) suppository 650 mg  650 mg Rectal Q4H PRN Ameena Wasserman APRN       • albuterol (PROVENTIL) nebulizer solution 0.083% 2.5 mg/3mL  2.5 mg Nebulization Q6H PRN Ameena Wasserman APRN       • ARIPiprazole (ABILIFY) tablet 5 mg  5 mg Oral Nightly Ameena Wasserman APRN   5 mg at 10/23/20 0030   • bisacodyl (DULCOLAX) EC tablet 5 mg  5 mg Oral Daily PRN Ameena Wasserman APRN       • budesonide-formoterol (SYMBICORT) 160-4.5 MCG/ACT inhaler 2 puff  2 puff Inhalation BID - RT Stacey Small MD       • calcium carbonate (TUMS) chewable tablet 500 mg (200 mg elemental)  2 tablet Oral BID PRN Ameena Wasserman APRN       • cetirizine (zyrTEC) tablet 10 mg  10 mg Oral Daily Ameena Wasserman, APRN    10 mg at 10/23/20 0813   • cyclobenzaprine (FLEXERIL) tablet 10 mg  10 mg Oral TID PRN Ameena Wasserman APRN       • enoxaparin (LOVENOX) syringe 40 mg  40 mg Subcutaneous Q24H Ameena Wasserman APRN   40 mg at 10/23/20 0813   • nicotine (NICODERM CQ) 7 MG/24HR patch 1 patch  1 patch Transdermal Q24H Ameena Wasserman APRN       • ondansetron (ZOFRAN) tablet 4 mg  4 mg Oral Q6H PRN Ameena Wasserman APRN        Or   • ondansetron (ZOFRAN) injection 4 mg  4 mg Intravenous Q6H PRN Ameena Wasserman APRN   4 mg at 10/23/20 0210   • pantoprazole (PROTONIX) EC tablet 40 mg  40 mg Oral BID AC Ameena Wasserman APRN   40 mg at 10/23/20 0813   • piperacillin-tazobactam (ZOSYN) 3.375 g in iso-osmotic dextrose 50 ml (premix)  3.375 g Intravenous Q8H Stacey Small MD   3.375 g at 10/23/20 1136   • saccharomyces boulardii (FLORASTOR) capsule 250 mg  250 mg Oral BID Ameena Wasserman APRN   250 mg at 10/23/20 0813   • sodium chloride 0.9 % flush 10 mL  10 mL Intravenous PRN Silvestre Seth MD       • sodium chloride 0.9 % flush 10 mL  10 mL Intravenous Q12H Ameena Wasserman APRN   10 mL at 10/23/20 0031   • sodium chloride 0.9 % flush 10 mL  10 mL Intravenous PRN Ameena Wasserman APRN       • sodium chloride 0.9 % infusion  100 mL/hr Intravenous Continuous Ameena Wasserman APRN 100 mL/hr at 10/23/20 1136 100 mL/hr at 10/23/20 1136   • venlafaxine (EFFEXOR) tablet 25 mg  25 mg Oral Nightly Ameena Wasserman APRN   25 mg at 10/23/20 0030     Allergies:  Allergies   Allergen Reactions   • Dust Mite Extract Itching     Other reaction(s): Other (See Comments)  Nasal symptoms   • Ciprofloxacin Diarrhea and Nausea And Vomiting     Tingling in left leg     Immunizations:  Immunization History   Administered Date(s) Administered   • Flu Vaccine Quad PF >18YRS 09/01/2017   • Pneumococcal Polysaccharide (PPSV23) 03/16/2018   • Tdap 01/30/2019   • flucelvax quad pfs =>4 YRS 01/30/2019  "    Social History:   Social History     Tobacco Use   • Smoking status: Current Some Day Smoker   • Smokeless tobacco: Current User   • Tobacco comment: quit cigarette smoking 2 years ago, uses vape now   Substance Use Topics   • Alcohol use: Yes     Comment: occasionally, 1-2 glasses of wine 1-2 x per week      Family History:  Family History   Problem Relation Age of Onset   • COPD Mother    • Arthritis Mother    • Obesity Mother    • Gestational diabetes Mother    • Cancer Father    • Scoliosis Father    • Rheum arthritis Maternal Aunt    • Diabetes Maternal Uncle    • Alcohol abuse Maternal Uncle    • Rheum arthritis Maternal Grandmother    • COPD Maternal Grandmother    • Stroke Maternal Grandfather    • Alcohol abuse Paternal Uncle         Review of Systems  negative 12 point system review except:as above, no gross hematuria    Objective:  tMax 24 hrs: Temp (24hrs), Av.8 °F (37.1 °C), Min:97.3 °F (36.3 °C), Max:103.1 °F (39.5 °C)    Vitals Ranges:   Temp:  [97.3 °F (36.3 °C)-103.1 °F (39.5 °C)] 97.3 °F (36.3 °C)  Heart Rate:  [] 95  Resp:  [16-20] 16  BP: ()/(59-89) 109/74  Intake and Output Last 3 Shifts:   I/O last 3 completed shifts:  In: 3980 [P.O.:420; IV Piggyback:3560]  Out: 550 [Urine:400; Emesis/NG output:150]    Exam:  /74 (BP Location: Right arm, Patient Position: Sitting)   Pulse 95   Temp 97.3 °F (36.3 °C) (Oral)   Resp 16   Ht 170.2 cm (67.01\")   Wt 114 kg (251 lb 11.2 oz)   SpO2 99%   BMI 39.41 kg/m²      GENERAL:    Alert, cooperative, no distress, appears stated age   Head:    Normocephalic, without obvious abnormality, atraumatic   Ears:    Normal external inspection, Nl. hearing   Throat:   Lips, mucosa, and tongue normal   Back:     No CVA tenderness   Lungs:     Respirations unlabored;Normal palpation   CV;   Regular rate and rhythm, No edema   Abdomen:     Soft, nontender,  no masses   :       Musculoskeletal:   Extremities normal,Gait Normal "   Neurologic/Psych:   Orientation Normal; Mood/Affect Normal       Data Review:   Admission on 10/22/2020   Component Date Value Ref Range Status   • Lactate 10/22/2020 0.7  0.5 - 2.0 mmol/L Final   • Extra Tube 10/22/2020 Hold for add-ons.   Final    Auto resulted.   • Extra Tube 10/22/2020 hold for add-on   Final    Auto resulted   • Extra Tube 10/22/2020 Hold for add-ons.   Final    Auto resulted.   • Color, UA 10/22/2020 Yellow  Yellow, Straw Final   • Appearance, UA 10/22/2020 Cloudy* Clear Final   • pH, UA 10/22/2020 6.0  5.0 - 8.0 Final   • Specific Gravity, UA 10/22/2020 1.015  1.005 - 1.030 Final   • Glucose, UA 10/22/2020 Negative  Negative Final   • Ketones, UA 10/22/2020 Negative  Negative Final   • Bilirubin, UA 10/22/2020 Negative  Negative Final   • Blood, UA 10/22/2020 Trace* Negative Final   • Protein, UA 10/22/2020 Negative  Negative Final   • Leuk Esterase, UA 10/22/2020 Small (1+)* Negative Final   • Nitrite, UA 10/22/2020 Positive* Negative Final   • Urobilinogen, UA 10/22/2020 0.2 E.U./dL  0.2 - 1.0 E.U./dL Final   • HCG, Urine QL 10/22/2020 Negative  Negative Final   • Glucose 10/22/2020 96  65 - 99 mg/dL Final   • BUN 10/22/2020 8  6 - 20 mg/dL Final   • Creatinine 10/22/2020 0.98  0.57 - 1.00 mg/dL Final   • Sodium 10/22/2020 135* 136 - 145 mmol/L Final   • Potassium 10/22/2020 3.8  3.5 - 5.2 mmol/L Final   • Chloride 10/22/2020 102  98 - 107 mmol/L Final   • CO2 10/22/2020 20.8* 22.0 - 29.0 mmol/L Final   • Calcium 10/22/2020 9.5  8.6 - 10.5 mg/dL Final   • Total Protein 10/22/2020 7.6  6.0 - 8.5 g/dL Final   • Albumin 10/22/2020 4.00  3.50 - 5.20 g/dL Final   • ALT (SGPT) 10/22/2020 12  1 - 33 U/L Final   • AST (SGOT) 10/22/2020 14  1 - 32 U/L Final   • Alkaline Phosphatase 10/22/2020 62  39 - 117 U/L Final   • Total Bilirubin 10/22/2020 0.5  0.0 - 1.2 mg/dL Final   • eGFR Non African Amer 10/22/2020 67  >60 mL/min/1.73 Final   • Globulin 10/22/2020 3.6  gm/dL Final   • A/G Ratio  10/22/2020 1.1  g/dL Final   • BUN/Creatinine Ratio 10/22/2020 8.2  7.0 - 25.0 Final   • Anion Gap 10/22/2020 12.2  5.0 - 15.0 mmol/L Final   • Procalcitonin 10/22/2020 0.13  0.00 - 0.25 ng/mL Final   • WBC 10/22/2020 17.84* 3.40 - 10.80 10*3/mm3 Final   • RBC 10/22/2020 4.67  3.77 - 5.28 10*6/mm3 Final   • Hemoglobin 10/22/2020 12.6  12.0 - 15.9 g/dL Final   • Hematocrit 10/22/2020 38.2  34.0 - 46.6 % Final   • MCV 10/22/2020 81.8  79.0 - 97.0 fL Final   • MCH 10/22/2020 27.0  26.6 - 33.0 pg Final   • MCHC 10/22/2020 33.0  31.5 - 35.7 g/dL Final   • RDW 10/22/2020 13.5  12.3 - 15.4 % Final   • RDW-SD 10/22/2020 39.7  37.0 - 54.0 fl Final   • MPV 10/22/2020 8.5  6.0 - 12.0 fL Final   • Platelets 10/22/2020 322  140 - 450 10*3/mm3 Final   • Neutrophil % 10/22/2020 81.1* 42.7 - 76.0 % Final   • Lymphocyte % 10/22/2020 9.9* 19.6 - 45.3 % Final   • Monocyte % 10/22/2020 7.9  5.0 - 12.0 % Final   • Eosinophil % 10/22/2020 0.1* 0.3 - 6.2 % Final   • Basophil % 10/22/2020 0.3  0.0 - 1.5 % Final   • Immature Grans % 10/22/2020 0.7* 0.0 - 0.5 % Final   • Neutrophils, Absolute 10/22/2020 14.49* 1.70 - 7.00 10*3/mm3 Final   • Lymphocytes, Absolute 10/22/2020 1.76  0.70 - 3.10 10*3/mm3 Final   • Monocytes, Absolute 10/22/2020 1.41* 0.10 - 0.90 10*3/mm3 Final   • Eosinophils, Absolute 10/22/2020 0.01  0.00 - 0.40 10*3/mm3 Final   • Basophils, Absolute 10/22/2020 0.05  0.00 - 0.20 10*3/mm3 Final   • Immature Grans, Absolute 10/22/2020 0.12* 0.00 - 0.05 10*3/mm3 Final   • nRBC 10/22/2020 0.0  0.0 - 0.2 /100 WBC Final   • RBC, UA 10/22/2020 0-2  None Seen, 0-2 /HPF Final   • WBC, UA 10/22/2020 6-12* None Seen, 0-2 /HPF Final   • Bacteria, UA 10/22/2020 3+* None Seen /HPF Final   • Squamous Epithelial Cells, UA 10/22/2020 0-2  None Seen, 0-2 /HPF Final   • Hyaline Casts, UA 10/22/2020 None Seen  None Seen /LPF Final   • Methodology 10/22/2020 Automated Microscopy   Final   • Urine Culture 10/22/2020 >100,000 CFU/mL Gram Negative  Bacilli*  Preliminary   • ADENOVIRUS, PCR 10/22/2020 Not Detected  Not Detected Final   • Coronavirus 229E 10/22/2020 Not Detected  Not Detected Final   • Coronavirus HKU1 10/22/2020 Not Detected  Not Detected Final   • Coronavirus NL63 10/22/2020 Not Detected  Not Detected Final   • Coronavirus OC43 10/22/2020 Not Detected  Not Detected Final   • COVID19 10/22/2020 Not Detected  Not Detected - Ref. Range Final   • Human Metapneumovirus 10/22/2020 Not Detected  Not Detected Final   • Human Rhinovirus/Enterovirus 10/22/2020 Not Detected  Not Detected Final   • Influenza A PCR 10/22/2020 Not Detected  Not Detected Final   • Influenza B PCR 10/22/2020 Not Detected  Not Detected Final   • Parainfluenza Virus 1 10/22/2020 Not Detected  Not Detected Final   • Parainfluenza Virus 2 10/22/2020 Not Detected  Not Detected Final   • Parainfluenza Virus 3 10/22/2020 Not Detected  Not Detected Final   • Parainfluenza Virus 4 10/22/2020 Not Detected  Not Detected Final   • RSV, PCR 10/22/2020 Not Detected  Not Detected Final   • Bordetella pertussis pcr 10/22/2020 Not Detected  Not Detected Final   • Bordetella parapertussis PCR 10/22/2020 Not Detected  Not Detected Final   • Chlamydophila pneumoniae PCR 10/22/2020 Not Detected  Not Detected Final   • Mycoplasma pneumo by PCR 10/22/2020 Not Detected  Not Detected Final   • Glucose 10/23/2020 120* 65 - 99 mg/dL Final   • BUN 10/23/2020 6  6 - 20 mg/dL Final   • Creatinine 10/23/2020 0.83  0.57 - 1.00 mg/dL Final   • Sodium 10/23/2020 136  136 - 145 mmol/L Final   • Potassium 10/23/2020 3.5  3.5 - 5.2 mmol/L Final   • Chloride 10/23/2020 107  98 - 107 mmol/L Final   • CO2 10/23/2020 21.3* 22.0 - 29.0 mmol/L Final   • Calcium 10/23/2020 8.5* 8.6 - 10.5 mg/dL Final   • eGFR Non  Amer 10/23/2020 81  >60 mL/min/1.73 Final   • BUN/Creatinine Ratio 10/23/2020 7.2  7.0 - 25.0 Final   • Anion Gap 10/23/2020 7.7  5.0 - 15.0 mmol/L Final   • WBC 10/23/2020 21.37* 3.40 - 10.80 10*3/mm3  Final   • RBC 10/23/2020 3.96  3.77 - 5.28 10*6/mm3 Final   • Hemoglobin 10/23/2020 10.7* 12.0 - 15.9 g/dL Final   • Hematocrit 10/23/2020 32.2* 34.0 - 46.6 % Final   • MCV 10/23/2020 81.3  79.0 - 97.0 fL Final   • MCH 10/23/2020 27.0  26.6 - 33.0 pg Final   • MCHC 10/23/2020 33.2  31.5 - 35.7 g/dL Final   • RDW 10/23/2020 13.4  12.3 - 15.4 % Final   • RDW-SD 10/23/2020 39.7  37.0 - 54.0 fl Final   • MPV 10/23/2020 8.9  6.0 - 12.0 fL Final   • Platelets 10/23/2020 264  140 - 450 10*3/mm3 Final       No results found.      Assessment:   L pyelonephritis, lobar nephronia, do not see obvious abscess amenable to drainage today. Pt is afebrile, feeling better, wbc elevated to 21.      Plan:   Monitor wbc, vs, follow urine cxs, continue to treat w braod spectrum IV abx. If clinical condition worsens reimage w plan for percutaneous drainage by IR (do not feel she needs that now). If clinically imporoves, wbc drops, remains afebrile, convert to po abx once ucx results back and can plan OP repeat imaging in 2-3 weeks. Will follow.       Ish Durán MD  10/23/2020

## 2020-10-23 NOTE — ED NOTES
"Nursing report ED to floor  Beatriz Boland  30 y.o.  female    HPI (triage note):   Chief Complaint   Patient presents with   • Fever   • Flank Pain       Admitting doctor:   Stacey Small MD    Admitting diagnosis:   The primary encounter diagnosis was Pyelonephritis of left kidney. A diagnosis of Renal abscess, left was also pertinent to this visit.    Code status:   Current Code Status     Date Active Code Status Order ID Comments User Context       Prior    Advance Care Planning Activity          Allergies:   Ciprofloxacin and Dust mite extract    Weight:       10/22/20  1847   Weight: 117 kg (259 lb)       Most recent vitals:   Vitals:    10/22/20 1833 10/22/20 1847 10/22/20 1900 10/22/20 1930   BP:  133/89 117/84 116/77   Pulse: (!) 121  104 92   Resp: 20      Temp: (!) 103.1 °F (39.5 °C)      TempSrc: Tympanic      SpO2: 98%  97% 99%   Weight:  117 kg (259 lb)     Height:  170.2 cm (67\")         Active LDAs/IV Access:   Lines, Drains & Airways    Active LDAs     Name:   Placement date:   Placement time:   Site:   Days:    Peripheral IV 10/22/20 1848 Right Antecubital   10/22/20    1848    Antecubital   less than 1    Peripheral IV 10/22/20 1855 Left Antecubital   10/22/20    1855    Antecubital   less than 1                Labs (abnormal labs have a star):   Labs Reviewed   URINALYSIS W/ CULTURE IF INDICATED - Abnormal; Notable for the following components:       Result Value    Appearance, UA Cloudy (*)     Blood, UA Trace (*)     Leuk Esterase, UA Small (1+) (*)     Nitrite, UA Positive (*)     All other components within normal limits   COMPREHENSIVE METABOLIC PANEL - Abnormal; Notable for the following components:    Sodium 135 (*)     CO2 20.8 (*)     All other components within normal limits    Narrative:     GFR Normal >60  Chronic Kidney Disease <60  Kidney Failure <15     CBC WITH AUTO DIFFERENTIAL - Abnormal; Notable for the following components:    WBC 17.84 (*)     Neutrophil % 81.1 (*)  " "   Lymphocyte % 9.9 (*)     Eosinophil % 0.1 (*)     Immature Grans % 0.7 (*)     Neutrophils, Absolute 14.49 (*)     Monocytes, Absolute 1.41 (*)     Immature Grans, Absolute 0.12 (*)     All other components within normal limits   URINALYSIS, MICROSCOPIC ONLY - Abnormal; Notable for the following components:    WBC, UA 6-12 (*)     Bacteria, UA 3+ (*)     All other components within normal limits   LACTIC ACID, PLASMA - Normal   PREGNANCY, URINE - Normal   PROCALCITONIN - Normal    Narrative:     As a Marker for Sepsis (Non-Neonates):   1. <0.5 ng/mL represents a low risk of severe sepsis and/or septic shock.  1. >2 ng/mL represents a high risk of severe sepsis and/or septic shock.    As a Marker for Lower Respiratory Tract Infections that require antibiotic therapy:  PCT on Admission     Antibiotic Therapy             6-12 Hrs later  > 0.5                Strongly Recommended            >0.25 - <0.5         Recommended  0.1 - 0.25           Discouraged                   Remeasure/reassess PCT  <0.1                 Strongly Discouraged          Remeasure/reassess PCT      As 28 day mortality risk marker: \"Change in Procalcitonin Result\" (> 80 % or <=80 %) if Day 0 (or Day 1) and Day 4 values are available. Refer to http://www.[employer]-calculator.com/   Change in PCT <=80 %   A decrease of PCT levels below or equal to 80 % defines a positive change in PCT test result representing a higher risk for 28-day all-cause mortality of patients diagnosed with severe sepsis or septic shock.  Change in PCT > 80 %   A decrease of PCT levels of more than 80 % defines a negative change in PCT result representing a lower risk for 28-day all-cause mortality of patients diagnosed with severe sepsis or septic shock.                Results may be falsely decreased if patient taking Biotin.    BLOOD CULTURE   BLOOD CULTURE   URINE CULTURE   RESPIRATORY PANEL PCR W/ COVID-19 (SARS-COV-2) ANNIA/LORIE/SALTY/PAD/COR/MAD/GEORGES IN-HOUSE, NP SWAB " IN [facility], 3-4 HR TAT   RAINBOW DRAW    Narrative:     The following orders were created for panel order [place] Draw.  Procedure                               Abnormality         Status                     ---------                               -----------         ------                     Green Top (Gel)[[id number]]                                  Final result               Lavender Top[[id number]]                                     Final result               Gold Top - SST[[id number]]                                   Final result                 Please view results for these tests on the individual orders.   GREEN TOP   LAVENDER TOP   GOLD TOP - SST   CBC AND DIFFERENTIAL    Narrative:     The following orders were created for panel order CBC & Differential.  Procedure                               Abnormality         Status                     ---------                               -----------         ------                     CBC Auto Differential[[id number]]        Abnormal            Final result                 Please view results for these tests on the individual orders.       EKG:   No orders to display       Meds given in ED:   Medications   sodium chloride 0.9 % flush 10 mL (has no administration in time range)   cefTRIAXone (ROCEPHIN) IVPB 1 g (has no administration in time range)   acetaminophen (TYLENOL) tablet 1,000 mg (1,000 mg Oral Given 10/22/20 1858)   cefTRIAXone (ROCEPHIN) IVPB 1 g (0 g Intravenous Stopped 10/22/20 1928)   sodium chloride 0.9 % bolus 3,510 mL (3,510 mL Intravenous New Bag 10/22/20 1857)   iopamidol (ISOVUE-300) 61 % injection 100 mL (85 mL Intravenous Given by Other 10/22/20 2010)       Imaging results:  Ct Abdomen Pelvis With Contrast    Result Date: 10/22/2020    1. Findings compatible with pyelonephritis in the left upper kidney, and lobar nephronia versus abscess in the left lower kidney. 2. Retroperitoneal adenopathy, presumably reactive in nature although nonspecific  "as described.  Discussed by telephone with Dr. Seth at 2026, 10/22/2020.  This report was finalized on 10/22/2020 8:33 PM by Dr. Nando Vergara M.D.      Xr Chest 1 View    Result Date: 10/22/2020  Mild cardiomegaly.  This report was finalized on 10/22/2020 8:19 PM by Dr. Wesley Soares M.D.        Ambulatory status:   - Up w/ assistance PRN    Social issues:   Social History     Socioeconomic History   • Marital status: Single     Spouse name: Not on file   • Number of children: Not on file   • Years of education: Not on file   • Highest education level: Not on file   Tobacco Use   • Smoking status: Current Some Day Smoker   • Smokeless tobacco: Never Used   • Tobacco comment: smokes hookah 1-2 times per week     Substance and Sexual Activity   • Alcohol use: Yes     Comment: occasionally, 1-2 glasses of wine 1-2 x per week   • Drug use: No   • Sexual activity: Yes     Partners: Male     Birth control/protection: Injection   Social History Narrative          Nursing report ED to floor  Beatriz Boland  30 y.o.  female    HPI (triage note):   Chief Complaint   Patient presents with   • Fever   • Flank Pain       Admitting doctor:   Stacey Small MD    Admitting diagnosis:   The primary encounter diagnosis was Pyelonephritis of left kidney. A diagnosis of Renal abscess, left was also pertinent to this visit.    Code status:   Current Code Status     Date Active Code Status Order ID Comments User Context       Prior    Advance Care Planning Activity          Allergies:   Ciprofloxacin and Dust mite extract    Weight:       10/22/20  1847   Weight: 117 kg (259 lb)       Most recent vitals:   Vitals:    10/22/20 1833 10/22/20 1847 10/22/20 1900 10/22/20 1930   BP:  133/89 117/84 116/77   Pulse: (!) 121  104 92   Resp: 20      Temp: (!) 103.1 °F (39.5 °C)      TempSrc: Tympanic      SpO2: 98%  97% 99%   Weight:  117 kg (259 lb)     Height:  170.2 cm (67\")         Active LDAs/IV Access:   Lines, Drains & " Airways    Active LDAs     Name:   Placement date:   Placement time:   Site:   Days:    Peripheral IV 10/22/20 1848 Right Antecubital   10/22/20    1848    Antecubital   less than 1    Peripheral IV 10/22/20 1855 Left Antecubital   10/22/20    1855    Antecubital   less than 1                Labs (abnormal labs have a star):   Labs Reviewed   URINALYSIS W/ CULTURE IF INDICATED - Abnormal; Notable for the following components:       Result Value    Appearance, UA Cloudy (*)     Blood, UA Trace (*)     Leuk Esterase, UA Small (1+) (*)     Nitrite, UA Positive (*)     All other components within normal limits   COMPREHENSIVE METABOLIC PANEL - Abnormal; Notable for the following components:    Sodium 135 (*)     CO2 20.8 (*)     All other components within normal limits    Narrative:     GFR Normal >60  Chronic Kidney Disease <60  Kidney Failure <15     CBC WITH AUTO DIFFERENTIAL - Abnormal; Notable for the following components:    WBC 17.84 (*)     Neutrophil % 81.1 (*)     Lymphocyte % 9.9 (*)     Eosinophil % 0.1 (*)     Immature Grans % 0.7 (*)     Neutrophils, Absolute 14.49 (*)     Monocytes, Absolute 1.41 (*)     Immature Grans, Absolute 0.12 (*)     All other components within normal limits   URINALYSIS, MICROSCOPIC ONLY - Abnormal; Notable for the following components:    WBC, UA 6-12 (*)     Bacteria, UA 3+ (*)     All other components within normal limits   LACTIC ACID, PLASMA - Normal   PREGNANCY, URINE - Normal   PROCALCITONIN - Normal    Narrative:     As a Marker for Sepsis (Non-Neonates):   1. <0.5 ng/mL represents a low risk of severe sepsis and/or septic shock.  1. >2 ng/mL represents a high risk of severe sepsis and/or septic shock.    As a Marker for Lower Respiratory Tract Infections that require antibiotic therapy:  PCT on Admission     Antibiotic Therapy             6-12 Hrs later  > 0.5                Strongly Recommended            >0.25 - <0.5         Recommended  0.1 - 0.25            "Discouraged                   Remeasure/reassess PCT  <0.1                 Strongly Discouraged          Remeasure/reassess PCT      As 28 day mortality risk marker: \"Change in Procalcitonin Result\" (> 80 % or <=80 %) if Day 0 (or Day 1) and Day 4 values are available. Refer to http://www.[employer]-calculator.com/   Change in PCT <=80 %   A decrease of PCT levels below or equal to 80 % defines a positive change in PCT test result representing a higher risk for 28-day all-cause mortality of patients diagnosed with severe sepsis or septic shock.  Change in PCT > 80 %   A decrease of PCT levels of more than 80 % defines a negative change in PCT result representing a lower risk for 28-day all-cause mortality of patients diagnosed with severe sepsis or septic shock.                Results may be falsely decreased if patient taking Biotin.    BLOOD CULTURE   BLOOD CULTURE   URINE CULTURE   RESPIRATORY PANEL PCR W/ COVID-19 (SARS-COV-2) ANNIA/LORIE/SALTY/PAD/COR/MAD/GEORGES IN-HOUSE, NP SWAB IN [facility], 3-4 HR TAT   RAINBOW DRAW    Narrative:     The following orders were created for panel order [place] Draw.  Procedure                               Abnormality         Status                     ---------                               -----------         ------                     Green Top (Gel)[[id number]]                                  Final result               Lavender Top[[id number]]                                     Final result               Gold Top - SST[[id number]]                                   Final result                 Please view results for these tests on the individual orders.   GREEN TOP   LAVENDER TOP   GOLD TOP - SST   CBC AND DIFFERENTIAL    Narrative:     The following orders were created for panel order CBC & Differential.  Procedure                               Abnormality         Status                     ---------                               -----------         ------                     CBC Auto " Differential[[id number]]        Abnormal            Final result                 Please view results for these tests on the individual orders.       EKG:   No orders to display       Meds given in ED:   Medications   sodium chloride 0.9 % flush 10 mL (has no administration in time range)   cefTRIAXone (ROCEPHIN) IVPB 1 g (has no administration in time range)   acetaminophen (TYLENOL) tablet 1,000 mg (1,000 mg Oral Given 10/22/20 1858)   cefTRIAXone (ROCEPHIN) IVPB 1 g (0 g Intravenous Stopped 10/22/20 1928)   sodium chloride 0.9 % bolus 3,510 mL (3,510 mL Intravenous New Bag 10/22/20 1857)   iopamidol (ISOVUE-300) 61 % injection 100 mL (85 mL Intravenous Given by Other 10/22/20 2010)       Imaging results:  Ct Abdomen Pelvis With Contrast    Result Date: 10/22/2020    1. Findings compatible with pyelonephritis in the left upper kidney, and lobar nephronia versus abscess in the left lower kidney. 2. Retroperitoneal adenopathy, presumably reactive in nature although nonspecific as described.  Discussed by telephone with Dr. Seth at 2026, 10/22/2020.  This report was finalized on 10/22/2020 8:33 PM by Dr. Nando Vergara M.D.      Xr Chest 1 View    Result Date: 10/22/2020  Mild cardiomegaly.  This report was finalized on 10/22/2020 8:19 PM by Dr. Wesley Soares M.D.        Ambulatory status:   - up with assistance as needed    Social issues:   Social History     Socioeconomic History   • Marital status: Single     Spouse name: Not on file   • Number of children: Not on file   • Years of education: Not on file   • Highest education level: Not on file   Tobacco Use   • Smoking status: Current Some Day Smoker   • Smokeless tobacco: Never Used   • Tobacco comment: smokes hookah 1-2 times per week     Substance and Sexual Activity   • Alcohol use: Yes     Comment: occasionally, 1-2 glasses of wine 1-2 x per week   • Drug use: No   • Sexual activity: Yes     Partners: Male     Birth control/protection: Injection    Social History Narrative              Ameena Barrera RN  10/22/20 2122

## 2020-10-23 NOTE — PLAN OF CARE
Goal Outcome Evaluation:  Plan of Care Reviewed With: patient  Progress: improving  Outcome Summary: Patient admitted for Pyelonephritis, IVF and IV antibiotics infusing. C/o left flank pain, medicated with Tylenol PRN. WBC 21 this AM. Regular diet. Up ad katia. VSS.

## 2020-10-23 NOTE — ED NOTES
Patient was placed in face mask during first look triage.  Patient was wearing a face mask throughout encounter.  I wore personal protective equipment throughout the encounter.  Hand hygiene was performed before and after patient encounter.        Tracy Almeida RN  10/22/20 1180

## 2020-10-23 NOTE — H&P
Patient Name:  Beatriz Boland  YOB: 1990  MRN:  [id number]  Admit Date:  10/22/2020  Patient Care Team:  Elyse Larkin APRN as PCP - General (Family Medicine)  Maria Ines Eisenberg MD as Consulting Physician (Otolaryngology)  Wesley Ferro MD as Consulting Physician (Gastroenterology)  Chao Mayberry MD (Ophthalmology)  Shannon Burr PA (Physician Assistant)  Colette Caal APRN as Nurse Practitioner (Family Medicine)  Víctor Gunter MD as Consulting Physician (Cardiology)  Dedrick Jaimes MD as Consulting Physician (Urology)  Nacho Pacheco MD as Consulting Physician (Hand Surgery)  Francisco Bond MD as Consulting Physician (Obstetrics and Gynecology)  Elyse Larkin APRN (Family Medicine)      Subjective   History Present Illness     Chief Complaint   Patient presents with   • Fever   • Flank Pain       Ms. Boland is a 30 y.o. smoker with a history of GERD, obesity, depression, asthma, mental developmental delay that presents to [facility] complaining of fever, chills, and left flank pain.  She reports the chills began yesterday and she assumed she had a fever but did not have a thermometer to check her temperature.  She reports she began experiencing left flank pain today that has progressively worsened.  She describes the pain as intermittent, moderate, and sharp in nature.  She denies exacerbating and alleviating factors to the pain.  She was recently treated at [facility] from 09/04/20 to 09/08/20 for an acute UTI and septicemia due to E. Coli.  She was discharged on oral Levaquin and she reports she completed the course of antibiotics.  She reports cloudy, foul-smelling urine.  She denies suprapubic tenderness, hematuria, and decreased urine.  She denies nausea, vomiting, and abdominal pain.  Work up in the ED revealed WBC 17.84.  A urinalysis showed 3+ bacteria, WBCs, 1+ leukocytes, nitrites, and trace blood.  CT of  the abdomen/pelvis showed findings compatible with pyelonephritis in the left upper kidney, and lobar nephronia versus abscess in the left lower kidney.  Retroperitoneal adenopathy was also seen, presumably reactive in nature.  She received IV Rocephin in the ED.       History of Present Illness  Review of Systems   Constitutional: Positive for chills and fever.   HENT: Negative for congestion and sore throat.    Eyes: Negative for photophobia and visual disturbance.   Respiratory: Negative for cough, chest tightness and shortness of breath.    Cardiovascular: Negative for chest pain, palpitations and leg swelling.   Gastrointestinal: Negative for diarrhea, nausea and vomiting.   Genitourinary: Positive for flank pain (left). Negative for decreased urine volume, dysuria, frequency, hematuria and urgency.   Musculoskeletal: Negative for arthralgias, gait problem and neck pain.   Skin: Negative for rash and wound.   Neurological: Positive for headaches. Negative for facial asymmetry, speech difficulty, weakness, light-headedness and numbness.        Personal History     Past Medical History:   Diagnosis Date   • Allergic    • Amblyopia    • Anxiety    • Back pain    • Cleft palate    • Depression    • Developmental delay    • GERD (gastroesophageal reflux disease)     followed by GI, Dr. Wesley Ferro   • Impetigo    • Kidney abscess    • Kidney stone    • Muscle spasm    • Obesity (BMI 30-39.9)    • Recurrent otitis media    • Scoliosis    • Sinus infection     recurrent   • Sprain of shoulder, left 12/2016     Past Surgical History:   Procedure Laterality Date   • ABSCESS DRAINAGE      kidney   • ADENOIDECTOMY     • EAR TUBES     • PHARYNGEAL FLAP      for speech   • TONSILLECTOMY       Family History   Problem Relation Age of Onset   • COPD Mother    • Arthritis Mother    • Obesity Mother    • Gestational diabetes Mother    • Cancer Father    • Scoliosis Father    • Rheum arthritis Maternal Aunt    • Diabetes  Maternal Uncle    • Alcohol abuse Maternal Uncle    • Rheum arthritis Maternal Grandmother    • COPD Maternal Grandmother    • Stroke Maternal Grandfather    • Alcohol abuse Paternal Uncle      Social History     Tobacco Use   • Smoking status: Current Some Day Smoker   • Smokeless tobacco: Current User   • Tobacco comment: quit cigarette smoking 2 years ago, uses vape now   Substance Use Topics   • Alcohol use: Yes     Comment: occasionally, 1-2 glasses of wine 1-2 x per week   • Drug use: No     Medications Prior to Admission   Medication Sig Dispense Refill Last Dose   • Acetaminophen (TYLENOL) 325 MG capsule Take 325 mg by mouth As Needed.      • albuterol sulfate  (90 Base) MCG/ACT inhaler Inhale 2 puffs Every 4 (Four) Hours As Needed for Wheezing. 18 g 3    • ARIPiprazole (ABILIFY) 5 MG tablet Take 5 mg by mouth Every Night.      • cetirizine (zyrTEC) 10 MG tablet Take 10 mg by mouth Daily.      • [employer] NASAL ALLERGY SPRAY 55 MCG/ACT nasal inhaler 2 sprays into each nostril Daily.  0    • cyclobenzaprine (FLEXERIL) 5 MG tablet Take 2 tablets by mouth 3 (Three) Times a Day As Needed for Muscle Spasms. 90 tablet 5    • dexlansoprazole (DEXILANT) 60 MG capsule Take 60 mg by mouth Daily.      • estradiol cypionate (DEPO-ESTRADIOL) 5 MG/ML injection Inject 1.5 mg into the shoulder, thigh, or buttocks Every 28 (Twenty-Eight) Days. Patient states she takes it every 3 months      • fluticasone (FLONASE) 50 MCG/ACT nasal spray       • ondansetron (ZOFRAN) 4 MG tablet Take 1 tablet by mouth Every 8 (Eight) Hours As Needed for Nausea or Vomiting. 30 tablet 1    • saccharomyces boulardii (FLORASTOR) 250 MG capsule Take 1 capsule by mouth 2 (Two) Times a Day. 14 capsule 0    • venlafaxine (EFFEXOR) 25 MG tablet Take 25 mg by mouth Every Night.        Allergies:    Allergies   Allergen Reactions   • Dust Mite Extract Itching     Other reaction(s): Other (See Comments)  Nasal symptoms   • Ciprofloxacin Diarrhea and  Nausea And Vomiting     Tingling in left leg       Objective    Objective     Vital Signs  Temp:  [97.8 °F (36.6 °C)-103.1 °F (39.5 °C)] 97.8 °F (36.6 °C)  Heart Rate:  [] 109  Resp:  [16-20] 16  BP: ()/(59-89) 96/59  SpO2:  [97 %-99 %] 99 %  on   ;   Device (Oxygen Therapy): room air  Body mass index is 39.41 kg/m².    Physical Exam  Vitals signs and nursing note reviewed.   Constitutional:       General: She is sleeping.      Appearance: She is ill-appearing.      Comments: Sleeping, arouses to verbal stimuli   HENT:      Head: Normocephalic and atraumatic.      Nose: Nose normal.      Mouth/Throat:      Mouth: Mucous membranes are dry.      Pharynx: Oropharynx is clear.   Eyes:      Extraocular Movements: Extraocular movements intact.      Conjunctiva/sclera: Conjunctivae normal.   Neck:      Musculoskeletal: Normal range of motion and neck supple.   Cardiovascular:      Rate and Rhythm: Tachycardia present.      Pulses: Normal pulses.      Heart sounds: Normal heart sounds.   Pulmonary:      Effort: Pulmonary effort is normal.      Breath sounds: Normal breath sounds.   Abdominal:      General: Bowel sounds are normal. There is no distension.      Palpations: Abdomen is soft.      Tenderness: There is no abdominal tenderness. There is left CVA tenderness. There is no guarding or rebound.   Musculoskeletal: Normal range of motion.         General: No swelling.   Skin:     General: Skin is warm and dry.   Neurological:      General: No focal deficit present.      Mental Status: She is oriented to person, place, and time.   Psychiatric:         Mood and Affect: Mood normal.         Behavior: Behavior normal.         Results Review:  I reviewed the patient's new clinical results.  I reviewed the patient's new imaging results and agree with the interpretation.  I reviewed the patient's other test results and agree with the interpretation  I personally viewed and interpreted the patient's EKG/Telemetry  data  Discussed with ED provider.    Lab Results (last 24 hours)     Procedure Component Value Units Date/Time    Blood Culture - Blood, Arm, Left [[id number]] Collected: 10/22/20 1850    Specimen: Blood from Arm, Left Updated: 10/22/20 1905    Blood Culture - Blood, Arm, Right [[id number]] Collected: 10/22/20 1850    Specimen: Blood from Arm, Right Updated: 10/22/20 1857    Lactic Acid, Plasma [[id number]]  (Normal) Collected: 10/22/20 1850    Specimen: Blood Updated: 10/22/20 1923     Lactate 0.7 mmol/L     Comprehensive Metabolic Panel [[id number]]  (Abnormal) Collected: 10/22/20 1850    Specimen: Blood Updated: 10/22/20 1930     Glucose 96 mg/dL      BUN 8 mg/dL      Creatinine 0.98 mg/dL      Sodium 135 mmol/L      Potassium 3.8 mmol/L      Chloride 102 mmol/L      CO2 20.8 mmol/L      Calcium 9.5 mg/dL      Total Protein 7.6 g/dL      Albumin 4.00 g/dL      ALT (SGPT) 12 U/L      AST (SGOT) 14 U/L      Alkaline Phosphatase 62 U/L      Total Bilirubin 0.5 mg/dL      eGFR Non African Amer 67 mL/min/1.73      Globulin 3.6 gm/dL      A/G Ratio 1.1 g/dL      BUN/Creatinine Ratio 8.2     Anion Gap 12.2 mmol/L     Narrative:      GFR Normal >60  Chronic Kidney Disease <60  Kidney Failure <15      CBC & Differential [[id number]]  (Abnormal) Collected: 10/22/20 1850    Specimen: Blood Updated: 10/22/20 1902    Narrative:      The following orders were created for panel order CBC & Differential.  Procedure                               Abnormality         Status                     ---------                               -----------         ------                     CBC Auto Differential[[id number]]        Abnormal            Final result                 Please view results for these tests on the individual orders.    Procalcitonin [[id number]]  (Normal) Collected: 10/22/20 1850    Specimen: Blood Updated: 10/22/20 1933     Procalcitonin 0.13 ng/mL     Narrative:      As a Marker for Sepsis (Non-Neonates):   1. <0.5 ng/mL  "represents a low risk of severe sepsis and/or septic shock.  1. >2 ng/mL represents a high risk of severe sepsis and/or septic shock.    As a Marker for Lower Respiratory Tract Infections that require antibiotic therapy:  PCT on Admission     Antibiotic Therapy             6-12 Hrs later  > 0.5                Strongly Recommended            >0.25 - <0.5         Recommended  0.1 - 0.25           Discouraged                   Remeasure/reassess PCT  <0.1                 Strongly Discouraged          Remeasure/reassess PCT      As 28 day mortality risk marker: \"Change in Procalcitonin Result\" (> 80 % or <=80 %) if Day 0 (or Day 1) and Day 4 values are available. Refer to http://www.[facility] of [place] [facility] according to the OHACupid-Labspct-calculator.com/   Change in PCT <=80 %   A decrease of PCT levels below or equal to 80 % defines a positive change in PCT test result representing a higher risk for 28-day all-cause mortality of patients diagnosed with severe sepsis or septic shock.  Change in PCT > 80 %   A decrease of PCT levels of more than 80 % defines a negative change in PCT result representing a lower risk for 28-day all-cause mortality of patients diagnosed with severe sepsis or septic shock.                Results may be falsely decreased if patient taking Biotin.     CBC Auto Differential [[id number]]  (Abnormal) Collected: 10/22/20 1850    Specimen: Blood Updated: 10/22/20 1902     WBC 17.84 10*3/mm3      RBC 4.67 10*6/mm3      Hemoglobin 12.6 g/dL      Hematocrit 38.2 %      MCV 81.8 fL      MCH 27.0 pg      MCHC 33.0 g/dL      RDW 13.5 %      RDW-SD 39.7 fl      MPV 8.5 fL      Platelets 322 10*3/mm3      Neutrophil % 81.1 %      Lymphocyte % 9.9 %      Monocyte % 7.9 %      Eosinophil % 0.1 %      Basophil % 0.3 %      Immature Grans % 0.7 %      Neutrophils, Absolute 14.49 10*3/mm3      Lymphocytes, Absolute 1.76 10*3/mm3      Monocytes, Absolute 1.41 10*3/mm3      Eosinophils, Absolute 0.01 10*3/mm3      Basophils, Absolute 0.05 10*3/mm3      " Immature Grans, Absolute 0.12 10*3/mm3      nRBC 0.0 /100 WBC     Urinalysis With Culture If Indicated - Urine, Clean Catch [[id number]]  (Abnormal) Collected: 10/22/20 1852    Specimen: Urine, Clean Catch Updated: 10/22/20 1935     Color, UA Yellow     Appearance, UA Cloudy     pH, UA 6.0     Specific Gravity, UA 1.015     Glucose, UA Negative     Ketones, UA Negative     Bilirubin, UA Negative     Blood, UA Trace     Protein, UA Negative     Leuk Esterase, UA Small (1+)     Nitrite, UA Positive     Urobilinogen, UA 0.2 E.U./dL    Pregnancy, Urine - Urine, Clean Catch [[id number]]  (Normal) Collected: 10/22/20 1852    Specimen: Urine, Clean Catch Updated: 10/22/20 1934     HCG, Urine QL Negative    Urinalysis, Microscopic Only - Urine, Clean Catch [[id number]]  (Abnormal) Collected: 10/22/20 1852    Specimen: Urine, Clean Catch Updated: 10/22/20 1954     RBC, UA 0-2 /HPF      WBC, UA 6-12 /HPF      Bacteria, UA 3+ /HPF      Squamous Epithelial Cells, UA 0-2 /HPF      Hyaline Casts, UA None Seen /LPF      Methodology Automated Microscopy    Urine Culture - Urine, Urine, Clean Catch [[id number]] Collected: 10/22/20 1852    Specimen: Urine, Clean Catch Updated: 10/22/20 1954    Respiratory Panel PCR w/COVID-19(SARS-CoV-2) ANNIA/LORIE/SALTY/PAD/COR/MAD/GEORGES In-House, NP Swab in UTM/VTM, 3-4 HR TAT - Swab, Nasopharynx [[id number]]  (Normal) Collected: 10/22/20 2021    Specimen: Swab from Nasopharynx Updated: 10/22/20 2134     ADENOVIRUS, PCR Not Detected     Coronavirus 229E Not Detected     Coronavirus HKU1 Not Detected     Coronavirus NL63 Not Detected     Coronavirus OC43 Not Detected     COVID19 Not Detected     Human Metapneumovirus Not Detected     Human Rhinovirus/Enterovirus Not Detected     Influenza A PCR Not Detected     Influenza B PCR Not Detected     Parainfluenza Virus 1 Not Detected     Parainfluenza Virus 2 Not Detected     Parainfluenza Virus 3 Not Detected     Parainfluenza Virus 4 Not Detected     RSV, PCR Not  Detected     Bordetella pertussis pcr Not Detected     Bordetella parapertussis PCR Not Detected     Chlamydophila pneumoniae PCR Not Detected     Mycoplasma pneumo by PCR Not Detected    Narrative:      Fact sheet for providers: https://docs."[employer]wp-content/uploads/ECW0883-8012-RU0.1-EUA-Provider-Fact-Sheet-3.pdf    Fact sheet for patients: https://docs."[employer]wp-content/uploads/HYW4941-7322-XN1.1-EUA-Patient-Fact-Sheet-1.pdf          Imaging Results (Last 24 Hours)     Procedure Component Value Units Date/Time    CT Abdomen Pelvis With Contrast [[id number]] Collected: 10/22/20 2021     Updated: 10/22/20 2036    Narrative:      CT ABDOMEN PELVIS W CONTRAST-     INDICATIONS: Left flank pain, fever     TECHNIQUE: Radiation dose reduction techniques were utilized, including  automated exposure control and exposure modulation based on body size.  Enhanced ABDOMEN AND PELVIS CT     COMPARISON: 03/06/2019     FINDINGS:     Ill-defined hazy hypoenhancement at the left upper kidney with adjacent  perinephric stranding is compatible with pyelonephritis, follow-up  recommended to exclude any possibility of underlying lesion. At the left  lower pole, a region of hypodensity measuring 3.4 cm is located in the  area of a previously demonstrated 1.7 cm cyst, but irregular  hypoenhancement around this area and adjacent perinephric fat stranding  raise suspicion for lobar nephronia or abscess within the left lower  pole kidney. Multiple small cysts and low densities too small to  characterize are seen in each kidney. Areas of focal cortical thinning  in the right kidney may be areas of previous infection. A 2 mm  nonobstructive calcification is seen in the right kidney. No  hydronephrosis.     Otherwise unremarkable appearance of the liver, gallbladder, spleen,  adrenal glands, pancreas, kidneys, bladder.     No bowel obstruction or abnormal bowel thickening is identified.  Appendix is not appear inflamed.     No  free intraperitoneal gas or free fluid.     Left para-aortic adenopathy, for example 1.1 cm short axis, axial image  62, 1.2 cm short axis, axial image 70, previously 0.3 cm, presumably  reactive in nature but nonspecific, possibility of neoplasm not  excluded, clinical correlation and follow-up recommended (if there is  reason to suspect lymphoproliferative disorder, such as lymphoma,  positron emission tomography can be obtained for further evaluation). A  retrocaval lymph node measuring 1.3 cm short axis on axial image 59,  previously 1.1 cm.     Abdominal aorta is not aneurysmal.     The lung bases are clear.     Degenerative changes are seen in the spine. No acute fracture is  identified.             Impression:            1. Findings compatible with pyelonephritis in the left upper kidney, and  lobar nephronia versus abscess in the left lower kidney.  2. Retroperitoneal adenopathy, presumably reactive in nature although  nonspecific as described.     Discussed by telephone with Dr. Seth at 2026, 10/22/2020.     This report was finalized on 10/22/2020 8:33 PM by Dr. Nando Vergara M.D.       XR Chest 1 View [[id number]] Collected: 10/22/20 2019     Updated: 10/22/20 2022    Narrative:      XR CHEST 1 VW-  10/22/2020     HISTORY: Fever. Chills.     Heart size is mildly enlarged. Lungs appear free of acute infiltrates.  Bony structures appear unremarkable.       Impression:      Mild cardiomegaly.     This report was finalized on 10/22/2020 8:19 PM by Dr. Wesley Soares M.D.                  No orders to display        Assessment/Plan     Active Hospital Problems    Diagnosis POA   • **Pyelonephritis of left kidney [N12] Yes   • Sepsis (CMS/HCC) [A41.9] Unknown   • Asthma [J45.909] Unknown   • Tobacco abuse [Z72.0] Yes   • Obesity (BMI 30-39.9) [E66.9] Yes   • Mental developmental delay [F81.9] Yes   • GERD without esophagitis [K21.9] Yes   • Depression [F32.9] Yes       Sepsis secondary to Acute  Pyelonephritis of Left Kidney  -Febrile and tachycardic on arrival with leukocytosis, meeting sepsis criteria  -Left lobar nephronia vs abscess on CT A/P. Urology consult  -She received IV Rocephin in the ED, continue 2 gm daily for now  -Blood and urine cultures pending  -IVF  -Repeat lab work in AM    GERD  -Continue home PPI    Anxiety/Depression  -Continue home regimen    Asthma  -She denies respiratory complaints, respiratory status stable on room air  -RVP negative  -Continue PRN albuterol neubulizers    Tobacco Abuse  -Nicotine patch    -I discussed the patients findings and my recommendations with patient.    VTE Prophylaxis - Pharmacy to dose Lovenox.  Code Status - Full code.       LIVIA Bajwa  [facility]  10/22/20  23:54 EDT

## 2020-10-23 NOTE — PROGRESS NOTES
[facility] HOSPITALIST    PROGRESS NOTE    Name:  Beatriz Boland   Age:  30 y.o.  Sex:  female  :  1990  MRN:  [id number]   Visit Number:  [id number]  Admission Date:  10/22/2020  Date Of Service:  10/23/20  Primary Care Physician:  Elyse Larkin APRN     LOS: 1 day :  Patient Care Team:  Elyse Larkin APRN as PCP - General (Family Medicine)  Maria Ines Eisenberg MD as Consulting Physician (Otolaryngology)  Wesley Ferro MD as Consulting Physician (Gastroenterology)  Chao Mayberry MD (Ophthalmology)  Shannon Burr PA (Physician Assistant)  Colette Caal APRN as Nurse Practitioner (Family Medicine)  Víctor Gunter MD as Consulting Physician (Cardiology)  Dedrick Jaimes MD as Consulting Physician (Urology)  Nacho Pacheco MD as Consulting Physician (Hand Surgery)  Francisco Bond MD as Consulting Physician (Obstetrics and Gynecology)  Elyse Larkin APRN (Family Medicine):    History taken from:     patient chart family    Chief Complaint:      Left flank pain    Subjective     Interval History:     Patient seen and examined today.  Reviewed history and physical, lab work, x-rays, chart, consult.  Patient is new to me.    Patient is a 30-year-old with history of mental developmental delay, GERD, anxiety, asthma, nephrolithiasis, recurrent UTIs.  She was treated for left pyelonephritis and sepsis on 2020.  Culture at that time grew E. coli.  She came into the emergency room with dysuria, frequency, urgency, incontinence.  Urinalysis suggested infection.  CT the abdomen pelvis was done which revealed left pyelonephritis with possible abscess.  Urology has been consulted.    Patient continues on Rocephin, cultures growing out gram-negative bacilli.  She feels slightly better.  She denies any chest pressure, shortness of breath, nausea, vomiting.  She has left flank pain.  This is moderate.    Review of Systems:      All systems were reviewed and negative except for:  Genitourinary: postivie for  Left flank pain    Objective     Vital Signs:    Temp:  [96.8 °F (36 °C)-103.1 °F (39.5 °C)] 96.8 °F (36 °C)  Heart Rate:  [] 86  Resp:  [16-20] 16  BP: ()/(59-89) 106/78    Physical Exam:    General: Alert and oriented x4, morbidly obese, mild distress  Heart: Regular rate and rhythm without murmur rub or thrill  Lungs: Clear to auscultation bilaterally without use of accessory muscles respiration.  Abdomen: Soft/nontender/nondistended.  No hepatosplenomegaly is noted.  Left Jones's noted.  MSK: 4/5 strength in upper/lower extremities bilaterally   Results Review:      I reviewed the patient's new clinical results.    Labs:    Lab Results (last 24 hours)     Procedure Component Value Units Date/Time    Urine Culture - Urine, Urine, Clean Catch [[id number]]  (Abnormal) Collected: 10/22/20 1852    Specimen: Urine, Clean Catch Updated: 10/23/20 1047     Urine Culture >100,000 CFU/mL Gram Negative Bacilli    Basic Metabolic Panel [[id number]]  (Abnormal) Collected: 10/23/20 0734    Specimen: Blood from Arm, Right Updated: 10/23/20 0830     Glucose 120 mg/dL      BUN 6 mg/dL      Creatinine 0.83 mg/dL      Sodium 136 mmol/L      Potassium 3.5 mmol/L      Chloride 107 mmol/L      CO2 21.3 mmol/L      Calcium 8.5 mg/dL      eGFR Non African Amer 81 mL/min/1.73      BUN/Creatinine Ratio 7.2     Anion Gap 7.7 mmol/L     Narrative:      GFR Normal >60  Chronic Kidney Disease <60  Kidney Failure <15      CBC (No Diff) [[id number]]  (Abnormal) Collected: 10/23/20 0734    Specimen: Blood Updated: 10/23/20 0802     WBC 21.37 10*3/mm3      RBC 3.96 10*6/mm3      Hemoglobin 10.7 g/dL      Hematocrit 32.2 %      MCV 81.3 fL      MCH 27.0 pg      MCHC 33.2 g/dL      RDW 13.4 %      RDW-SD 39.7 fl      MPV 8.9 fL      Platelets 264 10*3/mm3     Respiratory Panel PCR w/COVID-19(SARS-CoV-2) ANNIA/LORIE/SALTY/PAD/COR/MAD/GEORGES In-House, NP Swab in  UTM/VTM, 3-4 HR TAT - Swab, Nasopharynx [[id number]]  (Normal) Collected: 10/22/20 2021    Specimen: Swab from Nasopharynx Updated: 10/22/20 2134     ADENOVIRUS, PCR Not Detected     Coronavirus 229E Not Detected     Coronavirus HKU1 Not Detected     Coronavirus NL63 Not Detected     Coronavirus OC43 Not Detected     COVID19 Not Detected     Human Metapneumovirus Not Detected     Human Rhinovirus/Enterovirus Not Detected     Influenza A PCR Not Detected     Influenza B PCR Not Detected     Parainfluenza Virus 1 Not Detected     Parainfluenza Virus 2 Not Detected     Parainfluenza Virus 3 Not Detected     Parainfluenza Virus 4 Not Detected     RSV, PCR Not Detected     Bordetella pertussis pcr Not Detected     Bordetella parapertussis PCR Not Detected     Chlamydophila pneumoniae PCR Not Detected     Mycoplasma pneumo by PCR Not Detected    Narrative:      Fact sheet for providers: https://docs."[employer]/wp-content/uploads/OEB4160-6798-VT8.1-EUA-Provider-Fact-Sheet-3.pdf    Fact sheet for patients: https://docs."[employer]/wp-content/uploads/EAL9944-8319-YM8.1-EUA-Patient-Fact-Sheet-1.pdf    [place] Draw [[id number]] Collected: 10/22/20 1850    Specimen: Blood Updated: 10/22/20 2001    Narrative:      The following orders were created for panel order [place] Draw.  Procedure                               Abnormality         Status                     ---------                               -----------         ------                     Green Top (Gel)[[id number]]                                  Final result               Lavender Top[[id number]]                                     Final result               Gold Top - SST[[id number]]                                   Final result                 Please view results for these tests on the individual orders.    Green Top (Gel) [[id number]] Collected: 10/22/20 1850    Specimen: Blood Updated: 10/22/20 2001     Extra Tube Hold for add-ons.     Comment: Auto resulted.        Lavender Top [[id number]] Collected: 10/22/20 1850    Specimen: Blood Updated: 10/22/20 2001     Extra Tube hold for add-on     Comment: Auto resulted       Gold Top - SST [[id number]] Collected: 10/22/20 1850    Specimen: Blood Updated: 10/22/20 2001     Extra Tube Hold for add-ons.     Comment: Auto resulted.       Urinalysis, Microscopic Only - Urine, Clean Catch [[id number]]  (Abnormal) Collected: 10/22/20 1852    Specimen: Urine, Clean Catch Updated: 10/22/20 1954     RBC, UA 0-2 /HPF      WBC, UA 6-12 /HPF      Bacteria, UA 3+ /HPF      Squamous Epithelial Cells, UA 0-2 /HPF      Hyaline Casts, UA None Seen /LPF      Methodology Automated Microscopy    Urinalysis With Culture If Indicated - Urine, Clean Catch [[id number]]  (Abnormal) Collected: 10/22/20 1852    Specimen: Urine, Clean Catch Updated: 10/22/20 1935     Color, UA Yellow     Appearance, UA Cloudy     pH, UA 6.0     Specific Gravity, UA 1.015     Glucose, UA Negative     Ketones, UA Negative     Bilirubin, UA Negative     Blood, UA Trace     Protein, UA Negative     Leuk Esterase, UA Small (1+)     Nitrite, UA Positive     Urobilinogen, UA 0.2 E.U./dL    Pregnancy, Urine - Urine, Clean Catch [[id number]]  (Normal) Collected: 10/22/20 1852    Specimen: Urine, Clean Catch Updated: 10/22/20 1934     HCG, Urine QL Negative    Procalcitonin [[id number]]  (Normal) Collected: 10/22/20 1850    Specimen: Blood Updated: 10/22/20 1933     Procalcitonin 0.13 ng/mL     Narrative:      As a Marker for Sepsis (Non-Neonates):   1. <0.5 ng/mL represents a low risk of severe sepsis and/or septic shock.  1. >2 ng/mL represents a high risk of severe sepsis and/or septic shock.    As a Marker for Lower Respiratory Tract Infections that require antibiotic therapy:  PCT on Admission     Antibiotic Therapy             6-12 Hrs later  > 0.5                Strongly Recommended            >0.25 - <0.5         Recommended  0.1 - 0.25           Discouraged                    "Remeasure/reassess PCT  <0.1                 Strongly Discouraged          Remeasure/reassess PCT      As 28 day mortality risk marker: \"Change in Procalcitonin Result\" (> 80 % or <=80 %) if Day 0 (or Day 1) and Day 4 values are available. Refer to http://www.[facility] Twitter [email or web address]-[email or web address]/   Change in PCT <=80 %   A decrease of PCT levels below or equal to 80 % defines a positive change in PCT test result representing a higher risk for 28-day all-cause mortality of patients diagnosed with severe sepsis or septic shock.  Change in PCT > 80 %   A decrease of PCT levels of more than 80 % defines a negative change in PCT result representing a lower risk for 28-day all-cause mortality of patients diagnosed with severe sepsis or septic shock.                Results may be falsely decreased if patient taking Biotin.     Comprehensive Metabolic Panel [[id number]]  (Abnormal) Collected: 10/22/20 1850    Specimen: Blood Updated: 10/22/20 1930     Glucose 96 mg/dL      BUN 8 mg/dL      Creatinine 0.98 mg/dL      Sodium 135 mmol/L      Potassium 3.8 mmol/L      Chloride 102 mmol/L      CO2 20.8 mmol/L      Calcium 9.5 mg/dL      Total Protein 7.6 g/dL      Albumin 4.00 g/dL      ALT (SGPT) 12 U/L      AST (SGOT) 14 U/L      Alkaline Phosphatase 62 U/L      Total Bilirubin 0.5 mg/dL      eGFR Non African Amer 67 mL/min/1.73      Globulin 3.6 gm/dL      A/G Ratio 1.1 g/dL      BUN/Creatinine Ratio 8.2     Anion Gap 12.2 mmol/L     Narrative:      GFR Normal >60  Chronic Kidney Disease <60  Kidney Failure <15      Lactic Acid, Plasma [[id number]]  (Normal) Collected: 10/22/20 1850    Specimen: Blood Updated: 10/22/20 1923     Lactate 0.7 mmol/L     Blood Culture - Blood, Arm, Left [[id number]] Collected: 10/22/20 1850    Specimen: Blood from Arm, Left Updated: 10/22/20 1905    CBC & Differential [[id number]]  (Abnormal) Collected: 10/22/20 1850    Specimen: Blood Updated: 10/22/20 1902    Narrative:      The following orders were created " for panel order CBC & Differential.  Procedure                               Abnormality         Status                     ---------                               -----------         ------                     CBC Auto Differential[[id number]]        Abnormal            Final result                 Please view results for these tests on the individual orders.    CBC Auto Differential [[id number]]  (Abnormal) Collected: 10/22/20 1850    Specimen: Blood Updated: 10/22/20 1902     WBC 17.84 10*3/mm3      RBC 4.67 10*6/mm3      Hemoglobin 12.6 g/dL      Hematocrit 38.2 %      MCV 81.8 fL      MCH 27.0 pg      MCHC 33.0 g/dL      RDW 13.5 %      RDW-SD 39.7 fl      MPV 8.5 fL      Platelets 322 10*3/mm3      Neutrophil % 81.1 %      Lymphocyte % 9.9 %      Monocyte % 7.9 %      Eosinophil % 0.1 %      Basophil % 0.3 %      Immature Grans % 0.7 %      Neutrophils, Absolute 14.49 10*3/mm3      Lymphocytes, Absolute 1.76 10*3/mm3      Monocytes, Absolute 1.41 10*3/mm3      Eosinophils, Absolute 0.01 10*3/mm3      Basophils, Absolute 0.05 10*3/mm3      Immature Grans, Absolute 0.12 10*3/mm3      nRBC 0.0 /100 WBC     Blood Culture - Blood, Arm, Right [[id number]] Collected: 10/22/20 1850    Specimen: Blood from Arm, Right Updated: 10/22/20 1857           Radiology:    Imaging Results (Last 24 Hours)     Procedure Component Value Units Date/Time    CT Abdomen Pelvis With Contrast [[id number]] Collected: 10/22/20 2021     Updated: 10/22/20 2036    Narrative:      CT ABDOMEN PELVIS W CONTRAST-     INDICATIONS: Left flank pain, fever     TECHNIQUE: Radiation dose reduction techniques were utilized, including  automated exposure control and exposure modulation based on body size.  Enhanced ABDOMEN AND PELVIS CT     COMPARISON: 03/06/2019     FINDINGS:     Ill-defined hazy hypoenhancement at the left upper kidney with adjacent  perinephric stranding is compatible with pyelonephritis, follow-up  recommended to exclude any possibility  of underlying lesion. At the left  lower pole, a region of hypodensity measuring 3.4 cm is located in the  area of a previously demonstrated 1.7 cm cyst, but irregular  hypoenhancement around this area and adjacent perinephric fat stranding  raise suspicion for lobar nephronia or abscess within the left lower  pole kidney. Multiple small cysts and low densities too small to  characterize are seen in each kidney. Areas of focal cortical thinning  in the right kidney may be areas of previous infection. A 2 mm  nonobstructive calcification is seen in the right kidney. No  hydronephrosis.     Otherwise unremarkable appearance of the liver, gallbladder, spleen,  adrenal glands, pancreas, kidneys, bladder.     No bowel obstruction or abnormal bowel thickening is identified.  Appendix is not appear inflamed.     No free intraperitoneal gas or free fluid.     Left para-aortic adenopathy, for example 1.1 cm short axis, axial image  62, 1.2 cm short axis, axial image 70, previously 0.3 cm, presumably  reactive in nature but nonspecific, possibility of neoplasm not  excluded, clinical correlation and follow-up recommended (if there is  reason to suspect lymphoproliferative disorder, such as lymphoma,  positron emission tomography can be obtained for further evaluation). A  retrocaval lymph node measuring 1.3 cm short axis on axial image 59,  previously 1.1 cm.     Abdominal aorta is not aneurysmal.     The lung bases are clear.     Degenerative changes are seen in the spine. No acute fracture is  identified.             Impression:            1. Findings compatible with pyelonephritis in the left upper kidney, and  lobar nephronia versus abscess in the left lower kidney.  2. Retroperitoneal adenopathy, presumably reactive in nature although  nonspecific as described.     Discussed by telephone with Dr. Seth at 2026, 10/22/2020.     This report was finalized on 10/22/2020 8:33 PM by Dr. Nando Vergara M.D.       XR  Chest 1 View [[id number]] Collected: 10/22/20 2019     Updated: 10/22/20 2022    Narrative:      XR CHEST 1 VW-  10/22/2020     HISTORY: Fever. Chills.     Heart size is mildly enlarged. Lungs appear free of acute infiltrates.  Bony structures appear unremarkable.       Impression:      Mild cardiomegaly.     This report was finalized on 10/22/2020 8:19 PM by Dr. Wesley Soares M.D.             Medication Review:     ARIPiprazole, 5 mg, Oral, Nightly  budesonide-formoterol, 2 puff, Inhalation, BID - RT  cetirizine, 10 mg, Oral, Daily  enoxaparin, 40 mg, Subcutaneous, Q24H  nicotine, 1 patch, Transdermal, Q24H  pantoprazole, 40 mg, Oral, BID AC  piperacillin-tazobactam, 3.375 g, Intravenous, Q8H  saccharomyces boulardii, 250 mg, Oral, BID  sodium chloride, 10 mL, Intravenous, Q12H  venlafaxine, 25 mg, Oral, Nightly        sodium chloride, 100 mL/hr, Last Rate: 100 mL/hr (10/23/20 1136)        Assessment/Plan     Problem List Items Addressed This Visit        Genitourinary    * (Principal) Pyelonephritis of left kidney - Primary      Other Visit Diagnoses     Renal abscess, left              1.  Acute pyelonephritis of the left kidney, present on admission  2.  Sepsis due to #1  3.  GERD  4.  Asthma  5.  Anxiety/depression  6.  Mental developmental delay  7.  Morbid obesity      Plan:    We will continue with antibiotics at this point.  Patient is on Rocephin.  Await culture results.  Urology is following.  Continue to monitor lab work.  Further recommendations will depend on clinical course.    Artie Chambers DO  10/23/20  17:11 EDT

## 2020-10-24 VITALS
BODY MASS INDEX: 39.51 KG/M2 | WEIGHT: 251.7 LBS | HEART RATE: 64 BPM | OXYGEN SATURATION: 100 % | TEMPERATURE: 96.8 F | DIASTOLIC BLOOD PRESSURE: 70 MMHG | HEIGHT: 67 IN | SYSTOLIC BLOOD PRESSURE: 111 MMHG | RESPIRATION RATE: 20 BRPM

## 2020-10-24 LAB
ALBUMIN SERPL-MCNC: 3 G/DL (ref 3.5–5.2)
ANION GAP SERPL CALCULATED.3IONS-SCNC: 6.7 MMOL/L (ref 5–15)
BACTERIA SPEC AEROBE CULT: ABNORMAL
BASOPHILS # BLD AUTO: 0.04 10*3/MM3 (ref 0–0.2)
BASOPHILS NFR BLD AUTO: 0.4 % (ref 0–1.5)
BUN SERPL-MCNC: 5 MG/DL (ref 6–20)
BUN/CREAT SERPL: 6.3 (ref 7–25)
CALCIUM SPEC-SCNC: 8.5 MG/DL (ref 8.6–10.5)
CHLORIDE SERPL-SCNC: 111 MMOL/L (ref 98–107)
CO2 SERPL-SCNC: 21.3 MMOL/L (ref 22–29)
CREAT SERPL-MCNC: 0.8 MG/DL (ref 0.57–1)
DEPRECATED RDW RBC AUTO: 39.7 FL (ref 37–54)
EOSINOPHIL # BLD AUTO: 0.05 10*3/MM3 (ref 0–0.4)
EOSINOPHIL NFR BLD AUTO: 0.5 % (ref 0.3–6.2)
ERYTHROCYTE [DISTWIDTH] IN BLOOD BY AUTOMATED COUNT: 13.3 % (ref 12.3–15.4)
GFR SERPL CREATININE-BSD FRML MDRD: 85 ML/MIN/1.73
GLUCOSE SERPL-MCNC: 100 MG/DL (ref 65–99)
HCT VFR BLD AUTO: 30.5 % (ref 34–46.6)
HGB BLD-MCNC: 10 G/DL (ref 12–15.9)
IMM GRANULOCYTES # BLD AUTO: 0.05 10*3/MM3 (ref 0–0.05)
IMM GRANULOCYTES NFR BLD AUTO: 0.5 % (ref 0–0.5)
LYMPHOCYTES # BLD AUTO: 2.26 10*3/MM3 (ref 0.7–3.1)
LYMPHOCYTES NFR BLD AUTO: 20.7 % (ref 19.6–45.3)
MAGNESIUM SERPL-MCNC: 2.1 MG/DL (ref 1.6–2.6)
MCH RBC QN AUTO: 26.8 PG (ref 26.6–33)
MCHC RBC AUTO-ENTMCNC: 32.8 G/DL (ref 31.5–35.7)
MCV RBC AUTO: 81.8 FL (ref 79–97)
MONOCYTES # BLD AUTO: 1.27 10*3/MM3 (ref 0.1–0.9)
MONOCYTES NFR BLD AUTO: 11.7 % (ref 5–12)
NEUTROPHILS NFR BLD AUTO: 66.2 % (ref 42.7–76)
NEUTROPHILS NFR BLD AUTO: 7.23 10*3/MM3 (ref 1.7–7)
NRBC BLD AUTO-RTO: 0 /100 WBC (ref 0–0.2)
PHOSPHATE SERPL-MCNC: 2.3 MG/DL (ref 2.5–4.5)
PLATELET # BLD AUTO: 244 10*3/MM3 (ref 140–450)
PMV BLD AUTO: 8.9 FL (ref 6–12)
POTASSIUM SERPL-SCNC: 3.5 MMOL/L (ref 3.5–5.2)
RBC # BLD AUTO: 3.73 10*6/MM3 (ref 3.77–5.28)
SODIUM SERPL-SCNC: 139 MMOL/L (ref 136–145)
WBC # BLD AUTO: 10.9 10*3/MM3 (ref 3.4–10.8)

## 2020-10-24 PROCEDURE — 25010000002 ENOXAPARIN PER 10 MG: Performed by: NURSE PRACTITIONER

## 2020-10-24 PROCEDURE — 83735 ASSAY OF MAGNESIUM: CPT | Performed by: INTERNAL MEDICINE

## 2020-10-24 PROCEDURE — 25010000002 PIPERACILLIN SOD-TAZOBACTAM PER 1 G: Performed by: INTERNAL MEDICINE

## 2020-10-24 PROCEDURE — 94799 UNLISTED PULMONARY SVC/PX: CPT

## 2020-10-24 PROCEDURE — 80069 RENAL FUNCTION PANEL: CPT | Performed by: INTERNAL MEDICINE

## 2020-10-24 PROCEDURE — 85025 COMPLETE CBC W/AUTO DIFF WBC: CPT | Performed by: INTERNAL MEDICINE

## 2020-10-24 RX ORDER — CEPHALEXIN 500 MG/1
500 CAPSULE ORAL 3 TIMES DAILY
Qty: 21 CAPSULE | Refills: 0 | Status: SHIPPED | OUTPATIENT
Start: 2020-10-24 | End: 2020-10-31

## 2020-10-24 RX ADMIN — ACETAMINOPHEN 650 MG: 325 TABLET, FILM COATED ORAL at 05:52

## 2020-10-24 RX ADMIN — TAZOBACTAM SODIUM AND PIPERACILLIN SODIUM 3.38 G: 375; 3 INJECTION, SOLUTION INTRAVENOUS at 01:34

## 2020-10-24 RX ADMIN — SODIUM CHLORIDE 100 ML/HR: 9 INJECTION, SOLUTION INTRAVENOUS at 08:02

## 2020-10-24 RX ADMIN — TAZOBACTAM SODIUM AND PIPERACILLIN SODIUM 3.38 G: 375; 3 INJECTION, SOLUTION INTRAVENOUS at 11:01

## 2020-10-24 RX ADMIN — PANTOPRAZOLE SODIUM 40 MG: 40 TABLET, DELAYED RELEASE ORAL at 05:50

## 2020-10-24 RX ADMIN — Medication 250 MG: at 09:30

## 2020-10-24 RX ADMIN — ENOXAPARIN SODIUM 40 MG: 40 INJECTION SUBCUTANEOUS at 11:03

## 2020-10-24 RX ADMIN — CETIRIZINE HYDROCHLORIDE 10 MG: 10 TABLET, FILM COATED ORAL at 09:30

## 2020-10-24 NOTE — PROGRESS NOTES
CC: I am feeling better    NAD   A&O x 3  Resting in bed  Decreased pain in left side    AVSS  Cr 0.8  WBC 10.9 - improving  Hgb 10  [facility] Ecoli     Plan:  Continue Rocephin, transfer to PO antibiotics upon discharge    Schedule outpatient follow up with Dr. Dedrick Jaimes with [facility], [phone], Will plan repeated imaging of kidneys in 2-3 weeks    Please call if needed

## 2020-10-24 NOTE — DISCHARGE SUMMARY
[facility] HOSPITALIST   DISCHARGE SUMMARY      Name:  Beatriz Boland   Age:  30 y.o.  Sex:  female  :  1990  MRN:  [id number]   Visit Number:  [id number]  Primary Care Physician:  Elyse Larkin APRN  Date of Discharge:  10/24/2020  Admission Date:  10/22/2020      Discharge Diagnosis:     1.  Acute pyelonephritis of the left kidney, with renal abscess, present on admission  2.  Sepsis due to #1, resolved  3.  GERD  4.  Asthma  5.  Anxiety/depression  6.  Mental developmental delay  7.  Morbid obesity  Active Hospital Problems    Diagnosis  POA   • **Pyelonephritis of left kidney [N12]  Yes   • Sepsis (CMS/HCC) [A41.9]  Unknown   • Asthma [J45.909]  Unknown   • Tobacco abuse [Z72.0]  Yes   • Obesity (BMI 30-39.9) [E66.9]  Yes   • Mental developmental delay [F81.9]  Yes   • GERD without esophagitis [K21.9]  Yes   • Depression [F32.9]  Yes      Resolved Hospital Problems   No resolved problems to display.         Presenting Problem/History of Present Illness:    Renal abscess, left [N15.1]  Pyelonephritis of left kidney [N12]         Hospital Course:    Patient is a 30-year-old with history of mental developmental delay, GERD, anxiety, asthma, nephrolithiasis, recurrent UTIs.  She was treated for left pyelonephritis and sepsis on 2020.  Culture at that time grew E. coli.  She came into the emergency room with dysuria, frequency, urgency, incontinence.  Urinalysis suggested infection.  CT the abdomen pelvis was done which revealed left pyelonephritis with possible abscess.  Urology was consulted.     Patient continues on Rocephin, culture grew out E. coli.  This is sensitive to cephalosporins.  We will switch her to Keflex 500 mg 3 times daily for 1 week at discharge. She feels much better.  She denies any chest pressure, shortness of breath, nausea, vomiting.    She has minimal left flank pain.  Urology has seen the patient, and recommended treating with antibiotics and  then having a follow-up scan in 2-3 weeks.    Given the fact that she is afebrile, her WBC count is returned to normal, and she has no complaints, will discharge her home today.  She is to follow-up with her PCP in 1 week's time.  Follow-up with [facility] in 2 weeks.  She will need a repeat CT scan as an outpatient.    Procedures Performed:           Consults:     Consults     Date and Time Order Name Status Description    10/22/2020 2309 Inpatient Urology Consult Completed     10/22/2020 2030 [facility] (on-call MD unless specified) Details Completed           Pertinent Test Results:     Lab Results (all)     Procedure Component Value Units Date/Time    Renal Function Panel [[id number]]  (Abnormal) Collected: 10/24/20 0904    Specimen: Blood from Hand, Right Updated: 10/24/20 0959     Glucose 100 mg/dL      BUN 5 mg/dL      Creatinine 0.80 mg/dL      Sodium 139 mmol/L      Potassium 3.5 mmol/L      Chloride 111 mmol/L      CO2 21.3 mmol/L      Calcium 8.5 mg/dL      Albumin 3.00 g/dL      Phosphorus 2.3 mg/dL      Anion Gap 6.7 mmol/L      BUN/Creatinine Ratio 6.3     eGFR Non African Amer 85 mL/min/1.73     Narrative:      GFR Normal >60  Chronic Kidney Disease <60  Kidney Failure <15      Magnesium [[id number]]  (Normal) Collected: 10/24/20 0904    Specimen: Blood from Hand, Right Updated: 10/24/20 0959     Magnesium 2.1 mg/dL     CBC & Differential [[id number]]  (Abnormal) Collected: 10/24/20 0904    Specimen: Blood Updated: 10/24/20 0950    Narrative:      The following orders were created for panel order CBC & Differential.  Procedure                               Abnormality         Status                     ---------                               -----------         ------                     CBC Auto Differential[[id number]]        Abnormal            Final result                 Please view results for these tests on the individual orders.    CBC Auto Differential [[id number]]  (Abnormal) Collected: 10/24/20 0904     Specimen: Blood Updated: 10/24/20 0950     WBC 10.90 10*3/mm3      RBC 3.73 10*6/mm3      Hemoglobin 10.0 g/dL      Hematocrit 30.5 %      MCV 81.8 fL      MCH 26.8 pg      MCHC 32.8 g/dL      RDW 13.3 %      RDW-SD 39.7 fl      MPV 8.9 fL      Platelets 244 10*3/mm3      Neutrophil % 66.2 %      Lymphocyte % 20.7 %      Monocyte % 11.7 %      Eosinophil % 0.5 %      Basophil % 0.4 %      Immature Grans % 0.5 %      Neutrophils, Absolute 7.23 10*3/mm3      Lymphocytes, Absolute 2.26 10*3/mm3      Monocytes, Absolute 1.27 10*3/mm3      Eosinophils, Absolute 0.05 10*3/mm3      Basophils, Absolute 0.04 10*3/mm3      Immature Grans, Absolute 0.05 10*3/mm3      nRBC 0.0 /100 WBC     Urine Culture - Urine, Urine, Clean Catch [[id number]]  (Abnormal)  (Susceptibility) Collected: 10/22/20 1852    Specimen: Urine, Clean Catch Updated: 10/24/20 0225     Urine Culture >100,000 CFU/mL Escherichia coli    Susceptibility      Escherichia coli     SUSAN     Ampicillin Resistant     Ampicillin + Sulbactam Intermediate     Cefazolin Susceptible     Cefepime Susceptible     Ceftazidime Susceptible     Ceftriaxone Susceptible     Gentamicin Susceptible     Levofloxacin Susceptible     Nitrofurantoin Susceptible     Piperacillin + Tazobactam Susceptible     Tetracycline Susceptible     Trimethoprim + Sulfamethoxazole Resistant                    Blood Culture - Blood, Arm, Left [[id number]] Collected: 10/22/20 1850    Specimen: Blood from Arm, Left Updated: 10/23/20 1915     Blood Culture No growth at 24 hours    Blood Culture - Blood, Arm, Right [[id number]] Collected: 10/22/20 1850    Specimen: Blood from Arm, Right Updated: 10/23/20 1900     Blood Culture No growth at 24 hours    Basic Metabolic Panel [[id number]]  (Abnormal) Collected: 10/23/20 0734    Specimen: Blood from Arm, Right Updated: 10/23/20 0830     Glucose 120 mg/dL      BUN 6 mg/dL      Creatinine 0.83 mg/dL      Sodium 136 mmol/L      Potassium 3.5 mmol/L      Chloride  107 mmol/L      CO2 21.3 mmol/L      Calcium 8.5 mg/dL      eGFR Non African Amer 81 mL/min/1.73      BUN/Creatinine Ratio 7.2     Anion Gap 7.7 mmol/L     Narrative:      GFR Normal >60  Chronic Kidney Disease <60  Kidney Failure <15      CBC (No Diff) [[id number]]  (Abnormal) Collected: 10/23/20 0734    Specimen: Blood Updated: 10/23/20 0802     WBC 21.37 10*3/mm3      RBC 3.96 10*6/mm3      Hemoglobin 10.7 g/dL      Hematocrit 32.2 %      MCV 81.3 fL      MCH 27.0 pg      MCHC 33.2 g/dL      RDW 13.4 %      RDW-SD 39.7 fl      MPV 8.9 fL      Platelets 264 10*3/mm3     Respiratory Panel PCR w/COVID-19(SARS-CoV-2) ANNIA/LORIE/SALTY/PAD/COR/MAD/GEORGES In-House, NP Swab in UTM/VTM, 3-4 HR TAT - Swab, Nasopharynx [[id number]]  (Normal) Collected: 10/22/20 2021    Specimen: Swab from Nasopharynx Updated: 10/22/20 2134     ADENOVIRUS, PCR Not Detected     Coronavirus 229E Not Detected     Coronavirus HKU1 Not Detected     Coronavirus NL63 Not Detected     Coronavirus OC43 Not Detected     COVID19 Not Detected     Human Metapneumovirus Not Detected     Human Rhinovirus/Enterovirus Not Detected     Influenza A PCR Not Detected     Influenza B PCR Not Detected     Parainfluenza Virus 1 Not Detected     Parainfluenza Virus 2 Not Detected     Parainfluenza Virus 3 Not Detected     Parainfluenza Virus 4 Not Detected     RSV, PCR Not Detected     Bordetella pertussis pcr Not Detected     Bordetella parapertussis PCR Not Detected     Chlamydophila pneumoniae PCR Not Detected     Mycoplasma pneumo by PCR Not Detected    Narrative:      Fact sheet for providers: https://docs.[employer]/wp-content/uploads/GMY2919-5536-AD4.1-EUA-Provider-Fact-Sheet-3.pdf    Fact sheet for patients: https://docs.[employer]/wp-content/uploads/PAF9483-9976-JZ1.1-EUA-Patient-Fact-Sheet-1.pdf    [place] Draw [[id number]] Collected: 10/22/20 1850    Specimen: Blood Updated: 10/22/20 2001    Narrative:      The following orders were created for panel order  [place] Draw.  Procedure                               Abnormality         Status                     ---------                               -----------         ------                     Green Top (Gel)[[id number]]                                  Final result               Lavender Top[[id number]]                                     Final result               Gold Top - SST[[id number]]                                   Final result                 Please view results for these tests on the individual orders.    Green Top (Gel) [[id number]] Collected: 10/22/20 1850    Specimen: Blood Updated: 10/22/20 2001     Extra Tube Hold for add-ons.     Comment: Auto resulted.       Lavender Top [[id number]] Collected: 10/22/20 1850    Specimen: Blood Updated: 10/22/20 2001     Extra Tube hold for add-on     Comment: Auto resulted       Gold Top - SST [[id number]] Collected: 10/22/20 1850    Specimen: Blood Updated: 10/22/20 2001     Extra Tube Hold for add-ons.     Comment: Auto resulted.       Urinalysis, Microscopic Only - Urine, Clean Catch [[id number]]  (Abnormal) Collected: 10/22/20 1852    Specimen: Urine, Clean Catch Updated: 10/22/20 1954     RBC, UA 0-2 /HPF      WBC, UA 6-12 /HPF      Bacteria, UA 3+ /HPF      Squamous Epithelial Cells, UA 0-2 /HPF      Hyaline Casts, UA None Seen /LPF      Methodology Automated Microscopy    Urinalysis With Culture If Indicated - Urine, Clean Catch [[id number]]  (Abnormal) Collected: 10/22/20 1852    Specimen: Urine, Clean Catch Updated: 10/22/20 1935     Color, UA Yellow     Appearance, UA Cloudy     pH, UA 6.0     Specific Gravity, UA 1.015     Glucose, UA Negative     Ketones, UA Negative     Bilirubin, UA Negative     Blood, UA Trace     Protein, UA Negative     Leuk Esterase, UA Small (1+)     Nitrite, UA Positive     Urobilinogen, UA 0.2 E.U./dL    Pregnancy, Urine - Urine, Clean Catch [[id number]]  (Normal) Collected: 10/22/20 1852    Specimen: Urine, Clean Catch Updated: 10/22/20  "1934     HCG, Urine QL Negative    Procalcitonin [[id number]]  (Normal) Collected: 10/22/20 1850    Specimen: Blood Updated: 10/22/20 1933     Procalcitonin 0.13 ng/mL     Narrative:      As a Marker for Sepsis (Non-Neonates):   1. <0.5 ng/mL represents a low risk of severe sepsis and/or septic shock.  1. >2 ng/mL represents a high risk of severe sepsis and/or septic shock.    As a Marker for Lower Respiratory Tract Infections that require antibiotic therapy:  PCT on Admission     Antibiotic Therapy             6-12 Hrs later  > 0.5                Strongly Recommended            >0.25 - <0.5         Recommended  0.1 - 0.25           Discouraged                   Remeasure/reassess PCT  <0.1                 Strongly Discouraged          Remeasure/reassess PCT      As 28 day mortality risk marker: \"Change in Procalcitonin Result\" (> 80 % or <=80 %) if Day 0 (or Day 1) and Day 4 values are available. Refer to http://www.rVuepct-calculator.com/   Change in PCT <=80 %   A decrease of PCT levels below or equal to 80 % defines a positive change in PCT test result representing a higher risk for 28-day all-cause mortality of patients diagnosed with severe sepsis or septic shock.  Change in PCT > 80 %   A decrease of PCT levels of more than 80 % defines a negative change in PCT result representing a lower risk for 28-day all-cause mortality of patients diagnosed with severe sepsis or septic shock.                Results may be falsely decreased if patient taking Biotin.     Comprehensive Metabolic Panel [[id number]]  (Abnormal) Collected: 10/22/20 1850    Specimen: Blood Updated: 10/22/20 1930     Glucose 96 mg/dL      BUN 8 mg/dL      Creatinine 0.98 mg/dL      Sodium 135 mmol/L      Potassium 3.8 mmol/L      Chloride 102 mmol/L      CO2 20.8 mmol/L      Calcium 9.5 mg/dL      Total Protein 7.6 g/dL      Albumin 4.00 g/dL      ALT (SGPT) 12 U/L      AST (SGOT) 14 U/L      Alkaline Phosphatase 62 U/L      Total Bilirubin 0.5 " mg/dL      eGFR Non African Amer 67 mL/min/1.73      Globulin 3.6 gm/dL      A/G Ratio 1.1 g/dL      BUN/Creatinine Ratio 8.2     Anion Gap 12.2 mmol/L     Narrative:      GFR Normal >60  Chronic Kidney Disease <60  Kidney Failure <15      Lactic Acid, Plasma [[id number]]  (Normal) Collected: 10/22/20 1850    Specimen: Blood Updated: 10/22/20 1923     Lactate 0.7 mmol/L     CBC & Differential [[id number]]  (Abnormal) Collected: 10/22/20 1850    Specimen: Blood Updated: 10/22/20 1902    Narrative:      The following orders were created for panel order CBC & Differential.  Procedure                               Abnormality         Status                     ---------                               -----------         ------                     CBC Auto Differential[[id number]]        Abnormal            Final result                 Please view results for these tests on the individual orders.    CBC Auto Differential [[id number]]  (Abnormal) Collected: 10/22/20 1850    Specimen: Blood Updated: 10/22/20 1902     WBC 17.84 10*3/mm3      RBC 4.67 10*6/mm3      Hemoglobin 12.6 g/dL      Hematocrit 38.2 %      MCV 81.8 fL      MCH 27.0 pg      MCHC 33.0 g/dL      RDW 13.5 %      RDW-SD 39.7 fl      MPV 8.5 fL      Platelets 322 10*3/mm3      Neutrophil % 81.1 %      Lymphocyte % 9.9 %      Monocyte % 7.9 %      Eosinophil % 0.1 %      Basophil % 0.3 %      Immature Grans % 0.7 %      Neutrophils, Absolute 14.49 10*3/mm3      Lymphocytes, Absolute 1.76 10*3/mm3      Monocytes, Absolute 1.41 10*3/mm3      Eosinophils, Absolute 0.01 10*3/mm3      Basophils, Absolute 0.05 10*3/mm3      Immature Grans, Absolute 0.12 10*3/mm3      nRBC 0.0 /100 WBC           Imaging Results (All)     Procedure Component Value Units Date/Time    CT Abdomen Pelvis With Contrast [[id number]] Collected: 10/22/20 2021     Updated: 10/22/20 2036    Narrative:      CT ABDOMEN PELVIS W CONTRAST-     INDICATIONS: Left flank pain, fever     TECHNIQUE:  Radiation dose reduction techniques were utilized, including  automated exposure control and exposure modulation based on body size.  Enhanced ABDOMEN AND PELVIS CT     COMPARISON: 03/06/2019     FINDINGS:     Ill-defined hazy hypoenhancement at the left upper kidney with adjacent  perinephric stranding is compatible with pyelonephritis, follow-up  recommended to exclude any possibility of underlying lesion. At the left  lower pole, a region of hypodensity measuring 3.4 cm is located in the  area of a previously demonstrated 1.7 cm cyst, but irregular  hypoenhancement around this area and adjacent perinephric fat stranding  raise suspicion for lobar nephronia or abscess within the left lower  pole kidney. Multiple small cysts and low densities too small to  characterize are seen in each kidney. Areas of focal cortical thinning  in the right kidney may be areas of previous infection. A 2 mm  nonobstructive calcification is seen in the right kidney. No  hydronephrosis.     Otherwise unremarkable appearance of the liver, gallbladder, spleen,  adrenal glands, pancreas, kidneys, bladder.     No bowel obstruction or abnormal bowel thickening is identified.  Appendix is not appear inflamed.     No free intraperitoneal gas or free fluid.     Left para-aortic adenopathy, for example 1.1 cm short axis, axial image  62, 1.2 cm short axis, axial image 70, previously 0.3 cm, presumably  reactive in nature but nonspecific, possibility of neoplasm not  excluded, clinical correlation and follow-up recommended (if there is  reason to suspect lymphoproliferative disorder, such as lymphoma,  positron emission tomography can be obtained for further evaluation). A  retrocaval lymph node measuring 1.3 cm short axis on axial image 59,  previously 1.1 cm.     Abdominal aorta is not aneurysmal.     The lung bases are clear.     Degenerative changes are seen in the spine. No acute fracture is  identified.             Impression:           "  1. Findings compatible with pyelonephritis in the left upper kidney, and  lobar nephronia versus abscess in the left lower kidney.  2. Retroperitoneal adenopathy, presumably reactive in nature although  nonspecific as described.     Discussed by telephone with Dr. Seth at 2026, 10/22/2020.     This report was finalized on 10/22/2020 8:33 PM by Dr. Nando Vergara M.D.       XR Chest 1 View [[id number]] Collected: 10/22/20 2019     Updated: 10/22/20 2022    Narrative:      XR CHEST 1 VW-  10/22/2020     HISTORY: Fever. Chills.     Heart size is mildly enlarged. Lungs appear free of acute infiltrates.  Bony structures appear unremarkable.       Impression:      Mild cardiomegaly.     This report was finalized on 10/22/2020 8:19 PM by Dr. Wesley Soares M.D.             Condition on Discharge:      Stable    Vital Signs:    /70 (BP Location: Right arm, Patient Position: Lying)   Pulse 64   Temp 96.8 °F (36 °C) (Oral)   Resp 20   Ht 170.2 cm (67.01\")   Wt 114 kg (251 lb 11.2 oz)   SpO2 100%   BMI 39.41 kg/m²     Physical Exam:    General: Alert and oriented x4, morbidly obese, no distress  Heart: Regular rate and rhythm without murmur rub or thrill  Lungs: Clear to auscultation bilaterally without use of accessory muscles respiration.  Abdomen: Soft/nontender/nondistended.  No hepatosplenomegaly is noted.  Negative Jones sign.  MSK: 4/5 strength in upper/lower extremities bilaterally      Discharge Disposition:    Home or Self Care    Discharge Medication:       Discharge Medications      New Medications      Instructions Start Date   cephalexin 500 MG capsule  Commonly known as: Keflex   500 mg, Oral, 3 Times Daily         Continue These Medications      Instructions Start Date   albuterol sulfate  (90 Base) MCG/ACT inhaler  Commonly known as: PROVENTIL HFA;VENTOLIN HFA;PROAIR HFA   2 puffs, Inhalation, Every 4 Hours PRN      ARIPiprazole 5 MG tablet  Commonly known as: ABILIFY   5 mg, " Oral, Nightly      cetirizine 10 MG tablet  Commonly known as: zyrTEC   10 mg, Oral, Daily      [employer] Nasal Allergy [place] 55 MCG/ACT nasal inhaler  Generic drug: Triamcinolone Acetonide   2 sprays, Nasal, Daily      cyclobenzaprine 5 MG tablet  Commonly known as: FLEXERIL   10 mg, Oral, 3 Times Daily PRN      Dexilant 60 MG capsule  Generic drug: dexlansoprazole   60 mg, Oral, Daily      estradiol cypionate 5 MG/ML injection  Commonly known as: DEPO-ESTRADIOL   1.5 mg, Intramuscular, Every 28 Days, Patient states she takes it every 3 months       fluticasone 50 MCG/ACT nasal spray  Commonly known as: FLONASE   No dose, route, or frequency recorded.      ondansetron 4 MG tablet  Commonly known as: Zofran   4 mg, Oral, Every 8 Hours PRN      saccharomyces boulardii 250 MG capsule  Commonly known as: FLORASTOR   250 mg, Oral, 2 Times Daily      Tylenol 325 MG capsule  Generic drug: Acetaminophen   325 mg, Oral, As Needed      venlafaxine 25 MG tablet  Commonly known as: EFFEXOR   25 mg, Oral, Nightly             Discharge Diet:     Diet Instructions     Diet: Regular      Discharge Diet: Regular          Activity at Discharge:     Activity Instructions     Activity as Tolerated            Follow-up Appointments:    No future appointments.  Additional Instructions for the Follow-ups that You Need to Schedule     Discharge Follow-up with PCP   As directed       Currently Documented PCP:    Elyse Larkin APRN    PCP Phone Number:    [phone]     Follow Up Details: 1 week         Discharge Follow-up with Specified Provider: [facility]; 2 Weeks   As directed      To: [facility]    Follow Up: 2 Weeks               Test Results Pending at Discharge:    Pending Labs     Order Current Status    Blood Culture - Blood, Arm, Left Preliminary result    Blood Culture - Blood, Arm, Right Preliminary result             Artie Chambers DO  10/24/20  15:25 EDT

## 2020-10-24 NOTE — PLAN OF CARE
Goal Outcome Evaluation:  Plan of Care Reviewed With: patient  Progress: improving  Outcome Summary: VSS. low grade temp 99.1 , HR tachy at times. ivf infusing. iv abx given. no c/o pain or nausea. tolerating diet. up ad katia or with standby assist. Pt is having green loose bowel movements. sticky note left on summary page for MD. Pt has slept most of the night. Will continue to monitor.

## 2020-10-25 ENCOUNTER — READMISSION MANAGEMENT (OUTPATIENT)
Dept: CALL CENTER | Facility: HOSPITAL | Age: 30
End: 2020-10-25

## 2020-10-25 NOTE — OUTREACH NOTE
Prep Survey      Responses   [facility] [place] facility patient discharged from?  [facility]   Is LACE score < 7 ?  No   Eligibility  Readm Mgmt   Discharge diagnosis  **Pyelonephritis of left kidney    Does the patient have one of the following disease processes/diagnoses(primary or secondary)?  Sepsis   Does the patient have Home health ordered?  No   Is there a DME ordered?  No   Prep survey completed?  Yes          Sarah Bai RN

## 2020-10-26 NOTE — PROGRESS NOTES
Case Management Discharge Note      Final Note: Home; self-care. [place] Nataly CSW         Selected Continued Care - Discharged on 10/24/2020 Admission date: 10/22/2020 - Discharge disposition: Home or Self Care    Destination    No services have been selected for the patient.              Durable Medical Equipment    No services have been selected for the patient.              Dialysis/Infusion    No services have been selected for the patient.              Home Medical Care    No services have been selected for the patient.              Therapy    No services have been selected for the patient.              Community Resources    No services have been selected for the patient.                       Final Discharge Disposition Code: 01 - home or self-care

## 2020-10-26 NOTE — PAYOR COMM NOTE
"Beatriz Boland (30 y.o. Female)       Dc summary for [id number]          Date of Birth Social Security Number Address Home Phone MRN    1990  [address] [facility] [address] [phone] [id number]    Sabianism Marital Status          Worship Single       Admission Date Admission Type Admitting Provider Attending Provider Department, Room/Bed    10/22/20 Emergency Stacey Small MD  [facility] [address] [place], P691/1    Discharge Date Discharge Disposition Discharge Destination        10/24/2020 Home or Self Care              Attending Provider: (none)   Allergies: Dust Mite Extract, Ciprofloxacin    Isolation: None   Infection: None   Code Status: Prior    Ht: 170.2 cm (67.01\")   Wt: 114 kg (251 lb 11.2 oz)    Admission Cmt: None   Principal Problem: Pyelonephritis of left kidney [N12]                 Active Insurance as of 10/22/2020     Primary Coverage     Payor Plan Insurance Group Employer/Plan Group    [employer]HEM BLUE CROSS ANTHEM BLUE CROSS BLUE SHIELD PPO [id number]     Payor Plan Address Payor Plan Phone Number Payor Plan Fax Number Effective Dates    [address] [phone]  6/1/2014 - None Entered    [facility] [address]       Subscriber Name Subscriber Birth Date Member ID       BEATRIZ BOLAND 1990 [id number]           Secondary Coverage     Payor Plan Insurance Group Employer/Plan Group    [place]      Payor Plan Address Payor Plan Phone Number Payor Plan Fax Number Effective Dates    [address] [phone]  10/22/2020 - None Entered    [facility] [address]       Subscriber Name Subscriber Birth Date Member ID       BEATRIZ BOLAND 1990 [id number]                 Emergency Contacts      (Rel.) Home Phone Work Phone Mobile Phone    Marilu Boland (Mother) [phone] -- [phone]    Tere Gallardo (Friend) [phone] --    Tamiko Modi (Significant Other) -- -- [phone]             "   Discharge Summary      HiArtie alvarado, DO at 10/24/20 1525              [facility] HOSPITALIST   DISCHARGE SUMMARY      Name:  Beatriz Boland   Age:  30 y.o.  Sex:  female  :  1990  MRN:  [id number]   Visit Number:  [id number]  Primary Care Physician:  Elyse Larkin APRN  Date of Discharge:  10/24/2020  Admission Date:  10/22/2020      Discharge Diagnosis:     1.  Acute pyelonephritis of the left kidney, with renal abscess, present on admission  2.  Sepsis due to #1, resolved  3.  GERD  4.  Asthma  5.  Anxiety/depression  6.  Mental developmental delay  7.  Morbid obesity  Active Hospital Problems    Diagnosis  POA   • **Pyelonephritis of left kidney [N12]  Yes   • Sepsis (CMS/HCC) [A41.9]  Unknown   • Asthma [J45.909]  Unknown   • Tobacco abuse [Z72.0]  Yes   • Obesity (BMI 30-39.9) [E66.9]  Yes   • Mental developmental delay [F81.9]  Yes   • GERD without esophagitis [K21.9]  Yes   • Depression [F32.9]  Yes      Resolved Hospital Problems   No resolved problems to display.         Presenting Problem/History of Present Illness:    Renal abscess, left [N15.1]  Pyelonephritis of left kidney [N12]         Hospital Course:    Patient is a 30-year-old with history of mental developmental delay, GERD, anxiety, asthma, nephrolithiasis, recurrent UTIs.  She was treated for left pyelonephritis and sepsis on 2020.  Culture at that time grew E. coli.  She came into the emergency room with dysuria, frequency, urgency, incontinence.  Urinalysis suggested infection.  CT the abdomen pelvis was done which revealed left pyelonephritis with possible abscess.  Urology was consulted.     Patient continues on Rocephin, culture grew out E. coli.  This is sensitive to cephalosporins.  We will switch her to Keflex 500 mg 3 times daily for 1 week at discharge. She feels much better.  She denies any chest pressure, shortness of breath, nausea, vomiting.    She has minimal left flank pain.   Urology has seen the patient, and recommended treating with antibiotics and then having a follow-up scan in 2-3 weeks.    Given the fact that she is afebrile, her WBC count is returned to normal, and she has no complaints, will discharge her home today.  She is to follow-up with her PCP in 1 week's time.  Follow-up with [facility] in 2 weeks.  She will need a repeat CT scan as an outpatient.    Procedures Performed:           Consults:     Consults     Date and Time Order Name Status Description    10/22/2020 2305 Inpatient Urology Consult Completed     10/22/2020 2030 [facility] (on-call MD unless specified) Details Completed           Pertinent Test Results:     Lab Results (all)     Procedure Component Value Units Date/Time    Renal Function Panel [[id number]]  (Abnormal) Collected: 10/24/20 0904    Specimen: Blood from Hand, Right Updated: 10/24/20 0959     Glucose 100 mg/dL      BUN 5 mg/dL      Creatinine 0.80 mg/dL      Sodium 139 mmol/L      Potassium 3.5 mmol/L      Chloride 111 mmol/L      CO2 21.3 mmol/L      Calcium 8.5 mg/dL      Albumin 3.00 g/dL      Phosphorus 2.3 mg/dL      Anion Gap 6.7 mmol/L      BUN/Creatinine Ratio 6.3     eGFR Non African Amer 85 mL/min/1.73     Narrative:      GFR Normal >60  Chronic Kidney Disease <60  Kidney Failure <15      Magnesium [[id number]]  (Normal) Collected: 10/24/20 0904    Specimen: Blood from Hand, Right Updated: 10/24/20 0959     Magnesium 2.1 mg/dL     CBC & Differential [[id number]]  (Abnormal) Collected: 10/24/20 0904    Specimen: Blood Updated: 10/24/20 0950    Narrative:      The following orders were created for panel order CBC & Differential.  Procedure                               Abnormality         Status                     ---------                               -----------         ------                     CBC Auto Differential[[id number]]        Abnormal            Final result                 Please view results for these tests on the individual  orders.    CBC Auto Differential [[id number]]  (Abnormal) Collected: 10/24/20 0904    Specimen: Blood Updated: 10/24/20 0950     WBC 10.90 10*3/mm3      RBC 3.73 10*6/mm3      Hemoglobin 10.0 g/dL      Hematocrit 30.5 %      MCV 81.8 fL      MCH 26.8 pg      MCHC 32.8 g/dL      RDW 13.3 %      RDW-SD 39.7 fl      MPV 8.9 fL      Platelets 244 10*3/mm3      Neutrophil % 66.2 %      Lymphocyte % 20.7 %      Monocyte % 11.7 %      Eosinophil % 0.5 %      Basophil % 0.4 %      Immature Grans % 0.5 %      Neutrophils, Absolute 7.23 10*3/mm3      Lymphocytes, Absolute 2.26 10*3/mm3      Monocytes, Absolute 1.27 10*3/mm3      Eosinophils, Absolute 0.05 10*3/mm3      Basophils, Absolute 0.04 10*3/mm3      Immature Grans, Absolute 0.05 10*3/mm3      nRBC 0.0 /100 WBC     Urine Culture - Urine, Urine, Clean Catch [[id number]]  (Abnormal)  (Susceptibility) Collected: 10/22/20 1852    Specimen: Urine, Clean Catch Updated: 10/24/20 0225     Urine Culture >100,000 CFU/mL Escherichia coli    Susceptibility      Escherichia coli     SUSAN     Ampicillin Resistant     Ampicillin + Sulbactam Intermediate     Cefazolin Susceptible     Cefepime Susceptible     Ceftazidime Susceptible     Ceftriaxone Susceptible     Gentamicin Susceptible     Levofloxacin Susceptible     Nitrofurantoin Susceptible     Piperacillin + Tazobactam Susceptible     Tetracycline Susceptible     Trimethoprim + Sulfamethoxazole Resistant                    Blood Culture - Blood, Arm, Left [[id number]] Collected: 10/22/20 1850    Specimen: Blood from Arm, Left Updated: 10/23/20 1915     Blood Culture No growth at 24 hours    Blood Culture - Blood, Arm, Right [[id number]] Collected: 10/22/20 1850    Specimen: Blood from Arm, Right Updated: 10/23/20 1900     Blood Culture No growth at 24 hours    Basic Metabolic Panel [[id number]]  (Abnormal) Collected: 10/23/20 0734    Specimen: Blood from Arm, Right Updated: 10/23/20 0830     Glucose 120 mg/dL      BUN 6 mg/dL       Creatinine 0.83 mg/dL      Sodium 136 mmol/L      Potassium 3.5 mmol/L      Chloride 107 mmol/L      CO2 21.3 mmol/L      Calcium 8.5 mg/dL      eGFR Non African Amer 81 mL/min/1.73      BUN/Creatinine Ratio 7.2     Anion Gap 7.7 mmol/L     Narrative:      GFR Normal >60  Chronic Kidney Disease <60  Kidney Failure <15      CBC (No Diff) [[id number]]  (Abnormal) Collected: 10/23/20 0734    Specimen: Blood Updated: 10/23/20 0802     WBC 21.37 10*3/mm3      RBC 3.96 10*6/mm3      Hemoglobin 10.7 g/dL      Hematocrit 32.2 %      MCV 81.3 fL      MCH 27.0 pg      MCHC 33.2 g/dL      RDW 13.4 %      RDW-SD 39.7 fl      MPV 8.9 fL      Platelets 264 10*3/mm3     Respiratory Panel PCR w/COVID-19(SARS-CoV-2) ANNIA/LORIE/SALTY/PAD/COR/MAD/GEORGES In-House, NP Swab in UTM/VTM, 3-4 HR TAT - Swab, Nasopharynx [[id number]]  (Normal) Collected: 10/22/20 2021    Specimen: Swab from Nasopharynx Updated: 10/22/20 2134     ADENOVIRUS, PCR Not Detected     Coronavirus 229E Not Detected     Coronavirus HKU1 Not Detected     Coronavirus NL63 Not Detected     Coronavirus OC43 Not Detected     COVID19 Not Detected     Human Metapneumovirus Not Detected     Human Rhinovirus/Enterovirus Not Detected     Influenza A PCR Not Detected     Influenza B PCR Not Detected     Parainfluenza Virus 1 Not Detected     Parainfluenza Virus 2 Not Detected     Parainfluenza Virus 3 Not Detected     Parainfluenza Virus 4 Not Detected     RSV, PCR Not Detected     Bordetella pertussis pcr Not Detected     Bordetella parapertussis PCR Not Detected     Chlamydophila pneumoniae PCR Not Detected     Mycoplasma pneumo by PCR Not Detected    Narrative:      Fact sheet for providers: https://docs.[employer]/wp-content/uploads/LQH7295-4037-OW9.1-EUA-Provider-Fact-Sheet-3.pdf    Fact sheet for patients: https://docs.[employer]/wp-content/uploads/GJQ5477-0927-AR4.1-EUA-Patient-Fact-Sheet-1.pdf    [place] Draw [[id number]] Collected: 10/22/20 6710    Specimen: Blood Updated:  10/22/20 2001    Narrative:      The following orders were created for panel order [place] Draw.  Procedure                               Abnormality         Status                     ---------                               -----------         ------                     Green Top (Gel)[[id number]]                                  Final result               Lavender Top[[id number]]                                     Final result               Gold Top - SST[[id number]]                                   Final result                 Please view results for these tests on the individual orders.    Green Top (Gel) [[id number]] Collected: 10/22/20 1850    Specimen: Blood Updated: 10/22/20 2001     Extra Tube Hold for add-ons.     Comment: Auto resulted.       Lavender Top [[id number]] Collected: 10/22/20 1850    Specimen: Blood Updated: 10/22/20 2001     Extra Tube hold for add-on     Comment: Auto resulted       Gold Top - SST [[id number]] Collected: 10/22/20 1850    Specimen: Blood Updated: 10/22/20 2001     Extra Tube Hold for add-ons.     Comment: Auto resulted.       Urinalysis, Microscopic Only - Urine, Clean Catch [[id number]]  (Abnormal) Collected: 10/22/20 1852    Specimen: Urine, Clean Catch Updated: 10/22/20 1954     RBC, UA 0-2 /HPF      WBC, UA 6-12 /HPF      Bacteria, UA 3+ /HPF      Squamous Epithelial Cells, UA 0-2 /HPF      Hyaline Casts, UA None Seen /LPF      Methodology Automated Microscopy    Urinalysis With Culture If Indicated - Urine, Clean Catch [[id number]]  (Abnormal) Collected: 10/22/20 1852    Specimen: Urine, Clean Catch Updated: 10/22/20 1935     Color, UA Yellow     Appearance, UA Cloudy     pH, UA 6.0     Specific Gravity, UA 1.015     Glucose, UA Negative     Ketones, UA Negative     Bilirubin, UA Negative     Blood, UA Trace     Protein, UA Negative     Leuk Esterase, UA Small (1+)     Nitrite, UA Positive     Urobilinogen, UA 0.2 E.U./dL    Pregnancy, Urine - Urine, Clean Catch [[id number]]   "(Normal) Collected: 10/22/20 1852    Specimen: Urine, Clean Catch Updated: 10/22/20 1934     HCG, Urine QL Negative    Procalcitonin [[id number]]  (Normal) Collected: 10/22/20 1850    Specimen: Blood Updated: 10/22/20 1933     Procalcitonin 0.13 ng/mL     Narrative:      As a Marker for Sepsis (Non-Neonates):   1. <0.5 ng/mL represents a low risk of severe sepsis and/or septic shock.  1. >2 ng/mL represents a high risk of severe sepsis and/or septic shock.    As a Marker for Lower Respiratory Tract Infections that require antibiotic therapy:  PCT on Admission     Antibiotic Therapy             6-12 Hrs later  > 0.5                Strongly Recommended            >0.25 - <0.5         Recommended  0.1 - 0.25           Discouraged                   Remeasure/reassess PCT  <0.1                 Strongly Discouraged          Remeasure/reassess PCT      As 28 day mortality risk marker: \"Change in Procalcitonin Result\" (> 80 % or <=80 %) if Day 0 (or Day 1) and Day 4 values are available. Refer to http://www.[employer]-pct-calculator.com/   Change in PCT <=80 %   A decrease of PCT levels below or equal to 80 % defines a positive change in PCT test result representing a higher risk for 28-day all-cause mortality of patients diagnosed with severe sepsis or septic shock.  Change in PCT > 80 %   A decrease of PCT levels of more than 80 % defines a negative change in PCT result representing a lower risk for 28-day all-cause mortality of patients diagnosed with severe sepsis or septic shock.                Results may be falsely decreased if patient taking Biotin.     Comprehensive Metabolic Panel [[id number]]  (Abnormal) Collected: 10/22/20 1850    Specimen: Blood Updated: 10/22/20 1930     Glucose 96 mg/dL      BUN 8 mg/dL      Creatinine 0.98 mg/dL      Sodium 135 mmol/L      Potassium 3.8 mmol/L      Chloride 102 mmol/L      CO2 20.8 mmol/L      Calcium 9.5 mg/dL      Total Protein 7.6 g/dL      Albumin 4.00 g/dL      ALT (SGPT) 12 " U/L      AST (SGOT) 14 U/L      Alkaline Phosphatase 62 U/L      Total Bilirubin 0.5 mg/dL      eGFR Non African Amer 67 mL/min/1.73      Globulin 3.6 gm/dL      A/G Ratio 1.1 g/dL      BUN/Creatinine Ratio 8.2     Anion Gap 12.2 mmol/L     Narrative:      GFR Normal >60  Chronic Kidney Disease <60  Kidney Failure <15      Lactic Acid, Plasma [[id number]]  (Normal) Collected: 10/22/20 1850    Specimen: Blood Updated: 10/22/20 1923     Lactate 0.7 mmol/L     CBC & Differential [[id number]]  (Abnormal) Collected: 10/22/20 1850    Specimen: Blood Updated: 10/22/20 1902    Narrative:      The following orders were created for panel order CBC & Differential.  Procedure                               Abnormality         Status                     ---------                               -----------         ------                     CBC Auto Differential[[id number]]        Abnormal            Final result                 Please view results for these tests on the individual orders.    CBC Auto Differential [[id number]]  (Abnormal) Collected: 10/22/20 1850    Specimen: Blood Updated: 10/22/20 1902     WBC 17.84 10*3/mm3      RBC 4.67 10*6/mm3      Hemoglobin 12.6 g/dL      Hematocrit 38.2 %      MCV 81.8 fL      MCH 27.0 pg      MCHC 33.0 g/dL      RDW 13.5 %      RDW-SD 39.7 fl      MPV 8.5 fL      Platelets 322 10*3/mm3      Neutrophil % 81.1 %      Lymphocyte % 9.9 %      Monocyte % 7.9 %      Eosinophil % 0.1 %      Basophil % 0.3 %      Immature Grans % 0.7 %      Neutrophils, Absolute 14.49 10*3/mm3      Lymphocytes, Absolute 1.76 10*3/mm3      Monocytes, Absolute 1.41 10*3/mm3      Eosinophils, Absolute 0.01 10*3/mm3      Basophils, Absolute 0.05 10*3/mm3      Immature Grans, Absolute 0.12 10*3/mm3      nRBC 0.0 /100 WBC           Imaging Results (All)     Procedure Component Value Units Date/Time    CT Abdomen Pelvis With Contrast [[id number]] Collected: 10/22/20 2021     Updated: 10/22/20 2036    Narrative:      CT  ABDOMEN PELVIS W CONTRAST-     INDICATIONS: Left flank pain, fever     TECHNIQUE: Radiation dose reduction techniques were utilized, including  automated exposure control and exposure modulation based on body size.  Enhanced ABDOMEN AND PELVIS CT     COMPARISON: 03/06/2019     FINDINGS:     Ill-defined hazy hypoenhancement at the left upper kidney with adjacent  perinephric stranding is compatible with pyelonephritis, follow-up  recommended to exclude any possibility of underlying lesion. At the left  lower pole, a region of hypodensity measuring 3.4 cm is located in the  area of a previously demonstrated 1.7 cm cyst, but irregular  hypoenhancement around this area and adjacent perinephric fat stranding  raise suspicion for lobar nephronia or abscess within the left lower  pole kidney. Multiple small cysts and low densities too small to  characterize are seen in each kidney. Areas of focal cortical thinning  in the right kidney may be areas of previous infection. A 2 mm  nonobstructive calcification is seen in the right kidney. No  hydronephrosis.     Otherwise unremarkable appearance of the liver, gallbladder, spleen,  adrenal glands, pancreas, kidneys, bladder.     No bowel obstruction or abnormal bowel thickening is identified.  Appendix is not appear inflamed.     No free intraperitoneal gas or free fluid.     Left para-aortic adenopathy, for example 1.1 cm short axis, axial image  62, 1.2 cm short axis, axial image 70, previously 0.3 cm, presumably  reactive in nature but nonspecific, possibility of neoplasm not  excluded, clinical correlation and follow-up recommended (if there is  reason to suspect lymphoproliferative disorder, such as lymphoma,  positron emission tomography can be obtained for further evaluation). A  retrocaval lymph node measuring 1.3 cm short axis on axial image 59,  previously 1.1 cm.     Abdominal aorta is not aneurysmal.     The lung bases are clear.     Degenerative changes are seen in  "the spine. No acute fracture is  identified.             Impression:            1. Findings compatible with pyelonephritis in the left upper kidney, and  lobar nephronia versus abscess in the left lower kidney.  2. Retroperitoneal adenopathy, presumably reactive in nature although  nonspecific as described.     Discussed by telephone with Dr. Seth at 2026, 10/22/2020.     This report was finalized on 10/22/2020 8:33 PM by Dr. Nando Vergara M.D.       XR Chest 1 View [[id number]] Collected: 10/22/20 2019     Updated: 10/22/20 2022    Narrative:      XR CHEST 1 VW-  10/22/2020     HISTORY: Fever. Chills.     Heart size is mildly enlarged. Lungs appear free of acute infiltrates.  Bony structures appear unremarkable.       Impression:      Mild cardiomegaly.     This report was finalized on 10/22/2020 8:19 PM by Dr. Wesley Soares M.D.             Condition on Discharge:      Stable    Vital Signs:    /70 (BP Location: Right arm, Patient Position: Lying)   Pulse 64   Temp 96.8 °F (36 °C) (Oral)   Resp 20   Ht 170.2 cm (67.01\")   Wt 114 kg (251 lb 11.2 oz)   SpO2 100%   BMI 39.41 kg/m²     Physical Exam:    General: Alert and oriented x4, morbidly obese, no distress  Heart: Regular rate and rhythm without murmur rub or thrill  Lungs: Clear to auscultation bilaterally without use of accessory muscles respiration.  Abdomen: Soft/nontender/nondistended.  No hepatosplenomegaly is noted.  Negative Jones sign.  MSK: 4/5 strength in upper/lower extremities bilaterally      Discharge Disposition:    Home or Self Care    Discharge Medication:       Discharge Medications      New Medications      Instructions Start Date   cephalexin 500 MG capsule  Commonly known as: Keflex   500 mg, Oral, 3 Times Daily         Continue These Medications      Instructions Start Date   albuterol sulfate  (90 Base) MCG/ACT inhaler  Commonly known as: PROVENTIL HFA;VENTOLIN HFA;PROAIR HFA   2 puffs, Inhalation, Every 4 " Hours PRN      ARIPiprazole 5 MG tablet  Commonly known as: ABILIFY   5 mg, Oral, Nightly      cetirizine 10 MG tablet  Commonly known as: zyrTEC   10 mg, Oral, Daily      [facility] Nasal All[facility] [place] 55 MCG/ACT nasal inhaler  Generic drug: Triamcinolone Acetonide   2 sprays, Nasal, Daily      cyclobenzaprine 5 MG tablet  Commonly known as: FLEXERIL   10 mg, Oral, 3 Times Daily PRN      Dexilant 60 MG capsule  Generic drug: dexlansoprazole   60 mg, Oral, Daily      estradiol cypionate 5 MG/ML injection  Commonly known as: DEPO-ESTRADIOL   1.5 mg, Intramuscular, Every 28 Days, Patient states she takes it every 3 months       fluticasone 50 MCG/ACT nasal spray  Commonly known as: FLONASE   No dose, route, or frequency recorded.      ondansetron 4 MG tablet  Commonly known as: Zofran   4 mg, Oral, Every 8 Hours PRN      saccharomyces boulardii 250 MG capsule  Commonly known as: FLORASTOR   250 mg, Oral, 2 Times Daily      Tylenol 325 MG capsule  Generic drug: Acetaminophen   325 mg, Oral, As Needed      venlafaxine 25 MG tablet  Commonly known as: EFFEXOR   25 mg, Oral, Nightly             Discharge Diet:     Diet Instructions     Diet: Regular      Discharge Diet: Regular          Activity at Discharge:     Activity Instructions     Activity as Tolerated            Follow-up Appointments:    No future appointments.  Additional Instructions for the Follow-ups that You Need to Schedule     Discharge Follow-up with PCP   As directed       Currently Documented PCP:    Elyse Larkin APRN    PCP Phone Number:    [phone]     Follow Up Details: 1 week         Discharge Follow-up with Specified Provider: [facility]; 2 Weeks   As directed      To: [facility]    Follow Up: 2 Weeks               Test Results Pending at Discharge:    Pending Labs     Order Current Status    Blood Culture - Blood, Arm, Left Preliminary result    Blood Culture - Blood, Arm, Right Preliminary result             Artie Chambers,    10/24/20  15:25 EDT    Electronically signed by Artie Chambers DO at 10/24/20 152

## 2020-10-27 ENCOUNTER — READMISSION MANAGEMENT (OUTPATIENT)
Dept: CALL CENTER | Facility: HOSPITAL | Age: 30
End: 2020-10-27

## 2020-10-27 LAB
BACTERIA SPEC AEROBE CULT: NORMAL
BACTERIA SPEC AEROBE CULT: NORMAL

## 2020-10-27 NOTE — OUTREACH NOTE
Sepsis Week 1 Survey      Responses   [facility]   Does the patient have one of the following disease processes/diagnoses(primary or secondary)?  Sepsis   Week 1 attempt successful?  Yes   Call start time  1210   Call end time  1212   Discharge diagnosis  **Pyelonephritis of left kidney    Meds reviewed with patient/caregiver?  Yes   Is the patient having any side effects they believe may be caused by any medication additions or changes?  No   Does the patient have all medications related to this admission filled (includes all antibiotics, inhalers, nebulizers,steroids,etc.)  Yes   Is the patient taking all medications as directed (includes completed medication regime)?  Yes   Medication comments  taking antibx   Does the patient have a primary care provider?   Yes   Does the patient have an appointment with their PCP within 7 days of discharge?  Yes   Has the patient kept scheduled appointments due by today?  N/A   Has home health visited the patient within 72 hours of discharge?  N/A   Psychosocial issues?  No   Did the patient receive a copy of their discharge instructions?  Yes   Nursing interventions  Reviewed instructions with patient   What is the patient's perception of their health status since discharge?  Improving   Nursing interventions  Nurse provided patient education   Is the patient/caregiver able to teach back Sepsis?  E - Extreme pain or generalized discomfort (worst ever,especially abdomen)   Nursing interventions  Nurse provided patient education   Is patient/caregiver able to teach back steps to recovery at home?  Rest and regain strength, Eat a balanced diet   Is the patient/caregiver able to teach back signs and symptoms of worsening condition:  Fever, Hyperthermia, Rapid heart rate (>90), Shortness of breath/rapid respiratory rate, Altered mental status(confusion/coma)   Is the patient/caregiver able to teach back the hierarchy of who to call/visit  for symptoms/problems? PCP, Specialist, Home health nurse, Urgent Care, ED, 911  Yes   Additional teach back comments  Enc fluids and finishing all meds per orders.    Week 1 call completed?  Yes          Zulma Flowers RN

## 2020-11-03 ENCOUNTER — READMISSION MANAGEMENT (OUTPATIENT)
Dept: CALL CENTER | Facility: HOSPITAL | Age: 30
End: 2020-11-03

## 2020-11-03 NOTE — OUTREACH NOTE
Sepsis Week 2 Survey      Responses   [facility]   Does the patient have one of the following disease processes/diagnoses(primary or secondary)?  Sepsis   Week 2 attempt successful?  Yes   Call start time  1230   Call end time  1233   Discharge diagnosis  **Pyelonephritis of left kidney    Meds reviewed with patient/caregiver?  Yes   Is the patient having any side effects they believe may be caused by any medication additions or changes?  No   Does the patient have all medications related to this admission filled (includes all antibiotics, inhalers, nebulizers,steroids,etc.)  Yes   Is the patient taking all medications as directed (includes completed medication regime)?  Yes   Does the patient have a primary care provider?   Yes   Does the patient have an appointment with their PCP within 7 days of discharge?  Yes   Has the patient kept scheduled appointments due by today?  Yes   Comments  UA yesterday, but feeling better.   Has home health visited the patient within 72 hours of discharge?  N/A   Psychosocial issues?  No   Did the patient receive a copy of their discharge instructions?  Yes   Nursing interventions  Reviewed instructions with patient   What is the patient's perception of their health status since discharge?  Improving   Nursing interventions  Nurse provided patient education   Is the patient/caregiver able to teach back Sepsis?  S - Shivering,fever or very cold, S - Short of breath, E - Extreme pain or generalized discomfort (worst ever,especially abdomen)   Nursing interventions  Nurse provided reassurance to patient   Is patient/caregiver able to teach back steps to recovery at home?  Set small, achievable goals for return to baseline health, Rest and regain strength, Exercise as tolerated, Eat a balanced diet, Make a list of questions for PCP appoinment   Is the patient/caregiver able to teach back signs and symptoms of worsening condition:  Fever, Edema,  Shortness of breath/rapid respiratory rate   If the patient is a current smoker, are they able to teach back resources for cessation?  [id number]   Is the patient/caregiver able to teach back the hierarchy of who to call/visit for symptoms/problems? PCP, Specialist, Home health nurse, Urgent Care, ED, 911  Yes   Additional teach back comments  She is doing better, ABX completed, no issues at this time she says.  Urine clear she says.   Week 2 call completed?  Yes   Wrap up additional comments  Improving. States more energy daily.          Patricia Vo, RN

## 2020-11-11 ENCOUNTER — READMISSION MANAGEMENT (OUTPATIENT)
Dept: CALL CENTER | Facility: HOSPITAL | Age: 30
End: 2020-11-11

## 2020-11-11 NOTE — OUTREACH NOTE
Sepsis Week 3 Survey      Responses   [facility] patient discharged from?  [place]   Does the patient have one of the following disease processes/diagnoses(primary or secondary)?  Sepsis   Week 3 attempt successful?  No   Unsuccessful attempts  Attempt 2          Geri Alejandre RN

## 2020-11-24 NOTE — PLAN OF CARE
----- Message from Luli Mishra MD sent at 11/24/2020  7:45 AM CST -----  Call [facility] , add zinc level dx low alk phosphatase   Goal Outcome Evaluation:  Plan of Care Reviewed With: patient     Outcome Summary: New admission from ED. Afebrile. Tachycardic. Vomited twice. Zofran given. IV Fluids on flow. Up with assist to the BR, voided freely. For urology consult today. On IV Piperacillin.

## 2020-12-05 ENCOUNTER — APPOINTMENT (OUTPATIENT)
Dept: CT IMAGING | Facility: HOSPITAL | Age: 30
End: 2020-12-05

## 2020-12-05 ENCOUNTER — APPOINTMENT (OUTPATIENT)
Dept: GENERAL RADIOLOGY | Facility: HOSPITAL | Age: 30
End: 2020-12-05

## 2020-12-05 ENCOUNTER — HOSPITAL ENCOUNTER (EMERGENCY)
Facility: HOSPITAL | Age: 30
Discharge: HOME | End: 2020-12-05
Attending: EMERGENCY MEDICINE | Admitting: EMERGENCY MEDICINE

## 2020-12-05 VITALS
HEART RATE: 101 BPM | OXYGEN SATURATION: 100 % | BODY MASS INDEX: 40.02 KG/M2 | TEMPERATURE: 99 F | HEIGHT: 67 IN | SYSTOLIC BLOOD PRESSURE: 108 MMHG | RESPIRATION RATE: 18 BRPM | WEIGHT: 255 LBS | DIASTOLIC BLOOD PRESSURE: 69 MMHG

## 2020-12-05 DIAGNOSIS — N12 PYELONEPHRITIS: Primary | ICD-10-CM

## 2020-12-05 DIAGNOSIS — U07.1 COVID-19 VIRUS INFECTION: ICD-10-CM

## 2020-12-05 DIAGNOSIS — N30.01 ACUTE CYSTITIS WITH HEMATURIA: ICD-10-CM

## 2020-12-05 DIAGNOSIS — A41.9 SEPSIS WITHOUT ACUTE ORGAN DYSFUNCTION, DUE TO UNSPECIFIED ORGANISM (HCC): ICD-10-CM

## 2020-12-05 DIAGNOSIS — E87.1 HYPONATREMIA: ICD-10-CM

## 2020-12-05 LAB
ALBUMIN SERPL-MCNC: 3.7 G/DL (ref 3.5–5.2)
ALBUMIN/GLOB SERPL: 0.9 G/DL
ALP SERPL-CCNC: 57 U/L (ref 39–117)
ALT SERPL W P-5'-P-CCNC: 12 U/L (ref 1–33)
ANION GAP SERPL CALCULATED.3IONS-SCNC: 9.5 MMOL/L (ref 5–15)
AST SERPL-CCNC: 13 U/L (ref 1–32)
B PARAPERT DNA SPEC QL NAA+PROBE: NOT DETECTED
B PERT DNA SPEC QL NAA+PROBE: NOT DETECTED
BACTERIA UR QL AUTO: ABNORMAL /HPF
BASOPHILS # BLD AUTO: 0.09 10*3/MM3 (ref 0–0.2)
BASOPHILS NFR BLD AUTO: 0.3 % (ref 0–1.5)
BILIRUB SERPL-MCNC: 0.6 MG/DL (ref 0–1.2)
BILIRUB UR QL STRIP: NEGATIVE
BUN SERPL-MCNC: 14 MG/DL (ref 6–20)
BUN/CREAT SERPL: 13 (ref 7–25)
C PNEUM DNA NPH QL NAA+NON-PROBE: NOT DETECTED
CALCIUM SPEC-SCNC: 8.9 MG/DL (ref 8.6–10.5)
CHLORIDE SERPL-SCNC: 97 MMOL/L (ref 98–107)
CLARITY UR: ABNORMAL
CO2 SERPL-SCNC: 22.5 MMOL/L (ref 22–29)
COLOR UR: YELLOW
CREAT SERPL-MCNC: 1.08 MG/DL (ref 0.57–1)
D-LACTATE SERPL-SCNC: 1.2 MMOL/L (ref 0.5–2)
DEPRECATED RDW RBC AUTO: 39.5 FL (ref 37–54)
EOSINOPHIL # BLD AUTO: 0.01 10*3/MM3 (ref 0–0.4)
EOSINOPHIL NFR BLD AUTO: 0 % (ref 0.3–6.2)
ERYTHROCYTE [DISTWIDTH] IN BLOOD BY AUTOMATED COUNT: 13.6 % (ref 12.3–15.4)
FLUAV H1 2009 PAND RNA NPH QL NAA+PROBE: NOT DETECTED
FLUAV H1 HA GENE NPH QL NAA+PROBE: NOT DETECTED
FLUAV H3 RNA NPH QL NAA+PROBE: NOT DETECTED
FLUAV SUBTYP SPEC NAA+PROBE: NOT DETECTED
FLUBV RNA ISLT QL NAA+PROBE: NOT DETECTED
GFR SERPL CREATININE-BSD FRML MDRD: 60 ML/MIN/1.73
GLOBULIN UR ELPH-MCNC: 4 GM/DL
GLUCOSE SERPL-MCNC: 109 MG/DL (ref 65–99)
GLUCOSE UR STRIP-MCNC: NEGATIVE MG/DL
HADV DNA SPEC NAA+PROBE: NOT DETECTED
HCG SERPL QL: NEGATIVE
HCOV 229E RNA SPEC QL NAA+PROBE: NOT DETECTED
HCOV HKU1 RNA SPEC QL NAA+PROBE: NOT DETECTED
HCOV NL63 RNA SPEC QL NAA+PROBE: NOT DETECTED
HCOV OC43 RNA SPEC QL NAA+PROBE: NOT DETECTED
HCT VFR BLD AUTO: 38.4 % (ref 34–46.6)
HGB BLD-MCNC: 12.4 G/DL (ref 12–15.9)
HGB UR QL STRIP.AUTO: ABNORMAL
HMPV RNA NPH QL NAA+NON-PROBE: NOT DETECTED
HPIV1 RNA SPEC QL NAA+PROBE: NOT DETECTED
HPIV2 RNA SPEC QL NAA+PROBE: NOT DETECTED
HPIV3 RNA NPH QL NAA+PROBE: NOT DETECTED
HPIV4 P GENE NPH QL NAA+PROBE: NOT DETECTED
HYALINE CASTS UR QL AUTO: ABNORMAL /LPF
IMM GRANULOCYTES # BLD AUTO: 0.5 10*3/MM3 (ref 0–0.05)
IMM GRANULOCYTES NFR BLD AUTO: 1.7 % (ref 0–0.5)
KETONES UR QL STRIP: NEGATIVE
LEUKOCYTE ESTERASE UR QL STRIP.AUTO: ABNORMAL
LYMPHOCYTES # BLD AUTO: 1.97 10*3/MM3 (ref 0.7–3.1)
LYMPHOCYTES NFR BLD AUTO: 6.8 % (ref 19.6–45.3)
M PNEUMO IGG SER IA-ACNC: NOT DETECTED
MCH RBC QN AUTO: 26.1 PG (ref 26.6–33)
MCHC RBC AUTO-ENTMCNC: 32.3 G/DL (ref 31.5–35.7)
MCV RBC AUTO: 80.8 FL (ref 79–97)
MONOCYTES # BLD AUTO: 1.99 10*3/MM3 (ref 0.1–0.9)
MONOCYTES NFR BLD AUTO: 6.9 % (ref 5–12)
NEUTROPHILS NFR BLD AUTO: 24.38 10*3/MM3 (ref 1.7–7)
NEUTROPHILS NFR BLD AUTO: 84.3 % (ref 42.7–76)
NITRITE UR QL STRIP: NEGATIVE
NRBC BLD AUTO-RTO: 0 /100 WBC (ref 0–0.2)
PH UR STRIP.AUTO: 6 [PH] (ref 5–8)
PLATELET # BLD AUTO: 234 10*3/MM3 (ref 140–450)
PMV BLD AUTO: 9.2 FL (ref 6–12)
POTASSIUM SERPL-SCNC: 3.4 MMOL/L (ref 3.5–5.2)
PROT SERPL-MCNC: 7.7 G/DL (ref 6–8.5)
PROT UR QL STRIP: ABNORMAL
RBC # BLD AUTO: 4.75 10*6/MM3 (ref 3.77–5.28)
RBC # UR: ABNORMAL /HPF
REF LAB TEST METHOD: ABNORMAL
RHINOVIRUS RNA SPEC NAA+PROBE: NOT DETECTED
RSV RNA NPH QL NAA+NON-PROBE: NOT DETECTED
SARS-COV-2 RNA NPH QL NAA+NON-PROBE: DETECTED
SODIUM SERPL-SCNC: 129 MMOL/L (ref 136–145)
SP GR UR STRIP: 1.02 (ref 1–1.03)
SQUAMOUS #/AREA URNS HPF: ABNORMAL /HPF
UROBILINOGEN UR QL STRIP: ABNORMAL
WBC # BLD AUTO: 28.94 10*3/MM3 (ref 3.4–10.8)
WBC UR QL AUTO: ABNORMAL /HPF

## 2020-12-05 PROCEDURE — 99283 EMERGENCY DEPT VISIT LOW MDM: CPT

## 2020-12-05 PROCEDURE — 71045 X-RAY EXAM CHEST 1 VIEW: CPT

## 2020-12-05 PROCEDURE — 96365 THER/PROPH/DIAG IV INF INIT: CPT

## 2020-12-05 PROCEDURE — 87186 SC STD MICRODIL/AGAR DIL: CPT | Performed by: PHYSICIAN ASSISTANT

## 2020-12-05 PROCEDURE — 80053 COMPREHEN METABOLIC PANEL: CPT | Performed by: PHYSICIAN ASSISTANT

## 2020-12-05 PROCEDURE — 0202U NFCT DS 22 TRGT SARS-COV-2: CPT | Performed by: PHYSICIAN ASSISTANT

## 2020-12-05 PROCEDURE — 83605 ASSAY OF LACTIC ACID: CPT | Performed by: PHYSICIAN ASSISTANT

## 2020-12-05 PROCEDURE — 84703 CHORIONIC GONADOTROPIN ASSAY: CPT | Performed by: PHYSICIAN ASSISTANT

## 2020-12-05 PROCEDURE — 87086 URINE CULTURE/COLONY COUNT: CPT | Performed by: PHYSICIAN ASSISTANT

## 2020-12-05 PROCEDURE — 74176 CT ABD & PELVIS W/O CONTRAST: CPT

## 2020-12-05 PROCEDURE — 81001 URINALYSIS AUTO W/SCOPE: CPT | Performed by: PHYSICIAN ASSISTANT

## 2020-12-05 PROCEDURE — 25010000002 KETOROLAC TROMETHAMINE PER 15 MG: Performed by: PHYSICIAN ASSISTANT

## 2020-12-05 PROCEDURE — 85025 COMPLETE CBC W/AUTO DIFF WBC: CPT | Performed by: PHYSICIAN ASSISTANT

## 2020-12-05 PROCEDURE — 25010000003 CEFTRIAXONE PER 250 MG: Performed by: PHYSICIAN ASSISTANT

## 2020-12-05 PROCEDURE — 87088 URINE BACTERIA CULTURE: CPT | Performed by: PHYSICIAN ASSISTANT

## 2020-12-05 PROCEDURE — 96375 TX/PRO/DX INJ NEW DRUG ADDON: CPT

## 2020-12-05 RX ORDER — CEFDINIR 300 MG/1
300 CAPSULE ORAL 2 TIMES DAILY
Qty: 20 CAPSULE | Refills: 0 | Status: SHIPPED | OUTPATIENT
Start: 2020-12-05 | End: 2020-12-05 | Stop reason: SDUPTHER

## 2020-12-05 RX ORDER — ONDANSETRON 4 MG/1
4 TABLET, ORALLY DISINTEGRATING ORAL EVERY 6 HOURS PRN
Qty: 20 TABLET | Refills: 0 | Status: SHIPPED | OUTPATIENT
Start: 2020-12-05

## 2020-12-05 RX ORDER — CEFDINIR 300 MG/1
300 CAPSULE ORAL 2 TIMES DAILY
Qty: 20 CAPSULE | Refills: 0 | Status: SHIPPED | OUTPATIENT
Start: 2020-12-05

## 2020-12-05 RX ORDER — KETOROLAC TROMETHAMINE 15 MG/ML
15 INJECTION, SOLUTION INTRAMUSCULAR; INTRAVENOUS ONCE
Status: COMPLETED | OUTPATIENT
Start: 2020-12-05 | End: 2020-12-05

## 2020-12-05 RX ORDER — CEFTRIAXONE SODIUM 2 G/50ML
2 INJECTION, SOLUTION INTRAVENOUS ONCE
Status: COMPLETED | OUTPATIENT
Start: 2020-12-05 | End: 2020-12-05

## 2020-12-05 RX ADMIN — CEFTRIAXONE SODIUM 2 G: 2 INJECTION, SOLUTION INTRAVENOUS at 14:35

## 2020-12-05 RX ADMIN — SODIUM CHLORIDE 1000 ML: 9 INJECTION, SOLUTION INTRAVENOUS at 10:12

## 2020-12-05 RX ADMIN — KETOROLAC TROMETHAMINE 15 MG: 15 INJECTION, SOLUTION INTRAMUSCULAR; INTRAVENOUS at 11:01

## 2020-12-05 NOTE — ED PROVIDER NOTES
EMERGENCY DEPARTMENT ENCOUNTER    Room Number: 08/08  Date seen: 12/5/2020  Time seen: 10:49 EST  PCP: Elyse Larkin APRN    Spoken Language:  English  Language interpretation services not needed     CHIEF COMPLAINT: low back pain    HPI: Beatriz Boland is a 30 y.o. female presenting to the ED for evaluation of low back pain. The history is being obtained by the patient and by review of the medical chart.  The patient states that she has had low back pain and a headache for the past 2 days.  She states that she has had mild nausea, left lower quadrant abdominal pain and has had one episode of vomiting.  She does state that her urine has appeared cloudy since yesterday.  She denies dysuria or hematuria.  She denies fever, cough, sore throat, chest pain, shortness of breath, diarrhea, loss of taste, loss of smell or recent sick contacts.  She states that her pain is constant in nature.  She denies any specific aggravating or relieving factors.    MEDICAL RECORD REVIEW:  Reviewed in P[facility].     PAST MEDICAL HISTORY  Past Medical History:   Diagnosis Date   • Allergic    • Amblyopia    • Anxiety    • Back pain    • Cleft palate    • Depression    • Developmental delay    • GERD (gastroesophageal reflux disease)     followed by GI, Dr. Wesley Ferro   • Impetigo    • Kidney abscess    • Kidney stone    • Muscle spasm    • Obesity (BMI 30-39.9)    • Recurrent otitis media    • Scoliosis    • Sinus infection     recurrent   • Sprain of shoulder, left 12/2016       PAST SURGICAL HISTORY  Past Surgical History:   Procedure Laterality Date   • ABSCESS DRAINAGE      kidney   • ADENOIDECTOMY     • EAR TUBES     • PHARYNGEAL FLAP      for speech   • TONSILLECTOMY         FAMILY HISTORY  Family History   Problem Relation Age of Onset   • COPD Mother    • Arthritis Mother    • Obesity Mother    • Gestational diabetes Mother    • Cancer Father    • Scoliosis Father    • Rheum arthritis Maternal Aunt    • Diabetes  Maternal Uncle    • Alcohol abuse Maternal Uncle    • Rheum arthritis Maternal Grandmother    • COPD Maternal Grandmother    • Stroke Maternal Grandfather    • Alcohol abuse Paternal Uncle        SOCIAL HISTORY  Social History     Socioeconomic History   • Marital status: Single     Spouse name: Not on file   • Number of children: Not on file   • Years of education: Not on file   • Highest education level: Not on file   Tobacco Use   • Smoking status: Current Some Day Smoker   • Smokeless tobacco: Current User   • Tobacco comment: quit cigarette smoking 2 years ago, uses vape now   Substance and Sexual Activity   • Alcohol use: Yes     Comment: occasionally, 1-2 glasses of wine 1-2 x per week   • Drug use: No   • Sexual activity: Yes     Partners: Male     Birth control/protection: Injection   Social History Narrative             CURRENT MEDICATIONS  Prior to Admission medications    Medication Sig Start Date End Date Taking? Authorizing Provider   Acetaminophen (TYLENOL) 325 MG capsule Take 325 mg by mouth As Needed.    ProviderDalton MD   albuterol sulfate  (90 Base) MCG/ACT inhaler Inhale 2 puffs Every 4 (Four) Hours As Needed for Wheezing. 1/30/19   Victorina Ortega MD   ARIPiprazole (ABILIFY) 5 MG tablet Take 5 mg by mouth Every Night.    ProviderDalton MD   cetirizine (zyrTEC) 10 MG tablet Take 10 mg by mouth Daily.    ProviderDalton MD   [employer] NASAL ALLERGY SPRAY 55 MCG/ACT nasal inhaler 2 sprays into each nostril Daily. 10/22/17   Dalton Ren MD   cyclobenzaprine (FLEXERIL) 5 MG tablet Take 2 tablets by mouth 3 (Three) Times a Day As Needed for Muscle Spasms. 1/30/19   Victorina Ortega MD   dexlansoprazole (DEXILANT) 60 MG capsule Take 60 mg by mouth Daily.    Dalton Ren MD   estradiol cypionate (DEPO-ESTRADIOL) 5 MG/ML injection Inject 1.5 mg into the shoulder, thigh, or buttocks Every 28 (Twenty-Eight) Days. Patient states she  takes it every 3 months    ProviderDalton MD   fluticasone (FLONASE) 50 MCG/ACT nasal spray  1/9/18   Dalton Ren MD   ondansetron (ZOFRAN) 4 MG tablet Take 1 tablet by mouth Every 8 (Eight) Hours As Needed for Nausea or Vomiting. 3/16/18   Victorina Ortega MD   saccharomyces boulardii (FLORASTOR) 250 MG capsule Take 1 capsule by mouth 2 (Two) Times a Day. 9/8/20   Lydia Patel APRN   venlafaxine (EFFEXOR) 25 MG tablet Take 25 mg by mouth Every Night.    Provider, MD Dalton       ALLERGIES  Dust mite extract and Ciprofloxacin    REVIEW OF SYSTEMS  All systems reviewed and negative except for those discussed in HPI.     PHYSICAL EXAM  ED Triage Vitals   Temp Heart Rate Resp BP SpO2   12/05/20 0852 12/05/20 0852 12/05/20 0852 12/05/20 0939 12/05/20 0852   99.3 °F (37.4 °C) (!) 125 18 125/73 97 %      Temp src Heart Rate Source Patient Position BP Location FiO2 (%)   12/05/20 0852 12/05/20 0939 12/05/20 0939 12/05/20 0939 --   Tympanic Monitor Sitting Right arm        Physical Exam  Constitutional:       Appearance: Normal appearance.   HENT:      Head: Normocephalic and atraumatic.      Mouth/Throat:      Mouth: Mucous membranes are moist.   Eyes:      Extraocular Movements: Extraocular movements intact.      Pupils: Pupils are equal, round, and reactive to light.   Neck:      Musculoskeletal: Normal range of motion.   Cardiovascular:      Rate and Rhythm: Normal rate and regular rhythm.   Pulmonary:      Effort: Pulmonary effort is normal.      Breath sounds: Normal breath sounds.   Abdominal:      General: There is no distension.      Palpations: Abdomen is soft.      Tenderness: There is no abdominal tenderness.   Skin:     General: Skin is warm and dry.   Neurological:      General: No focal deficit present.      Mental Status: She is alert and oriented to person, place, and time.   Psychiatric:         Mood and Affect: Mood normal.         Behavior: Behavior normal.          Thought Content: Thought content normal.         Judgment: Judgment normal.         PROCEDURES  Procedures  None    LABS  Recent Results (from the past 24 hour(s))   Respiratory Panel PCR w/COVID-19(SARS-CoV-2) ANNIA/LORIE/SALTY/PAD/COR/MAD/GEORGES In-House, NP Swab in UTM/VTM, 3-4 HR TAT - Swab, Nasopharynx    Collection Time: 12/05/20  9:56 AM    Specimen: Nasopharynx; Swab   Result Value Ref Range    ADENOVIRUS, PCR Not Detected Not Detected    Coronavirus 229E Not Detected Not Detected    Coronavirus HKU1 Not Detected Not Detected    Coronavirus NL63 Not Detected Not Detected    Coronavirus OC43 Not Detected Not Detected    COVID19 Detected (C) Not Detected - Ref. Range    Human Metapneumovirus Not Detected Not Detected    Human Rhinovirus/Enterovirus Not Detected Not Detected    Influenza A PCR Not Detected Not Detected    Influenza A H1 Not Detected Not Detected    Influenza A H1 2009 PCR Not Detected Not Detected    Influenza A H3 Not Detected Not Detected    Influenza B PCR Not Detected Not Detected    Parainfluenza Virus 1 Not Detected Not Detected    Parainfluenza Virus 2 Not Detected Not Detected    Parainfluenza Virus 3 Not Detected Not Detected    Parainfluenza Virus 4 Not Detected Not Detected    RSV, PCR Not Detected Not Detected    Bordetella pertussis pcr Not Detected Not Detected    Bordetella parapertussis PCR Not Detected Not Detected    Chlamydophila pneumoniae PCR Not Detected Not Detected    Mycoplasma pneumo by PCR Not Detected Not Detected   Comprehensive Metabolic Panel    Collection Time: 12/05/20 10:11 AM    Specimen: Blood   Result Value Ref Range    Glucose 109 (H) 65 - 99 mg/dL    BUN 14 6 - 20 mg/dL    Creatinine 1.08 (H) 0.57 - 1.00 mg/dL    Sodium 129 (L) 136 - 145 mmol/L    Potassium 3.4 (L) 3.5 - 5.2 mmol/L    Chloride 97 (L) 98 - 107 mmol/L    CO2 22.5 22.0 - 29.0 mmol/L    Calcium 8.9 8.6 - 10.5 mg/dL    Total Protein 7.7 6.0 - 8.5 g/dL    Albumin 3.70 3.50 - 5.20 g/dL    ALT (SGPT) 12 1  - 33 U/L    AST (SGOT) 13 1 - 32 U/L    Alkaline Phosphatase 57 39 - 117 U/L    Total Bilirubin 0.6 0.0 - 1.2 mg/dL    eGFR Non African Amer 60 (L) >60 mL/min/1.73    Globulin 4.0 gm/dL    A/G Ratio 0.9 g/dL    BUN/Creatinine Ratio 13.0 7.0 - 25.0    Anion Gap 9.5 5.0 - 15.0 mmol/L   hCG, Serum, Qualitative    Collection Time: 12/05/20 10:11 AM    Specimen: Blood   Result Value Ref Range    HCG Qualitative Negative Negative   CBC Auto Differential    Collection Time: 12/05/20 10:11 AM    Specimen: Blood   Result Value Ref Range    WBC 28.94 (H) 3.40 - 10.80 10*3/mm3    RBC 4.75 3.77 - 5.28 10*6/mm3    Hemoglobin 12.4 12.0 - 15.9 g/dL    Hematocrit 38.4 34.0 - 46.6 %    MCV 80.8 79.0 - 97.0 fL    MCH 26.1 (L) 26.6 - 33.0 pg    MCHC 32.3 31.5 - 35.7 g/dL    RDW 13.6 12.3 - 15.4 %    RDW-SD 39.5 37.0 - 54.0 fl    MPV 9.2 6.0 - 12.0 fL    Platelets 234 140 - 450 10*3/mm3    Neutrophil % 84.3 (H) 42.7 - 76.0 %    Lymphocyte % 6.8 (L) 19.6 - 45.3 %    Monocyte % 6.9 5.0 - 12.0 %    Eosinophil % 0.0 (L) 0.3 - 6.2 %    Basophil % 0.3 0.0 - 1.5 %    Immature Grans % 1.7 (H) 0.0 - 0.5 %    Neutrophils, Absolute 24.38 (H) 1.70 - 7.00 10*3/mm3    Lymphocytes, Absolute 1.97 0.70 - 3.10 10*3/mm3    Monocytes, Absolute 1.99 (H) 0.10 - 0.90 10*3/mm3    Eosinophils, Absolute 0.01 0.00 - 0.40 10*3/mm3    Basophils, Absolute 0.09 0.00 - 0.20 10*3/mm3    Immature Grans, Absolute 0.50 (H) 0.00 - 0.05 10*3/mm3    nRBC 0.0 0.0 - 0.2 /100 WBC   Urinalysis With Microscopic If Indicated (No Culture) - Urine, Clean Catch    Collection Time: 12/05/20 11:01 AM    Specimen: Urine, Clean Catch   Result Value Ref Range    Color, UA Yellow Yellow, Straw    Appearance, UA Turbid (A) Clear    pH, UA 6.0 5.0 - 8.0    Specific Gravity, UA 1.016 1.005 - 1.030    Glucose, UA Negative Negative    Ketones, UA Negative Negative    Bilirubin, UA Negative Negative    Blood, UA Moderate (2+) (A) Negative    Protein, UA 30 mg/dL (1+) (A) Negative    Leuk  Esterase, UA Large (3+) (A) Negative    Nitrite, UA Negative Negative    Urobilinogen, UA 0.2 E.U./dL 0.2 - 1.0 E.U./dL   Urinalysis, Microscopic Only - Urine, Clean Catch    Collection Time: 12/05/20 11:01 AM    Specimen: Urine, Clean Catch   Result Value Ref Range    RBC, UA Unable to determine due to loaded field (A) None Seen, 0-2 /HPF    WBC, UA Too Numerous to Count (A) None Seen, 0-2 /HPF    Bacteria, UA 4+ (A) None Seen /HPF    Squamous Epithelial Cells, UA Unable to determine due to loaded field (A) None Seen, 0-2 /HPF    Hyaline Casts, UA Unable to determine due to loaded field None Seen /LPF    Methodology Manual Light Microscopy    Lactic Acid, Plasma    Collection Time: 12/05/20 12:57 PM    Specimen: Blood   Result Value Ref Range    Lactate 1.2 0.5 - 2.0 mmol/L       RADIOLOGY  CT Abdomen Pelvis Without Contrast   Preliminary Result   There has been interval increase in the left perinephric   stranding suggesting recurrence or progression of the left   pyelonephritis. There has however been decrease in the 3 cm fluid   collection/abscess at the lower pole, previously 4 cm.          XR Chest 1 View   Final Result   Enlarged cardiac silhouette. No focal pulmonary   consolidation.       This report was finalized on 12/5/2020 9:54 AM by Dr. Nando Vergara M.D.              MEDICATIONS GIVEN IN THE ER  Medications   cefTRIAXone (ROCEPHIN) IVPB 2 g (has no administration in time range)   sodium chloride 0.9 % bolus 1,000 mL ( Intravenous Currently Infusing 12/5/20 1352)   ketorolac (TORADOL) injection 15 mg (15 mg Intravenous Given 12/5/20 1101)       MEDICAL DECISION MAKING, CONSULTS AND PROGRESS NOTES  All labs and all radiology studies were have been viewed and interpreted by me.   Discussion below represents my analysis of pertinent findings related to patient's condition, differential diagnosis, treatment plan and final disposition.    Differential diagnosis includes but is not limited to:  -  Sepsis related to an intra-abdominal infection/UTI  - Hepatobiliary pathology such as cholecystitis, cholangitis, and symptomatic cholelithiasis  - Pancreatitis  - Dyspepsia  - Small bowel obstruction  - Appendicitis  - Diverticulitis  - UTI including pyelonephritis  - Ureteral stone  - Zoster  - Colitis, including infectious and ischemic  - Atypical ACS  - COVID    PPE: The patient was placed in a face mask in first look. Patient was wearing facemask when I entered the room and throughout our encounter. I wore full protective equipment throughout this patient encounter including a face mask, eye shield, gown and gloves. Hand hygiene was performed before donning protective equipment and after removal when leaving the room.    AS OF 14:33 EST VITALS:    BP - 108/69  HR - 89  TEMP - 99 °F (37.2 °C)  O2 SATS - 100%      ED Course as of Dec 05 1433   Sat Dec 05, 2020   1352 Still awaiting lab to come down to attempt obtaining blood cultures on patient.    [AR]   1426 I had a lengthy discussion with the patient regarding her results.  I have discussed with her the option of admission versus discharge home following a dose of IV Rocephin in the emergency department with oral antibiotics at home with coverage for pyelonephritis.  She is comfortable with this option.  I will cancel her blood cultures at this time.    [AR]      ED Course User Index  [AR] Carmel Garcia PA     The patient's history, physical exam, and lab findings were discussed with the physician, who also performed a face to face history and physical exam.  I discussed all results and noted any abnormalities with patient.  Discussed absoute need to recheck abnormalities with their family physician.  I answered any of the patient's questions.  Discussed plan for discharge, as there is no emergent indication for admission.  Pt is agreeable and understands need for follow up and repeat testing.  Pt is aware that discharge does not mean that nothing is  wrong but it indicates no emergency is present and they must continue care with their family physician.  Pt is discharged with instructions to follow up with primary care doctor to have their blood pressure rechecked.       DIAGNOSIS   Diagnosis Plan   1. Pyelonephritis     2. Acute cystitis with hematuria     3. Sepsis without acute organ dysfunction, due to unspecified organism (CMS/formerly [facility])     4. Hyponatremia     5. COVID-19 virus infection         DISPOSITION  ED Disposition     ED Disposition Condition Comment    Discharge Stable           FOLLOW UP  Elyse Larkin Mushtaq, APRN  [address] [address]  [phone]    Schedule an appointment as soon as possible for a visit   Schedule an appointment for further evaluation and to have your sodium level and urinalysis rechecked      RX     Medication List      New Prescriptions    cefdinir 300 MG capsule  Commonly known as: OMNICEF  Take 1 capsule by mouth 2 (Two) Times a Day.     ondansetron ODT 4 MG disintegrating tablet  Commonly known as: ZOFRAN-ODT  Place 1 tablet on the tongue Every 6 (Six) Hours As Needed for Nausea or Vomiting.           Where to Get Your Medications      You can get these medications from any pharmacy    Bring a paper prescription for each of these medications  · cefdinir 300 MG capsule  · ondansetron ODT 4 MG disintegrating tablet         Provider Attestation:  I personally reviewed the past medical history, past surgical history, social history, family history, current medications and allergies as they appear in the chart. I reviewed the patient's history, physical, lab/imaging results and overall care with Dr. Mosqueda who is in agreement with the patient's treatment plan.    EMR Dragon/Transcription disclaimer:  Some of this encounter note is an electronic transcription/translation of spoken language to printed text. The electronic translation of spoken language may permit erroneous, or at times, nonsensical words or phrases  to be inadvertently transcribed; Although I have reviewed the note for such errors, some may still exist.    Provider note signed by:         Carmel Garcia PA  12/05/20 9413

## 2020-12-05 NOTE — ED PROVIDER NOTES
I have supervised the care provided by the midlevel provider.    We have discussed this patient's history, physical exam, and treatment plan.   I have reviewed the note and have personally examined the patient and agree with the plan of care.  See attached attending note.  My personal findings are below:    Patient complains of left flank pain, cloudy urine, and increased urinary frequency since yesterday.  She also reports chills, nausea, and one episode of vomiting last night.  Patient denies cough, chest pain, or shortness of breath.    On exam: Awake and alert.  Nontoxic-appearing.  Heart is regular rate and rhythm.  Lungs are clear bilaterally.  Abdomen is soft and nontender.  There is mild left CVA tenderness.    Plan is to obtain labs and chest x-ray.    10:35 AM: Chest x-ray is negative acute.  White blood cell count is 28.9.  UA is pending.     Silvestre Mosqueda MD  12/05/20 1581

## 2020-12-05 NOTE — ED NOTES
Pt complains of chills and fatigue since yesterday.  Pt states she has level 8 pain in head and back since yesterday.  Mask placed on patient in triage.  Triage RN wearing mask throughout encounter.       Víctor Mendoza, RN  12/05/20 1490

## 2020-12-05 NOTE — ED NOTES
.Pt masked, RN wearing mask, goggles, faceshield, hair covering, gown, and gloves during encounter.    Multiple attempts by 2 RN to collect blood cultures, unsuccessfull, lab aware of need for collect but will be awhile, TOM Pace aware     Leno Fofana, RN  12/05/20 4145

## 2020-12-05 NOTE — DISCHARGE INSTRUCTIONS
Although you are being discharged from the ED today, I encourage you to return for worsening symptoms. Things can, and do, change such that treatment at home with medication may not be adequate. Specifically I recommend returning for chest pain or discomfort, difficulty breathing, persistent vomiting or difficulty holding down liquids or medications, fever > 102.0 F or any other worsening or alarming symptoms.     Follow up with your primary care provider to have your sodium level and urinalysis rechecked.      Follow up with primary care provider for further management and to have blood pressure rechecked.  Take your medications as prescribed.    Consistent with CDC guidelines we are currently recommending everyone with a fever and viral respiratory illness self quarantine for 14 days.  I strongly recommend that you do not leave your home after you have been tested for COVID except to get medical care.  This includes not going to work, school or to public areas.  If this is not possible for you to do, please limit your activities to only required outings.  Be sure to wear a mask when you are with other people, practice social distancing and wash your hands frequently.    Please follow CDC and WHO guidelines, checking them frequently as they are often updated daily.    COVID-19 DISCHARGE GUIDELINES:  (This information was obtained from [facility] dated 03/13/2020.)    The following non-pharmaceutical intervention strategies are recommended to prevent the transmission of the novel coronavirus (COVID-19). Currently, there is no vaccine available to prevent coronavirus disease. The best way to prevent illness is to avoid exposure.    Personal Prevention Measures  * Avoid close contact with people who are sick with fever, coughing, sneezing, and difficulty breathing. To avoid close contact, stay at least 6 feet away from others.  * Avoid touching your eyes, nose, and mouth.  * Stay home when you are sick.  *  Cover your cough or sneeze with a tissue, and throw the tissue in the trash. To avoid coughing into your hands, you can cough into your elbow.  * Clean and disinfect frequently touched objects and surfaces using a regular household cleaning spray or wipe.  * Wash her hands often with soap and water for at least 20 seconds especially after going to the bathroom, before eating, and after blowing your nose, coughing, or sneezing.  * If soap and water not readily available, use an alcohol-based hand  with at least 60% alcohol. Always wash hands with soap and water if hands are visibly dirty.  * Do not share utensils, towels or food from the same bowl.  Have your own utensils, drinking glass, dishes, towels and bedding.  * If possible, stay in a separate room and use a separate bathroom.  * Wear a mask around others in your home if you are unable to stay in a separate room or 6 feet apart.  If you are unable to wear a mask, have your family member wear a mask if they musty within 6 feet of you.  * Call your primary care physician if you experience any of the following symptoms: Sore throat, shortness of breath, cough, chills, body aches, headache, fever or new lost of taste and/or smell.    Community Prevention Measures  The following community prevention measures are recommended to prevent the transmission of COVID-19:  * Practice social distancing (6 feet away).  * Cancel places a Yarsani in large gatherings temporarily due to close proximity.  * Urged businesses to allow employees to telecommute and to provide paid sick leave.  * Spread out during meetings by increasing the distance between seats and other similar actions.  * Implement environmental surface cleaning measures and homes, businesses, and other locations, including frequently touched surfaces and objects, i.e., tables, doorknobs, toys, desks, and computer keyboard. Use regular household cleaning spray or wipes as recommended by the CDC.    People  at Higher Risk for COVID-19 Complications  Adults over 60 and people who have severe chronic medical conditions like heart, lung or kidney disease seen to be at higher risk for more serious COVID-19 illness. If you are at increased risk for COVID-19, it is especially important for you to take the following actions to reduce your risk of exposure:  * Stay home as much as possible.  * Make sure he have access to several weeks of medications and supplies in case you need to stay home for prolonged periods of time.  * When you go out in public, keep away from others who are sick, limit close contact (6 feet away) and wash her hands often.  * Avoid crowds.    Additional information can be obtained at http://[email or web address].GOV.

## 2020-12-07 ENCOUNTER — EPISODE CHANGES (OUTPATIENT)
Dept: CASE MANAGEMENT | Facility: OTHER | Age: 30
End: 2020-12-07

## 2020-12-07 LAB — BACTERIA SPEC AEROBE CULT: ABNORMAL

## 2020-12-09 ENCOUNTER — PATIENT OUTREACH (OUTPATIENT)
Dept: CASE MANAGEMENT | Facility: OTHER | Age: 30
End: 2020-12-09

## 2020-12-09 NOTE — OUTREACH NOTE
ED Potential Covid Discharge Follow-up    Contacted related to ER visit on 12/5/20, patient is aware of Covid + test, she remains home in isolation. Patient reports she has only had chills and no other symptoms. She has been in contact with her PCP for a second test next week so she can return to work. Education done on resting, hydration needs and returning to ER if symptoms occur or worsen. Reviewed CDC COVID-19 recommendations for quarantine and social distancing & wearing a mask when leaving their home. Patient has no barriers to food and transportation at this time.Patient is aware phone number to the 24/7 Nurse Line ([phone]) & COVID-19 Hotline (1-126.325.1296) found on AVS from ED visit.Nurse provided patient education.No other questions, concerns or needs regarding health and wellness voiced at this time.     Candi Dobbins RN  Ambulatory     12/9/2020, 14:11 EST

## 2021-04-16 ENCOUNTER — BULK ORDERING (OUTPATIENT)
Dept: CASE MANAGEMENT | Facility: OTHER | Age: 31
End: 2021-04-16

## 2021-04-16 DIAGNOSIS — Z23 IMMUNIZATION DUE: ICD-10-CM

## 2021-05-06 ENCOUNTER — HOSPITAL ENCOUNTER (EMERGENCY)
Facility: HOSPITAL | Age: 31
Discharge: HOME | End: 2021-05-06
Attending: EMERGENCY MEDICINE | Admitting: EMERGENCY MEDICINE

## 2021-05-06 ENCOUNTER — APPOINTMENT (OUTPATIENT)
Dept: GENERAL RADIOLOGY | Facility: HOSPITAL | Age: 31
End: 2021-05-06

## 2021-05-06 VITALS
BODY MASS INDEX: 37.67 KG/M2 | RESPIRATION RATE: 16 BRPM | HEIGHT: 67 IN | SYSTOLIC BLOOD PRESSURE: 118 MMHG | WEIGHT: 240 LBS | DIASTOLIC BLOOD PRESSURE: 87 MMHG | TEMPERATURE: 97.9 F | HEART RATE: 63 BPM | OXYGEN SATURATION: 97 %

## 2021-05-06 DIAGNOSIS — M25.512 ACUTE PAIN OF LEFT SHOULDER: ICD-10-CM

## 2021-05-06 DIAGNOSIS — S46.912A STRAIN OF LEFT SHOULDER, INITIAL ENCOUNTER: Primary | ICD-10-CM

## 2021-05-06 PROCEDURE — 73030 X-RAY EXAM OF SHOULDER: CPT

## 2021-05-06 PROCEDURE — 99283 EMERGENCY DEPT VISIT LOW MDM: CPT

## 2021-05-06 PROCEDURE — 63710000001 ONDANSETRON ODT 4 MG TABLET DISPERSIBLE: Performed by: NURSE PRACTITIONER

## 2021-05-06 RX ORDER — METHOCARBAMOL 750 MG/1
750 TABLET, FILM COATED ORAL 3 TIMES DAILY PRN
Qty: 21 TABLET | Refills: 0 | Status: SHIPPED | OUTPATIENT
Start: 2021-05-06

## 2021-05-06 RX ORDER — HYDROCODONE BITARTRATE AND ACETAMINOPHEN 5; 325 MG/1; MG/1
1 TABLET ORAL ONCE
Status: COMPLETED | OUTPATIENT
Start: 2021-05-06 | End: 2021-05-06

## 2021-05-06 RX ORDER — ONDANSETRON 4 MG/1
4 TABLET, ORALLY DISINTEGRATING ORAL ONCE
Status: COMPLETED | OUTPATIENT
Start: 2021-05-06 | End: 2021-05-06

## 2021-05-06 RX ADMIN — HYDROCODONE BITARTRATE AND ACETAMINOPHEN 1 TABLET: 5; 325 TABLET ORAL at 12:52

## 2021-05-06 RX ADMIN — ONDANSETRON 4 MG: 4 TABLET, ORALLY DISINTEGRATING ORAL at 12:52

## 2021-05-07 ENCOUNTER — TRANSCRIBE ORDERS (OUTPATIENT)
Dept: PHYSICAL THERAPY | Facility: HOSPITAL | Age: 31
End: 2021-05-07

## 2021-05-07 DIAGNOSIS — M77.8 TENDONITIS OF SHOULDER, LEFT: Primary | ICD-10-CM

## 2021-05-10 ENCOUNTER — HOSPITAL ENCOUNTER (OUTPATIENT)
Dept: PHYSICAL THERAPY | Facility: HOSPITAL | Age: 31
Setting detail: THERAPIES SERIES
Discharge: HOME | End: 2021-05-10

## 2021-05-10 DIAGNOSIS — M77.8 TENDINITIS OF LEFT SHOULDER: ICD-10-CM

## 2021-05-10 DIAGNOSIS — M25.512 LEFT SHOULDER PAIN, UNSPECIFIED CHRONICITY: Primary | ICD-10-CM

## 2021-05-10 PROCEDURE — 97110 THERAPEUTIC EXERCISES: CPT

## 2021-05-10 PROCEDURE — 97161 PT EVAL LOW COMPLEX 20 MIN: CPT

## 2021-05-10 NOTE — THERAPY EVALUATION
Outpatient Physical Therapy Ortho Initial Evaluation  [facility]     Patient Name: Beatriz Boland  : 1990  MRN: [id number]  Today's Date: 5/10/2021      Visit Date: 05/10/2021    Patient Active Problem List   Diagnosis   • Depression   • Mental developmental delay   • GERD without esophagitis   • Scoliosis   • Amblyopia   • Back pain   • Chronic left shoulder pain   • Muscle spasm   • Seasonal allergies   • Panic attacks   • Anxiety   • Mild intermittent asthma without complication   • Chest pain on breathing   • Nicotine vapor product user   • Class 3 obesity without serious comorbidity with body mass index (BMI) of 40.0 to 44.9 in adult   • Acute UTI (urinary tract infection)   • Obesity (BMI 30-39.9)   • Tobacco abuse   • Septicemia due to E. coli (CMS/HCC)   • Pyelonephritis of left kidney   • Sepsis (CMS/HCC)   • Asthma        Past Medical History:   Diagnosis Date   • Allergic    • Amblyopia    • Anxiety    • Back pain    • Cleft palate    • Depression    • Developmental delay    • GERD (gastroesophageal reflux disease)     followed by GI, Dr. Wesley Ferro   • Impetigo    • Kidney abscess    • Kidney stone    • Muscle spasm    • Obesity (BMI 30-39.9)    • Recurrent otitis media    • Scoliosis    • Sinus infection     recurrent   • Sprain of shoulder, left 2016        Past Surgical History:   Procedure Laterality Date   • ABSCESS DRAINAGE      kidney   • ADENOIDECTOMY     • EAR TUBES     • PHARYNGEAL FLAP      for speech   • TONSILLECTOMY         Visit Dx:     ICD-10-CM ICD-9-CM   1. Left shoulder pain, unspecified chronicity  M25.512 719.41   2. Tendinitis of left shoulder  M77.8 726.10         Patient History     Row Name 05/10/21 0900 21 1233          History    Chief Complaint  --  Joint swelling;Muscle tenderness;Muscle weakness;Numbness;Pain  (Pended)   (Patient-Rptd)   -patient     Type of Pain  --  Shoulder pain  (Pended)   (Patient-Rptd)   -patient     Date Current  Problem(s) Began  --  04/26/21  (Pended)   (Patient-Rptd)   -patient     Brief Description of Current Complaint  Pt reports she began having L anterior shoiulder pain with no mech of injury around 4/26/21.  She had her 2nd Covid vaccine on 4/22/21.  She also c/o pain in to her forearm and hand with tingling in RF and MF occasionally.  She works at [employer] as a  and tries not to lift heavy things. She lives with her MoM and has 2 mid sized dogs.   -LP  Just hurts to do regular activities  (Pended)   (Patient-Rptd)   -patient     Patient/Caregiver Goals  --  Relieve pain;Improve mobility;Improve strength;Know what to do to help the symptoms  (Pended)   (Patient-Rptd)   -patient     Hand Dominance  --  right-handed  (Pended)   (Patient-Rptd)   -patient     Occupation/sports/leisure activities  --    (Pended)   (Patient-Rptd)   -patient     What clinical tests have you had for this problem?  --  X-ray  (Pended)   (Patient-Rptd)   -patient     Are you or can you be pregnant  --  No  (Pended)   (Patient-Rptd)   -patient        Pain     Pain Location  Shoulder  -LP  --     Pain at Present  8  -LP  --     Pain at Best  2  -LP  --     Pain at Worst  10  -LP  --     Pain Frequency  Intermittent  -LP  --     Pain Description  Sharp;Throbbing;Tingling tingling into forearm and hand. Also R hand  -LP  --     What Performance Factors Make the Current Problem(s) WORSE?  reaching, sitting, no pattern  -LP  --     What Performance Factors Make the Current Problem(s) BETTER?  tylenol, resting/not moving,  -LP  --     Is your sleep disturbed?  Yes only if I roll on it.  -LP  --     Is medication used to assist with sleep?  Yes sometimes takes a melatonin chewy to help  -LP  --        Fall Risk Assessment    Any falls in the past year:  --  No  (Pended)   (Patient-Rptd)   -patient        Services    Prior Rehab/Home Health Experiences  --  Yes  (Pended)   (Patient-Rptd)   -patient     Are you currently receiving Home  Health services  --  No  (Pended)   (Patient-Rptd)   -patient     Do you plan to receive Home Health services in the near future  --  No  (Pended)   (Patient-Rptd)   -patient        Daily Activities    Primary Language  --  English  (Pended)   (Patient-Rptd)   -patient     Are you able to read  --  Yes  (Pended)   (Patient-Rptd)   -patient     Are you able to write  --  Yes  (Pended)   (Patient-Rptd)   -patient     How does patient learn best?  --  Listening;Reading;Demonstration;Pictures/Video  (Pended)   (Patient-Rptd)   -patient     Pt Participated in POC and Goals  Yes  -LP  --        Safety    Are you being hurt, hit, or frightened by anyone at home or in your life?  --  No  (Pended)   (Patient-Rptd)   -patient     Are you being neglected by a caregiver  --  No  (Pended)   (Patient-Rptd)   -patient     Have you had any of the following issues with  --  Depression;Anxiety;Panic Attacks  (Pended)   (Patient-Rptd)   -patient       User Key  (r) = Recorded By, (t) = Taken By, (c) = Cosigned By    Initials Name Provider Type    LP Judy Martinez, PT Physical Therapist    patient Beatriz Boland --          PT Ortho     Row Name 05/10/21 0900       Subjective Pain    Able to rate subjective pain?  yes  -LP    Pre-Treatment Pain Level  8  -LP       Posture/Observations    Posture/Observations Comments  forwrd head/shoulders. Increased kyphosis. Mild scoliosis at thoraco-lumbar spine  -LP       DTR- Upper Quarter Clearing    Biceps (C5/6)  Bilateral:;2- Normal response  -LP       Myotomal Screen- Upper Quarter Clearing    Shoulder flexion (C5)  Left:;4- (Good -);Right:;4 (Good)  -LP    Elbow flexion/wrist extension (C6)  Bilateral:;4 (Good)  -LP    Elbow extension/wrist flexion (C7)  Bilateral:;4 (Good)  -LP      -- take next visit  -LP       Cervical/Shoulder ROM Screen    Cervical flexion  Normal  -LP    Cervical extension  Normal tingling in L RF, MF  -LP    Cervical lateral flexion  Normal  -LP     Cervical rotation  Normal  -LP    Shoulder elevation   Normal  -LP       Shoulder Impingement/Rotator Cuff Special Tests    Neer Impingement Test (RC Lesion vs. Bursitis)  Left:;Negative  -LP    Empty Can Test (RC Lesion)  Left:;Negative  -LP    Drop Arm Test (Full Thickness RC Lesion)  Left:;Negative  -LP    Speed's Test (LH of Biceps Lesion)  Left:;Negative  -LP    Shoulder Impingement/Rotator Cuff Special Tests Comments  Impingement sign + L  -LP       General ROM    GENERAL ROM COMMENTS  R also WNL's. Hypermobility at L elbow with vagus deformity  -LP       Left Upper Ext    Lt Shoulder Abduction AROM  0-168  -LP    Lt Shoulder Flexion AROM  0-170  -LP    Lt Shoulder Horizontal ABduction AROM  0-95  -LP    Lt Shoulder External Rotation AROM  0-71  -LP    Lt Shoulder Internal Rotation AROM  to bra strap  -LP       Sensation    Sensation WNL?  WNL  -LP      User Key  (r) = Recorded By, (t) = Taken By, (c) = Cosigned By    Initials Name Provider Type    LP Judy Martinez, PT Physical Therapist                      Therapy Education  Given: HEP, Symptoms/condition management, Posture/body mechanics  Program: New  How Provided: Verbal, Demonstration, Written  Provided to: Patient  Level of Understanding: Teach back education performed     PT OP Goals     Row Name 05/10/21 1500          PT Short Term Goals    STG Date to Achieve  05/31/21  -LP     STG 1  Independent with HEP  -LP     STG 1 Progress  New  -LP     STG 2  Decrease subjective pain rating from 8/10 to 5/10  -LP     STG 2 Progress  New  -LP     STG 3  Pt demosntrates improved awareness of standing posture and can self correct  -LP     STG 3 Progress  New  -LP        Long Term Goals    LTG Date to Achieve  06/14/21  -LP     LTG 1  Improved Strength of L UE to rom 4- to 4+/5  -LP     LTG 1 Progress  New  -LP     LTG 2  Pt with decreased subjective pain rating to 3-4/10 with simulated scanning motion.  -LP     LTG 2 Progress  New  -LP     LTG 3  Improved  Quick Dash score to 30 or less.  -LP     LTG 3 Progress  New  -LP       User Key  (r) = Recorded By, (t) = Taken By, (c) = Cosigned By    Initials Name Provider Type    LP Judy Martinez, PT Physical Therapist          PT Assessment/Plan     Row Name 05/10/21 9786          PT Assessment    Functional Limitations  Limitations in community activities;Performance in leisure activities;Performance in sport activities;Performance in work activities  -LP     Impairments  Posture;Range of motion;Pain;Muscle strength;Joint mobility  -LP     Assessment Comments  Ms. Boland is a 30 y.o. female referred for physical therapy for L shoulder tendonits.  Her condition is evolving and signs and symptoms ar consistent with diagnosis. She has no significant co-morbidities at this time.  She presents with L shoulder AROM, WNL's with some tightness at end range.  She has no painful arc, and no c/o popping thru range.  Her L UE strength is slightly less than R and again some complaints with resistance.  Her posture is very forward with rounded shoulders.  She has full cerv. AROM, but with extension, noted that her tingling in the fingers was more.  She will benefit from skilled PT for postural education and correction, and UE strengthening,and pain relief.  -LP     Please refer to paper survey for additional self-reported information  Yes  -LP     Rehab Potential  Good  -LP     Patient/caregiver participated in establishment of treatment plan and goals  Yes  -LP     Patient would benefit from skilled therapy intervention  Yes  -LP        PT Plan    PT Frequency  2x/week  -LP     Predicted Duration of Therapy Intervention (PT)  4 weeks  -LP     Planned CPT's?  PT EVAL LOW COMPLEXITY: 99189;PT THER PROC EA 15 MIN: 34253;PT MANUAL THERAPY EA 15 MIN: 65108;PT ELECTRICAL STIM UNATTEND: ;PT TRACTION CERVICAL: 42394;PT ULTRASOUND EA 15 MIN: 16294;PT HOT/COLD PACK WC NONMCARE: 99279  -LP     Physical Therapy Interventions (Optional  Details)  home exercise program;joint mobilization;manual therapy techniques;modalities;postural re-education;strengthening  -LP     PT Plan Comments  Develop and progress HEP, manual techniques to help decrease shoulder pain and improve strength and posture.  -LP       User Key  (r) = Recorded By, (t) = Taken By, (c) = Cosigned By    Initials Name Provider Type    Judy Moore PT Physical Therapist            OP Exercises     Row Name 05/10/21 0900             Subjective Pain    Able to rate subjective pain?  yes  -LP      Pre-Treatment Pain Level  8  -LP         Exercise 1    Exercise Name 1  scap retraction  -LP      Cueing 1  Verbal;Tactile;Demo  -LP      Sets 1  1  -LP      Reps 1  10  -LP      Additional Comments  3s  -LP         Exercise 2    Exercise Name 2  shoulder rolls  -LP      Cueing 2  Verbal;Tactile;Demo  -LP      Sets 2  1  -LP      Reps 2  10  -LP         Exercise 3    Exercise Name 3  standing chin tucks  -LP      Cueing 3  Verbal;Tactile;Demo  -LP      Sets 3  1  -LP      Reps 3  5  -LP      Time 3  5s  -LP        User Key  (r) = Recorded By, (t) = Taken By, (c) = Cosigned By    Initials Name Provider Type    Judy Moore, PT Physical Therapist                        Outcome Measure Options: Quick DASH  Quick DASH  Open a tight or new jar.: Moderate Difficulty  Do heavy household chores (e.g., wash walls, wash floors): Moderate Difficulty  Carry a shopping bag or briefcase: Moderate Difficulty  Wash your back: Severe Difficulty  Use a knife to cut food: No Difficulty  Recreational activities in which you take some force or impact through your arm, should or hand (e.g. golf, hammering, tennis, etc.): Severe Difficulty  During the past week, to what extent has your arm, shoulder, or hand problem interfered with your normal social activites with family, friends, neighbors or groups?: Slightly  During the past week, were you limited in your work or other regular daily activities as a  result of your arm, shoulder or hand problem?: Not limited at all  Arm, Shoulder, or hand pain: Severe  Tingling (pins and needles) in your arm, shoulder, or hand: Severe  During the past week, how much difficulty have you had sleeping because of the pain in your arm, shoulder or hand?: Moderate Difficiculty  Number of Questions Answered: 11  Quick DASH Score: 47.73         Time Calculation:     Start Time: 0900  Stop Time: 0945  Time Calculation (min): 45 min  Time Calculation- PT  Start Time: 0900  Stop Time: 0945  Time Calculation (min): 45 min     Therapy Charges for Today     Code Description Service Date Service Provider Modifiers Qty    65904923345 HC PT EVAL LOW COMPLEXITY 2 5/10/2021 Judy Martinez, PT GP 1    04313158572 HC PT THER PROC EA 15 MIN 5/10/2021 Judy Martinez, PT GP 1          PT G-Codes  Outcome Measure Options: Quick DASH  Quick DASH Score: 47.73         Judy Martinez PT  5/10/2021

## 2021-05-12 ENCOUNTER — HOSPITAL ENCOUNTER (OUTPATIENT)
Dept: PHYSICAL THERAPY | Facility: HOSPITAL | Age: 31
Setting detail: THERAPIES SERIES
Discharge: HOME | End: 2021-05-12

## 2021-05-12 DIAGNOSIS — M77.8 TENDINITIS OF LEFT SHOULDER: Primary | ICD-10-CM

## 2021-05-12 DIAGNOSIS — M25.512 LEFT SHOULDER PAIN, UNSPECIFIED CHRONICITY: ICD-10-CM

## 2021-05-12 PROCEDURE — 97140 MANUAL THERAPY 1/> REGIONS: CPT

## 2021-05-12 PROCEDURE — 97110 THERAPEUTIC EXERCISES: CPT

## 2021-05-12 NOTE — THERAPY TREATMENT NOTE
Outpatient Physical Therapy Ortho Treatment Note  [facility]     Patient Name: Beatriz Boland  : 1990  MRN: [id number]  Today's Date: 2021      Visit Date: 2021    Visit Dx:    ICD-10-CM ICD-9-CM   1. Tendinitis of left shoulder  M77.8 726.10   2. Left shoulder pain, unspecified chronicity  M25.512 719.41       Patient Active Problem List   Diagnosis   • Depression   • Mental developmental delay   • GERD without esophagitis   • Scoliosis   • Amblyopia   • Back pain   • Chronic left shoulder pain   • Muscle spasm   • Seasonal allergies   • Panic attacks   • Anxiety   • Mild intermittent asthma without complication   • Chest pain on breathing   • Nicotine vapor product user   • Class 3 obesity without serious comorbidity with body mass index (BMI) of 40.0 to 44.9 in adult   • Acute UTI (urinary tract infection)   • Obesity (BMI 30-39.9)   • Tobacco abuse   • Septicemia due to E. coli (CMS/HCC)   • Pyelonephritis of left kidney   • Sepsis (CMS/HCC)   • Asthma        Past Medical History:   Diagnosis Date   • Allergic    • Amblyopia    • Anxiety    • Back pain    • Cleft palate    • Depression    • Developmental delay    • GERD (gastroesophageal reflux disease)     followed by GI, Dr. Wesley Ferro   • Impetigo    • Kidney abscess    • Kidney stone    • Muscle spasm    • Obesity (BMI 30-39.9)    • Recurrent otitis media    • Scoliosis    • Sinus infection     recurrent   • Sprain of shoulder, left 2016        Past Surgical History:   Procedure Laterality Date   • ABSCESS DRAINAGE      kidney   • ADENOIDECTOMY     • EAR TUBES     • PHARYNGEAL FLAP      for speech   • TONSILLECTOMY         PT Ortho     Row Name 21 1200       Special Tests/Palpation    Special Tests/Palpation  Cervical/Thoracic  -MH       Cervical/Thoracic Special Tests    Adson's Maneuver (TOS)  Left:;Negative  -    Chip Test (TOS)  Left:;Negative  -    Summer's Test (TOS)  Left:;Positive at end; unable to  complete time  -MH        Strength Right    # Reps  3  -MH    Right Rung  2  -MH    Right  Test 1  30  -MH    Right  Test 2  20  -MH    Right  Test 3  15  -MH     Strength Average Right  21.67  -MH        Strength Left    # Reps  3  -MH    Left Rung  2  -MH    Left  Test 1  15  -MH    Left  Test 2  15  -MH    Left  Test 3  10  -MH     Strength Average Left  13.33  -MH       Hand  Strength     Strength Affected Side  Bilateral  -      User Key  (r) = Recorded By, (t) = Taken By, (c) = Cosigned By    Initials Name Provider Type     Nguyen Mackay, PT Physical Therapist                      PT Assessment/Plan     Row Name 05/12/21 1338          PT Assessment    Assessment Comments  Ms. Boland presents to therapy for first follow up visit from initial evaluation, pt. reports small improvements in symptoms. Reports intermittent numbness/tingling in L UE that was no reciprocated so performed TOS special tests all of which were (-) except Sebastien which pt. had difficulty describing symptoms experienced. Pt. require frequent cues and reassurance for form and technique with all exercises, very tender to palpation supraspinatus and deltoid region. Pt. remains appropriate for skilled PT.  -        PT Plan    PT Plan Comments  Update HEP as able; consider H-A  -       User Key  (r) = Recorded By, (t) = Taken By, (c) = Cosigned By    Initials Name Provider Type     Nguyen Mackay, PT Physical Therapist            OP Exercises     Row Name 05/12/21 1200 05/12/21 1100          Total Minutes    06656 - PT Therapeutic Exercise Minutes  27  -MH  --     40023 - PT Manual Therapy Minutes  12  -MH  --  -MH        Exercise 1    Exercise Name 1  scap retraction  -MH  --     Cueing 1  Verbal;Tactile;Demo  -MH  --     Sets 1  1  -MH  --     Reps 1  10  -MH  --     Time 1  2-3sec  -MH  --     Additional Comments  RTB  -  --        Exercise 2    Exercise Name 2  shoulder rolls  -MH   --     Cueing 2  Verbal;Tactile;Demo  -MH  --     Sets 2  1  -MH  --     Reps 2  10  -MH  --        Exercise 3    Exercise Name 3  standing chin tucks  -MH  --     Cueing 3  Verbal;Tactile;Demo  -MH  --     Sets 3  1  -MH  --     Reps 3  5  -MH  --     Time 3  5s  -MH  --        Exercise 4    Exercise Name 4  UBE  -MH  --     Cueing 4  Verbal  -MH  --     Time 4  4 min  -MH  --     Additional Comments  2/2  -MH  --        Exercise 5    Exercise Name 5  pulleys  -MH  --     Cueing 5  Verbal  -MH  --     Reps 5  15  -MH  --     Time 5  2-3sec  -MH  --     Additional Comments  bilateral; keep shoulder down  -MH  --        Exercise 6    Exercise Name 6  shoulder flex AAROM  -MH  --     Cueing 6  Verbal  -MH  --     Reps 6  15  -MH  --     Time 6  hands clasped  -MH  --        Exercise 7    Exercise Name 7  handgrip strength, TOS special tests  -MH  --     Time 7  6 minutes  -MH  --        Exercise 8    Exercise Name 8  B ER isometric  -MH  --     Cueing 8  Verbal  -MH  --     Reps 8  10  -MH  --     Time 8  5  -MH  --     Additional Comments  into belt  -MH  --        Exercise 9    Exercise Name 9  shoulder ext  -MH  --     Cueing 9  Verbal  -MH  --     Reps 9  10  -MH  --     Time 9  2-3sec  -MH  --     Additional Comments  RTB  -MH  --       User Key  (r) = Recorded By, (t) = Taken By, (c) = Cosigned By    Initials Name Provider Type     Nguyen Mackay, PT Physical Therapist                      Manual Rx (last 36 hours)      Manual Treatments     Row Name 05/12/21 1200 05/12/21 1100          Total Minutes    62691 - PT Manual Therapy Minutes  12  -MH  --  -MH        Manual Rx 1    Manual Rx 1 Location  --  PROM flex/ER  -MH     Manual Rx 1 Type  --  gentle oscillations  -MH     Manual Rx 1 Grade  --  GH joint mobs posterior/inferior grade II-III  -MH       User Key  (r) = Recorded By, (t) = Taken By, (c) = Cosigned By    Initials Name Provider Type     Fatemeher, Nguyen, PT Physical Therapist          PT OP Goals      Row Name 05/12/21 1200          PT Short Term Goals    STG Date to Achieve  05/31/21  -     STG 1  Independent with HEP  -     STG 1 Progress  Ongoing  -     STG 2  Decrease subjective pain rating from 8/10 to 5/10  -     STG 2 Progress  Ongoing  -     STG 3  Pt demosntrates improved awareness of standing posture and can self correct  -     STG 3 Progress  Ongoing  -        Long Term Goals    LTG Date to Achieve  06/14/21  -     LTG 1  Improved Strength of L UE to rom 4- to 4+/5  -     LTG 1 Progress  Ongoing  -     LTG 2  Pt with decreased subjective pain rating to 3-4/10 with simulated scanning motion.  -     LTG 2 Progress  Ongoing  -     LTG 3  Improved Quick Dash score to 30 or less.  -     LTG 3 Progress  Ongoing  -       User Key  (r) = Recorded By, (t) = Taken By, (c) = Cosigned By    Initials Name Provider Type     Nguyen Mackay PT Physical Therapist          Therapy Education  Education Details: Will hold on updating HEP as pt. continues to require cues with initial program  Given: HEP, Symptoms/condition management, Pain management  Program: Reinforced  How Provided: Verbal, Demonstration  Provided to: Patient  Level of Understanding: Verbalized, Demonstrated              Time Calculation:   Start Time: 1224  Stop Time: 1303  Time Calculation (min): 39 min  Total Timed Code Minutes- PT: 39 minute(s)  Timed Charges  31173 - PT Therapeutic Exercise Minutes: 27  48261 - PT Manual Therapy Minutes: 12  Total Minutes  Timed Charges Total Minutes: 39   Total Minutes: 39  Therapy Charges for Today     Code Description Service Date Service Provider Modifiers Qty    98832380097  PT THER PROC EA 15 MIN 5/12/2021 Nguyen Mackay, PT GP 2    36848783636 HC PT MANUAL THERAPY EA 15 MIN 5/12/2021 Nguyen Mackay PT GP 1                    Nguyen Mackay PT  5/12/2021

## 2021-05-17 ENCOUNTER — APPOINTMENT (OUTPATIENT)
Dept: PHYSICAL THERAPY | Facility: HOSPITAL | Age: 31
End: 2021-05-17

## 2021-05-19 ENCOUNTER — HOSPITAL ENCOUNTER (OUTPATIENT)
Dept: PHYSICAL THERAPY | Facility: HOSPITAL | Age: 31
Setting detail: THERAPIES SERIES
Discharge: HOME | End: 2021-05-19

## 2021-05-19 DIAGNOSIS — M25.512 LEFT SHOULDER PAIN, UNSPECIFIED CHRONICITY: ICD-10-CM

## 2021-05-19 DIAGNOSIS — M77.8 TENDINITIS OF LEFT SHOULDER: Primary | ICD-10-CM

## 2021-05-19 PROCEDURE — 97110 THERAPEUTIC EXERCISES: CPT

## 2021-05-19 PROCEDURE — 97140 MANUAL THERAPY 1/> REGIONS: CPT

## 2021-05-19 NOTE — THERAPY TREATMENT NOTE
Outpatient Physical Therapy Ortho Treatment Note  [facility]     Patient Name: Beatriz Boland  : 1990  MRN: [id number]  Today's Date: 2021      Visit Date: 2021    Visit Dx:    ICD-10-CM ICD-9-CM   1. Tendinitis of left shoulder  M77.8 726.10   2. Left shoulder pain, unspecified chronicity  M25.512 719.41       Patient Active Problem List   Diagnosis   • Depression   • Mental developmental delay   • GERD without esophagitis   • Scoliosis   • Amblyopia   • Back pain   • Chronic left shoulder pain   • Muscle spasm   • Seasonal allergies   • Panic attacks   • Anxiety   • Mild intermittent asthma without complication   • Chest pain on breathing   • Nicotine vapor product user   • Class 3 obesity without serious comorbidity with body mass index (BMI) of 40.0 to 44.9 in adult   • Acute UTI (urinary tract infection)   • Obesity (BMI 30-39.9)   • Tobacco abuse   • Septicemia due to E. coli (CMS/[facility])   • Pyelonephritis of left kidney   • Sepsis (CMS/[facility])   • Asthma        Past Medical History:   Diagnosis Date   • Allergic    • Amblyopia    • Anxiety    • Back pain    • Cleft palate    • Depression    • Developmental delay    • GERD (gastroesophageal reflux disease)     followed by GI, Dr. Wesley Ferro   • Impetigo    • Kidney abscess    • Kidney stone    • Muscle spasm    • Obesity (BMI 30-39.9)    • Recurrent otitis media    • Scoliosis    • Sinus infection     recurrent   • Sprain of shoulder, left 2016        Past Surgical History:   Procedure Laterality Date   • ABSCESS DRAINAGE      kidney   • ADENOIDECTOMY     • EAR TUBES     • PHARYNGEAL FLAP      for speech   • TONSILLECTOMY                         PT Assessment/Plan     Row Name 21 0909          PT Assessment    Assessment Comments  Pt with pain rated 4/10 today and slow improvement in symptoms. Added supine HA with cues for scap retraction and dec UT compensation with good tolerance. Given HEP handouts with current  program and instructed to perform 3x/week. Pt with difficulty remaining on task and therefore limited progression of exercises today.  -CN        PT Plan    PT Plan Comments  Assess compliance with HEP and continue to progress scapular strengthening as tolerated.  -CN       User Key  (r) = Recorded By, (t) = Taken By, (c) = Cosigned By    Initials Name Provider Type    Suad Thomas, PT Physical Therapist            OP Exercises     Row Name 05/19/21 0800 05/19/21 0700          Subjective Comments    Subjective Comments  It is feeling ok, nothing new going on.   -CN  --        Subjective Pain    Able to rate subjective pain?  yes  -CN  --     Pre-Treatment Pain Level  4  -CN  --        Total Minutes    77592 - PT Therapeutic Exercise Minutes  28  -CN  --     68680 - PT Manual Therapy Minutes  --  12  -CN        Exercise 1    Exercise Name 1  scap retraction  -CN  --     Cueing 1  Verbal;Tactile;Demo  -CN  --     Sets 1  1  -CN  --     Reps 1  10  -CN  --     Time 1  2-3sec  -CN  --     Additional Comments  RTB  -CN  --        Exercise 2    Exercise Name 2  shoulder rolls  -CN  --     Cueing 2  Verbal;Tactile;Demo  -CN  --     Sets 2  1  -CN  --     Reps 2  10  -CN  --        Exercise 3    Exercise Name 3  standing chin tucks  -CN  --     Cueing 3  Verbal;Tactile;Demo  -CN  --     Sets 3  1  -CN  --     Reps 3  5  -CN  --     Time 3  5s  -CN  --        Exercise 4    Exercise Name 4  UBE  -CN  --     Cueing 4  Verbal  -CN  --     Time 4  4 min  -CN  --        Exercise 5    Exercise Name 5  pulleys  -CN  --     Cueing 5  Verbal  -CN  --     Reps 5  15  -CN  --     Time 5  2-3sec  -CN  --     Additional Comments  bilateral; keep shoulder down  -CN  --        Exercise 6    Exercise Name 6  shoulder flex AAROM  -CN  --     Cueing 6  Verbal  -CN  --     Reps 6  15  -CN  --     Time 6  hands clasped  -CN  --        Exercise 8    Exercise Name 8  B ER isometric  -CN  --     Cueing 8  Verbal  -CN  --     Reps 8   10  -CN  --     Time 8  5  -CN  --     Additional Comments  into belt  -CN  --        Exercise 9    Exercise Name 9  shoulder ext  -CN  --     Cueing 9  Verbal  -CN  --     Reps 9  10  -CN  --     Time 9  2-3sec  -CN  --     Additional Comments  RTB  -CN  --        Exercise 10    Exercise Name 10  Supine HA  -CN  --     Cueing 10  Verbal;Demo  -CN  --     Reps 10  10  -CN  --     Additional Comments  YTB  -CN  --       User Key  (r) = Recorded By, (t) = Taken By, (c) = Cosigned By    Initials Name Provider Type    Suad Thomas, PT Physical Therapist                      Manual Rx (last 36 hours)      Manual Treatments     Row Name 05/19/21 0700             Total Minutes    81503 - PT Manual Therapy Minutes  12  -CN         Manual Rx 1    Manual Rx 1 Location  PROM flex/ER  -CN      Manual Rx 1 Type  gentle oscillations  -CN      Manual Rx 1 Grade  GH joint mobs posterior/inferior grade II-III  -CN        User Key  (r) = Recorded By, (t) = Taken By, (c) = Cosigned By    Initials Name Provider Type    Suad Thomas, PT Physical Therapist          PT OP Goals     Row Name 05/19/21 0900          PT Short Term Goals    STG Date to Achieve  05/31/21  -CN     STG 1  Independent with HEP  -CN     STG 1 Progress  Ongoing  -CN     STG 1 Progress Comments  Given HEP handouts today.   -CN     STG 2  Decrease subjective pain rating from 8/10 to 5/10  -CN     STG 2 Progress  Ongoing  -CN     STG 2 Progress Comments  P reports pain rated 4/10 today.   -CN     STG 3  Pt demosntrates improved awareness of standing posture and can self correct  -CN     STG 3 Progress  Ongoing  -CN        Long Term Goals    LTG Date to Achieve  06/14/21  -CN     LTG 1  Improved Strength of L UE to rom 4- to 4+/5  -CN     LTG 1 Progress  Ongoing  -CN     LTG 2  Pt with decreased subjective pain rating to 3-4/10 with simulated scanning motion.  -CN     LTG 2 Progress  Ongoing  -CN     LTG 3  Improved Quick Dash score to 30 or  less.  -GAL     LTG 3 Progress  Ongoing  -GAL       User Key  (r) = Recorded By, (t) = Taken By, (c) = Cosigned By    Initials Name Provider Type    Suad Thomas, PT Physical Therapist          Therapy Education  Education Details: Access Code [email or web address]  Given: HEP, Symptoms/condition management, Pain management  Program: Reinforced  How Provided: Verbal, Demonstration  Provided to: Patient  Level of Understanding: Verbalized, Demonstrated              Time Calculation:   Start Time: 0830  Stop Time: 0910  Time Calculation (min): 40 min  Timed Charges  37069 - PT Therapeutic Exercise Minutes: 28  75661 - PT Manual Therapy Minutes: 12  Total Minutes  Timed Charges Total Minutes: 28   Total Minutes: 28  Therapy Charges for Today     Code Description Service Date Service Provider Modifiers Qty    33709055719  PT THER PROC EA 15 MIN 5/19/2021 Suad Granados, PT GP 2    [id number] HC PT MANUAL THERAPY EA 15 MIN 5/19/2021 Suad Granados, PT GP 1                    Suad Granados PT  5/19/2021

## 2021-05-24 ENCOUNTER — HOSPITAL ENCOUNTER (OUTPATIENT)
Dept: PHYSICAL THERAPY | Facility: HOSPITAL | Age: 31
Setting detail: THERAPIES SERIES
Discharge: HOME | End: 2021-05-24

## 2021-05-24 DIAGNOSIS — M25.512 LEFT SHOULDER PAIN, UNSPECIFIED CHRONICITY: ICD-10-CM

## 2021-05-24 DIAGNOSIS — M77.8 TENDINITIS OF LEFT SHOULDER: Primary | ICD-10-CM

## 2021-05-24 PROCEDURE — 97140 MANUAL THERAPY 1/> REGIONS: CPT

## 2021-05-24 PROCEDURE — 97110 THERAPEUTIC EXERCISES: CPT

## 2021-05-25 NOTE — THERAPY TREATMENT NOTE
Outpatient Physical Therapy Ortho Treatment Note  [facility]     Patient Name: Beatriz Boland  : 1990  MRN: [id number]  Today's Date: 2021      Visit Date: 2021    Visit Dx:    ICD-10-CM ICD-9-CM   1. Tendinitis of left shoulder  M77.8 726.10   2. Left shoulder pain, unspecified chronicity  M25.512 719.41       Patient Active Problem List   Diagnosis   • Depression   • Mental developmental delay   • GERD without esophagitis   • Scoliosis   • Amblyopia   • Back pain   • Chronic left shoulder pain   • Muscle spasm   • Seasonal allergies   • Panic attacks   • Anxiety   • Mild intermittent asthma without complication   • Chest pain on breathing   • Nicotine vapor product user   • Class 3 obesity without serious comorbidity with body mass index (BMI) of 40.0 to 44.9 in adult   • Acute UTI (urinary tract infection)   • Obesity (BMI 30-39.9)   • Tobacco abuse   • Septicemia due to E. coli (CMS/[facility])   • Pyelonephritis of left kidney   • Sepsis (CMS/HCC)   • Asthma        Past Medical History:   Diagnosis Date   • Allergic    • Amblyopia    • Anxiety    • Back pain    • Cleft palate    • Depression    • Developmental delay    • GERD (gastroesophageal reflux disease)     followed by GI, Dr. Wesley Ferro   • Impetigo    • Kidney abscess    • Kidney stone    • Muscle spasm    • Obesity (BMI 30-39.9)    • Recurrent otitis media    • Scoliosis    • Sinus infection     recurrent   • Sprain of shoulder, left 2016        Past Surgical History:   Procedure Laterality Date   • ABSCESS DRAINAGE      kidney   • ADENOIDECTOMY     • EAR TUBES     • PHARYNGEAL FLAP      for speech   • TONSILLECTOMY                 21 0800   Subjective Comments   Subjective Comments It was hurting yesterday but it went away after a hot shower. It is not too bad today.    Subjective Pain   Able to rate subjective pain? yes   Pre-Treatment Pain Level 2   Total Minutes   72164 - PT Therapeutic Exercise Minutes 29    Exercise 1   Exercise Name 1 scap retraction   Cueing 1 Verbal;Tactile;Demo   Sets 1 1   Reps 1 10   Time 1 2-3sec   Additional Comments RTB   Exercise 2   Exercise Name 2 shoulder rolls   Cueing 2 Verbal;Tactile;Demo   Sets 2 1   Reps 2 10   Exercise 3   Exercise Name 3 standing chin tucks   Cueing 3 Verbal;Tactile;Demo   Sets 3 1   Reps 3 5   Time 3 5s   Exercise 4   Exercise Name 4 UBE   Cueing 4 Verbal   Time 4 4 min   Exercise 5   Exercise Name 5 pulleys   Cueing 5 Verbal   Reps 5 15   Time 5 2-3sec   Exercise 6   Exercise Name 6 shoulder flex AAROM   Cueing 6 Verbal   Reps 6 15   Time 6 hands clasped   Exercise 8   Exercise Name 8 B ER isometric   Cueing 8 Verbal   Reps 8 10   Time 8 5   Additional Comments into belt   Exercise 9   Exercise Name 9 shoulder ext   Cueing 9 Verbal   Reps 9 10   Time 9 2-3sec   Additional Comments RTB   Exercise 10   Exercise Name 10 Supine HA   Cueing 10 Verbal;Demo   Reps 10 10   Additional Comments YTB   Exercise 11   Exercise Name 11 ER/IR isometric, 2 steps   Cueing 11 Verbal;Demo   Reps 11 5 ea   Additional Comments YTB               PT Assessment/Plan     Row Name 05/24/21 1003          PT Assessment    Assessment Comments  Pt reporting low pain levels and able to perform current program with encouragement for participation in strengthening. Added ER/IR walk outs with good tolerance and many cues for correct form. Pt concered about progressing strengtheing too quickly to avoid flare up and educated on expected soreness following PT session and HEP.  -CN        PT Plan    PT Plan Comments  Continue with scapular strengthening as tolerated. Consider PNF patterns and supine ABCs next visit.  -CN       User Key  (r) = Recorded By, (t) = Taken By, (c) = Cosigned By    Initials Name Provider Type    Suad Thomas, PT Physical Therapist                  05/24/21 0900   PT Short Term Goals   STG Date to Achieve 05/31/21   STG 1 Independent with HEP   STG 1 Progress  Ongoing   STG 1 Progress Comments Requires cues for form with rows and pulley to dec UT activation.    STG 2 Decrease subjective pain rating from 8/10 to 5/10   STG 2 Progress Ongoing   STG 2 Progress Comments Reporting pain rated 2/10 today.    STG 3 Pt demosntrates improved awareness of standing posture and can self correct   STG 3 Progress Ongoing   Long Term Goals   LTG Date to Achieve 06/14/21   LTG 1 Improved Strength of L UE to rom 4- to 4+/5   LTG 1 Progress Ongoing   LTG 2 Pt with decreased subjective pain rating to 3-4/10 with simulated scanning motion.   LTG 2 Progress Ongoing   LTG 3 Improved Quick Dash score to 30 or less.   LTG 3 Progress Ongoing                 Manual Rx (last 36 hours)      Manual Treatments     Row Name 05/24/21 0700             Total Minutes    68030 - PT Manual Therapy Minutes  12  -CN         Manual Rx 1    Manual Rx 1 Location  PROM flex/ER  -CN      Manual Rx 1 Type  gentle oscillations  -CN      Manual Rx 1 Grade  GH joint mobs posterior/inferior grade II-III  -CN        User Key  (r) = Recorded By, (t) = Taken By, (c) = Cosigned By    Initials Name Provider Type    Suad Thomas, PT Physical Therapist              Therapy Education  Given: HEP, Symptoms/condition management, Pain management  Program: Reinforced  How Provided: Verbal, Demonstration  Provided to: Patient  Level of Understanding: Verbalized, Demonstrated              Time Calculation:   Start Time: 0835  Stop Time: 0916  Time Calculation (min): 41 min  Timed Charges  99089 - PT Therapeutic Exercise Minutes: 29  57055 - PT Manual Therapy Minutes: 12  Total Minutes  Timed Charges Total Minutes: 29   Total Minutes: 29  Therapy Charges for Today     Code Description Service Date Service Provider Modifiers Qty    52598224168 HC PT THER PROC EA 15 MIN 5/24/2021 Suad Granados, PT GP 2    [id number] HC PT MANUAL THERAPY EA 15 MIN 5/24/2021 Suad Granados, PT GP 1                     Suad Granados, PT  5/24/2021

## 2021-05-26 ENCOUNTER — HOSPITAL ENCOUNTER (OUTPATIENT)
Dept: PHYSICAL THERAPY | Facility: HOSPITAL | Age: 31
Setting detail: THERAPIES SERIES
Discharge: HOME | End: 2021-05-26

## 2021-05-26 DIAGNOSIS — M25.512 LEFT SHOULDER PAIN, UNSPECIFIED CHRONICITY: ICD-10-CM

## 2021-05-26 DIAGNOSIS — M77.8 TENDINITIS OF LEFT SHOULDER: Primary | ICD-10-CM

## 2021-05-26 PROCEDURE — 97140 MANUAL THERAPY 1/> REGIONS: CPT

## 2021-05-26 PROCEDURE — 97110 THERAPEUTIC EXERCISES: CPT

## 2021-05-26 NOTE — THERAPY TREATMENT NOTE
Outpatient Physical Therapy Ortho Treatment Note  [facility]     Patient Name: Beatriz Boland  : 1990  MRN: [id number]  Today's Date: 2021      Visit Date: 2021    Visit Dx:    ICD-10-CM ICD-9-CM   1. Tendinitis of left shoulder  M77.8 726.10   2. Left shoulder pain, unspecified chronicity  M25.512 719.41       Patient Active Problem List   Diagnosis   • Depression   • Mental developmental delay   • GERD without esophagitis   • Scoliosis   • Amblyopia   • Back pain   • Chronic left shoulder pain   • Muscle spasm   • Seasonal allergies   • Panic attacks   • Anxiety   • Mild intermittent asthma without complication   • Chest pain on breathing   • Nicotine vapor product user   • Class 3 obesity without serious comorbidity with body mass index (BMI) of 40.0 to 44.9 in adult   • Acute UTI (urinary tract infection)   • Obesity (BMI 30-39.9)   • Tobacco abuse   • Septicemia due to E. coli (CMS/[facility])   • Pyelonephritis of left kidney   • Sepsis (CMS/[facility])   • Asthma        Past Medical History:   Diagnosis Date   • Allergic    • Amblyopia    • Anxiety    • Back pain    • Cleft palate    • Depression    • Developmental delay    • GERD (gastroesophageal reflux disease)     followed by GI, Dr. Wesley Ferro   • Impetigo    • Kidney abscess    • Kidney stone    • Muscle spasm    • Obesity (BMI 30-39.9)    • Recurrent otitis media    • Scoliosis    • Sinus infection     recurrent   • Sprain of shoulder, left 2016        Past Surgical History:   Procedure Laterality Date   • ABSCESS DRAINAGE      kidney   • ADENOIDECTOMY     • EAR TUBES     • PHARYNGEAL FLAP      for speech   • TONSILLECTOMY                         PT Assessment/Plan     Row Name 21 0909          PT Assessment    Assessment Comments  Pt with reducing pain levels in L shoulder, however reporting slight inc in R shoulder pain today. Able to tolerate addition of supine star and ABC, requiring 1 rest break with ABCs on the R  due to numbness in R hand. Pt with full ROM and minimal cues required for HEP today.  -CN        PT Plan    PT Plan Comments  Likely D/C manual next visit and continue to progress strengthening to pt's tolerance. May consider SL arcs and alt shoulder flex over noodle next visit per reports of numbness in hands at times.  -CN       User Key  (r) = Recorded By, (t) = Taken By, (c) = Cosigned By    Initials Name Provider Type    Suad Thomas, PT Physical Therapist            OP Exercises     Row Name 05/26/21 0800 05/26/21 0700          Subjective Comments    Subjective Comments  I am tired today, the shoulder is ok though. I am feeling pain higher up in the shoulder. I am also feeling some in the R shoulder today. It is shooting sometimes.   -CN  --        Subjective Pain    Able to rate subjective pain?  yes  -CN  --     Pre-Treatment Pain Level  2  -CN  --        Total Minutes    88056 - PT Therapeutic Exercise Minutes  30  -CN  --     18376 - PT Manual Therapy Minutes  --  10  -CN        Exercise 1    Exercise Name 1  scap retraction  -CN  --     Cueing 1  Verbal;Tactile;Demo  -CN  --     Sets 1  1  -CN  --     Reps 1  10  -CN  --     Time 1  2-3sec  -CN  --     Additional Comments  RTB  -CN  --        Exercise 2    Exercise Name 2  shoulder rolls  -CN  --     Cueing 2  Verbal;Tactile;Demo  -CN  --     Sets 2  1  -CN  --     Reps 2  10  -CN  --        Exercise 4    Exercise Name 4  UBE  -CN  --     Cueing 4  Verbal  -CN  --     Time 4  4 min  -CN  --        Exercise 5    Exercise Name 5  pulleys  -CN  --     Cueing 5  Verbal  -CN  --     Reps 5  15  -CN  --     Time 5  2-3sec  -CN  --        Exercise 6    Exercise Name 6  shoulder flex AAROM  -CN  --     Cueing 6  Verbal  -CN  --     Reps 6  15  -CN  --     Time 6  hands clasped  -CN  --        Exercise 8    Exercise Name 8  B ER isometric  -CN  --     Cueing 8  Verbal  -CN  --     Reps 8  10  -CN  --     Time 8  5  -CN  --     Additional Comments   into belt  -CN  --        Exercise 10    Exercise Name 10  Supine star  -CN  --     Cueing 10  Verbal;Demo  -CN  --     Reps 10  10  -CN  --     Additional Comments  YTB  -CN  --        Exercise 11    Exercise Name 11  ER/IR isometric, 2 steps  -CN  --     Cueing 11  Verbal;Demo  -CN  --     Reps 11  5 ea  -CN  --     Additional Comments  YTB  -CN  --        Exercise 12    Exercise Name 12  Supine ABCs with slight protraction  -CN  --     Cueing 12  Verbal;Demo  -CN  --     Reps 12  1 B  -CN  --     Additional Comments  1 rest break required on the R due to pain in hand  -CN  --       User Key  (r) = Recorded By, (t) = Taken By, (c) = Cosigned By    Initials Name Provider Type    Suad Thomas, ZELDA Physical Therapist                      Manual Rx (last 36 hours)      Manual Treatments     Row Name 05/26/21 0700             Total Minutes    70777 - PT Manual Therapy Minutes  10  -CN         Manual Rx 1    Manual Rx 1 Location  PROM flex/ER  -CN      Manual Rx 1 Type  gentle oscillations  -CN      Manual Rx 1 Grade  GH joint mobs posterior/inferior grade II-III  -CN        User Key  (r) = Recorded By, (t) = Taken By, (c) = Cosigned By    Initials Name Provider Type    Suad Thomas, ZELDA Physical Therapist              Therapy Education  Given: HEP, Symptoms/condition management, Pain management  Program: Reinforced  How Provided: Verbal, Demonstration  Provided to: Patient  Level of Understanding: Verbalized, Demonstrated              Time Calculation:   Start Time: 0835  Stop Time: 0915  Time Calculation (min): 40 min  Timed Charges  31139 - PT Therapeutic Exercise Minutes: 30  56769 - PT Manual Therapy Minutes: 10  Total Minutes  Timed Charges Total Minutes: 30   Total Minutes: 30  Therapy Charges for Today     Code Description Service Date Service Provider Modifiers Qty    47648175674  PT THER PROC EA 15 MIN 5/26/2021 Suad Granados, PT GP 2    [id number]  PT MANUAL THERAPY EA  15 MIN 5/26/2021 Suad Granados, PT GP 1                    Suad Granados, PT  5/26/2021

## 2021-06-02 ENCOUNTER — HOSPITAL ENCOUNTER (OUTPATIENT)
Dept: PHYSICAL THERAPY | Facility: HOSPITAL | Age: 31
Setting detail: THERAPIES SERIES
Discharge: HOME | End: 2021-06-02

## 2021-06-02 DIAGNOSIS — M77.8 TENDINITIS OF LEFT SHOULDER: Primary | ICD-10-CM

## 2021-06-02 DIAGNOSIS — M25.512 LEFT SHOULDER PAIN, UNSPECIFIED CHRONICITY: ICD-10-CM

## 2021-06-02 PROCEDURE — 97110 THERAPEUTIC EXERCISES: CPT

## 2021-06-02 NOTE — THERAPY TREATMENT NOTE
Outpatient Physical Therapy Ortho Treatment Note  [facility]     Patient Name: Beatriz Boland  : 1990  MRN: [id number]  Today's Date: 2021      Visit Date: 2021    Visit Dx:    ICD-10-CM ICD-9-CM   1. Tendinitis of left shoulder  M77.8 726.10   2. Left shoulder pain, unspecified chronicity  M25.512 719.41       Patient Active Problem List   Diagnosis   • Depression   • Mental developmental delay   • GERD without esophagitis   • Scoliosis   • Amblyopia   • Back pain   • Chronic left shoulder pain   • Muscle spasm   • Seasonal allergies   • Panic attacks   • Anxiety   • Mild intermittent asthma without complication   • Chest pain on breathing   • Nicotine vapor product user   • Class 3 obesity without serious comorbidity with body mass index (BMI) of 40.0 to 44.9 in adult   • Acute UTI (urinary tract infection)   • Obesity (BMI 30-39.9)   • Tobacco abuse   • Septicemia due to E. coli (CMS/[facility])   • Pyelonephritis of left kidney   • Sepsis (CMS/[facility])   • Asthma        Past Medical History:   Diagnosis Date   • Allergic    • Amblyopia    • Anxiety    • Back pain    • Cleft palate    • Depression    • Developmental delay    • GERD (gastroesophageal reflux disease)     followed by GI, Dr. Wesley Ferro   • Impetigo    • Kidney abscess    • Kidney stone    • Muscle spasm    • Obesity (BMI 30-39.9)    • Recurrent otitis media    • Scoliosis    • Sinus infection     recurrent   • Sprain of shoulder, left 2016        Past Surgical History:   Procedure Laterality Date   • ABSCESS DRAINAGE      kidney   • ADENOIDECTOMY     • EAR TUBES     • PHARYNGEAL FLAP      for speech   • TONSILLECTOMY                         PT Assessment/Plan     Row Name 21 0800          PT Assessment    Assessment Comments  Ms. Boland returns to therapy reporting much improvement in symptoms since beginning therapy, states on a scale from 1-10 she is 9.5/10 of where she would like to be. Pt. 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 Pt. has now met 1/3 STGs and 2/3 LTGs and making progress toward remaining goals. Pt. requests to perform all exercises in standing due to having not had breakfast, transitioned PNF star to standing and increased resistance on rows and shoulder extension. Pt. tolerated well, pt. improving posutral awareness with initial exercises, still requires cues with added exercises. Held on manual this date to allow for increased time with ther ex due to minimal pain levels and pt. reports of improvement at this time. Anticipate pt. will be ready for D/C to independent management following remaining 2 sessions.  -        PT Plan    PT Plan Comments  Work on finalizing HEP  -       User Key  (r) = Recorded By, (t) = Taken By, (c) = Cosigned By    Initials Name Provider Type     Nguyen Mackay, PT Physical Therapist            OP Exercises     Row Name 06/02/21 0800             Subjective Comments    Subjective Comments  I feel therapy has helped me to a 9.5/10 in terms of back to my normal self, it has really helped a lot  -         Subjective Pain    Able to rate subjective pain?  yes  -MH      Pre-Treatment Pain Level  1  -MH         Total Minutes    32319 - PT Therapeutic Exercise Minutes  39  -MH         Exercise 1    Exercise Name 1  scap retraction  -MH      Cueing 1  Verbal;Tactile;Demo  -MH      Sets 1  1  -MH      Reps 1  10  -MH      Time 1  2-3sec  -MH      Additional Comments  GTB  -MH         Exercise 2    Exercise Name 2  shoulder rolls  -MH      Cueing 2  Verbal;Tactile;Demo  -MH      Sets 2  1  -MH      Reps 2  10  -MH         Exercise 4    Exercise Name 4  UBE  -MH      Cueing 4  Verbal  -MH      Time 4  4 min  -MH         Exercise 5    Exercise Name 5  pulleys  -      Cueing 5  Verbal  -MH      Reps 5  20  -MH      Time 5  2-3sec  -MH         Exercise 6    Exercise Name 6  wall  wash  -MH      Cueing 6  Verbal  -MH      Reps 6  15  -MH      Time 6  towel   -      Additional Comments  for flexion AAROM  -         Exercise 8    Exercise Name 8  --  -MH      Cueing 8  --  -MH      Reps 8  --  -MH      Time 8  --  -MH         Exercise 9    Exercise Name 9  shoulder ext  -MH      Cueing 9  Verbal  -MH      Reps 9  10  -MH      Time 9  2-3sec  -MH      Additional Comments  GTB  -MH         Exercise 10    Exercise Name 10  standing star  -MH      Cueing 10  Verbal;Demo  -MH      Reps 10  10  -MH      Additional Comments  YTB  -         Exercise 11    Exercise Name 11  ER/IR isometric, 2 steps  -MH      Cueing 11  Verbal;Demo  -MH      Reps 11  5 ea  -MH      Time 11  towel under arm  -      Additional Comments  YTB  -         Exercise 12    Exercise Name 12  standing ABCs with slight protraction  -      Cueing 12  Verbal;Demo  -      Reps 12  1 B  -MH      Time 12  shoulder at 90 degrees  -         Exercise 13    Exercise Name 13  standing open book  -      Cueing 13  Verbal  -      Reps 13  10e  -      Time 13  follow with head  -        User Key  (r) = Recorded By, (t) = Taken By, (c) = Cosigned By    Initials Name Provider Type    Nguyen Santana, PT Physical Therapist                       PT OP Goals     Row Name 06/02/21 0800          PT Short Term Goals    STG Date to Achieve  05/31/21  -     STG 1  Independent with HEP  -     STG 1 Progress  Ongoing  -     STG 2  Decrease subjective pain rating from 8/10 to 5/10  -     STG 2 Progress  Met  -     STG 2 Progress Comments  <1/10; consistently <2/10 over past week  -     STG 3  Pt demosntrates improved awareness of standing posture and can self correct  -     STG 3 Progress  Ongoing  -     STG 3 Progress Comments  intermittent cues for postural correction in clinic  -        Long Term Goals    LTG Date to Achieve  06/14/21  -     LTG 1  Improved Strength of L UE to rom 4- to 4+/5  -     LTG 1  Progress  Ongoing  -     LTG 2  Pt with decreased subjective pain rating to 3-4/10 with simulated scanning motion.  -     LTG 2 Progress  Met  -     LTG 2 Progress Comments  1-2/10 with simulated motion in clinic, mild tightness. Pt. has returned to scanning some items but not cleared to scan large, heavy items  -     LTG 3  Improved Quick Dash score to 30 or less.  -     LTG 3 Progress  Met  [facility]  4.55  -       User Key  (r) = Recorded By, (t) = Taken By, (c) = Cosigned By    Initials Name Provider Type    Nguyen Santana, ZELDA Physical Therapist          Therapy Education  Education Details: Updated HEP Access Code: [email or web address]  Given: HEP, Symptoms/condition management  Program: Reinforced, Progressed  How Provided: Verbal, Demonstration, Written  Provided to: Patient  Level of Understanding: Verbalized, Demonstrated    Outcome Measure Options: Quick DASH  Quick DASH  Open a tight or new jar.: No Difficulty  Do heavy household chores (e.g., wash walls, wash floors): No Difficulty  Carry a shopping bag or briefcase: No Difficulty  Wash your back: No Difficulty  Use a knife to cut food: No Difficulty  Recreational activities in which you take some force or impact through your arm, should or hand (e.g. golf, hammering, tennis, etc.): No Difficulty  During the past week, to what extent has your arm, shoulder, or hand problem interfered with your normal social activites with family, friends, neighbors or groups?: Not at all  During the past week, were you limited in your work or other regular daily activities as a result of your arm, shoulder or hand problem?: Not limited at all  Arm, Shoulder, or hand pain: Mild  Tingling (pins and needles) in your arm, shoulder, or hand: Mild  During the past week, how much difficulty have you had sleeping because of the pain in your arm, shoulder or hand?: No difficulty  Number of Questions Answered: 11  Quick DASH Score: 4.55         Time  Calculation:   Start Time: 0835  Stop Time: 0914  Time Calculation (min): 39 min  Total Timed Code Minutes- PT: 39 minute(s)  Timed Charges  10955 - PT Therapeutic Exercise Minutes: 39  Total Minutes  Timed Charges Total Minutes: 39   Total Minutes: 39  Therapy Charges for Today     Code Description Service Date Service Provider Modifiers Qty    89944256985 HC PT THER PROC EA 15 MIN 6/2/2021 Nguyen Mackay, PT GP 3          PT G-Codes  Outcome Measure Options: Quick DASH  Quick DASH Score: 4.55         Nguyen Mackay, PT  6/2/2021

## 2021-06-07 ENCOUNTER — HOSPITAL ENCOUNTER (OUTPATIENT)
Dept: PHYSICAL THERAPY | Facility: HOSPITAL | Age: 31
Setting detail: THERAPIES SERIES
Discharge: HOME | End: 2021-06-07

## 2021-06-07 ENCOUNTER — TRANSCRIBE ORDERS (OUTPATIENT)
Dept: PHYSICAL THERAPY | Facility: HOSPITAL | Age: 31
End: 2021-06-07

## 2021-06-07 DIAGNOSIS — M25.512 LEFT SHOULDER PAIN, UNSPECIFIED CHRONICITY: ICD-10-CM

## 2021-06-07 DIAGNOSIS — M54.9 BACK PAIN, UNSPECIFIED BACK LOCATION, UNSPECIFIED BACK PAIN LATERALITY, UNSPECIFIED CHRONICITY: Primary | ICD-10-CM

## 2021-06-07 DIAGNOSIS — M77.8 TENDINITIS OF LEFT SHOULDER: Primary | ICD-10-CM

## 2021-06-07 PROCEDURE — 97110 THERAPEUTIC EXERCISES: CPT

## 2021-06-07 NOTE — THERAPY DISCHARGE NOTE
Outpatient Physical Therapy Ortho Treatment Note/Discharge Summary  [facility]     Patient Name: Beatriz Boland  : 1990  MRN: [id number]  Today's Date: 2021      Visit Date: 2021    Visit Dx:    ICD-10-CM ICD-9-CM   1. Tendinitis of left shoulder  M77.8 726.10   2. Left shoulder pain, unspecified chronicity  M25.512 719.41       Patient Active Problem List   Diagnosis   • Depression   • Mental developmental delay   • GERD without esophagitis   • Scoliosis   • Amblyopia   • Back pain   • Chronic left shoulder pain   • Muscle spasm   • Seasonal allergies   • Panic attacks   • Anxiety   • Mild intermittent asthma without complication   • Chest pain on breathing   • Nicotine vapor product user   • Class 3 obesity without serious comorbidity with body mass index (BMI) of 40.0 to 44.9 in adult   • Acute UTI (urinary tract infection)   • Obesity (BMI 30-39.9)   • Tobacco abuse   • Septicemia due to E. coli (CMS/[facility])   • Pyelonephritis of left kidney   • Sepsis (CMS/[facility])   • Asthma        Past Medical History:   Diagnosis Date   • Allergic    • Amblyopia    • Anxiety    • Back pain    • Cleft palate    • Depression    • Developmental delay    • GERD (gastroesophageal reflux disease)     followed by GI, Dr. Wesley Ferro   • Impetigo    • Kidney abscess    • Kidney stone    • Muscle spasm    • Obesity (BMI 30-39.9)    • Recurrent otitis media    • Scoliosis    • Sinus infection     recurrent   • Sprain of shoulder, left 2016        Past Surgical History:   Procedure Laterality Date   • ABSCESS DRAINAGE      kidney   • ADENOIDECTOMY     • EAR TUBES     • PHARYNGEAL FLAP      for speech   • TONSILLECTOMY         PT Ortho     Row Name 21 0800       MMT (Manual Muscle Testing)    Lt Upper Ext  Lt Shoulder Flexion;Lt Shoulder ABduction 4/5 MMT for both  -CN (r) NC (t) CN (c)      User Key  (r) = Recorded By, (t) = Taken By, (c) = Cosigned By    Initials Name Provider Type    GAL Granados  Suad Rees, PT Physical Therapist    Charly Harriosn, PT Student PT Student                      PT Assessment/Plan     Row Name 06/07/21 0900          PT Assessment    Assessment Comments  Patient presents today with overall improved condition with her shoulder. She is reporting that she is able to work with minimal pain and is feeling confident with her HEP at home. She showed improvement with L Shoulder Flex and ABD from 4-/5 to 4/5 MMT. Pt demonstrated independence with exercise today. Patient is to be discharged today to home with HEP.    -CN (r) NC (t) CN (c)        PT Plan    PT Plan Comments  D/C to home with HEP  -CN (r) NC (t) CN (c)       User Key  (r) = Recorded By, (t) = Taken By, (c) = Cosigned By    Initials Name Provider Type    Suad Thomas, PT Physical Therapist    Charly Harrison, PT Student PT Student              OP Exercises     Row Name 06/07/21 0800             Subjective Comments    Subjective Comments  Work hasnt caused me much pain anymore. Dr. Mancini  wants me to continue therapy for my back.  -CN (r) NC (t) CN (c)         Subjective Pain    Able to rate subjective pain?  yes  -CN (r) NC (t) CN (c)      Pre-Treatment Pain Level  1  -CN (r) NC (t) CN (c)         Total Minutes    03128 - PT Therapeutic Exercise Minutes  40  -CN (r) NC (t) CN (c)         Exercise 1    Exercise Name 1  scap retraction  -CN (r) NC (t) CN (c)      Cueing 1  Verbal;Tactile;Demo  -CN (r) NC (t) CN (c)      Sets 1  1  -CN (r) NC (t) CN (c)      Reps 1  10  -CN (r) NC (t) CN (c)      Time 1  2-3sec  -CN (r) NC (t) CN (c)      Additional Comments  GTB  -CN (r) NC (t) CN (c)         Exercise 2    Exercise Name 2  shoulder rolls  -CN (r) NC (t) CN (c)      Cueing 2  Verbal;Tactile;Demo  -CN (r) NC (t) CN (c)      Sets 2  1  -CN (r) NC (t) CN (c)      Reps 2  10  -CN (r) NC (t) CN (c)         Exercise 4    Exercise Name 4  UBE  -CN (r) NC (t) CN (c)      Cueing 4  Verbal  -CN (r) NC (t) CN (c)       Time 4  4 min  -CN (r) NC (t) CN (c)         Exercise 5    Exercise Name 5  pulleys  -CN (r) NC (t) CN (c)      Cueing 5  Verbal  -CN (r) NC (t) CN (c)      Reps 5  20  -CN (r) NC (t) CN (c)      Time 5  2-3sec  -CN (r) NC (t) CN (c)         Exercise 6    Exercise Name 6  wall wash  -CN (r) NC (t) CN (c)      Cueing 6  Verbal  -CN (r) NC (t) CN (c)      Reps 6  15  -CN (r) NC (t) CN (c)      Time 6  towel   -CN (r) NC (t) CN (c)         Exercise 9    Exercise Name 9  shoulder ext  -CN (r) NC (t) CN (c)      Cueing 9  Verbal  -CN (r) NC (t) CN (c)      Reps 9  10  -CN (r) NC (t) CN (c)      Time 9  2-3sec  -CN (r) NC (t) CN (c)      Additional Comments  YTB  -CN (r) NC (t) CN (c)         Exercise 10    Exercise Name 10  standing star  -CN (r) NC (t) CN (c)      Cueing 10  Verbal;Demo  -CN (r) NC (t) CN (c)      Reps 10  10  -CN (r) NC (t) CN (c)      Additional Comments  YTB  -CN (r) NC (t) CN (c)         Exercise 12    Exercise Name 12  standing ABCs with slight protraction  -CN (r) NC (t) CN (c)      Cueing 12  Verbal;Demo  -CN (r) NC (t) CN (c)      Reps 12  1 B  -CN (r) NC (t) CN (c)      Time 12  shoulder at 90 degrees  -CN (r) NC (t) CN (c)         Exercise 13    Exercise Name 13  standing open book  -CN (r) NC (t) CN (c)      Cueing 13  Verbal  -CN (r) NC (t) CN (c)      Reps 13  10e  -CN (r) NC (t) CN (c)      Time 13  follow with head  -CN (r) NC (t) CN (c)        User Key  (r) = Recorded By, (t) = Taken By, (c) = Cosigned By    Initials Name Provider Type    Suad Thomas, PT Physical Therapist    Charly Harrison, PT Student PT Student                         PT OP Goals     Row Name 06/07/21 0800          PT Short Term Goals    STG Date to Achieve  05/31/21  -CN (r) NC (t) CN (c)     STG 1  Independent with HEP  -CN (r) NC (t) CN (c)     STG 1 Progress  Met  -CN (r) NC (t) CN (c)     STG 1 Progress Comments  Pt verbalized she feels confident with her HEP  -CN (r) NC (t) CN (c)     STG 2   Decrease subjective pain rating from 8/10 to 5/10  -CN (r) NC (t) CN (c)     STG 2 Progress  Met  -CN (r) NC (t) CN (c)     STG 3  Pt demosntrates improved awareness of standing posture and can self correct  -CN (r) NC (t) CN (c)     STG 3 Progress  Ongoing;Partially Met  -CN (r) NC (t) CN (c)     STG 3 Progress Comments  Pt needs minimal cueing throughout session with exercises  -CN (r) NC (t) CN (c)        Long Term Goals    LTG Date to Achieve  06/14/21  -CN (r) NC (t) CN (c)     LTG 1  Improved Strength of L UE to rom 4- to 4+/5  -CN (r) NC (t) CN (c)     LTG 1 Progress  Partially Met  -CN (r) NC (t) CN (c)     LTG 2  Pt with decreased subjective pain rating to 3-4/10 with simulated scanning motion.  -CN (r) NC (t) CN (c)     LTG 2 Progress  Met  -CN (r) NC (t) CN (c)     LTG 3  Improved Quick Dash score to 30 or less.  -CN (r) NC (t) CN (c)     LTG 3 Progress  Met  -CN (r) NC (t) CN (c)     LTG 3 Progress Comments  Pt scored 0% (where 100% is highest disability)  -CN (r) NC (t) CN (c)       User Key  (r) = Recorded By, (t) = Taken By, (c) = Cosigned By    Initials Name Provider Type    Suad Thomas, PT Physical Therapist    Charly Harrison, PT Student PT Student          Therapy Education  Education Details: Patient was educated on the importance of continuing HEP to reduce injury at work.  Given: HEP, Symptoms/condition management, Posture/body mechanics  Program: Reinforced  How Provided: Verbal, Demonstration  Provided to: Patient  Level of Understanding: Verbalized, Demonstrated    Outcome Measure Options: Quick DASH  Quick DASH  Open a tight or new jar.: No Difficulty  Do heavy household chores (e.g., wash walls, wash floors): No Difficulty  Carry a shopping bag or briefcase: No Difficulty  Wash your back: No Difficulty  Use a knife to cut food: No Difficulty  Recreational activities in which you take some force or impact through your arm, should or hand (e.g. golf, hammering, tennis,  etc.): No Difficulty  During the past week, to what extent has your arm, shoulder, or hand problem interfered with your normal social activites with family, friends, neighbors or groups?: Not at all  During the past week, were you limited in your work or other regular daily activities as a result of your arm, shoulder or hand problem?: Not limited at all  Arm, Shoulder, or hand pain: None  Tingling (pins and needles) in your arm, shoulder, or hand: None  During the past week, how much difficulty have you had sleeping because of the pain in your arm, shoulder or hand?: No difficulty  Number of Questions Answered: 11  Quick DASH Score: 0         Time Calculation:   Start Time: 0830  Stop Time: 0915  Time Calculation (min): 45 min  Timed Charges  05756 - PT Therapeutic Exercise Minutes: 40  Total Minutes  Timed Charges Total Minutes: 40   Total Minutes: 40  Therapy Charges for Today     Code Description Service Date Service Provider Modifiers Qty    18276229215 HC PT THER PROC EA 15 MIN 6/7/2021 Charly Castañeda, PT Student GP 3          PT G-Codes  Outcome Measure Options: Quick DASH  Quick DASH Score: 0     OP PT Discharge Summary  Date of Discharge: 06/07/21  Reason for Discharge: Maximum functional potential achieved, Independent  Outcomes Achieved: Patient able to partially acheive established goals  Discharge Destination: Home with home program  Discharge Instructions/Additional Comments: Patient demonstrated today independence with exercises and verbalized importance of adhereance to HEP. Her QD Outcome score of 0% indicates her improvement from recieving therapy. Pt will be evaluated later this week for low back pain.      CHARLY CASTAÑEDA, PT Student  6/7/2021

## 2021-06-09 ENCOUNTER — HOSPITAL ENCOUNTER (OUTPATIENT)
Dept: PHYSICAL THERAPY | Facility: HOSPITAL | Age: 31
Setting detail: THERAPIES SERIES
Discharge: HOME | End: 2021-06-09

## 2021-06-09 DIAGNOSIS — M54.50 CHRONIC LOW BACK PAIN, UNSPECIFIED BACK PAIN LATERALITY, UNSPECIFIED WHETHER SCIATICA PRESENT: Primary | ICD-10-CM

## 2021-06-09 DIAGNOSIS — R29.3 POOR POSTURE: ICD-10-CM

## 2021-06-09 DIAGNOSIS — R29.898 IMPAIRED FLEXIBILITY OF LOWER EXTREMITY: ICD-10-CM

## 2021-06-09 DIAGNOSIS — G89.29 CHRONIC LOW BACK PAIN, UNSPECIFIED BACK PAIN LATERALITY, UNSPECIFIED WHETHER SCIATICA PRESENT: Primary | ICD-10-CM

## 2021-06-09 PROCEDURE — 97110 THERAPEUTIC EXERCISES: CPT

## 2021-06-09 PROCEDURE — 97161 PT EVAL LOW COMPLEX 20 MIN: CPT

## 2021-06-09 NOTE — THERAPY EVALUATION
Outpatient Physical Therapy Ortho Initial Evaluation  [facility]     Patient Name: Beatriz Boland  : 1990  MRN: [id number]  Today's Date: 2021      Visit Date: 2021    Patient Active Problem List   Diagnosis   • Depression   • Mental developmental delay   • GERD without esophagitis   • Scoliosis   • Amblyopia   • Back pain   • Chronic left shoulder pain   • Muscle spasm   • Seasonal allergies   • Panic attacks   • Anxiety   • Mild intermittent asthma without complication   • Chest pain on breathing   • Nicotine vapor product user   • Class 3 obesity without serious comorbidity with body mass index (BMI) of 40.0 to 44.9 in adult   • Acute UTI (urinary tract infection)   • Obesity (BMI 30-39.9)   • Tobacco abuse   • Septicemia due to E. coli (CMS/HCC)   • Pyelonephritis of left kidney   • Sepsis (CMS/HCC)   • Asthma        Past Medical History:   Diagnosis Date   • Allergic    • Amblyopia    • Anxiety    • Back pain    • Cleft palate    • Depression    • Developmental delay    • GERD (gastroesophageal reflux disease)     followed by GI, Dr. Wesley Ferro   • Impetigo    • Kidney abscess    • Kidney stone    • Muscle spasm    • Obesity (BMI 30-39.9)    • Recurrent otitis media    • Scoliosis    • Sinus infection     recurrent   • Sprain of shoulder, left 2016        Past Surgical History:   Procedure Laterality Date   • ABSCESS DRAINAGE      kidney   • ADENOIDECTOMY     • EAR TUBES     • PHARYNGEAL FLAP      for speech   • TONSILLECTOMY         Visit Dx:     ICD-10-CM ICD-9-CM   1. Chronic low back pain, unspecified back pain laterality, unspecified whether sciatica present  M54.5 724.2    G89.29 338.29   2. Poor posture  R29.3 781.92   3. Impaired flexibility of lower extremity  R29.898 781.99         Patient History     Row Name 21 0800             History    Chief Complaint  Pain  -      Type of Pain  Back pain  -      Date Current Problem(s) Began  -- 2013       Brief Description of Current Complaint  Pt. Presents to therapy with reports of back pain that began when she was working for [employer] in 2013. Around 2015 the back pain really progressed and continued working there until August 2020 when she ended up leaving for a multitude of reasons including the back pain. Pt. States the pain during that period was on and off at times because they did try to keep her out of the work that was aggravating to her back. Since quitting [employer] the pain has improved but she still reports having occasional muscle spasms and it can be very painful. Pt. Manages this currently with muscle relaxers.   -      Previous treatment for THIS PROBLEM  Medication  -      Patient/Caregiver Goals  Relieve pain;Return to prior level of function;Improve mobility;Know what to do to help the symptoms  -      Patient's Rating of General Health  Good  -      Occupation/sports/leisure activities   for [employer]  -      What clinical tests have you had for this problem?  X-ray scheduled for XRay Monday; previous XRay showed scoliosis  -      Results of Clinical Tests  scoliosis per pt. report  -      Related/Recent Hospitalizations  No  -MH         Pain     Pain Location  Back  -      Pain at Present  1  -      Pain at Worst  10  -MH      Pain Frequency  Intermittent  -      Pain Description  Spasm  -      What Performance Factors Make the Current Problem(s) WORSE?  spasm occurs randomly, sometimes over-work/over-use  -      What Performance Factors Make the Current Problem(s) BETTER?  muscle relaxers, anti-inflammatories, heat  -      Is your sleep disturbed?  No  -      Difficulties at work?  no  -      Difficulties with ADL's?  no  -      Difficulties with recreational activities?  no  -         Fall Risk Assessment    Any falls in the past year:  No  -         Services    Prior Rehab/Home Health Experiences  Yes  -      When was the prior experience with Rehab/Home  Health  2021  -      Where was the prior experience with Rehab/Home Health  [facility] - for shoulder  -MH         Daily Activities    Primary Language  English  -MH      Are you able to read  Yes  -MH      Are you able to write  Yes  -MH      How does patient learn best?  Listening;Reading;Demonstration  -      Does patient have problems with the following?  Depression;Anxiety;Panic Attack  -      Barriers to learning  None  -MH      Pt Participated in POC and Goals  Yes  -         Safety    Are you being hurt, hit, or frightened by anyone at home or in your life?  No  -MH      Are you being neglected by a caregiver  No  -MH      Have you had any of the following issues with  N/A  -MH        User Key  (r) = Recorded By, (t) = Taken By, (c) = Cosigned By    Initials Name Provider Type    Nguyen Santana PT Physical Therapist          PT Ortho     Row Name 06/09/21 0800       Posture/Observations    Alignment Options  Lumbar lordosis;Iliac crests;Rounded shoulders  -MH    Lumbar lordosis  Mild;Decreased  -MH    Iliac crests  Normal  -MH       Quarter Clearing    Quarter Clearing  Lower Quarter Clearing  -MH       Myotomal Screen- Lower Quarter Clearing    Hip flexion (L2)  Bilateral:;4+ (Good +) leans back despite cues for upright posture  -MH    Knee extension (L3)  Bilateral:;4+ (Good +) leans back despite cues for upright posture  -MH    Ankle DF (L4)  Bilateral:;5 (Normal)  -    Ankle PF (S1)  Bilateral:;5 (Normal)  -MH    Knee flexion (S2)  Bilateral:;4 (Good) prone  -MH       Lumbar ROM Screen- Lower Quarter Clearing    Lumbar Flexion  Normal  -MH    Lumbar Extension  Normal  -    Lumbar Lateral Flexion  Impaired 50% full  -MH    Lumbar Rotation  Normal  -       Special Tests/Palpation    Special Tests/Palpation  Lumbar/SI;Hip  -MH       Lumbosacral Accessory Motions    Lumbosacral Accessory Motions Tested?  Yes  -MH    PA Glide- L1  WNL  -MH    PA [place]- L2  WNL  -MH    PA Glide- L3  Bilateral  pain  -MH    PA [place]- L4  Bilateral pain  -MH    PA Glide- L5  Bilateral pain  -MH    PA glide- Sacral base  Bilateral pain  -MH       Lumbosacral Palpation    Lumbosacral Palpation?  Yes  -MH    SI  Tender  -MH    Piriformis  Tender  -MH    Quadratus Lumborum  Tender  -MH    Erector Spinae (Paraspinals)  Tender  -MH       Hip Special Tests    CHAD (hip vs SI pathology)  Bilateral:;Negative  -    FAIR test (piriformis syndrome)  Right:;Positive;Left:;Negative  -MH       General ROM    GENERAL ROM COMMENTS  B hip and knee WFL  -MH       MMT (Manual Muscle Testing)    Rt Lower Ext  Rt Hip Extension;Rt Hip ABduction;Rt Hip Internal (Medial) Rotation;Rt Hip External (Lateral) Rotation  -    Lt Lower Ext  Lt Hip Extension;Lt Hip ABduction;Lt Hip Internal (Medial) Rotation;Lt Hip External (Lateral) Rotation  -       MMT Right Lower Ext    Rt Hip Extension MMT, Gross Movement  (4-/5) good minus  -MH    Rt Hip ABduction MMT, Gross Movement  (3+/5) fair plus  -MH    Rt Hip Internal (Medial) Rotation MMT, Gross Movement  (3/5) fair  -MH    Rt Hip External (Lateral) Rotation MMT, Gross Movement  (3+/5) fair plus  -MH       MMT Left Lower Ext    Lt Hip Extension MMT, Gross Movement  (4-/5) good minus  -MH    Lt Hip ABduction MMT, Gross Movement  (4-/5) good minus  -MH    Lt Hip Internal (Medial) Rotation MMT, Gross Movement  (3/5) fair  -MH    Lt Hip External (Lateral) Rotation MMT, Gross Movement  (3+/5) fair plus  -MH       Sensation    Sensation WNL?  WNL  -MH      User Key  (r) = Recorded By, (t) = Taken By, (c) = Cosigned By    Initials Name Provider Type    Nguyen Santana PT Physical Therapist                      Therapy Education  Education Details: Educated on PT role and POC; discussed expectations/timeframe for healing based on chronicity of pain. Provided HEP, Access Code: [email or web address]  Given: HEP, Symptoms/condition management  Program: New  How Provided: Verbal, Demonstration, Written  Provided to:  Patient  Level of Understanding: Teach back education performed, Verbalized, Demonstrated     PT OP Goals     Row Name 06/09/21 0900          PT Short Term Goals    STG Date to Achieve  06/23/21  -     STG 1  Pt. Will be independent with initial HEP to improve self-management of condition.  -     STG 1 Progress  New  [facility]     STG 2  Pt. Will demonstrate proper log roll technique without cueing to reduce lumbar strain and preserve spine.  -     STG 2 Progress  New  [facility]        Long Term Goals    LTG Date to Achieve  07/10/21  -     LTG 1  Pt. Will be independent with advanced HEP to improve long-term management of condition and independence.  -     LTG 1 Progress  New  [facility]     LTG 2  Pt. Will score </= 5% on BERTIN  (from 16% on initial evaluation) to indicate improved perception of disability.  -     LTG 2 Progress  New  [facility]     LTG 3  Pt. Will demonstrate >/= 4/5 MMT B hip to improve stability/support with prolonged standing/walking.  -     LTG 3 Progress  New  [facility]     LTG 4  Pt. Will demonstrate proper lifting and transfer mechanics to protect lumbar spine with work related tasks.  -     LTG 4 Progress  New  [facility]        Time Calculation    PT Goal Re-Cert Due Date  09/07/21  -       User Key  (r) = Recorded By, (t) = Taken By, (c) = Cosigned By    Initials Name Provider Type     Nguyen Mackay, PT Physical Therapist          PT Assessment/Plan     Row Name 06/09/21 1000          PT Assessment    Functional Limitations  Limitations in community activities;Performance in leisure activities;Performance in sport activities;Performance in work activities  -     Impairments  Endurance;Gait;Impaired muscle length;Joint mobility;Muscle strength;Pain;Poor body mechanics;Posture  -     Assessment Comments  Beatrizlibra Boland is a 30 y.o. year-old female referred to physical therapy for chronic low back pain. Pt. Recently seen in clinic for shoulder pain but it has improved, therefore transitioned to  treating low back pain. She presents with a stable clinical presentation. She has comorbidities and personal factors of increased BMI, scoliosis, depression, anxiety and panic attacks, in addition to working as  for [employer] requiring frequent standing, lifting various size/weight packages, and trunk rotation that may affect her progress in the plan of care. Self scored disability measure of BERTIN was a 16%. She demonstrated impaired B hip strength, tenderness to palpation lumbosacral region, poor body awareness, and decreased flexibility LE. Pt. Denies paresthesias, no changes in bowel/bladder. Currently takes muscle relaxers for occasional back spasms.. Signs and symptoms are consistent with referring diagnosis. She is appropriate for skilled therapy services at this time to address deficits and improve ease with ADLs and improve body mechanics to protect back at work.  -     Please refer to paper survey for additional self-reported information  Yes  -MH     Rehab Potential  Good  -     Patient/caregiver participated in establishment of treatment plan and goals  Yes  -     Patient would benefit from skilled therapy intervention  Yes  -        PT Plan    PT Frequency  2x/week  -     Predicted Duration of Therapy Intervention (PT)  8 visits  -     Planned CPT's?  PT EVAL LOW COMPLEXITY: 21722;PT RE-EVAL: 82904;PT THER PROC EA 15 MIN: 81308;PT THER ACT EA 15 MIN: 54595;PT MANUAL THERAPY EA 15 MIN: 60575;PT GAIT TRAINING EA 15 MIN: 06957;PT NEUROMUSC RE-EDUCATION EA 15 MIN: 80395;PT EVAL AQUA: 15069;PT AQUATIC THERAPY EA 15 MIN: 90108;PT SELF CARE/HOME MGMT/TRAIN EA 15: 93946;PT HOT OR COLD PACK TREAT [facility]ARE;PT ELECTRICAL STIM UNATTEND: ;PT ULTRASOUND EA 15 MIN: 68703;PT TRACTION LUMBAR: 54177;PT SELF CARE/MGMT/TRAIN 15 MIN: 61042;PT THER MASS EA 15 MIN: 63814  -     PT Plan Comments  Consider warm-up NuStep, review PPT, clamshell, hip add, hip abd, STS, shoulder ext + TrA  -       User Key  (r)  = Recorded By, (t) = Taken By, (c) = Cosigned By    Initials Name Provider Type     Nguyen Mackay, PT Physical Therapist            OP Exercises     Row Name 06/09/21 1000             Subjective Comments    Subjective Comments  evaluation  -MH         Total Minutes    62948 - PT Therapeutic Exercise Minutes  8  -MH         Exercise 1    Exercise Name 1  LTR  -MH      Cueing 1  Verbal  -MH      Reps 1  10e  -MH      Time 1  2-3sec  -MH         Exercise 2    Exercise Name 2  piriformis stretch  -MH      Cueing 2  Verbal  -MH      Reps 2  2  -MH      Time 2  20sec  -MH         Exercise 3    Exercise Name 3  PPT  -MH      Cueing 3  Verbal  -MH      Reps 3  10  -MH      Time 3  5sec  -MH        User Key  (r) = Recorded By, (t) = Taken By, (c) = Cosigned By    Initials Name Provider Type     Nguyen Mackay, PT Physical Therapist                        Outcome Measure Options: Modifed Owestry  Modified Oswestry  Modified Oswestry Score/Comments: 8/50 16%      Time Calculation:     Start Time: 0840 (Pt. arrives 837 for 830 appointment, hen needed to fill out paperwork)  Stop Time: 0915  Time Calculation (min): 35 min  Total Timed Code Minutes- PT: 8 minute(s)  Timed Charges  79128 - PT Therapeutic Exercise Minutes: 8  Total Minutes  Timed Charges Total Minutes: 8   Total Minutes: 8     Therapy Charges for Today     Code Description Service Date Service Provider Modifiers Qty    11984673813 HC PT THER PROC EA 15 MIN 6/9/2021 Nguyen Mackay, PT GP 1    08462921078 HC PT EVAL LOW COMPLEXITY 1 6/9/2021 Nguyen Mackay, PT GP 1          PT G-Codes  Outcome Measure Options: Modifed Owestry  Modified Oswestry Score/Comments: 8/50 16%         Nguyen Mackay PT  6/9/2021

## 2021-06-15 ENCOUNTER — HOSPITAL ENCOUNTER (OUTPATIENT)
Dept: PHYSICAL THERAPY | Facility: HOSPITAL | Age: 31
Setting detail: THERAPIES SERIES
Discharge: HOME | End: 2021-06-15

## 2021-06-15 DIAGNOSIS — R29.898 IMPAIRED FLEXIBILITY OF LOWER EXTREMITY: ICD-10-CM

## 2021-06-15 DIAGNOSIS — G89.29 CHRONIC LOW BACK PAIN, UNSPECIFIED BACK PAIN LATERALITY, UNSPECIFIED WHETHER SCIATICA PRESENT: Primary | ICD-10-CM

## 2021-06-15 DIAGNOSIS — M54.50 CHRONIC LOW BACK PAIN, UNSPECIFIED BACK PAIN LATERALITY, UNSPECIFIED WHETHER SCIATICA PRESENT: Primary | ICD-10-CM

## 2021-06-15 DIAGNOSIS — R29.3 POOR POSTURE: ICD-10-CM

## 2021-06-15 PROCEDURE — 97110 THERAPEUTIC EXERCISES: CPT

## 2021-06-15 NOTE — THERAPY TREATMENT NOTE
Outpatient Physical Therapy Ortho Treatment Note  [facility]     Patient Name: Beatriz Boland  : 1990  MRN: [id number]  Today's Date: 6/15/2021      Visit Date: 06/15/2021    Visit Dx:    ICD-10-CM ICD-9-CM   1. Chronic low back pain, unspecified back pain laterality, unspecified whether sciatica present  M54.5 724.2    G89.29 338.29   2. Impaired flexibility of lower extremity  R29.898 781.99   3. Poor posture  R29.3 781.92       Patient Active Problem List   Diagnosis   • Depression   • Mental developmental delay   • GERD without esophagitis   • Scoliosis   • Amblyopia   • Back pain   • Chronic left shoulder pain   • Muscle spasm   • Seasonal allergies   • Panic attacks   • Anxiety   • Mild intermittent asthma without complication   • Chest pain on breathing   • Nicotine vapor product user   • Class 3 obesity without serious comorbidity with body mass index (BMI) of 40.0 to 44.9 in adult   • Acute UTI (urinary tract infection)   • Obesity (BMI 30-39.9)   • Tobacco abuse   • Septicemia due to E. coli (CMS/[facility])   • Pyelonephritis of left kidney   • Sepsis (CMS/[facility])   • Asthma        Past Medical History:   Diagnosis Date   • Allergic    • Amblyopia    • Anxiety    • Back pain    • Cleft palate    • Depression    • Developmental delay    • GERD (gastroesophageal reflux disease)     followed by GI, Dr. Wesley Ferro   • Impetigo    • Kidney abscess    • Kidney stone    • Muscle spasm    • Obesity (BMI 30-39.9)    • Recurrent otitis media    • Scoliosis    • Sinus infection     recurrent   • Sprain of shoulder, left 2016        Past Surgical History:   Procedure Laterality Date   • ABSCESS DRAINAGE      kidney   • ADENOIDECTOMY     • EAR TUBES     • PHARYNGEAL FLAP      for speech   • TONSILLECTOMY                         PT Assessment/Plan     Row Name 06/15/21 1300          PT Assessment    Assessment Comments  Ms. Boland returns to clinic for first follow up from evaluation for LBP. Pt.  admits to having not performed HEP as oten as she should but is working on developing routine. Focused session on core and hip gridle strengthening, pt. with increased difficulty with unilateral clamshell, cues to stabilize with non-moving leg. Pt. with difficulty perofrming PPT but responded well to demonstration vs. tactile and verbal cueing. Discussed with pt. that we will hold on updating HEP at this time to improve pt. performance and independence with current program. Pt. remains appropriate for skilled PT.  -MH        PT Plan    PT Plan Comments  Consider STS, shoulder ext + TrA, update HEP  -MH       User Key  (r) = Recorded By, (t) = Taken By, (c) = Cosigned By    Initials Name Provider Type     Nguyen Mackay, PT Physical Therapist            OP Exercises     Row Name 06/15/21 1300             Subjective Comments    Subjective Comments  I need to do the exercises more but i have been tired  -MH         Total Minutes    14077 - PT Therapeutic Exercise Minutes  38  -MH         Exercise 1    Exercise Name 1  LTR  -MH      Cueing 1  Verbal  -MH      Reps 1  10e  -MH      Time 1  2-3sec  -MH         Exercise 2    Exercise Name 2  piriformis stretch  -MH      Cueing 2  Verbal  -MH      Reps 2  3  -MH      Time 2  20sec  -MH         Exercise 3    Exercise Name 3  PPT  -MH      Cueing 3  Verbal;Demo  -MH      Reps 3  15  -MH      Time 3  5sec  -MH      Additional Comments  performance improved following demo  -MH         Exercise 4    Exercise Name 4  H/L hip add  -MH      Cueing 4  Verbal  -MH      Reps 4  15  -MH      Time 4  5sec  -MH         Exercise 5    Exercise Name 5  H/L hip abd  -MH      Cueing 5  Verbal  -MH      Reps 5  20e  -MH      Time 5  B/uni  -MH      Additional Comments  RTB  -MH         Exercise 6    Exercise Name 6  NuStep  -MH      Cueing 6  Verbal  -MH      Time 6  5 min  -MH      Additional Comments  B UE/LE  -MH         Exercise 7    Exercise Name 7  glute set + mini bridg  -MH       Cueing 7  Verbal  -MH      Reps 7  20  -MH      Time 7  2sec  -MH      Additional Comments  not clearing hips  -MH         Exercise 8    Exercise Name 8  seated EOB HS stretch  -MH      Cueing 8  Verbal  -MH      Reps 8  3e  -MH      Time 8  20sec  -MH        User Key  (r) = Recorded By, (t) = Taken By, (c) = Cosigned By    Initials Name Provider Type    Nguyen Santana, PT Physical Therapist                       PT OP Goals     Row Name 06/15/21 1300          PT Short Term Goals    STG Date to Achieve  06/23/21  -     STG 1  Pt. Will be independent with initial HEP to improve self-management of condition.  -MH     STG 1 Progress  Ongoing  -     STG 2  Pt. Will demonstrate proper log roll technique without cueing to reduce lumbar strain and preserve spine.  -     STG 2 Progress  Ongoing  -        Long Term Goals    LTG Date to Achieve  07/10/21  -     LTG 1  Pt. Will be independent with advanced HEP to improve long-term management of condition and independence.  -     LTG 1 Progress  Ongoing  -     LTG 2  Pt. Will score </= 5% on BERTIN  (from 16% on initial evaluation) to indicate improved perception of disability.  -     LTG 2 Progress  Ongoing  -     LTG 3  Pt. Will demonstrate >/= 4/5 MMT B hip to improve stability/support with prolonged standing/walking.  -     LTG 3 Progress  Ongoing  -     LTG 4  Pt. Will demonstrate proper lifting and transfer mechanics to protect lumbar spine with work related tasks.  -     LTG 4 Progress  Ongoing  -       User Key  (r) = Recorded By, (t) = Taken By, (c) = Cosigned By    Initials Name Provider Type    Nguyen Santana, PT Physical Therapist          Therapy Education  Education Details: Held on updating HEP to improve compliance and independence with initial HEP  Given: HEP, Symptoms/condition management  Program: Reinforced  How Provided: Verbal, Demonstration  Provided to: Patient  Level of Understanding: Verbalized, Demonstrated               Time Calculation:   Start Time: 1349  Stop Time: 1427  Time Calculation (min): 38 min  Total Timed Code Minutes- PT: 38 minute(s)  Timed Charges  91996 - PT Therapeutic Exercise Minutes: 38  Total Minutes  Timed Charges Total Minutes: 38   Total Minutes: 38  Therapy Charges for Today     Code Description Service Date Service Provider Modifiers Qty    85406729832 HC PT THER PROC EA 15 MIN 6/15/2021 Nguyen Mackay, PT GP 3                    Nguyen Mackay PT  6/15/2021

## 2021-06-17 ENCOUNTER — HOSPITAL ENCOUNTER (OUTPATIENT)
Dept: PHYSICAL THERAPY | Facility: HOSPITAL | Age: 31
Setting detail: THERAPIES SERIES
Discharge: HOME | End: 2021-06-17

## 2021-06-17 DIAGNOSIS — M54.50 CHRONIC LOW BACK PAIN, UNSPECIFIED BACK PAIN LATERALITY, UNSPECIFIED WHETHER SCIATICA PRESENT: Primary | ICD-10-CM

## 2021-06-17 DIAGNOSIS — R29.3 POOR POSTURE: ICD-10-CM

## 2021-06-17 DIAGNOSIS — G89.29 CHRONIC LOW BACK PAIN, UNSPECIFIED BACK PAIN LATERALITY, UNSPECIFIED WHETHER SCIATICA PRESENT: Primary | ICD-10-CM

## 2021-06-17 DIAGNOSIS — R29.898 IMPAIRED FLEXIBILITY OF LOWER EXTREMITY: ICD-10-CM

## 2021-06-17 PROCEDURE — 97110 THERAPEUTIC EXERCISES: CPT

## 2021-06-17 NOTE — THERAPY TREATMENT NOTE
Outpatient Physical Therapy Ortho Treatment Note  [facility]     Patient Name: Beatriz Boland  : 1990  MRN: [id number]  Today's Date: 2021      Visit Date: 2021    Visit Dx:    ICD-10-CM ICD-9-CM   1. Chronic low back pain, unspecified back pain laterality, unspecified whether sciatica present  M54.5 724.2    G89.29 338.29   2. Impaired flexibility of lower extremity  R29.898 781.99   3. Poor posture  R29.3 781.92       Patient Active Problem List   Diagnosis   • Depression   • Mental developmental delay   • GERD without esophagitis   • Scoliosis   • Amblyopia   • Back pain   • Chronic left shoulder pain   • Muscle spasm   • Seasonal allergies   • Panic attacks   • Anxiety   • Mild intermittent asthma without complication   • Chest pain on breathing   • Nicotine vapor product user   • Class 3 obesity without serious comorbidity with body mass index (BMI) of 40.0 to 44.9 in adult   • Acute UTI (urinary tract infection)   • Obesity (BMI 30-39.9)   • Tobacco abuse   • Septicemia due to E. coli (CMS/[facility])   • Pyelonephritis of left kidney   • Sepsis (CMS/[facility])   • Asthma        Past Medical History:   Diagnosis Date   • Allergic    • Amblyopia    • Anxiety    • Back pain    • Cleft palate    • Depression    • Developmental delay    • GERD (gastroesophageal reflux disease)     followed by GI, Dr. Wesley Ferro   • Impetigo    • Kidney abscess    • Kidney stone    • Muscle spasm    • Obesity (BMI 30-39.9)    • Recurrent otitis media    • Scoliosis    • Sinus infection     recurrent   • Sprain of shoulder, left 2016        Past Surgical History:   Procedure Laterality Date   • ABSCESS DRAINAGE      kidney   • ADENOIDECTOMY     • EAR TUBES     • PHARYNGEAL FLAP      for speech   • TONSILLECTOMY                         PT Assessment/Plan     Row Name 21 1400          PT Assessment    Assessment Comments  Ms. Boland presents to therapy, minimal pain reports varies with activity level.  "Pt. continues with overall poor tolerance to therapy, requiring frequent short breaks and encouragement to perform ther ex. Pt. educated on importance of consistency with HEP to make long-term improvements in strength and mobility. Pt. cued with bridges to engeage hip and maintain proper hip alignment to reduce adduction as well as glute engagement at top of bridge and STS with pause. Pt. remains appropriate for skilled PT.  -        PT Plan    PT Plan Comments  Update HEP; consider anti-rotation  -       User Key  (r) = Recorded By, (t) = Taken By, (c) = Cosigned By    Initials Name Provider Type     Nguyen Mackay, PT Physical Therapist            OP Exercises     Row Name 06/17/21 1300             Subjective Comments    Subjective Comments  \"I did the exercises one time but yesterday I was too tired and this morning I woke up late and had stuff to do so I didnt do them\"  -         Subjective Pain    Able to rate subjective pain?  yes  -      Pre-Treatment Pain Level  1  -      Subjective Pain Comment  Pt. arrives at 13:51 for 13:45 appointment.  -         Total Minutes    77795 - PT Therapeutic Exercise Minutes  38  -MH         Exercise 1    Exercise Name 1  LTR  -MH      Cueing 1  Verbal  -MH      Reps 1  10e  -MH      Time 1  2-3sec  -MH         Exercise 2    Exercise Name 2  piriformis stretch  -MH      Cueing 2  Verbal  -MH      Reps 2  3  -MH      Time 2  20sec  -MH         Exercise 3    Exercise Name 3  PPT  -MH      Cueing 3  Verbal;Demo  -MH      Reps 3  15  -MH      Time 3  5sec  -MH         Exercise 4    Exercise Name 4  H/L hip add  -MH      Cueing 4  Verbal  -MH      Reps 4  20  -MH      Time 4  5sec  -MH         Exercise 5    Exercise Name 5  H/L hip abd  -MH      Cueing 5  Verbal  -MH      Reps 5  20e  -MH      Time 5  B/uni  -MH      Additional Comments  RTB; cues to improve fluidity of movement  -MH         Exercise 6    Exercise Name 6  NuStep  -MH      Cueing 6  Verbal  -MH      " Time 6  5 min  -      Additional Comments  B UE/LE  -         Exercise 7    Exercise Name 7  glute set + mini bridge  -      Cueing 7  Verbal  -      Sets 7  keep knees in line with feet to engage glute vs. allowing hip adduction  -      Reps 7  20  -MH      Time 7  2sec  -      Additional Comments  increased range, cues to push through heels   -         Exercise 8    Exercise Name 8  STS w/ mini squat pause  -      Cueing 8  Verbal;Demo  -      Reps 8  10  -MH      Time 8  hands on thighs, feet underneath  -        User Key  (r) = Recorded By, (t) = Taken By, (c) = Cosigned By    Initials Name Provider Type     Nguyen Mackay, PT Physical Therapist                       PT OP Goals     Row Name 06/17/21 1300          PT Short Term Goals    STG Date to Achieve  06/23/21  -     STG 1  Pt. Will be independent with initial HEP to improve self-management of condition.  -     STG 1 Progress  Ongoing  -     STG 1 Progress Comments  pt. reports performed HEPx1, still requiring cues with initial program  -     STG 2  Pt. Will demonstrate proper log roll technique without cueing to reduce lumbar strain and preserve spine.  -     STG 2 Progress  Ongoing  -        Long Term Goals    LTG Date to Achieve  07/10/21  -     LTG 1  Pt. Will be independent with advanced HEP to improve long-term management of condition and independence.  -     LTG 1 Progress  Ongoing  [facility]     LTG 2  Pt. Will score </= 5% on BERTIN  (from 16% on initial evaluation) to indicate improved perception of disability.  -     LTG 2 Progress  Ongoing  [facility]     LTG 3  Pt. Will demonstrate >/= 4/5 MMT B hip to improve stability/support with prolonged standing/walking.  -     LTG 3 Progress  Ongoing  [facility]     LTG 4  Pt. Will demonstrate proper lifting and transfer mechanics to protect lumbar spine with work related tasks.  -     LTG 4 Progress  Ongoing  -       User Key  (r) = Recorded By, (t) = Taken By, (c) = Cosigned By     Initials Name Provider Type     Nguyen Mackay PT Physical Therapist          Therapy Education  Education Details: Pt. continues to require cues with initial HEP  Given: Symptoms/condition management  Program: Reinforced  How Provided: Verbal, Demonstration  Provided to: Patient  Level of Understanding: Verbalized, Demonstrated              Time Calculation:   Start Time: 1351  Stop Time: 1430  Time Calculation (min): 39 min  Total Timed Code Minutes- PT: 38 minute(s)  Timed Charges  23435 - PT Therapeutic Exercise Minutes: 38  Total Minutes  Timed Charges Total Minutes: 38   Total Minutes: 38  Therapy Charges for Today     Code Description Service Date Service Provider Modifiers Qty    99223443244 HC PT THER PROC EA 15 MIN 6/17/2021 Nguyen Mackay, ZELDA GP 3                    Nguyen Mackay PT  6/17/2021

## 2021-06-25 ENCOUNTER — HOSPITAL ENCOUNTER (OUTPATIENT)
Dept: PHYSICAL THERAPY | Facility: HOSPITAL | Age: 31
Setting detail: THERAPIES SERIES
Discharge: HOME | End: 2021-06-25

## 2021-06-25 DIAGNOSIS — R29.898 IMPAIRED FLEXIBILITY OF LOWER EXTREMITY: ICD-10-CM

## 2021-06-25 DIAGNOSIS — R29.3 POOR POSTURE: ICD-10-CM

## 2021-06-25 DIAGNOSIS — M54.50 CHRONIC LOW BACK PAIN, UNSPECIFIED BACK PAIN LATERALITY, UNSPECIFIED WHETHER SCIATICA PRESENT: Primary | ICD-10-CM

## 2021-06-25 DIAGNOSIS — G89.29 CHRONIC LOW BACK PAIN, UNSPECIFIED BACK PAIN LATERALITY, UNSPECIFIED WHETHER SCIATICA PRESENT: Primary | ICD-10-CM

## 2021-06-25 PROCEDURE — 97110 THERAPEUTIC EXERCISES: CPT

## 2021-06-25 NOTE — THERAPY TREATMENT NOTE
Outpatient Physical Therapy Ortho Treatment Note  [facility]     Patient Name: Beatriz Boland  : 1990  MRN: [id number]  Today's Date: 2021      Visit Date: 2021    Visit Dx:    ICD-10-CM ICD-9-CM   1. Chronic low back pain, unspecified back pain laterality, unspecified whether sciatica present  M54.5 724.2    G89.29 338.29   2. Impaired flexibility of lower extremity  R29.898 781.99   3. Poor posture  R29.3 781.92       Patient Active Problem List   Diagnosis   • Depression   • Mental developmental delay   • GERD without esophagitis   • Scoliosis   • Amblyopia   • Back pain   • Chronic left shoulder pain   • Muscle spasm   • Seasonal allergies   • Panic attacks   • Anxiety   • Mild intermittent asthma without complication   • Chest pain on breathing   • Nicotine vapor product user   • Class 3 obesity without serious comorbidity with body mass index (BMI) of 40.0 to 44.9 in adult   • Acute UTI (urinary tract infection)   • Obesity (BMI 30-39.9)   • Tobacco abuse   • Septicemia due to E. coli (CMS/[facility])   • Pyelonephritis of left kidney   • Sepsis (CMS/[facility])   • Asthma        Past Medical History:   Diagnosis Date   • Allergic    • Amblyopia    • Anxiety    • Back pain    • Cleft palate    • Depression    • Developmental delay    • GERD (gastroesophageal reflux disease)     followed by GI, Dr. Wesley Ferro   • Impetigo    • Kidney abscess    • Kidney stone    • Muscle spasm    • Obesity (BMI 30-39.9)    • Recurrent otitis media    • Scoliosis    • Sinus infection     recurrent   • Sprain of shoulder, left 2016        Past Surgical History:   Procedure Laterality Date   • ABSCESS DRAINAGE      kidney   • ADENOIDECTOMY     • EAR TUBES     • PHARYNGEAL FLAP      for speech   • TONSILLECTOMY                         PT Assessment/Plan     Row Name 21 0800          PT Assessment    Assessment Comments  Ms. Boland presents to therapy with reports of 0/10 pain and feels the exercises  "have been beneficial thus far. Pt. required frequent redirection to remain on task with exercises and participate in therapy this date as pt. is easily distracted. Pt. tolerated increased repetitions on PPT, bridges, and increased resistance on H/L abd with cues required for appropriate range and control. Added seated swiss ball roll out to improve lumbar mobility in multiple directions. Pt. remains appropriate for skilled PT.  -        PT Plan    PT Plan Comments  consider shoulder ext + TrA, H-A with LE over green swiss ball?  -       User Key  (r) = Recorded By, (t) = Taken By, (c) = Cosigned By    Initials Name Provider Type     Nguyen Mackay, PT Physical Therapist            OP Exercises     Row Name 06/25/21 0800             Subjective Comments    Subjective Comments  \"It's not bothering me, so something is working.\"  -         Subjective Pain    Able to rate subjective pain?  yes  -MH      Pre-Treatment Pain Level  0  -MH         Total Minutes    47521 - PT Therapeutic Exercise Minutes  41  -MH         Exercise 1    Exercise Name 1  LTR  -MH      Cueing 1  Verbal  -MH      Reps 1  10e  -MH      Time 1  2-3sec  -MH         Exercise 2    Exercise Name 2  piriformis stretch  -MH      Cueing 2  Verbal  -MH      Reps 2  3  -MH      Time 2  20sec  -MH         Exercise 3    Exercise Name 3  PPT  -MH      Cueing 3  Verbal;Demo  -MH      Reps 3  20  -MH      Time 3  5sec  -MH         Exercise 4    Exercise Name 4  H/L hip add  -MH      Cueing 4  Verbal  -MH      Reps 4  20  -MH      Time 4  5sec  -MH         Exercise 5    Exercise Name 5  H/L hip abd  -MH      Cueing 5  Verbal  -MH      Reps 5  20e  -MH      Time 5  B/uni  -MH      Additional Comments  GTB  -MH         Exercise 6    Exercise Name 6  NuStep  -MH      Cueing 6  Verbal  -MH      Time 6  5 min  -MH      Additional Comments  B UE/LE  -MH         Exercise 7    Exercise Name 7  glute set + mini bridge  -MH      Cueing 7  Verbal  -MH      Sets 7  " keep knees in line with feet to engage glute vs. allowing hip adduction  -      Reps 7  20  -MH      Time 7  2sec  -         Exercise 8    Exercise Name 8  STS w/ mini squat pause  -      Cueing 8  Verbal;Demo  -      Reps 8  10  -MH      Time 8  hands on thighs, feet underneath  -         Exercise 9    Exercise Name 9  seated swiss ball roll out  -      Cueing 9  Verbal;Demo  -      Reps 9  6e  -MH      Time 9  5sec  -      Additional Comments  FW/Lateral  -        User Key  (r) = Recorded By, (t) = Taken By, (c) = Cosigned By    Initials Name Provider Type     Nguyen Mackay, PT Physical Therapist                       PT OP Goals     Row Name 06/25/21 0900          PT Short Term Goals    STG Date to Achieve  06/23/21  -     STG 1  Pt. Will be independent with initial HEP to improve self-management of condition.  -     STG 1 Progress  Progressing;Ongoing  -     STG 1 Progress Comments  improved carryover with initial 3 exercises on HEP  -     STG 2  Pt. Will demonstrate proper log roll technique without cueing to reduce lumbar strain and preserve spine.  -     STG 2 Progress  Ongoing  -        Long Term Goals    LTG Date to Achieve  07/10/21  -     LTG 1  Pt. Will be independent with advanced HEP to improve long-term management of condition and independence.  -     LTG 1 Progress  Ongoing;Progressing  -     LTG 1 Progress Comments  slow progression, updated HEP this date  -     LTG 2  Pt. Will score </= 5% on BERTIN  (from 16% on initial evaluation) to indicate improved perception of disability.  -     LTG 2 Progress  Ongoing  -     LTG 3  Pt. Will demonstrate >/= 4/5 MMT B hip to improve stability/support with prolonged standing/walking.  -     LTG 3 Progress  Ongoing  -     LTG 4  Pt. Will demonstrate proper lifting and transfer mechanics to protect lumbar spine with work related tasks.  -     LTG 4 Progress  Ongoing  -       User Key  (r) = Recorded By, (t) =  Taken By, (c) = Cosigned By    Initials Name Provider Type     Nguyen Mackay PT Physical Therapist          Therapy Education  Education Details: Updated HEP Access Code: [email or web address]  Given: HEP, Symptoms/condition management  Program: Reinforced, Progressed  How Provided: Written, Verbal, Demonstration  Provided to: Patient  Level of Understanding: Teach back education performed, Verbalized, Demonstrated              Time Calculation:   Start Time: 0832  Stop Time: 0913  Time Calculation (min): 41 min  Total Timed Code Minutes- PT: 41 minute(s)  Timed Charges  32798 - PT Therapeutic Exercise Minutes: 41  Total Minutes  Timed Charges Total Minutes: 41   Total Minutes: 41  Therapy Charges for Today     Code Description Service Date Service Provider Modifiers Qty    40926891532 HC PT THER PROC EA 15 MIN 6/25/2021 Nguyen Mackay, PT GP 3                    Nguyen Mackay PT  6/25/2021

## 2021-06-28 ENCOUNTER — APPOINTMENT (OUTPATIENT)
Dept: PHYSICAL THERAPY | Facility: HOSPITAL | Age: 31
End: 2021-06-28

## 2021-06-30 ENCOUNTER — APPOINTMENT (OUTPATIENT)
Dept: PHYSICAL THERAPY | Facility: HOSPITAL | Age: 31
End: 2021-06-30

## 2021-07-27 ENCOUNTER — TELEPHONE (OUTPATIENT)
Dept: CARDIOLOGY | Facility: CLINIC | Age: 31
End: 2021-07-27

## 2021-07-27 NOTE — TELEPHONE ENCOUNTER
Geri, this should not have been sent to me.  It does not involve clinical care at all.  She is sending a new insurance card and asking about physician coverage.  Please address.  Please review messages like these and if they're not medical, then please send them to the correct person.     Thanks  Dr Gunter

## 2021-07-27 NOTE — TELEPHONE ENCOUNTER
----- Message from Geri Chapa MA sent at 7/27/2021 11:10 AM EDT -----  Regarding: FW: Non-Urgent Medical Question  Contact: [phone]    ----- Message -----  From: Beatriz Boland  Sent: 7/26/2021   7:32 PM EDT  To: [facility]  Subject: Non-Urgent Medical Question                      Attached to the message is my new insurance card. My question is this, does [facility] accept this insurance? Please let me know as soon as possible.     Thank you,    [email or web address]    [phone]

## 2021-08-31 ENCOUNTER — DOCUMENTATION (OUTPATIENT)
Dept: PHYSICAL THERAPY | Facility: HOSPITAL | Age: 31
End: 2021-08-31

## 2021-08-31 DIAGNOSIS — M54.50 CHRONIC LOW BACK PAIN, UNSPECIFIED BACK PAIN LATERALITY, UNSPECIFIED WHETHER SCIATICA PRESENT: Primary | ICD-10-CM

## 2021-08-31 DIAGNOSIS — R29.898 IMPAIRED FLEXIBILITY OF LOWER EXTREMITY: ICD-10-CM

## 2021-08-31 DIAGNOSIS — G89.29 CHRONIC LOW BACK PAIN, UNSPECIFIED BACK PAIN LATERALITY, UNSPECIFIED WHETHER SCIATICA PRESENT: Primary | ICD-10-CM

## 2021-08-31 DIAGNOSIS — R29.3 POOR POSTURE: ICD-10-CM

## 2021-08-31 NOTE — THERAPY DISCHARGE NOTE
Outpatient Physical Therapy Discharge Summary         Patient Name: Beatriz Boland  : 1990  MRN: [id number]    Today's Date: 2021    Visit Dx:    ICD-10-CM ICD-9-CM   1. Chronic low back pain, unspecified back pain laterality, unspecified whether sciatica present  M54.5 724.2    G89.29 338.29   2. Impaired flexibility of lower extremity  R29.898 781.99   3. Poor posture  R29.3 781.92       PT OP Goals     Row Name 21 1300          PT Short Term Goals    STG Date to Achieve  21  -     STG 1  Pt. Will be independent with initial HEP to improve self-management of condition.  -     STG 1 Progress  Not Met  -     STG 2  Pt. Will demonstrate proper log roll technique without cueing to reduce lumbar strain and preserve spine.  -     STG 2 Progress  Not Met  -        Long Term Goals    LTG Date to Achieve  07/10/21  -     LTG 1  Pt. Will be independent with advanced HEP to improve long-term management of condition and independence.  -     LTG 1 Progress  Not Met  -     LTG 2  Pt. Will score </= 5% on BERTIN  (from 16% on initial evaluation) to indicate improved perception of disability.  -     LTG 2 Progress  Not Met  -     LTG 3  Pt. Will demonstrate >/= 4/5 MMT B hip to improve stability/support with prolonged standing/walking.  -     LTG 3 Progress  Not Met  -     LTG 4  Pt. Will demonstrate proper lifting and transfer mechanics to protect lumbar spine with work related tasks.  -     LTG 4 Progress  Not Met  -       User Key  (r) = Recorded By, (t) = Taken By, (c) = Cosigned By    Initials Name Provider Type    Nguyen Santana, PT Physical Therapist          OP PT Discharge Summary  Date of Discharge: 21  Reason for Discharge: other (comment) (pt. canceled last 2 scheduled appointments)  Outcomes Achieved: Refer to plan of care for updates on goals achieved  Discharge Destination: Unknown  Discharge Instructions/Additional Comments: Ms. Boland was  seen for 4 skilled PT visits to address LBP. Pt. canceled last 2 remaining appointments secondary to illness (per note on visit).  Pt. had reported feeling benefit with therapy but was not consistent with HEP performance.      Time Calculation:                    Nguyen Mackay, PT  8/31/2021

## 2021-11-20 ENCOUNTER — HOSPITAL ENCOUNTER (EMERGENCY)
Facility: HOSPITAL | Age: 31
Discharge: HOME | End: 2021-11-20
Attending: EMERGENCY MEDICINE | Admitting: EMERGENCY MEDICINE

## 2021-11-20 ENCOUNTER — APPOINTMENT (OUTPATIENT)
Dept: CT IMAGING | Facility: HOSPITAL | Age: 31
End: 2021-11-20

## 2021-11-20 VITALS
RESPIRATION RATE: 17 BRPM | TEMPERATURE: 97.8 F | OXYGEN SATURATION: 100 % | DIASTOLIC BLOOD PRESSURE: 99 MMHG | SYSTOLIC BLOOD PRESSURE: 148 MMHG | HEART RATE: 61 BPM

## 2021-11-20 DIAGNOSIS — R10.9 ACUTE LEFT FLANK PAIN: Primary | ICD-10-CM

## 2021-11-20 LAB
ALBUMIN SERPL-MCNC: 4.2 G/DL (ref 3.5–5.2)
ALBUMIN/GLOB SERPL: 1.2 G/DL
ALP SERPL-CCNC: 66 U/L (ref 39–117)
ALT SERPL W P-5'-P-CCNC: 13 U/L (ref 1–33)
ANION GAP SERPL CALCULATED.3IONS-SCNC: 9.2 MMOL/L (ref 5–15)
AST SERPL-CCNC: 12 U/L (ref 1–32)
BASOPHILS # BLD AUTO: 0.05 10*3/MM3 (ref 0–0.2)
BASOPHILS NFR BLD AUTO: 0.6 % (ref 0–1.5)
BILIRUB SERPL-MCNC: 0.3 MG/DL (ref 0–1.2)
BILIRUB UR QL STRIP: NEGATIVE
BUN SERPL-MCNC: 11 MG/DL (ref 6–20)
BUN/CREAT SERPL: 10.9 (ref 7–25)
CALCIUM SPEC-SCNC: 9.2 MG/DL (ref 8.6–10.5)
CHLORIDE SERPL-SCNC: 106 MMOL/L (ref 98–107)
CLARITY UR: CLEAR
CO2 SERPL-SCNC: 24.8 MMOL/L (ref 22–29)
COLOR UR: YELLOW
CREAT SERPL-MCNC: 1.01 MG/DL (ref 0.57–1)
DEPRECATED RDW RBC AUTO: 40.5 FL (ref 37–54)
EOSINOPHIL # BLD AUTO: 0.15 10*3/MM3 (ref 0–0.4)
EOSINOPHIL NFR BLD AUTO: 1.7 % (ref 0.3–6.2)
ERYTHROCYTE [DISTWIDTH] IN BLOOD BY AUTOMATED COUNT: 13.2 % (ref 12.3–15.4)
GFR SERPL CREATININE-BSD FRML MDRD: 64 ML/MIN/1.73
GLOBULIN UR ELPH-MCNC: 3.4 GM/DL
GLUCOSE SERPL-MCNC: 91 MG/DL (ref 65–99)
GLUCOSE UR STRIP-MCNC: NEGATIVE MG/DL
HCG SERPL QL: NEGATIVE
HCT VFR BLD AUTO: 43.9 % (ref 34–46.6)
HGB BLD-MCNC: 14.3 G/DL (ref 12–15.9)
HGB UR QL STRIP.AUTO: NEGATIVE
HOLD SPECIMEN: NORMAL
IMM GRANULOCYTES # BLD AUTO: 0.01 10*3/MM3 (ref 0–0.05)
IMM GRANULOCYTES NFR BLD AUTO: 0.1 % (ref 0–0.5)
KETONES UR QL STRIP: NEGATIVE
LEUKOCYTE ESTERASE UR QL STRIP.AUTO: NEGATIVE
LIPASE SERPL-CCNC: 34 U/L (ref 13–60)
LYMPHOCYTES # BLD AUTO: 2.83 10*3/MM3 (ref 0.7–3.1)
LYMPHOCYTES NFR BLD AUTO: 32.2 % (ref 19.6–45.3)
MCH RBC QN AUTO: 27.2 PG (ref 26.6–33)
MCHC RBC AUTO-ENTMCNC: 32.6 G/DL (ref 31.5–35.7)
MCV RBC AUTO: 83.6 FL (ref 79–97)
MONOCYTES # BLD AUTO: 0.74 10*3/MM3 (ref 0.1–0.9)
MONOCYTES NFR BLD AUTO: 8.4 % (ref 5–12)
NEUTROPHILS NFR BLD AUTO: 5 10*3/MM3 (ref 1.7–7)
NEUTROPHILS NFR BLD AUTO: 57 % (ref 42.7–76)
NITRITE UR QL STRIP: NEGATIVE
NRBC BLD AUTO-RTO: 0 /100 WBC (ref 0–0.2)
PH UR STRIP.AUTO: 6.5 [PH] (ref 5–8)
PLATELET # BLD AUTO: 243 10*3/MM3 (ref 140–450)
PMV BLD AUTO: 9.5 FL (ref 6–12)
POTASSIUM SERPL-SCNC: 4.2 MMOL/L (ref 3.5–5.2)
PROT SERPL-MCNC: 7.6 G/DL (ref 6–8.5)
PROT UR QL STRIP: NEGATIVE
RBC # BLD AUTO: 5.25 10*6/MM3 (ref 3.77–5.28)
SODIUM SERPL-SCNC: 140 MMOL/L (ref 136–145)
SP GR UR STRIP: 1.01 (ref 1–1.03)
UROBILINOGEN UR QL STRIP: NORMAL
WBC NRBC COR # BLD: 8.78 10*3/MM3 (ref 3.4–10.8)
WHOLE BLOOD HOLD SPECIMEN: NORMAL

## 2021-11-20 PROCEDURE — 84703 CHORIONIC GONADOTROPIN ASSAY: CPT

## 2021-11-20 PROCEDURE — 25010000002 IOPAMIDOL 61 % SOLUTION: Performed by: EMERGENCY MEDICINE

## 2021-11-20 PROCEDURE — 81003 URINALYSIS AUTO W/O SCOPE: CPT

## 2021-11-20 PROCEDURE — 96374 THER/PROPH/DIAG INJ IV PUSH: CPT

## 2021-11-20 PROCEDURE — 99283 EMERGENCY DEPT VISIT LOW MDM: CPT

## 2021-11-20 PROCEDURE — 74177 CT ABD & PELVIS W/CONTRAST: CPT

## 2021-11-20 PROCEDURE — 83690 ASSAY OF LIPASE: CPT

## 2021-11-20 PROCEDURE — 85025 COMPLETE CBC W/AUTO DIFF WBC: CPT

## 2021-11-20 PROCEDURE — 25010000002 KETOROLAC TROMETHAMINE PER 15 MG: Performed by: EMERGENCY MEDICINE

## 2021-11-20 PROCEDURE — 80053 COMPREHEN METABOLIC PANEL: CPT

## 2021-11-20 RX ORDER — KETOROLAC TROMETHAMINE 15 MG/ML
15 INJECTION, SOLUTION INTRAMUSCULAR; INTRAVENOUS ONCE
Status: COMPLETED | OUTPATIENT
Start: 2021-11-20 | End: 2021-11-20

## 2021-11-20 RX ORDER — NAPROXEN 500 MG/1
500 TABLET ORAL 2 TIMES DAILY PRN
Qty: 20 TABLET | Refills: 0 | Status: SHIPPED | OUTPATIENT
Start: 2021-11-20

## 2021-11-20 RX ORDER — METAXALONE 800 MG/1
800 TABLET ORAL 3 TIMES DAILY
Qty: 15 TABLET | Refills: 0 | Status: SHIPPED | OUTPATIENT
Start: 2021-11-20

## 2021-11-20 RX ORDER — SODIUM CHLORIDE 0.9 % (FLUSH) 0.9 %
10 SYRINGE (ML) INJECTION AS NEEDED
Status: DISCONTINUED | OUTPATIENT
Start: 2021-11-20 | End: 2021-11-20 | Stop reason: HOSPADM

## 2021-11-20 RX ADMIN — IOPAMIDOL 85 ML: 612 INJECTION, SOLUTION INTRAVENOUS at 14:15

## 2021-11-20 RX ADMIN — KETOROLAC TROMETHAMINE 15 MG: 15 INJECTION, SOLUTION INTRAMUSCULAR; INTRAVENOUS at 13:03

## 2021-11-20 RX ADMIN — SODIUM CHLORIDE, POTASSIUM CHLORIDE, SODIUM LACTATE AND CALCIUM CHLORIDE 1000 ML: 600; 310; 30; 20 INJECTION, SOLUTION INTRAVENOUS at 13:01

## 2021-11-20 NOTE — ED NOTES
This RN attempted IV insertion, unsuccessful. Onesimo RN notified and will come attempt to get blood work and IV.     Mayi Dejesus, RN  11/20/21 6366

## 2021-11-20 NOTE — ED NOTES
Onesimo RN at bedside attempting IV insertion and blood work.     Mayi Dejesus, RN  11/20/21 1837

## 2021-11-20 NOTE — ED NOTES
Patient wearing mask, nurse wearing mask, n95, gloves and protective eyewear during care and assessment.  Hand hygiene performed prior to and post care.      Scar Epps RN  11/20/21 5979

## 2021-11-20 NOTE — ED NOTES
C/o of left flank pain. Hx of abcess on left kidney.  Reports some blood in her urine. Reports pain started today.   This rn wearing mask and goggles. Pt wearing mask during encounter.        Melissa Byrd, JONATHAN  11/20/21 5114

## 2021-11-20 NOTE — ED PROVIDER NOTES
EMERGENCY DEPARTMENT ENCOUNTER    Room Number:  31/31  Date of encounter:  11/20/2021  PCP: Elyse Larkin APRN  Historian: Patient     I used full protective equipment while examining this patient.  This includes face mask, gloves and protective eyewear.  I washed my hands before entering the room and immediately upon leaving the room.  Patient was wearing a surgical mask.      HPI:  Chief Complaint: Left flank pain  A complete HPI/ROS/PMH/PSH/SH/FH are unobtainable due to: None    Context: Beatriz Boland is a 31 y.o. female who presents to the ED c/o left flank pain that began this morning.  Pain was initially intermittent has been constant for the past few hours.  Pain is described as sharp and stabbing.  It does not radiate.  It is worse with movement.  Pain is currently moderate in intensity.  She reports increased urinary frequency and urgency for the past few days.  Had one episode of mild hematuria a few days ago but none since.  Denies dysuria, fever, chills, nausea, vomiting, diarrhea, chest pain, or shortness of breath.  Patient has a history of pyelonephritis and kidney stones.  Patient is on Depo-Provera and her last dose was in October.      PAST MEDICAL HISTORY  Active Ambulatory Problems     Diagnosis Date Noted   • Depression    • Mental developmental delay    • GERD without esophagitis    • Scoliosis    • Amblyopia    • Back pain    • Chronic left shoulder pain 09/29/2017   • Muscle spasm 09/29/2017   • Seasonal allergies 11/05/2017   • Panic attacks 11/15/2017   • Anxiety 01/04/2018   • Mild intermittent asthma without complication 03/24/2018   • Chest pain on breathing 08/10/2018   • Nicotine vapor product user 08/10/2018   • Class 3 obesity without serious comorbidity with body mass index (BMI) of 40.0 to 44.9 in adult 08/10/2018   • Acute UTI (urinary tract infection) 09/04/2020   • Obesity (BMI 30-39.9) 09/04/2020   • Tobacco abuse 09/04/2020   • Septicemia due to E. coli (HCC)  09/05/2020   • Pyelonephritis of left kidney 10/22/2020   • Sepsis ([facility]) 10/22/2020   • Asthma 10/22/2020     Resolved Ambulatory Problems     Diagnosis Date Noted   • Sinus bradycardia 09/29/2014   • Palpitations 11/15/2017   • Acute otitis externa of both ears 11/15/2017   • Atypical chest pain 01/04/2018   • Hypokalemia 09/04/2020   • WILIAN (acute kidney injury) ([facility]) 09/04/2020     Past Medical History:   Diagnosis Date   • Allergic    • Cleft palate    • Developmental delay    • GERD (gastroesophageal reflux disease)    • Impetigo    • Kidney abscess    • Kidney stone    • Recurrent otitis media    • Sinus infection    • Sprain of shoulder, left 12/2016         PAST SURGICAL HISTORY  Past Surgical History:   Procedure Laterality Date   • ABSCESS DRAINAGE      kidney   • ADENOIDECTOMY     • EAR TUBES     • PHARYNGEAL FLAP      for speech   • TONSILLECTOMY           FAMILY HISTORY  Family History   Problem Relation Age of Onset   • COPD Mother    • Arthritis Mother    • Obesity Mother    • Gestational diabetes Mother    • Cancer Father    • Scoliosis Father    • Rheum arthritis Maternal Aunt    • Diabetes Maternal Uncle    • Alcohol abuse Maternal Uncle    • Rheum arthritis Maternal Grandmother    • COPD Maternal Grandmother    • Stroke Maternal Grandfather    • Alcohol abuse Paternal Uncle          SOCIAL HISTORY  Social History     Socioeconomic History   • Marital status: Single   Tobacco Use   • Smoking status: Current Some Day Smoker   • Smokeless tobacco: Current User   • Tobacco comment: quit cigarette smoking 2 years ago, uses vape now   Vaping Use   • Vaping Use: Every day   • Substances: Nicotine, Flavoring   • Devices: Refillable tank   Substance and Sexual Activity   • Alcohol use: Yes     Comment: occasionally, 1-2 glasses of wine 1-2 x per week   • Drug use: No   • Sexual activity: Yes     Partners: Male     Birth control/protection: Injection         ALLERGIES  Dust mite extract and Ciprofloxacin        REVIEW OF SYSTEMS  Review of Systems      All systems have been reviewed and are negative except as as discussed in the HPI    PHYSICAL EXAM    I have reviewed the triage vital signs and nursing notes.    ED Triage Vitals [11/20/21 1206]   Temp Heart Rate Resp BP SpO2   97.8 °F (36.6 °C) 82 17 -- 91 %      Temp src Heart Rate Source Patient Position BP Location FiO2 (%)   Tympanic Monitor -- -- --       Physical Exam  GENERAL: Awake, alert, resting comfortably, no acute distress  HENT: NCAT, nares patent  NECK: supple  EYES:  no scleral icterus  CV: regular rhythm, regular rate  RESPIRATORY: normal effort, clear to auscultation bilaterally  ABDOMEN: soft, nontender, left CVA tenderness MUSCULOSKELETAL: Extremities are nontender and without obvious deformity.  There is normal range of motion in all extremities.  There is no calf tenderness or pedal edema  NEURO: Strength, sensation, and coordination are grossly intact.  Speech and mentation are unremarkable.  No facial droop.  SKIN: warm, dry, no rash  PSYCH: Normal mood and affect      LAB RESULTS  Recent Results (from the past 24 hour(s))   Comprehensive Metabolic Panel    Collection Time: 11/20/21 12:55 PM    Specimen: Blood   Result Value Ref Range    Glucose 91 65 - 99 mg/dL    BUN 11 6 - 20 mg/dL    Creatinine 1.01 (H) 0.57 - 1.00 mg/dL    Sodium 140 136 - 145 mmol/L    Potassium 4.2 3.5 - 5.2 mmol/L    Chloride 106 98 - 107 mmol/L    CO2 24.8 22.0 - 29.0 mmol/L    Calcium 9.2 8.6 - 10.5 mg/dL    Total Protein 7.6 6.0 - 8.5 g/dL    Albumin 4.20 3.50 - 5.20 g/dL    ALT (SGPT) 13 1 - 33 U/L    AST (SGOT) 12 1 - 32 U/L    Alkaline Phosphatase 66 39 - 117 U/L    Total Bilirubin 0.3 0.0 - 1.2 mg/dL    eGFR Non African Amer 64 >60 mL/min/1.73    Globulin 3.4 gm/dL    A/G Ratio 1.2 g/dL    BUN/Creatinine Ratio 10.9 7.0 - 25.0    Anion Gap 9.2 5.0 - 15.0 mmol/L   Lipase    Collection Time: 11/20/21 12:55 PM    Specimen: Blood   Result Value Ref Range    Lipase 34 13  - 60 U/L   hCG, Serum, Qualitative    Collection Time: 11/20/21 12:55 PM    Specimen: Blood   Result Value Ref Range    HCG Qualitative Negative Negative   Green Top (Gel)    Collection Time: 11/20/21 12:55 PM   Result Value Ref Range    Extra Tube Hold for add-ons.    Lavender Top    Collection Time: 11/20/21 12:55 PM   Result Value Ref Range    Extra Tube hold for add-on    CBC Auto Differential    Collection Time: 11/20/21 12:55 PM    Specimen: Blood   Result Value Ref Range    WBC 8.78 3.40 - 10.80 10*3/mm3    RBC 5.25 3.77 - 5.28 10*6/mm3    Hemoglobin 14.3 12.0 - 15.9 g/dL    Hematocrit 43.9 34.0 - 46.6 %    MCV 83.6 79.0 - 97.0 fL    MCH 27.2 26.6 - 33.0 pg    MCHC 32.6 31.5 - 35.7 g/dL    RDW 13.2 12.3 - 15.4 %    RDW-SD 40.5 37.0 - 54.0 fl    MPV 9.5 6.0 - 12.0 fL    Platelets 243 140 - 450 10*3/mm3    Neutrophil % 57.0 42.7 - 76.0 %    Lymphocyte % 32.2 19.6 - 45.3 %    Monocyte % 8.4 5.0 - 12.0 %    Eosinophil % 1.7 0.3 - 6.2 %    Basophil % 0.6 0.0 - 1.5 %    Immature Grans % 0.1 0.0 - 0.5 %    Neutrophils, Absolute 5.00 1.70 - 7.00 10*3/mm3    Lymphocytes, Absolute 2.83 0.70 - 3.10 10*3/mm3    Monocytes, Absolute 0.74 0.10 - 0.90 10*3/mm3    Eosinophils, Absolute 0.15 0.00 - 0.40 10*3/mm3    Basophils, Absolute 0.05 0.00 - 0.20 10*3/mm3    Immature Grans, Absolute 0.01 0.00 - 0.05 10*3/mm3    nRBC 0.0 0.0 - 0.2 /100 WBC   Urinalysis With Microscopic If Indicated (No Culture) - Urine, Clean Catch    Collection Time: 11/20/21  2:21 PM    Specimen: Urine, Clean Catch   Result Value Ref Range    Color, UA Yellow Yellow, Straw    Appearance, UA Clear Clear    pH, UA 6.5 5.0 - 8.0    Specific Gravity, UA 1.008 1.005 - 1.030    Glucose, UA Negative Negative    Ketones, UA Negative Negative    Bilirubin, UA Negative Negative    Blood, UA Negative Negative    Protein, UA Negative Negative    Leuk Esterase, UA Negative Negative    Nitrite, UA Negative Negative    Urobilinogen, UA 0.2 E.U./dL 0.2 - 1.0 E.U./dL        Ordered the above labs and independently reviewed the results.      RADIOLOGY  CT Abdomen Pelvis With Contrast    Result Date: 11/20/2021  CT ABDOMEN PELVIS W CONTRAST-  INDICATIONS: Left flank pain, history of pyelonephritis and kidney stones  TECHNIQUE: Radiation dose reduction techniques were utilized, including automated exposure control and exposure modulation based on body size. Enhanced ABDOMEN AND PELVIS CT  COMPARISON: 12/5/2020  FINDINGS:  A 2 mm nonobstructive stone is seen in the right lower kidney. Areas of cortical thinning in both kidneys may be result of prior infections or infarcts. Bilateral renal cysts are seen, as well as renal low densities that are too small to characterize.  Gallbladder is partly contracted.  Otherwise unremarkable appearance of the liver, spleen, adrenal glands, pancreas, kidneys, bladder.  No bowel obstruction. Assessment of the mid ascending to mid transverse colon for wall thickening is limited by lack of distention. The appendix is not appear inflamed.  No free intraperitoneal gas or free fluid. Small umbilical hernia of fat is seen.  Scattered small mesenteric and para-aortic lymph nodes are seen that are not significant by size criteria.  Abdominal aorta is not aneurysmal.  The lung bases are clear.  Degenerative changes are seen in the spine. No acute fracture is identified.          1. Nonobstructive right nephrolithiasis. No hydronephrosis. 2. Apparent wall thickening in the proximal to mid colon is likely at least in part a result of lack of distention, but potentially evidence of mild nonspecific colitis.  This report was finalized on 11/20/2021 2:45 PM by Dr. Nando Vergara M.D.        I ordered the above noted radiological studies. Reviewed by me and discussed with radiologist.  See dictation for official radiology interpretation.      PROCEDURES  Procedures      MEDICATIONS GIVEN IN ER    Medications   sodium chloride 0.9 % flush 10 mL (has no  administration in time range)   ketorolac (TORADOL) injection 15 mg (15 mg Intravenous Given 11/20/21 1303)   lactated ringers bolus 1,000 mL (0 mL Intravenous Stopped 11/20/21 1331)   iopamidol (ISOVUE-300) 61 % injection 100 mL (85 mL Intravenous Given 11/20/21 1415)         PROGRESS, DATA ANALYSIS, CONSULTS, AND MEDICAL DECISION MAKING    All labs have been independently reviewed by me.  All radiology studies have been reviewed by me and discussed with radiologist dictating the report.   EKG's independently viewed and interpreted by me.  I have reviewed the nurse's notes, vital signs, past medical history, and medication list.  Discussion below represents my analysis of pertinent findings related to patient's condition, differential diagnosis, treatment plan and final disposition.      ED Course as of 11/20/21 1548   Sat Nov 20, 2021   1228 Old records reviewed.  Patient has multiple ED visits for flank pain and urinary tract infection.  She was last admitted here in October 2020 for acute pyelonephritis of the left kidney with suspected renal abscess.  Patient was seen by urology and treated with IV antibiotics. [WH]   1515 CT abdomen/pelvis interpreted by the radiologist and independently viewed by me. There is right nonobstructive nephrolithiasis. No ureteral stones. Mild wall thickening in the proximal to mid colon. See dictated report for details. [WH]   1519 Test results discussed with the patient. She is resting comfortably. Work-up is unremarkable. There are no ureteral stones. She does not have a kidney or urinary infection. Patient will be discharged with prescriptions for Skelaxin and naproxen. Return precautions were discussed. [WH]      ED Course User Index  [WH] Silvestre Mosqueda MD       AS OF 15:48 EST VITALS:    BP - 148/99  HR - 61  TEMP - 97.8 °F (36.6 °C) (Tympanic)  O2 SATS - 100%      DIAGNOSIS  Final diagnoses:   Acute left flank pain         DISPOSITION  Discharge    DISCHARGE    Patient  discharged in stable condition.    Reviewed implications of results, diagnosis, meds, responsibility to follow up, warning signs and symptoms of possible worsening, potential complications and reasons to return to ER, including worsening pain, vomiting, fever, or other concern..    Patient/Family voiced understanding of above instructions.    Discussed plan for discharge, as there is no emergent indication for admission. Patient referred to primary care provider for BP management due to today's BP. Pt/family is agreeable and understands need for follow up and repeat testing.  Pt is aware that discharge does not mean that nothing is wrong but it indicates no emergency is present that requires admission and they must continue care with follow-up as given below or physician of their choice.     FOLLOW-UP  Elyse Larkin, APRN  [address] [address]  [phone]    Schedule an appointment as soon as possible for a visit  If symptoms persist         Medication List      New Prescriptions    metaxalone 800 MG tablet  Commonly known as: SKELAXIN  Take 1 tablet by mouth 3 (Three) Times a Day.     naproxen 500 MG tablet  Commonly known as: NAPROSYN  Take 1 tablet by mouth 2 (Two) Times a Day As Needed for Moderate Pain .           Where to Get Your Medications      These medications were sent to [address] AT [facility] & ([facility]) - [phone]  - [phone] [place] [address]    Phone: [phone]   · metaxalone 800 MG tablet  · naproxen 500 MG tablet           Dictated utilizing Dragon dictation:  Much of this encounter note is an electronic transcription/translation of spoken language to printed text. The electronic translation of spoken language may permit erroneous, or at times, nonsensical words or phrases to be inadvertently transcribed; Although I have reviewed the note for such errors, some may still  exist.     Silvestre Mosqueda MD  11/20/21 1542

## 2021-11-20 NOTE — DISCHARGE INSTRUCTIONS
Take medications as prescribed. Drink plenty of fluids. Return to the emergency department for worsening pain, vomiting, fever, or other concern.

## 2022-04-01 ENCOUNTER — APPOINTMENT (OUTPATIENT)
Dept: GENERAL RADIOLOGY | Facility: HOSPITAL | Age: 32
End: 2022-04-01

## 2022-04-01 ENCOUNTER — HOSPITAL ENCOUNTER (EMERGENCY)
Facility: HOSPITAL | Age: 32
Discharge: HOME | End: 2022-04-01
Attending: EMERGENCY MEDICINE | Admitting: EMERGENCY MEDICINE

## 2022-04-01 VITALS
SYSTOLIC BLOOD PRESSURE: 107 MMHG | DIASTOLIC BLOOD PRESSURE: 67 MMHG | RESPIRATION RATE: 18 BRPM | OXYGEN SATURATION: 100 % | BODY MASS INDEX: 39.08 KG/M2 | WEIGHT: 249 LBS | TEMPERATURE: 97.9 F | HEIGHT: 67 IN | HEART RATE: 64 BPM

## 2022-04-01 DIAGNOSIS — R30.0 DYSURIA: ICD-10-CM

## 2022-04-01 DIAGNOSIS — S29.019A THORACIC MYOFASCIAL STRAIN, INITIAL ENCOUNTER: Primary | ICD-10-CM

## 2022-04-01 LAB
ALBUMIN SERPL-MCNC: 4.2 G/DL (ref 3.5–5.2)
ALBUMIN/GLOB SERPL: 1.5 G/DL
ALP SERPL-CCNC: 66 U/L (ref 39–117)
ALT SERPL W P-5'-P-CCNC: 11 U/L (ref 1–33)
ANION GAP SERPL CALCULATED.3IONS-SCNC: 9 MMOL/L (ref 5–15)
AST SERPL-CCNC: 15 U/L (ref 1–32)
BASOPHILS # BLD AUTO: 0.03 10*3/MM3 (ref 0–0.2)
BASOPHILS NFR BLD AUTO: 0.4 % (ref 0–1.5)
BILIRUB SERPL-MCNC: 0.2 MG/DL (ref 0–1.2)
BILIRUB UR QL STRIP: NEGATIVE
BUN SERPL-MCNC: 10 MG/DL (ref 6–20)
BUN/CREAT SERPL: 11 (ref 7–25)
CALCIUM SPEC-SCNC: 9.1 MG/DL (ref 8.6–10.5)
CHLORIDE SERPL-SCNC: 106 MMOL/L (ref 98–107)
CLARITY UR: CLEAR
CO2 SERPL-SCNC: 25 MMOL/L (ref 22–29)
COLOR UR: YELLOW
CREAT SERPL-MCNC: 0.91 MG/DL (ref 0.57–1)
DEPRECATED RDW RBC AUTO: 37.5 FL (ref 37–54)
EGFRCR SERPLBLD CKD-EPI 2021: 87.2 ML/MIN/1.73
EOSINOPHIL # BLD AUTO: 0.07 10*3/MM3 (ref 0–0.4)
EOSINOPHIL NFR BLD AUTO: 0.9 % (ref 0.3–6.2)
ERYTHROCYTE [DISTWIDTH] IN BLOOD BY AUTOMATED COUNT: 12.5 % (ref 12.3–15.4)
GLOBULIN UR ELPH-MCNC: 2.8 GM/DL
GLUCOSE BLDC GLUCOMTR-MCNC: 91 MG/DL (ref 70–130)
GLUCOSE SERPL-MCNC: 65 MG/DL (ref 65–99)
GLUCOSE UR STRIP-MCNC: NEGATIVE MG/DL
HCT VFR BLD AUTO: 41.9 % (ref 34–46.6)
HGB BLD-MCNC: 13.9 G/DL (ref 12–15.9)
HGB UR QL STRIP.AUTO: NEGATIVE
IMM GRANULOCYTES # BLD AUTO: 0.02 10*3/MM3 (ref 0–0.05)
IMM GRANULOCYTES NFR BLD AUTO: 0.3 % (ref 0–0.5)
KETONES UR QL STRIP: NEGATIVE
LEUKOCYTE ESTERASE UR QL STRIP.AUTO: NEGATIVE
LIPASE SERPL-CCNC: 27 U/L (ref 13–60)
LYMPHOCYTES # BLD AUTO: 2.49 10*3/MM3 (ref 0.7–3.1)
LYMPHOCYTES NFR BLD AUTO: 31.9 % (ref 19.6–45.3)
MCH RBC QN AUTO: 27.5 PG (ref 26.6–33)
MCHC RBC AUTO-ENTMCNC: 33.2 G/DL (ref 31.5–35.7)
MCV RBC AUTO: 82.8 FL (ref 79–97)
MONOCYTES # BLD AUTO: 0.64 10*3/MM3 (ref 0.1–0.9)
MONOCYTES NFR BLD AUTO: 8.2 % (ref 5–12)
NEUTROPHILS NFR BLD AUTO: 4.56 10*3/MM3 (ref 1.7–7)
NEUTROPHILS NFR BLD AUTO: 58.3 % (ref 42.7–76)
NITRITE UR QL STRIP: NEGATIVE
NRBC BLD AUTO-RTO: 0 /100 WBC (ref 0–0.2)
PH UR STRIP.AUTO: 6.5 [PH] (ref 5–8)
PLATELET # BLD AUTO: 226 10*3/MM3 (ref 140–450)
PMV BLD AUTO: 9.7 FL (ref 6–12)
POTASSIUM SERPL-SCNC: 4 MMOL/L (ref 3.5–5.2)
PROT SERPL-MCNC: 7 G/DL (ref 6–8.5)
PROT UR QL STRIP: NEGATIVE
RBC # BLD AUTO: 5.06 10*6/MM3 (ref 3.77–5.28)
SODIUM SERPL-SCNC: 140 MMOL/L (ref 136–145)
SP GR UR STRIP: <=1.005 (ref 1–1.03)
UROBILINOGEN UR QL STRIP: NORMAL
WBC NRBC COR # BLD: 7.81 10*3/MM3 (ref 3.4–10.8)

## 2022-04-01 PROCEDURE — 82962 GLUCOSE BLOOD TEST: CPT

## 2022-04-01 PROCEDURE — 80053 COMPREHEN METABOLIC PANEL: CPT | Performed by: EMERGENCY MEDICINE

## 2022-04-01 PROCEDURE — 85025 COMPLETE CBC W/AUTO DIFF WBC: CPT | Performed by: EMERGENCY MEDICINE

## 2022-04-01 PROCEDURE — 99284 EMERGENCY DEPT VISIT MOD MDM: CPT

## 2022-04-01 PROCEDURE — 81003 URINALYSIS AUTO W/O SCOPE: CPT | Performed by: EMERGENCY MEDICINE

## 2022-04-01 PROCEDURE — 83690 ASSAY OF LIPASE: CPT | Performed by: EMERGENCY MEDICINE

## 2022-04-01 PROCEDURE — 72072 X-RAY EXAM THORAC SPINE 3VWS: CPT

## 2022-04-01 RX ORDER — SODIUM CHLORIDE 0.9 % (FLUSH) 0.9 %
10 SYRINGE (ML) INJECTION AS NEEDED
Status: DISCONTINUED | OUTPATIENT
Start: 2022-04-01 | End: 2022-04-01 | Stop reason: HOSPADM

## 2022-04-01 RX ORDER — HYDROCODONE BITARTRATE AND ACETAMINOPHEN 5; 325 MG/1; MG/1
1 TABLET ORAL EVERY 6 HOURS PRN
Status: DISCONTINUED | OUTPATIENT
Start: 2022-04-01 | End: 2022-04-01 | Stop reason: HOSPADM

## 2022-04-01 RX ORDER — METHYLPREDNISOLONE 4 MG/1
TABLET ORAL
Qty: 1 EACH | Refills: 0 | Status: SHIPPED | OUTPATIENT
Start: 2022-04-01

## 2022-04-01 RX ADMIN — HYDROCODONE BITARTRATE AND ACETAMINOPHEN 1 TABLET: 5; 325 TABLET ORAL at 12:48

## 2022-04-01 NOTE — ED TRIAGE NOTES
"She has had back pain, pain in left side, shoulder pain, hand is cramping.  All her pain came on suddenly at 1000 today.  She reports she can \"hardly walk.\"  nki    Patient was placed in face mask during first look triage.  Patient was wearing a face mask throughout encounter.  I wore personal protective equipment throughout the encounter.  Hand hygiene was performed before and after patient encounter.     "

## 2022-04-01 NOTE — ED PROVIDER NOTES
EMERGENCY DEPARTMENT ENCOUNTER  I wore full protective equipment throughout this patient encounter including a N95 mask, eye shield, gown and gloves. Hand hygiene was performed before donning protective equipment and after removal when leaving the room.    Room Number:  19/19  Date of encounter:  4/1/2022  PCP: Elyse Larkin APRN    HPI:  Context: Beatriz Boland is a 31 y.o. female who presents to the ED c/o chief complaint of onset of mid back pain that started today.  She states is a sharp pain that has been radiating to her lower back.  Is worse with movement.  She denies recent injury but works as a  so she is on her feet for hours at a time.  She does have a known history of muscle spasm of her lower back so she took a Flexeril tablet along with Tylenol and ibuprofen this morning.  She has been using warm heat without relief.  She denies fever or chills but has noted urinary frequency and burning since yesterday.  Her history is complicated in that she was diagnosed with acute bilateral otitis media on Saturday and has been on amoxicillin for the past 5 days.  Her symptoms from the ear infection have resolved.  She denies diarrhea or abdominal pain.  She did notice vaginal itching that was compatible with a yeast infection and has been on Monistat for 2 days.    MEDICAL HISTORY REVIEW  Reviewed in EPIC    PAST MEDICAL HISTORY  Active Ambulatory Problems     Diagnosis Date Noted   • Depression    • Mental developmental delay    • GERD without esophagitis    • Scoliosis    • Amblyopia    • Back pain    • Chronic left shoulder pain 09/29/2017   • Muscle spasm 09/29/2017   • Seasonal allergies 11/05/2017   • Panic attacks 11/15/2017   • Anxiety 01/04/2018   • Mild intermittent asthma without complication 03/24/2018   • Chest pain on breathing 08/10/2018   • Nicotine vapor product user 08/10/2018   • Class 3 obesity without serious comorbidity with body mass index (BMI) of 40.0 to 44.9 in  adult 08/10/2018   • Acute UTI (urinary tract infection) 09/04/2020   • Obesity (BMI 30-39.9) 09/04/2020   • Tobacco abuse 09/04/2020   • Septicemia due to E. coli ([facility]) 09/05/2020   • Pyelonephritis of left kidney 10/22/2020   • Sepsis ([facility]) 10/22/2020   • Asthma 10/22/2020     Resolved Ambulatory Problems     Diagnosis Date Noted   • Sinus bradycardia 09/29/2014   • Palpitations 11/15/2017   • Acute otitis externa of both ears 11/15/2017   • Atypical chest pain 01/04/2018   • Hypokalemia 09/04/2020   • WILIAN (acute kidney injury) ([facility]) 09/04/2020     Past Medical History:   Diagnosis Date   • Allergic    • Cleft palate    • Developmental delay    • GERD (gastroesophageal reflux disease)    • Impetigo    • Kidney abscess    • Kidney stone    • Recurrent otitis media    • Sinus infection    • Sprain of shoulder, left 12/2016       PAST SURGICAL HISTORY  Past Surgical History:   Procedure Laterality Date   • ABSCESS DRAINAGE      kidney   • ADENOIDECTOMY     • EAR TUBES     • PHARYNGEAL FLAP      for speech   • TONSILLECTOMY         FAMILY HISTORY  Family History   Problem Relation Age of Onset   • COPD Mother    • Arthritis Mother    • Obesity Mother    • Gestational diabetes Mother    • Cancer Father    • Scoliosis Father    • Rheum arthritis Maternal Aunt    • Diabetes Maternal Uncle    • Alcohol abuse Maternal Uncle    • Rheum arthritis Maternal Grandmother    • COPD Maternal Grandmother    • Stroke Maternal Grandfather    • Alcohol abuse Paternal Uncle        SOCIAL HISTORY  Social History     Socioeconomic History   • Marital status: Single   Tobacco Use   • Smoking status: Current Some Day Smoker   • Smokeless tobacco: Current User   • Tobacco comment: quit cigarette smoking 2 years ago, uses vape now   Vaping Use   • Vaping Use: Every day   • Substances: Nicotine, Flavoring   • Devices: Refillable tank   Substance and Sexual Activity   • Alcohol use: Yes     Comment: occasionally, 1-2 glasses of wine 1-2 x per  week   • Drug use: No   • Sexual activity: Yes     Partners: Male     Birth control/protection: Injection       ALLERGIES  Dust mite extract and Ciprofloxacin    The patient's allergies have been reviewed    REVIEW OF SYSTEMS  All systems reviewed and negative except for those discussed in HPI.     PHYSICAL EXAM  I have reviewed the triage vital signs and nursing notes.  ED Triage Vitals   Temp Heart Rate Resp BP SpO2   04/01/22 1116 04/01/22 1116 04/01/22 1116 04/01/22 1240 04/01/22 1116   97.9 °F (36.6 °C) 55 16 112/76 100 %      Temp src Heart Rate Source Patient Position BP Location FiO2 (%)   04/01/22 1116 04/01/22 1116 -- -- --   Tympanic Monitor            General: Mild distress.  HENT: NCAT, PERRL, Nares patent.  Eyes: no scleral icterus.  Neck: trachea midline, no ROM limitations.  CV: regular rhythm, regular rate.  Respiratory: normal effort, CTAB.  Abdomen: soft, nondistended, mild suprapubic tenderness, no rebound tenderness, no guarding or rigidity.  No CVA tenderness noted  Musculoskeletal: no deformity.  Patient has point tenderness of her mid thoracic spine.  She has pain with rotation and flexion of her thoracic spine.  She has a negative leg raise bilaterally.  She is able to ambulate easily within the emergency department.  Neuro: alert, moves all extremities, follows commands.  Skin: warm, dry.    LAB RESULTS  Recent Results (from the past 24 hour(s))   Comprehensive Metabolic Panel    Collection Time: 04/01/22 12:49 PM    Specimen: Blood   Result Value Ref Range    Glucose 65 65 - 99 mg/dL    BUN 10 6 - 20 mg/dL    Creatinine 0.91 0.57 - 1.00 mg/dL    Sodium 140 136 - 145 mmol/L    Potassium 4.0 3.5 - 5.2 mmol/L    Chloride 106 98 - 107 mmol/L    CO2 25.0 22.0 - 29.0 mmol/L    Calcium 9.1 8.6 - 10.5 mg/dL    Total Protein 7.0 6.0 - 8.5 g/dL    Albumin 4.20 3.50 - 5.20 g/dL    ALT (SGPT) 11 1 - 33 U/L    AST (SGOT) 15 1 - 32 U/L    Alkaline Phosphatase 66 39 - 117 U/L    Total Bilirubin 0.2 0.0 -  1.2 mg/dL    Globulin 2.8 gm/dL    A/G Ratio 1.5 g/dL    BUN/Creatinine Ratio 11.0 7.0 - 25.0    Anion Gap 9.0 5.0 - 15.0 mmol/L    eGFR 87.2 >60.0 mL/min/1.73   Urinalysis With Culture If Indicated - Urine, Clean Catch    Collection Time: 04/01/22 12:49 PM    Specimen: Urine, Clean Catch   Result Value Ref Range    Color, UA Yellow Yellow, Straw    Appearance, UA Clear Clear    pH, UA 6.5 5.0 - 8.0    Specific Gravity, UA <=1.005 1.005 - 1.030    Glucose, UA Negative Negative    Ketones, UA Negative Negative    Bilirubin, UA Negative Negative    Blood, UA Negative Negative    Protein, UA Negative Negative    Leuk Esterase, UA Negative Negative    Nitrite, UA Negative Negative    Urobilinogen, UA 0.2 E.U./dL 0.2 - 1.0 E.U./dL   Lipase    Collection Time: 04/01/22 12:49 PM    Specimen: Blood   Result Value Ref Range    Lipase 27 13 - 60 U/L   CBC Auto Differential    Collection Time: 04/01/22 12:49 PM    Specimen: Blood   Result Value Ref Range    WBC 7.81 3.40 - 10.80 10*3/mm3    RBC 5.06 3.77 - 5.28 10*6/mm3    Hemoglobin 13.9 12.0 - 15.9 g/dL    Hematocrit 41.9 34.0 - 46.6 %    MCV 82.8 79.0 - 97.0 fL    MCH 27.5 26.6 - 33.0 pg    MCHC 33.2 31.5 - 35.7 g/dL    RDW 12.5 12.3 - 15.4 %    RDW-SD 37.5 37.0 - 54.0 fl    MPV 9.7 6.0 - 12.0 fL    Platelets 226 140 - 450 10*3/mm3    Neutrophil % 58.3 42.7 - 76.0 %    Lymphocyte % 31.9 19.6 - 45.3 %    Monocyte % 8.2 5.0 - 12.0 %    Eosinophil % 0.9 0.3 - 6.2 %    Basophil % 0.4 0.0 - 1.5 %    Immature Grans % 0.3 0.0 - 0.5 %    Neutrophils, Absolute 4.56 1.70 - 7.00 10*3/mm3    Lymphocytes, Absolute 2.49 0.70 - 3.10 10*3/mm3    Monocytes, Absolute 0.64 0.10 - 0.90 10*3/mm3    Eosinophils, Absolute 0.07 0.00 - 0.40 10*3/mm3    Basophils, Absolute 0.03 0.00 - 0.20 10*3/mm3    Immature Grans, Absolute 0.02 0.00 - 0.05 10*3/mm3    nRBC 0.0 0.0 - 0.2 /100 WBC   POC Glucose Once    Collection Time: 04/01/22  2:39 PM    Specimen: Blood   Result Value Ref Range    Glucose 91 70 -  130 mg/dL       I ordered the above labs and reviewed the results.    RADIOLOGY  XR Spine Thoracic 3 View    Result Date: 4/1/2022  THREE-VIEW THORACIC SPINE  HISTORY: Acute onset of mid back pain. No trauma.  FINDINGS: The vertebral height and disc spaces are well-maintained throughout the thoracic spine. No significant abnormality is seen.  This report was finalized on 4/1/2022 2:15 PM by Dr. Igor Perry M.D.        I ordered the above noted radiological studies. I reviewed the images and results. I agree with the radiologist interpretation.    PROCEDURES  Procedures    MEDICATIONS GIVEN IN ER  Medications - No data to display    PROGRESS, DATA ANALYSIS, CONSULTS, AND MEDICAL DECISION MAKING  A complete history and physical exam have been performed.  All available laboratory and imaging results have been reviewed by myself prior to disposition.    MDM  After the initial H&P, I discussed pertinent information from history and physical exam with patient/family.  Discussed differential diagnosis.  Discussed plan for ED evaluation/workup/treatment.  All questions answered.  Patient/family is agreeable with plan.  ED Course as of 04/01/22 1757   Fri Apr 01, 2022   1220 Patient presents with mid back pain with no known injury.  Patient does have a history of frequent urinary tract infections and pyelonephritis.  We will check a urinalysis along with basic blood work.  I will give something for pain and we will also get a thoracic spine series.  If she has evidence of hematuria or infection, we may end up obtaining a CT of the abdomen pelvis to rule out recurrent pyelonephritis. [DE]   1331 Urinalysis With Culture If Indicated - Urine, Clean Catch [DE]   1331 Lipase: 27 [DE]   1331 WBC: 7.81 [DE]   1340 Glucose: 65 [DE]   1421 XR Spine Thoracic 3 View [DE]   1441 I updated patient on the results of her x-rays.  Repeat Accu-Chek was 91.  I will place patient on a steroid pack and discharge her to home. [DE]      ED  Course User Index  [DE] Scott Galloway MD       AS OF 17:57 EDT VITALS:    BP - 107/67  HR - 64  TEMP - 97.9 °F (36.6 °C) (Tympanic)  O2 SATS - 100%    DIAGNOSIS  Final diagnoses:   Thoracic myofascial strain, initial encounter   Dysuria         DISPOSITION    DISCHARGE    Patient discharged in stable condition.    Reviewed implications of results, diagnosis, meds, responsibility to follow up, warning signs and symptoms of possible worsening, potential complications and reasons to return to ER.    Patient/Family voiced understanding of above instructions.    Discussed plan for discharge, as there is no emergent indication for admission. Patient referred to primary care provider for BP management due to today's BP. Pt/family is agreeable and understands need for follow up and repeat testing.  Pt is aware that discharge does not mean that nothing is wrong but it indicates no emergency is present that requires admission and they must continue care with follow-up as given below or physician of their choice.     FOLLOW-UP  Elyse Larkin Mushtaq, APRN  [address] [id number] [phone]    Schedule an appointment as soon as possible for a visit            Medication List      New Prescriptions    methylPREDNISolone 4 MG dose pack  Commonly known as: MEDROL  Take as directed on package instructions.           Where to Get Your Medications      These medications were sent to [facility]/pharmacy #[id number] - [place], [place] - [address] AT [facility] - [phone] [facility] [phone] FX  [address], [place] [id number]    Hours: 24-hours Phone: [phone]   · methylPREDNISolone 4 MG dose pack          Scott Galloway MD  04/01/22 3705

## 2022-04-01 NOTE — DISCHARGE INSTRUCTIONS
Continue your home medications and take the Medrol Dosepak as directed.  You can take 1 g of Tylenol along with 1 ibuprofen tablet every 4-6 hours as needed for pain.  Please return to the emergency department if symptoms worsen with fever and worsening pain.

## 2023-05-02 ENCOUNTER — OFFICE (AMBULATORY)
Dept: URBAN - METROPOLITAN AREA CLINIC 76 | Facility: CLINIC | Age: 33
End: 2023-05-02

## 2023-05-02 VITALS
SYSTOLIC BLOOD PRESSURE: 134 MMHG | HEIGHT: 67 IN | OXYGEN SATURATION: 97 % | HEART RATE: 78 BPM | WEIGHT: 253 LBS | DIASTOLIC BLOOD PRESSURE: 80 MMHG

## 2023-05-02 DIAGNOSIS — R10.11 RIGHT UPPER QUADRANT PAIN: ICD-10-CM

## 2023-05-02 DIAGNOSIS — F17.200 NICOTINE DEPENDENCE, UNSPECIFIED, UNCOMPLICATED: ICD-10-CM

## 2023-05-02 DIAGNOSIS — K21.9 GASTRO-ESOPHAGEAL REFLUX DISEASE WITHOUT ESOPHAGITIS: ICD-10-CM

## 2023-05-02 PROCEDURE — 99204 OFFICE O/P NEW MOD 45 MIN: CPT | Performed by: INTERNAL MEDICINE

## 2023-11-03 ENCOUNTER — OFFICE (AMBULATORY)
Dept: URBAN - METROPOLITAN AREA CLINIC 76 | Facility: CLINIC | Age: 33
End: 2023-11-03

## 2023-11-03 VITALS
HEART RATE: 66 BPM | OXYGEN SATURATION: 98 % | WEIGHT: 262 LBS | DIASTOLIC BLOOD PRESSURE: 72 MMHG | SYSTOLIC BLOOD PRESSURE: 132 MMHG | HEIGHT: 67 IN

## 2023-11-03 DIAGNOSIS — K21.9 GASTRO-ESOPHAGEAL REFLUX DISEASE WITHOUT ESOPHAGITIS: ICD-10-CM

## 2023-11-03 DIAGNOSIS — R10.11 RIGHT UPPER QUADRANT PAIN: ICD-10-CM

## 2023-11-03 PROCEDURE — 99213 OFFICE O/P EST LOW 20 MIN: CPT

## 2023-11-03 RX ORDER — DEXLANSOPRAZOLE 60 MG/1
60 CAPSULE, DELAYED RELEASE ORAL
Qty: 90 | Refills: 3 | Status: ACTIVE
Start: 2019-08-09

## 2024-05-04 ENCOUNTER — APPOINTMENT (OUTPATIENT)
Dept: GENERAL RADIOLOGY | Facility: HOSPITAL | Age: 34
End: 2024-05-04
Payer: COMMERCIAL

## 2024-05-04 ENCOUNTER — HOSPITAL ENCOUNTER (EMERGENCY)
Facility: HOSPITAL | Age: 34
Discharge: HOME | End: 2024-05-04
Attending: EMERGENCY MEDICINE
Payer: COMMERCIAL

## 2024-05-04 VITALS
OXYGEN SATURATION: 99 % | RESPIRATION RATE: 18 BRPM | HEIGHT: 67 IN | WEIGHT: 250 LBS | HEART RATE: 88 BPM | SYSTOLIC BLOOD PRESSURE: 127 MMHG | DIASTOLIC BLOOD PRESSURE: 106 MMHG | TEMPERATURE: 98.6 F | BODY MASS INDEX: 39.24 KG/M2

## 2024-05-04 DIAGNOSIS — R10.13 DYSPEPSIA: ICD-10-CM

## 2024-05-04 DIAGNOSIS — R07.9 CHEST PAIN, UNSPECIFIED TYPE: Primary | ICD-10-CM

## 2024-05-04 LAB
ALBUMIN SERPL-MCNC: 4 G/DL (ref 3.5–5.2)
ALBUMIN/GLOB SERPL: 1.2 G/DL
ALP SERPL-CCNC: 66 U/L (ref 39–117)
ALT SERPL W P-5'-P-CCNC: 12 U/L (ref 1–33)
ANION GAP SERPL CALCULATED.3IONS-SCNC: 11 MMOL/L (ref 5–15)
AST SERPL-CCNC: 9 U/L (ref 1–32)
BASOPHILS # BLD AUTO: 0.05 10*3/MM3 (ref 0–0.2)
BASOPHILS NFR BLD AUTO: 0.6 % (ref 0–1.5)
BILIRUB SERPL-MCNC: 0.2 MG/DL (ref 0–1.2)
BUN SERPL-MCNC: 7 MG/DL (ref 6–20)
BUN/CREAT SERPL: 8.2 (ref 7–25)
CALCIUM SPEC-SCNC: 8.9 MG/DL (ref 8.6–10.5)
CHLORIDE SERPL-SCNC: 108 MMOL/L (ref 98–107)
CO2 SERPL-SCNC: 24 MMOL/L (ref 22–29)
CREAT SERPL-MCNC: 0.85 MG/DL (ref 0.57–1)
DEPRECATED RDW RBC AUTO: 39.2 FL (ref 37–54)
EGFRCR SERPLBLD CKD-EPI 2021: 93.5 ML/MIN/1.73
EOSINOPHIL # BLD AUTO: 0.12 10*3/MM3 (ref 0–0.4)
EOSINOPHIL NFR BLD AUTO: 1.5 % (ref 0.3–6.2)
ERYTHROCYTE [DISTWIDTH] IN BLOOD BY AUTOMATED COUNT: 12.6 % (ref 12.3–15.4)
GLOBULIN UR ELPH-MCNC: 3.3 GM/DL
GLUCOSE SERPL-MCNC: 89 MG/DL (ref 65–99)
HCT VFR BLD AUTO: 43.3 % (ref 34–46.6)
HGB BLD-MCNC: 13.9 G/DL (ref 12–15.9)
IMM GRANULOCYTES # BLD AUTO: 0.02 10*3/MM3 (ref 0–0.05)
IMM GRANULOCYTES NFR BLD AUTO: 0.2 % (ref 0–0.5)
LYMPHOCYTES # BLD AUTO: 3.05 10*3/MM3 (ref 0.7–3.1)
LYMPHOCYTES NFR BLD AUTO: 37.5 % (ref 19.6–45.3)
MCH RBC QN AUTO: 27.4 PG (ref 26.6–33)
MCHC RBC AUTO-ENTMCNC: 32.1 G/DL (ref 31.5–35.7)
MCV RBC AUTO: 85.2 FL (ref 79–97)
MONOCYTES # BLD AUTO: 0.51 10*3/MM3 (ref 0.1–0.9)
MONOCYTES NFR BLD AUTO: 6.3 % (ref 5–12)
NEUTROPHILS NFR BLD AUTO: 4.38 10*3/MM3 (ref 1.7–7)
NEUTROPHILS NFR BLD AUTO: 53.9 % (ref 42.7–76)
NRBC BLD AUTO-RTO: 0 /100 WBC (ref 0–0.2)
PLATELET # BLD AUTO: 233 10*3/MM3 (ref 140–450)
PMV BLD AUTO: 9.4 FL (ref 6–12)
POTASSIUM SERPL-SCNC: 3.8 MMOL/L (ref 3.5–5.2)
PROT SERPL-MCNC: 7.3 G/DL (ref 6–8.5)
QT INTERVAL: 493 MS
QTC INTERVAL: 417 MS
RBC # BLD AUTO: 5.08 10*6/MM3 (ref 3.77–5.28)
SODIUM SERPL-SCNC: 143 MMOL/L (ref 136–145)
TROPONIN T SERPL HS-MCNC: <6 NG/L
WBC NRBC COR # BLD AUTO: 8.13 10*3/MM3 (ref 3.4–10.8)

## 2024-05-04 PROCEDURE — 84484 ASSAY OF TROPONIN QUANT: CPT | Performed by: EMERGENCY MEDICINE

## 2024-05-04 PROCEDURE — 93005 ELECTROCARDIOGRAM TRACING: CPT

## 2024-05-04 PROCEDURE — 36415 COLL VENOUS BLD VENIPUNCTURE: CPT

## 2024-05-04 PROCEDURE — 85025 COMPLETE CBC W/AUTO DIFF WBC: CPT | Performed by: EMERGENCY MEDICINE

## 2024-05-04 PROCEDURE — 71045 X-RAY EXAM CHEST 1 VIEW: CPT

## 2024-05-04 PROCEDURE — 80053 COMPREHEN METABOLIC PANEL: CPT | Performed by: EMERGENCY MEDICINE

## 2024-05-04 PROCEDURE — 99284 EMERGENCY DEPT VISIT MOD MDM: CPT

## 2024-05-04 RX ORDER — NITROGLYCERIN 0.4 MG/1
0.4 TABLET SUBLINGUAL ONCE
Status: COMPLETED | OUTPATIENT
Start: 2024-05-04 | End: 2024-05-04

## 2024-05-04 RX ORDER — ACETAMINOPHEN 500 MG
1000 TABLET ORAL ONCE
Status: COMPLETED | OUTPATIENT
Start: 2024-05-04 | End: 2024-05-04

## 2024-05-04 RX ORDER — SUCRALFATE 1 G/1
1 TABLET ORAL 4 TIMES DAILY
Qty: 30 TABLET | Refills: 0 | Status: SHIPPED | OUTPATIENT
Start: 2024-05-04

## 2024-05-04 RX ADMIN — NITROGLYCERIN 0.4 MG: 0.4 TABLET SUBLINGUAL at 14:46

## 2024-05-04 RX ADMIN — ACETAMINOPHEN 1000 MG: 500 TABLET ORAL at 14:45

## 2024-05-04 NOTE — Clinical Note
[facility] EMERGENCY DEPARTMENT  [address] [facility] [address]  Phone: [phone]    Beatriz Boland was seen and treated in our emergency department on 5/4/2024.  She may return to work on 05/06/2024.         Thank you for choosing [facility].    Chintan Jaimes MD

## 2024-05-04 NOTE — ED NOTES
Pt reports chest pain that started yesterday but went away. Pt reports the chest pain came back and now she feels the pain in her back

## 2024-05-04 NOTE — ED PROVIDER NOTES
EMERGENCY DEPARTMENT ENCOUNTER  Room Number:  12/12  PCP: Elyse Larkin APRN  Independent Historians: Patient      HPI:  Chief Complaint: had concerns including Chest Pain.     A complete HPI/ROS/PMH/PSH/SH/FH are unobtainable due to: None    Chronic or social conditions impacting patient care (Social Determinants of Health): None      Context: Beatriz Boland is a 33 y.o. female with a medical history of GERD, scoliosis and panic attacks who presents to the ED c/o acute substernal sharp pain that radiates to her back intermittently since yesterday.  Patient notes that she struggles with reflux frequently.  No associated shortness of breath, diaphoresis or nausea.  No abdominal pain.  No black or bloody stools.  No exertional component.  Symptoms do seem to wax and wane.  Patient does note that she has been under fair amount of stress as of late.  She denies any significant past cardiac history.    Review of prior external notes (non-ED) -and- Review of prior external test results outside of this encounter:        PAST MEDICAL HISTORY  Active Ambulatory Problems     Diagnosis Date Noted    Depression     Mental developmental delay     GERD without esophagitis     Scoliosis     Amblyopia     Back pain     Chronic left shoulder pain 09/29/2017    Muscle spasm 09/29/2017    Seasonal allergies 11/05/2017    Panic attacks 11/15/2017    Anxiety 01/04/2018    Mild intermittent asthma without complication 03/24/2018    Chest pain on breathing 08/10/2018    Nicotine vapor product user 08/10/2018    Class 3 obesity without serious comorbidity with body mass index (BMI) of 40.0 to 44.9 in adult 08/10/2018    Acute UTI (urinary tract infection) 09/04/2020    Obesity (BMI 30-39.9) 09/04/2020    Tobacco abuse 09/04/2020    Septicemia due to E. coli 09/05/2020    Pyelonephritis of left kidney 10/22/2020    Sepsis 10/22/2020    Asthma 10/22/2020     Resolved Ambulatory Problems     Diagnosis Date Noted    Sinus  bradycardia 09/29/2014    Palpitations 11/15/2017    Acute otitis externa of both ears 11/15/2017    Atypical chest pain 01/04/2018    Hypokalemia 09/04/2020    WILIAN (acute kidney injury) 09/04/2020     Past Medical History:   Diagnosis Date    Allergic     Cleft palate     Developmental delay     GERD (gastroesophageal reflux disease)     Impetigo     Kidney abscess     Kidney stone     Recurrent otitis media     Sinus infection     Sprain of shoulder, left 12/2016         PAST SURGICAL HISTORY  Past Surgical History:   Procedure Laterality Date    ABSCESS DRAINAGE      kidney    ADENOIDECTOMY      EAR TUBES      PHARYNGEAL FLAP      for speech    TONSILLECTOMY           FAMILY HISTORY  Family History   Problem Relation Age of Onset    COPD Mother     Arthritis Mother     Obesity Mother     Gestational diabetes Mother     Cancer Father     Scoliosis Father     Rheum arthritis Maternal Aunt     Diabetes Maternal Uncle     Alcohol abuse Maternal Uncle     Rheum arthritis Maternal Grandmother     COPD Maternal Grandmother     Stroke Maternal Grandfather     Alcohol abuse Paternal Uncle          SOCIAL HISTORY  Social History     Socioeconomic History    Marital status: Single   Tobacco Use    Smoking status: Some Days    Smokeless tobacco: Current    Tobacco comments:     quit cigarette smoking 2 years ago, uses vape now   Vaping Use    Vaping status: Every Day    Substances: Nicotine, Flavoring    Devices: Refillable tank   Substance and Sexual Activity    Alcohol use: Yes     Comment: occasionally, 1-2 glasses of wine 1-2 x per week    Drug use: No    Sexual activity: Yes     Partners: Male     Birth control/protection: Injection         ALLERGIES  Dust mite extract and Ciprofloxacin      REVIEW OF SYSTEMS  Review of Systems  Included in HPI  All systems reviewed and negative except for those discussed in HPI.      PHYSICAL EXAM    I have reviewed the triage vital signs and nursing notes.    ED Triage Vitals   Temp  Heart Rate Resp BP SpO2   05/04/24 1349 05/04/24 1349 05/04/24 1349 05/04/24 1402 05/04/24 1349   98.6 °F (37 °C) 81 18 140/84 99 %      Temp src Heart Rate Source Patient Position BP Location FiO2 (%)   05/04/24 1349 -- 05/04/24 1402 05/04/24 1402 --   Tympanic  Lying Right arm        Physical Exam    Physical Exam   Constitutional: No distress.  Nontoxic  HENT:  Head: Normocephalic and atraumatic.   Oropharynx: Mucous membranes are moist.   Eyes: . No scleral icterus. No conjunctival pallor.  Neck: Normal range of motion. Neck supple.   Cardiovascular: Pink warm and well perfused throughout.  Regular  Pulmonary/Chest: No respiratory distress.  No tachypnea or increased work of breathing appreciated.  Clear to auscultation  Abdominal: Soft. There is no tenderness. There is no rebound and no guarding.  Benign exam  Musculoskeletal: Moves all extremities equally.  No calf tenderness or swelling  Neurological: Alert and oriented.  No acute focal deficit appreciated.  Skin: Skin is pink, warm, and dry.   Psychiatric: Mood and affect normal.   Nursing note and vitals reviewed.             LAB RESULTS  Recent Results (from the past 24 hour(s))   ECG 12 Lead Chest Pain    Collection Time: 05/04/24  1:52 PM   Result Value Ref Range    QT Interval 493 ms    QTC Interval 417 ms   Comprehensive Metabolic Panel    Collection Time: 05/04/24  2:37 PM    Specimen: Blood   Result Value Ref Range    Glucose 89 65 - 99 mg/dL    BUN 7 6 - 20 mg/dL    Creatinine 0.85 0.57 - 1.00 mg/dL    Sodium 143 136 - 145 mmol/L    Potassium 3.8 3.5 - 5.2 mmol/L    Chloride 108 (H) 98 - 107 mmol/L    CO2 24.0 22.0 - 29.0 mmol/L    Calcium 8.9 8.6 - 10.5 mg/dL    Total Protein 7.3 6.0 - 8.5 g/dL    Albumin 4.0 3.5 - 5.2 g/dL    ALT (SGPT) 12 1 - 33 U/L    AST (SGOT) 9 1 - 32 U/L    Alkaline Phosphatase 66 39 - 117 U/L    Total Bilirubin 0.2 0.0 - 1.2 mg/dL    Globulin 3.3 gm/dL    A/G Ratio 1.2 g/dL    BUN/Creatinine Ratio 8.2 7.0 - 25.0    Anion  Gap 11.0 5.0 - 15.0 mmol/L    eGFR 93.5 >60.0 mL/min/1.73   High Sensitivity Troponin T    Collection Time: 05/04/24  2:37 PM    Specimen: Blood   Result Value Ref Range    HS Troponin T <6 <14 ng/L   CBC Auto Differential    Collection Time: 05/04/24  2:37 PM    Specimen: Blood   Result Value Ref Range    WBC 8.13 3.40 - 10.80 10*3/mm3    RBC 5.08 3.77 - 5.28 10*6/mm3    Hemoglobin 13.9 12.0 - 15.9 g/dL    Hematocrit 43.3 34.0 - 46.6 %    MCV 85.2 79.0 - 97.0 fL    MCH 27.4 26.6 - 33.0 pg    MCHC 32.1 31.5 - 35.7 g/dL    RDW 12.6 12.3 - 15.4 %    RDW-SD 39.2 37.0 - 54.0 fl    MPV 9.4 6.0 - 12.0 fL    Platelets 233 140 - 450 10*3/mm3    Neutrophil % 53.9 42.7 - 76.0 %    Lymphocyte % 37.5 19.6 - 45.3 %    Monocyte % 6.3 5.0 - 12.0 %    Eosinophil % 1.5 0.3 - 6.2 %    Basophil % 0.6 0.0 - 1.5 %    Immature Grans % 0.2 0.0 - 0.5 %    Neutrophils, Absolute 4.38 1.70 - 7.00 10*3/mm3    Lymphocytes, Absolute 3.05 0.70 - 3.10 10*3/mm3    Monocytes, Absolute 0.51 0.10 - 0.90 10*3/mm3    Eosinophils, Absolute 0.12 0.00 - 0.40 10*3/mm3    Basophils, Absolute 0.05 0.00 - 0.20 10*3/mm3    Immature Grans, Absolute 0.02 0.00 - 0.05 10*3/mm3    nRBC 0.0 0.0 - 0.2 /100 WBC         RADIOLOGY  XR Chest 1 View    Result Date: 5/4/2024  PORTABLE CHEST  HISTORY: Chest pain.  COMPARISON: Chest x-ray 12/05/2020.  FINDINGS: The study is hampered somewhat by the patient's body habitus. The heart is within normal limits in size. There is no evidence of infiltrate, effusion or of congestive failure.         MEDICATIONS GIVEN IN ER  Medications   nitroglycerin (NITROSTAT) SL tablet 0.4 mg (0.4 mg Sublingual Given 5/4/24 1446)   acetaminophen (TYLENOL) tablet 1,000 mg (1,000 mg Oral Given 5/4/24 1445)         ORDERS PLACED DURING THIS VISIT:  Orders Placed This Encounter   Procedures    XR Chest 1 View    Comprehensive Metabolic Panel    High Sensitivity Troponin T    CBC Auto Differential    Monitor Blood Pressure    ECG 12 Lead Chest Pain     CBC & Differential         OUTPATIENT MEDICATION MANAGEMENT:  No current Epic-ordered facility-administered medications on file.     Current Outpatient Medications Ordered in Epic   Medication Sig Dispense Refill    Acetaminophen (TYLENOL) 325 MG capsule Take 325 mg by mouth As Needed.      albuterol sulfate  (90 Base) MCG/ACT inhaler Inhale 2 puffs Every 4 (Four) Hours As Needed for Wheezing. 18 g 3    ARIPiprazole (ABILIFY) 5 MG tablet Take 5 mg by mouth Every Night.      cefdinir (OMNICEF) 300 MG capsule Take 1 capsule by mouth 2 (Two) Times a Day. 20 capsule 0    cetirizine (zyrTEC) 10 MG tablet Take 10 mg by mouth Daily.      [employer] NASAL ALLERGY SPRAY 55 MCG/ACT nasal inhaler 2 sprays into each nostril Daily.  0    dexlansoprazole (DEXILANT) 60 MG capsule Take 60 mg by mouth Daily.      estradiol cypionate (DEPO-ESTRADIOL) 5 MG/ML injection Inject 1.5 mg into the shoulder, thigh, or buttocks Every 28 (Twenty-Eight) Days. Patient states she takes it every 3 months      fluticasone (FLONASE) 50 MCG/ACT nasal spray       metaxalone (SKELAXIN) 800 MG tablet Take 1 tablet by mouth 3 (Three) Times a Day. 15 tablet 0    methocarbamol (ROBAXIN) 750 MG tablet Take 1 tablet by mouth 3 (Three) Times a Day As Needed for Muscle Spasms. 21 tablet 0    methylPREDNISolone (MEDROL) 4 MG dose pack Take as directed on package instructions. 1 each 0    naproxen (NAPROSYN) 500 MG tablet Take 1 tablet by mouth 2 (Two) Times a Day As Needed for Moderate Pain . 20 tablet 0    ondansetron ODT (ZOFRAN-ODT) 4 MG disintegrating tablet Place 1 tablet on the tongue Every 6 (Six) Hours As Needed for Nausea or Vomiting. 20 tablet 0    saccharomyces boulardii (FLORASTOR) 250 MG capsule Take 1 capsule by mouth 2 (Two) Times a Day. 14 capsule 0    sucralfate (CARAFATE) 1 g tablet Take 1 tablet by mouth 4 (Four) Times a Day. 30 tablet 0    venlafaxine (EFFEXOR) 25 MG tablet Take 25 mg by mouth Every Night.            PROCEDURES  Procedures            PROGRESS, DATA ANALYSIS, CONSULTS, AND MEDICAL DECISION MAKING  All labs have been independently interpreted by me.  All radiology studies have been reviewed by me. All EKG's have been independently viewed and interpreted by me.  Discussion below represents my analysis of pertinent findings related to patient's condition, differential diagnosis, treatment plan and final disposition.    Differential diagnosis:   My differential diagnosis for chest pain includes but is not limited to:  Muscle strain, costochondritis, myositis, pleurisy, rib fracture, intercostal neuritis, herpes zoster, tumor, myocardial infarction, coronary syndrome, unstable angina, angina, aortic dissection, mitral valve prolapse, pericarditis, palpitations, pulmonary embolus, pneumonia, pneumothorax, lung cancer, GERD, esophagitis, esophageal spasm      Clinical Scores: HEART Score: 1                  ED Course as of 05/04/24 1514   Sat May 04, 2024   1401 EKG           EKG time: 1352  Rhythm/Rate: Sinus bradycardia, 45-50  P waves and CT: PJ within normal limits  QRS, axis: Narrow complex with slow R wave progression  ST and T waves: No STEMI; QTc within normal limits    Interpreted Contemporaneously by me, independently viewed  Comparison: 9/8/2020-no acute change   [RS]   1445 RADIOLOGY      Study: Single view chest  Findings: No pneumothorax or focal infiltrate noted  I independently viewed and interpreted these images contemporaneously with treatment.    [RS]   1452 WBC: 8.13 [RS]   1452 Hemoglobin: 13.9 [RS]   1452 Platelets: 233 [RS]   1510 HS Troponin T: <6  Atypical presentation greater than 12 hours ago with unremarkable EKG.  No indication for repeat troponin at this time. [RS]   1510 Glucose: 89 [RS]   1510 BUN: 7 [RS]   1510 Creatinine: 0.85 [RS]   1510 Sodium: 143 [RS]   1510 Potassium: 3.8 [RS]   1513 Discussed all findings with patient.  She still having intermittent discomfort but it is  milder.  Will try some Carafate.  Repeat blood pressure at this time 117/82.  Patient agreeable with discharge planning.  Reviewed red flags which locate need for ED return. [RS]      ED Course User Index  [RS] Chintan Jaimes MD         Prescription drug monitoring program review:     AS OF 15:14 EDT VITALS:    BP - (!) 127/106  HR - 88  TEMP - 98.6 °F (37 °C) (Tympanic)  O2 SATS - 99%    COMPLEXITY OF CARE  Admission was considered but after careful review of the patient's presentation, physical examination, diagnostic results, and response to treatment the patient may be safely discharged with outpatient follow-up.      DIAGNOSIS  Final diagnoses:   Chest pain, unspecified type   Dyspepsia         DISPOSITION  ED Disposition       ED Disposition   Discharge    Condition   Stable    Comment   --                  DISCHARGE    Patient discharged in stable condition.    Reviewed implications of results, diagnosis, meds, responsibility to follow up, warning signs and symptoms of possible worsening, potential complications and reasons to return to ER.    Patient/Family voiced understanding of above instructions.    Discussed plan for discharge, as there is no emergent indication for admission. Patient referred to primary care provider for regular health maintenance. Pt/family is agreeable and understands need for follow up and possible repeat testing.  Pt is aware that discharge does not mean that nothing is wrong but it indicates no emergency is present that requires admission and they must continue care with follow-up as given below or physician of their choice.     FOLLOW-UP  Elyse Larkin, APRN  [address] [facility] [address]  [phone]    Schedule an appointment as soon as possible for a visit in 3 days  Reevaluation    [facility] EMERGENCY DEPARTMENT  [address] [facility] [address] [phone]  Schedule an appointment as soon as possible for a visit   As  needed         Medication List        New Prescriptions      sucralfate 1 g tablet  Commonly known as: CARAFATE  Take 1 tablet by mouth 4 (Four) Times a Day.               Where to Get Your Medications        These medications were sent to [facility] [id number] - [place], [place] - [address] AT [facility] [address] - [phone]  - [phone] FX  [address], [facility] [address]      Phone: [phone]   sucralfate 1 g tablet           Please note that portions of this document were completed with a voice recognition program.    Note Disclaimer: At [facility], we believe that sharing information builds trust and better relationships. You are receiving this note because you recently visited [facility]. It is possible you will see health information before a provider has talked with you about it. This kind of information can be easy to misunderstand. To help you fully understand what it means for your health, we urge you to discuss this note with your provider.         Chintan Jaimes MD  05/04/24 1539

## 2024-05-06 ENCOUNTER — OFFICE (AMBULATORY)
Dept: URBAN - METROPOLITAN AREA CLINIC 76 | Facility: CLINIC | Age: 34
End: 2024-05-06

## 2024-05-06 VITALS
HEIGHT: 67 IN | OXYGEN SATURATION: 99 % | HEART RATE: 51 BPM | DIASTOLIC BLOOD PRESSURE: 73 MMHG | SYSTOLIC BLOOD PRESSURE: 118 MMHG | WEIGHT: 253 LBS

## 2024-05-06 DIAGNOSIS — R07.89 OTHER CHEST PAIN: ICD-10-CM

## 2024-05-06 DIAGNOSIS — K21.9 GASTRO-ESOPHAGEAL REFLUX DISEASE WITHOUT ESOPHAGITIS: ICD-10-CM

## 2024-05-06 PROCEDURE — 99213 OFFICE O/P EST LOW 20 MIN: CPT

## 2024-06-13 ENCOUNTER — HOSPITAL ENCOUNTER (EMERGENCY)
Facility: HOSPITAL | Age: 34
Discharge: HOME | End: 2024-06-13
Attending: EMERGENCY MEDICINE
Payer: COMMERCIAL

## 2024-06-13 ENCOUNTER — APPOINTMENT (OUTPATIENT)
Dept: CT IMAGING | Facility: HOSPITAL | Age: 34
End: 2024-06-13
Payer: COMMERCIAL

## 2024-06-13 VITALS
TEMPERATURE: 98.7 F | BODY MASS INDEX: 38.57 KG/M2 | HEIGHT: 66 IN | HEART RATE: 66 BPM | RESPIRATION RATE: 16 BRPM | WEIGHT: 240 LBS | SYSTOLIC BLOOD PRESSURE: 125 MMHG | DIASTOLIC BLOOD PRESSURE: 96 MMHG | OXYGEN SATURATION: 100 %

## 2024-06-13 DIAGNOSIS — R10.9 LEFT FLANK PAIN: Primary | ICD-10-CM

## 2024-06-13 DIAGNOSIS — R19.7 DIARRHEA, UNSPECIFIED TYPE: ICD-10-CM

## 2024-06-13 LAB
ALBUMIN SERPL-MCNC: 3.8 G/DL (ref 3.5–5.2)
ALBUMIN/GLOB SERPL: 1.1 G/DL
ALP SERPL-CCNC: 67 U/L (ref 39–117)
ALT SERPL W P-5'-P-CCNC: 12 U/L (ref 1–33)
ANION GAP SERPL CALCULATED.3IONS-SCNC: 10.5 MMOL/L (ref 5–15)
AST SERPL-CCNC: 10 U/L (ref 1–32)
BACTERIA UR QL AUTO: ABNORMAL /HPF
BASOPHILS # BLD AUTO: 0.04 10*3/MM3 (ref 0–0.2)
BASOPHILS NFR BLD AUTO: 0.4 % (ref 0–1.5)
BILIRUB SERPL-MCNC: 0.3 MG/DL (ref 0–1.2)
BILIRUB UR QL STRIP: NEGATIVE
BUN SERPL-MCNC: 8 MG/DL (ref 6–20)
BUN/CREAT SERPL: 10.7 (ref 7–25)
CALCIUM SPEC-SCNC: 9.5 MG/DL (ref 8.6–10.5)
CHLORIDE SERPL-SCNC: 107 MMOL/L (ref 98–107)
CLARITY UR: CLEAR
CO2 SERPL-SCNC: 22.5 MMOL/L (ref 22–29)
COLOR UR: YELLOW
CREAT SERPL-MCNC: 0.75 MG/DL (ref 0.57–1)
DEPRECATED RDW RBC AUTO: 37.5 FL (ref 37–54)
EGFRCR SERPLBLD CKD-EPI 2021: 108.6 ML/MIN/1.73
EOSINOPHIL # BLD AUTO: 0.11 10*3/MM3 (ref 0–0.4)
EOSINOPHIL NFR BLD AUTO: 1.1 % (ref 0.3–6.2)
ERYTHROCYTE [DISTWIDTH] IN BLOOD BY AUTOMATED COUNT: 12.2 % (ref 12.3–15.4)
GLOBULIN UR ELPH-MCNC: 3.5 GM/DL
GLUCOSE SERPL-MCNC: 85 MG/DL (ref 65–99)
GLUCOSE UR STRIP-MCNC: NEGATIVE MG/DL
HCG SERPL QL: NEGATIVE
HCT VFR BLD AUTO: 44.9 % (ref 34–46.6)
HGB BLD-MCNC: 14.7 G/DL (ref 12–15.9)
HGB UR QL STRIP.AUTO: NEGATIVE
HYALINE CASTS UR QL AUTO: ABNORMAL /LPF
IMM GRANULOCYTES # BLD AUTO: 0.02 10*3/MM3 (ref 0–0.05)
IMM GRANULOCYTES NFR BLD AUTO: 0.2 % (ref 0–0.5)
KETONES UR QL STRIP: NEGATIVE
LEUKOCYTE ESTERASE UR QL STRIP.AUTO: ABNORMAL
LIPASE SERPL-CCNC: 24 U/L (ref 13–60)
LYMPHOCYTES # BLD AUTO: 2.68 10*3/MM3 (ref 0.7–3.1)
LYMPHOCYTES NFR BLD AUTO: 27.5 % (ref 19.6–45.3)
MCH RBC QN AUTO: 27.8 PG (ref 26.6–33)
MCHC RBC AUTO-ENTMCNC: 32.7 G/DL (ref 31.5–35.7)
MCV RBC AUTO: 85 FL (ref 79–97)
MONOCYTES # BLD AUTO: 0.69 10*3/MM3 (ref 0.1–0.9)
MONOCYTES NFR BLD AUTO: 7.1 % (ref 5–12)
NEUTROPHILS NFR BLD AUTO: 6.2 10*3/MM3 (ref 1.7–7)
NEUTROPHILS NFR BLD AUTO: 63.7 % (ref 42.7–76)
NITRITE UR QL STRIP: NEGATIVE
NRBC BLD AUTO-RTO: 0 /100 WBC (ref 0–0.2)
PH UR STRIP.AUTO: 6 [PH] (ref 5–8)
PLATELET # BLD AUTO: 252 10*3/MM3 (ref 140–450)
PMV BLD AUTO: 9.1 FL (ref 6–12)
POTASSIUM SERPL-SCNC: 4 MMOL/L (ref 3.5–5.2)
PROT SERPL-MCNC: 7.3 G/DL (ref 6–8.5)
PROT UR QL STRIP: NEGATIVE
RBC # BLD AUTO: 5.28 10*6/MM3 (ref 3.77–5.28)
RBC # UR STRIP: ABNORMAL /HPF
REF LAB TEST METHOD: ABNORMAL
SODIUM SERPL-SCNC: 140 MMOL/L (ref 136–145)
SP GR UR STRIP: 1.02 (ref 1–1.03)
SQUAMOUS #/AREA URNS HPF: ABNORMAL /HPF
UROBILINOGEN UR QL STRIP: ABNORMAL
WBC # UR STRIP: ABNORMAL /HPF
WBC NRBC COR # BLD AUTO: 9.74 10*3/MM3 (ref 3.4–10.8)

## 2024-06-13 PROCEDURE — 25810000003 LACTATED RINGERS SOLUTION: Performed by: PHYSICIAN ASSISTANT

## 2024-06-13 PROCEDURE — 81001 URINALYSIS AUTO W/SCOPE: CPT | Performed by: PHYSICIAN ASSISTANT

## 2024-06-13 PROCEDURE — 83690 ASSAY OF LIPASE: CPT | Performed by: PHYSICIAN ASSISTANT

## 2024-06-13 PROCEDURE — 25010000002 KETOROLAC TROMETHAMINE PER 15 MG: Performed by: PHYSICIAN ASSISTANT

## 2024-06-13 PROCEDURE — 96374 THER/PROPH/DIAG INJ IV PUSH: CPT

## 2024-06-13 PROCEDURE — 84703 CHORIONIC GONADOTROPIN ASSAY: CPT | Performed by: PHYSICIAN ASSISTANT

## 2024-06-13 PROCEDURE — 85025 COMPLETE CBC W/AUTO DIFF WBC: CPT | Performed by: PHYSICIAN ASSISTANT

## 2024-06-13 PROCEDURE — 99284 EMERGENCY DEPT VISIT MOD MDM: CPT

## 2024-06-13 PROCEDURE — 36415 COLL VENOUS BLD VENIPUNCTURE: CPT

## 2024-06-13 PROCEDURE — 74176 CT ABD & PELVIS W/O CONTRAST: CPT

## 2024-06-13 PROCEDURE — 80053 COMPREHEN METABOLIC PANEL: CPT | Performed by: PHYSICIAN ASSISTANT

## 2024-06-13 RX ORDER — KETOROLAC TROMETHAMINE 15 MG/ML
15 INJECTION, SOLUTION INTRAMUSCULAR; INTRAVENOUS ONCE
Status: COMPLETED | OUTPATIENT
Start: 2024-06-13 | End: 2024-06-13

## 2024-06-13 RX ORDER — SODIUM CHLORIDE 0.9 % (FLUSH) 0.9 %
10 SYRINGE (ML) INJECTION AS NEEDED
Status: DISCONTINUED | OUTPATIENT
Start: 2024-06-13 | End: 2024-06-13 | Stop reason: HOSPADM

## 2024-06-13 RX ADMIN — KETOROLAC TROMETHAMINE 15 MG: 15 INJECTION, SOLUTION INTRAMUSCULAR; INTRAVENOUS at 15:30

## 2024-06-13 RX ADMIN — SODIUM CHLORIDE, POTASSIUM CHLORIDE, SODIUM LACTATE AND CALCIUM CHLORIDE 1000 ML: 600; 310; 30; 20 INJECTION, SOLUTION INTRAVENOUS at 15:28

## 2024-06-13 NOTE — ED PROVIDER NOTES
EMERGENCY DEPARTMENT ENCOUNTER  Room Number:  06/06  PCP: Elyse Larkin APRN  Independent Historians: Patient      HPI:  Chief Complaint: had concerns including Abdominal Pain and Diarrhea.     A complete HPI/ROS/PMH/PSH/SH/FH are unobtainable due to: None    Chronic or social conditions impacting patient care (Social Determinants of Health): None      Context: Beatriz Boland is a 33 y.o. female with a medical history of depression, developmental delay, GERD, scoliosis, seasonal allergies, anxiety, and morbid obesity who presents to the ED c/o acute diarrhea and left flank pain.  Patient reports she ate yogurt yesterday and noticed looser stool yesterday.  This morning she has had 2 episodes of nonbloody diarrhea.  She also reports a sharp pain in her lower left flank.  She states she did recently complete a course of Augmentin that she was taking for an ear infection.  She denies nausea or vomiting.  No injury or trauma to the abdomen.  She has had drainage of renal abscess.  Patient denies fever, syncope, chest pain, dyspnea, dysuria, or any other systemic complaints.      Review of prior external notes (non-ED) -and- Review of prior external test results outside of this encounter:  Patient seen at urgent care on 6/1/2024 for otitis media.  Reviewed assessment and plan.  Patient prescribed 10 days of Augmentin.  Reviewed prior laboratory studies collected on 5/4/2024.  CBC with hemoglobin 13.9, CMP with creatinine 0.85.    Prescription drug monitoring program review:     N/A    PAST MEDICAL HISTORY  Active Ambulatory Problems     Diagnosis Date Noted    Depression     Mental developmental delay     GERD without esophagitis     Scoliosis     Amblyopia     Back pain     Chronic left shoulder pain 09/29/2017    Muscle spasm 09/29/2017    Seasonal allergies 11/05/2017    Panic attacks 11/15/2017    Anxiety 01/04/2018    Mild intermittent asthma without complication 03/24/2018    Chest pain on breathing  08/10/2018    Nicotine vapor product user 08/10/2018    Class 3 obesity without serious comorbidity with body mass index (BMI) of 40.0 to 44.9 in adult 08/10/2018    Acute UTI (urinary tract infection) 09/04/2020    Obesity (BMI 30-39.9) 09/04/2020    Tobacco abuse 09/04/2020    Septicemia due to E. coli 09/05/2020    Pyelonephritis of left kidney 10/22/2020    Sepsis 10/22/2020    Asthma 10/22/2020     Resolved Ambulatory Problems     Diagnosis Date Noted    Sinus bradycardia 09/29/2014    Palpitations 11/15/2017    Acute otitis externa of both ears 11/15/2017    Atypical chest pain 01/04/2018    Hypokalemia 09/04/2020    WILIAN (acute kidney injury) 09/04/2020     Past Medical History:   Diagnosis Date    Allergic     Cleft palate     Developmental delay     GERD (gastroesophageal reflux disease)     Impetigo     Kidney abscess     Kidney stone     Recurrent otitis media     Sinus infection     Sprain of shoulder, left 12/2016         PAST SURGICAL HISTORY  Past Surgical History:   Procedure Laterality Date    ABSCESS DRAINAGE      kidney    ADENOIDECTOMY      EAR TUBES      PHARYNGEAL FLAP      for speech    TONSILLECTOMY           FAMILY HISTORY  Family History   Problem Relation Age of Onset    COPD Mother     Arthritis Mother     Obesity Mother     Gestational diabetes Mother     Cancer Father     Scoliosis Father     Rheum arthritis Maternal Aunt     Diabetes Maternal Uncle     Alcohol abuse Maternal Uncle     Rheum arthritis Maternal Grandmother     COPD Maternal Grandmother     Stroke Maternal Grandfather     Alcohol abuse Paternal Uncle          SOCIAL HISTORY  Social History     Socioeconomic History    Marital status: Single   Tobacco Use    Smoking status: Some Days    Smokeless tobacco: Current    Tobacco comments:     quit cigarette smoking 2 years ago, uses vape now   Vaping Use    Vaping status: Every Day    Substances: Nicotine, Flavoring    Devices: Refillable tank   Substance and Sexual Activity     Alcohol use: Yes     Comment: occasionally, 1-2 glasses of wine 1-2 x per week    Drug use: No    Sexual activity: Yes     Partners: Male     Birth control/protection: Injection         ALLERGIES  Dust mite extract and Ciprofloxacin      REVIEW OF SYSTEMS  Review of Systems   Constitutional:  Negative for chills and fever.   HENT:  Negative for ear pain and sore throat.    Respiratory:  Negative for cough and shortness of breath.    Cardiovascular:  Negative for chest pain and palpitations.   Gastrointestinal:  Positive for abdominal pain and diarrhea. Negative for vomiting.   Genitourinary:  Negative for dysuria and hematuria.   Musculoskeletal:  Negative for arthralgias and joint swelling.   Skin:  Negative for pallor and rash.   Neurological:  Negative for numbness and headaches.   Psychiatric/Behavioral:  Negative for confusion and hallucinations.      Included in HPI  All systems reviewed and negative except for those discussed in HPI.      PHYSICAL EXAM    I have reviewed the triage vital signs and nursing notes.    ED Triage Vitals   Temp Heart Rate Resp BP SpO2   06/13/24 1334 06/13/24 1334 06/13/24 1334 06/13/24 1336 06/13/24 1334   98.7 °F (37.1 °C) 77 16 125/99 97 %      Temp src Heart Rate Source Patient Position BP Location FiO2 (%)   06/13/24 1334 06/13/24 1334 -- -- --   Tympanic Monitor          Physical Exam  Constitutional:       General: She is not in acute distress.     Appearance: She is well-developed.   HENT:      Head: Normocephalic and atraumatic.   Eyes:      Extraocular Movements: Extraocular movements intact.   Cardiovascular:      Rate and Rhythm: Normal rate and regular rhythm.      Heart sounds: Normal heart sounds.   Pulmonary:      Effort: Pulmonary effort is normal.      Breath sounds: Normal breath sounds.   Abdominal:      General: There is no distension.          Comments: Focal left low flank TTP   Skin:     General: Skin is warm.   Neurological:      General: No focal deficit  present.      Mental Status: She is alert and oriented to person, place, and time.   Psychiatric:         Mood and Affect: Mood normal.           LAB RESULTS  Recent Results (from the past 24 hour(s))   Urinalysis With Microscopic If Indicated (No Culture) - Urine, Clean Catch    Collection Time: 06/13/24  1:56 PM    Specimen: Urine, Clean Catch   Result Value Ref Range    Color, UA Yellow Yellow, Straw    Appearance, UA Clear Clear    pH, UA 6.0 5.0 - 8.0    Specific Gravity, UA 1.016 1.005 - 1.030    Glucose, UA Negative Negative    Ketones, UA Negative Negative    Bilirubin, UA Negative Negative    Blood, UA Negative Negative    Protein, UA Negative Negative    Leuk Esterase, UA Large (3+) (A) Negative    Nitrite, UA Negative Negative    Urobilinogen, UA 0.2 E.U./dL 0.2 - 1.0 E.U./dL   Urinalysis, Microscopic Only - Urine, Clean Catch    Collection Time: 06/13/24  1:56 PM    Specimen: Urine, Clean Catch   Result Value Ref Range    RBC, UA 0-2 None Seen, 0-2 /HPF    WBC, UA 3-5 (A) None Seen, 0-2 /HPF    Bacteria, UA Trace (A) None Seen /HPF    Squamous Epithelial Cells, UA 7-12 (A) None Seen, 0-2 /HPF    Hyaline Casts, UA 0-2 None Seen /LPF    Methodology Automated Microscopy    Comprehensive Metabolic Panel    Collection Time: 06/13/24  1:58 PM    Specimen: Blood   Result Value Ref Range    Glucose 85 65 - 99 mg/dL    BUN 8 6 - 20 mg/dL    Creatinine 0.75 0.57 - 1.00 mg/dL    Sodium 140 136 - 145 mmol/L    Potassium 4.0 3.5 - 5.2 mmol/L    Chloride 107 98 - 107 mmol/L    CO2 22.5 22.0 - 29.0 mmol/L    Calcium 9.5 8.6 - 10.5 mg/dL    Total Protein 7.3 6.0 - 8.5 g/dL    Albumin 3.8 3.5 - 5.2 g/dL    ALT (SGPT) 12 1 - 33 U/L    AST (SGOT) 10 1 - 32 U/L    Alkaline Phosphatase 67 39 - 117 U/L    Total Bilirubin 0.3 0.0 - 1.2 mg/dL    Globulin 3.5 gm/dL    A/G Ratio 1.1 g/dL    BUN/Creatinine Ratio 10.7 7.0 - 25.0    Anion Gap 10.5 5.0 - 15.0 mmol/L    eGFR 108.6 >60.0 mL/min/1.73   Lipase    Collection Time:  06/13/24  1:58 PM    Specimen: Blood   Result Value Ref Range    Lipase 24 13 - 60 U/L   hCG, Serum, Qualitative    Collection Time: 06/13/24  1:58 PM    Specimen: Blood   Result Value Ref Range    HCG Qualitative Negative Negative   CBC Auto Differential    Collection Time: 06/13/24  1:58 PM    Specimen: Blood   Result Value Ref Range    WBC 9.74 3.40 - 10.80 10*3/mm3    RBC 5.28 3.77 - 5.28 10*6/mm3    Hemoglobin 14.7 12.0 - 15.9 g/dL    Hematocrit 44.9 34.0 - 46.6 %    MCV 85.0 79.0 - 97.0 fL    MCH 27.8 26.6 - 33.0 pg    MCHC 32.7 31.5 - 35.7 g/dL    RDW 12.2 (L) 12.3 - 15.4 %    RDW-SD 37.5 37.0 - 54.0 fl    MPV 9.1 6.0 - 12.0 fL    Platelets 252 140 - 450 10*3/mm3    Neutrophil % 63.7 42.7 - 76.0 %    Lymphocyte % 27.5 19.6 - 45.3 %    Monocyte % 7.1 5.0 - 12.0 %    Eosinophil % 1.1 0.3 - 6.2 %    Basophil % 0.4 0.0 - 1.5 %    Immature Grans % 0.2 0.0 - 0.5 %    Neutrophils, Absolute 6.20 1.70 - 7.00 10*3/mm3    Lymphocytes, Absolute 2.68 0.70 - 3.10 10*3/mm3    Monocytes, Absolute 0.69 0.10 - 0.90 10*3/mm3    Eosinophils, Absolute 0.11 0.00 - 0.40 10*3/mm3    Basophils, Absolute 0.04 0.00 - 0.20 10*3/mm3    Immature Grans, Absolute 0.02 0.00 - 0.05 10*3/mm3    nRBC 0.0 0.0 - 0.2 /100 WBC         RADIOLOGY  CT Abdomen Pelvis Without Contrast    Result Date: 6/13/2024  CT ABDOMEN PELVIS WO CONTRAST-  HISTORY: 33 years of age, Female.  low left flank pain and diarrhea  TECHNIQUE:  CT includes axial imaging from the lung bases to the trochanters without intravenous contrast and without use of oral contrast. Data reconstructed in coronal and sagittal planes. Radiation dose reduction techniques were utilized, including automated exposure control and exposure modulation based on body size.  COMPARISON: CT abdomen and pelvis 11/20/2021.  FINDINGS: Lung bases appear clear and there is no basilar effusion. Liver, decompressed gallbladder, spleen, adrenal glands, pancreas appear within normal limits. 2 mm right lower  pole nonobstructing renal stone. Left lateral renal low-density lesion is most likely a small cyst. No ureteral stone or evidence for obstructive uropathy. Urinary bladder partially decompressed.  No bowel dilatation or evidence for bowel obstruction. No evidence for appendicitis. No ascites or evidence of intra-abdominal abscess formation. No [place] enlargement in the abdomen or pelvis.      2 mm right lower pole nonobstructing stone. No ureteral stone or evidence for obstructive uropathy or acute abnormality in the abdomen/pelvis.  Radiation dose reduction techniques were utilized, including automated exposure control and exposure modulation based on body size.          MEDICATIONS GIVEN IN ER  Medications   sodium chloride 0.9 % flush 10 mL (has no administration in time range)   lactated ringers bolus 1,000 mL (1,000 mL Intravenous New Bag 6/13/24 1528)   ketorolac (TORADOL) injection 15 mg (15 mg Intravenous Given 6/13/24 1530)         ORDERS PLACED DURING THIS VISIT:  Orders Placed This Encounter   Procedures    CT Abdomen Pelvis Without Contrast    Comprehensive Metabolic Panel    Lipase    hCG, Serum, Qualitative    Urinalysis With Microscopic If Indicated (No Culture) - Urine, Clean Catch    CBC Auto Differential    Urinalysis, Microscopic Only - Urine, Clean Catch    Patient Isolation Contact Spore    Insert Peripheral IV    CBC & Differential         OUTPATIENT MEDICATION MANAGEMENT:  Current Facility-Administered Medications Ordered in Epic   Medication Dose Route Frequency Provider Last Rate Last Admin    lactated ringers bolus 1,000 mL  1,000 mL Intravenous Once Desire Estevez PA-C 2,000 mL/hr at 06/13/24 1528 1,000 mL at 06/13/24 1528    sodium chloride 0.9 % flush 10 mL  10 mL Intravenous PRN Desire Estevez PA-C         Current Outpatient Medications Ordered in Epic   Medication Sig Dispense Refill    estradiol cypionate (DEPO-ESTRADIOL) 5 MG/ML injection Inject 0.3 mL into the appropriate  muscle as directed by prescriber Every 28 (Twenty-Eight) Days. Patient states she takes it every 3 months      Acetaminophen (TYLENOL) 325 MG capsule Take 325 mg by mouth As Needed.      albuterol sulfate  (90 Base) MCG/ACT inhaler Inhale 2 puffs Every 4 (Four) Hours As Needed for Wheezing. 18 g 3    ARIPiprazole (ABILIFY) 5 MG tablet Take 5 mg by mouth Every Night.      cefdinir (OMNICEF) 300 MG capsule Take 1 capsule by mouth 2 (Two) Times a Day. 20 capsule 0    cetirizine (zyrTEC) 10 MG tablet Take 10 mg by mouth Daily.      [employer] NASAL ALLERGY SPRAY 55 MCG/ACT nasal inhaler 2 sprays into each nostril Daily.  0    dexlansoprazole (DEXILANT) 60 MG capsule Take 60 mg by mouth Daily.      fluticasone (FLONASE) 50 MCG/ACT nasal spray       metaxalone (SKELAXIN) 800 MG tablet Take 1 tablet by mouth 3 (Three) Times a Day. 15 tablet 0    methocarbamol (ROBAXIN) 750 MG tablet Take 1 tablet by mouth 3 (Three) Times a Day As Needed for Muscle Spasms. 21 tablet 0    methylPREDNISolone (MEDROL) 4 MG dose pack Take as directed on package instructions. 1 each 0    naproxen (NAPROSYN) 500 MG tablet Take 1 tablet by mouth 2 (Two) Times a Day As Needed for Moderate Pain . 20 tablet 0    ondansetron ODT (ZOFRAN-ODT) 4 MG disintegrating tablet Place 1 tablet on the tongue Every 6 (Six) Hours As Needed for Nausea or Vomiting. 20 tablet 0    saccharomyces boulardii (FLORASTOR) 250 MG capsule Take 1 capsule by mouth 2 (Two) Times a Day. 14 capsule 0    sucralfate (CARAFATE) 1 g tablet Take 1 tablet by mouth 4 (Four) Times a Day. 30 tablet 0    venlafaxine (EFFEXOR) 25 MG tablet Take 25 mg by mouth Every Night.             PROGRESS, DATA ANALYSIS, CONSULTS, AND MEDICAL DECISION MAKING  All labs have been independently interpreted by me.  All radiology studies have been reviewed by me. All EKG's have been independently viewed and interpreted by me.  Discussion below represents my analysis of pertinent findings related to patient's  condition, differential diagnosis, treatment plan and final disposition.    Differential diagnosis includes but is not limited to musculoskeletal pain, C. difficile colitis, diverticulitis.        ED Course as of 06/13/24 1539   Thu Jun 13, 2024   1416 WBC: 9.74 [MP]   1416 Hemoglobin: 14.7 [MP]   1433 BUN: 8 [MP]   1433 Creatinine: 0.75 [MP]   1433 Lipase: 24 [MP]   1434 Leukocytes, UA(!): Large (3+) [MP]   1434 RBC, UA: 0-2 [MP]   1434 WBC, UA(!): 3-5 [MP]   1434 Bacteria, UA(!): Trace [MP]   1434 Squamous Epithelial Cells, UA(!): 7-12 [MP]   1441 CT abdomen and pelvis independently interpreted by me as no bowel obstruction [MP]   1537 I reassessed the patient.  She is feeling improved.  She states she is unable to leave a stool sample at this time.  She still has IV fluids running.  Overall workup today is benign.  Suspect diarrhea secondary to recent antibiotic use.  Suspect muscular etiology for flank pain.  Will have patient follow-up closely with primary care provider.  Discussed ED return precautions.  She is otherwise well-appearing, hemodynamically stable, and therefore appropriate for discharge. [MP]      ED Course User Index  [MP] Desire Estevez PA-C             AS OF 15:39 EDT VITALS:    BP - 142/96  HR - 52  TEMP - 98.7 °F (37.1 °C) (Tympanic)  O2 SATS - 98%    COMPLEXITY OF CARE  Admission was considered but after careful review of the patient's presentation, physical examination, diagnostic results, and response to treatment the patient may be safely discharged with outpatient follow-up.      DIAGNOSIS  Final diagnoses:   Left flank pain   Diarrhea, unspecified type         DISPOSITION  ED Disposition       ED Disposition   Discharge    Condition   Stable    Comment   --                Please note that portions of this document were completed with a voice recognition program.    Note Disclaimer: At [facility], we believe that sharing information builds trust and better relationships. You are  receiving this note because you recently visited [facility]. It is possible you will see health information before a provider has talked with you about it. This kind of information can be easy to misunderstand. To help you fully understand what it means for your health, we urge you to discuss this note with your provider.         Desire Estevez PA-C  06/13/24 1532

## 2024-06-13 NOTE — ED PROVIDER NOTES
EMERGENCY DEPARTMENT MD ATTESTATION NOTE    Room Number:  06/06  PCP: Elyse Larkin APRN  Independent Historians: Patient and Family -patient's mother    HPI:  A complete HPI/ROS/PMH/PSH/SH/FH are unobtainable due to: None    Chronic or social conditions impacting patient care (Social Determinants of Health): None      Context: Beatriz Boland is a 33 y.o. female with a medical history of GERD, scoliosis, and anxiety who presents to the ED c/o acute diarrhea and left lateral abdominal / flank pain since yesterday.  Denies blood in the stool.  Denies vomiting.  Denies fevers.  Denies cough or dyspnea.        Review of prior external notes (non-ED) -and- Review of prior external test results outside of this encounter: I reviewed the hospital discharge summary from 10/24/2020 when pt was admitted for tx of acute pyelonephritis      PHYSICAL EXAM    I have reviewed the triage vital signs and nursing notes.    ED Triage Vitals   Temp Heart Rate Resp BP SpO2   06/13/24 1334 06/13/24 1334 06/13/24 1334 06/13/24 1336 06/13/24 1334   98.7 °F (37.1 °C) 77 16 125/99 97 %      Temp src Heart Rate Source Patient Position BP Location FiO2 (%)   06/13/24 1334 06/13/24 1334 -- -- --   Tympanic Monitor          Physical Exam  GENERAL: alert, no acute distress, non toxic appearing  SKIN: Warm, dry  HENT: Normocephalic, atraumatic  EYES: no scleral icterus  CV: regular rhythm, regular rate  RESPIRATORY: normal effort, lungs clear  ABDOMEN: soft, nontender, nondistended  MUSCULOSKELETAL: no deformity  NEURO: alert, moves all extremities, follows commands            MEDICATIONS GIVEN IN ER  Medications   sodium chloride 0.9 % flush 10 mL (has no administration in time range)   lactated ringers bolus 1,000 mL (has no administration in time range)   ketorolac (TORADOL) injection 15 mg (has no administration in time range)         ORDERS PLACED DURING THIS VISIT:  Orders Placed This Encounter   Procedures    Clostridioides  difficile Toxin - Stool, Per Rectum    Clostridioides difficile Toxin, PCR - Stool, Per Rectum    CT Abdomen Pelvis Without Contrast    Comprehensive Metabolic Panel    Lipase    hCG, Serum, Qualitative    Urinalysis With Microscopic If Indicated (No Culture) - Urine, Clean Catch    CBC Auto Differential    Urinalysis, Microscopic Only - Urine, Clean Catch    Patient Isolation Contact Spore    Insert Peripheral IV    CBC & Differential         PROCEDURES  Procedures            PROGRESS, DATA ANALYSIS, CONSULTS, AND MEDICAL DECISION MAKING  All labs have been independently interpreted by me.  All radiology studies have been reviewed by me. All EKG's have been independently viewed and interpreted by me.  Discussion below represents my analysis of pertinent findings related to patient's condition, differential diagnosis, treatment plan and final disposition.    Differential diagnosis includes but is not limited to Pyelonephritis, infectious colitis, IBS, UTI, bowel obstruction.    Clinical Scores:                   ED Course as of 06/15/24 0825   Thu Jun 13, 2024   1416 WBC: 9.74 [MP]   1416 Hemoglobin: 14.7 [MP]   1433 BUN: 8 [MP]   1433 Creatinine: 0.75 [MP]   1433 Lipase: 24 [MP]   1434 Leukocytes, UA(!): Large (3+) [MP]   1434 RBC, UA: 0-2 [MP]   1434 WBC, UA(!): 3-5 [MP]   1434 Bacteria, UA(!): Trace [MP]   1434 Squamous Epithelial Cells, UA(!): 7-12 [MP]   1441 CT abdomen and pelvis independently interpreted by me as no bowel obstruction [MP]   1537 I reassessed the patient.  She is feeling improved.  She states she is unable to leave a stool sample at this time.  She still has IV fluids running.  Overall workup today is benign.  Suspect diarrhea secondary to recent antibiotic use.  Suspect muscular etiology for flank pain.  Will have patient follow-up closely with primary care provider.  Discussed ED return precautions.  She is otherwise well-appearing, hemodynamically stable, and therefore appropriate for  "discharge. [MP]      ED Course User Index  [MP] Coby Estevezhal VELAZQUEZ, ABELARDO       MDM:   I independently interpreted the CT abdomen and pelvis study and my findings are: no free air, no bowel obstruction.      I have reviewed all of the lab results from today's ED visit as well.  The urinalysis is c/w contamination and not suggestive of an acute UTI.  Pt has no dysuria symptoms either.  The CMP values look excellent without any sign of electrolyte imbalance or renal impairment.  Throughout the visit, pt was unable to provide a stool sample for PCR testing, so it seems that her diarrhea symptoms are improving.  I believe it should be a self limited process, either due to viral infection or recent antibiotic use.  She is ok to d/c home in good condition for continued symptomatic mgmt and f/u with her PCP.  Will review with her the usual \"return to ER\" instructions at time of discharge.       COMPLEXITY OF CARE  Admission was considered but after careful review of the patient's presentation, physical examination, diagnostic results, and response to treatment the patient may be safely discharged with outpatient follow-up.    Please note that portions of this document were completed with a voice recognition program.    Note Disclaimer: At [facility], we believe that sharing information builds trust and better relationships. You are receiving this note because you recently visited [facility]. It is possible you will see health information before a provider has talked with you about it. This kind of information can be easy to misunderstand. To help you fully understand what it means for your health, we urge you to discuss this note with your provider.         Lalito Barvo MD  06/15/24 0833    "

## 2024-06-13 NOTE — Clinical Note
[facility] EMERGENCY DEPARTMENT  [address] [facility] [address]  Phone: [phone]    Beatriz Boland was seen and treated in our emergency department on 6/13/2024.  She may return to work on 06/15/2024.         Thank you for choosing [facility].    Hilda Branch RN

## 2024-06-13 NOTE — Clinical Note
[facility] EMERGENCY DEPARTMENT  [address] [facility] [address]  Phone: [phone]    Beatriz Boland was seen and treated in our emergency department on 6/13/2024.  She may return to work on 06/15/2024.         Thank you for choosing [facility].    Hilda Branch RN

## 2024-06-13 NOTE — DISCHARGE INSTRUCTIONS
Follow-up with your primary care provider.  Drink plenty of water to replace fluids you are losing with diarrhea.  Return to emergency department for any worsening symptoms.

## 2024-07-12 ENCOUNTER — HOSPITAL ENCOUNTER (EMERGENCY)
Facility: HOSPITAL | Age: 34
Discharge: HOME | End: 2024-07-12
Attending: EMERGENCY MEDICINE
Payer: COMMERCIAL

## 2024-07-12 VITALS
WEIGHT: 240.3 LBS | HEIGHT: 66 IN | OXYGEN SATURATION: 98 % | HEART RATE: 53 BPM | SYSTOLIC BLOOD PRESSURE: 119 MMHG | RESPIRATION RATE: 16 BRPM | DIASTOLIC BLOOD PRESSURE: 79 MMHG | BODY MASS INDEX: 38.62 KG/M2 | TEMPERATURE: 98.5 F

## 2024-07-12 DIAGNOSIS — U07.1 COVID-19: Primary | ICD-10-CM

## 2024-07-12 DIAGNOSIS — M54.50 ACUTE BILATERAL LOW BACK PAIN WITHOUT SCIATICA: ICD-10-CM

## 2024-07-12 LAB
FLUAV SUBTYP SPEC NAA+PROBE: NOT DETECTED
FLUBV RNA ISLT QL NAA+PROBE: NOT DETECTED
SARS-COV-2 RNA RESP QL NAA+PROBE: DETECTED

## 2024-07-12 PROCEDURE — 96372 THER/PROPH/DIAG INJ SC/IM: CPT

## 2024-07-12 PROCEDURE — 99283 EMERGENCY DEPT VISIT LOW MDM: CPT

## 2024-07-12 PROCEDURE — 25010000002 KETOROLAC TROMETHAMINE PER 15 MG: Performed by: EMERGENCY MEDICINE

## 2024-07-12 PROCEDURE — 87636 SARSCOV2 & INF A&B AMP PRB: CPT | Performed by: EMERGENCY MEDICINE

## 2024-07-12 RX ORDER — CYCLOBENZAPRINE HCL 10 MG
10 TABLET ORAL ONCE
Status: COMPLETED | OUTPATIENT
Start: 2024-07-12 | End: 2024-07-12

## 2024-07-12 RX ORDER — KETOROLAC TROMETHAMINE 30 MG/ML
30 INJECTION, SOLUTION INTRAMUSCULAR; INTRAVENOUS ONCE
Status: COMPLETED | OUTPATIENT
Start: 2024-07-12 | End: 2024-07-12

## 2024-07-12 RX ADMIN — KETOROLAC TROMETHAMINE 30 MG: 30 INJECTION, SOLUTION INTRAMUSCULAR at 12:54

## 2024-07-12 RX ADMIN — CYCLOBENZAPRINE HYDROCHLORIDE 10 MG: 10 TABLET, FILM COATED ORAL at 12:54

## 2024-07-12 NOTE — ED PROVIDER NOTES
EMERGENCY DEPARTMENT ENCOUNTER  Room Number:  19/19  PCP: Elyse Larkin APRN  Independent Historians: Patient      HPI:  Chief Complaint: had concerns including Back Pain.     A complete HPI/ROS/PMH/PSH/SH/FH are unobtainable due to: None    Chronic or social conditions impacting patient care (Social Determinants of Health): None      Context: Beatriz Boland is a 34 y.o. female with a medical history of sinusitis, scoliosis, GERD, anxiety and depression who presents to the ED c/o acute low back pain, radiating to bilateral lumbar regions today.  Patient denies any falls or accidents.  She says that she went outside to take her dog outside.  Then as she stood up to go back inside, she suddenly had some pain in her low back.  It was made worse when she was turning to get some orange juice later in the day.  She denies any fevers or numbness or weakness or bowel or bladder incontinence.  She is also complaining of some nasal drainage.  She expresses concern about possibly having COVID because one of her coworkers at [employer] was recently diagnosed with COVID and she is asking to be tested because she lives with her mother who is high risk from comorbidity standpoint since her mother has COPD.      Review of prior external notes (non-ED) -and- Review of prior external test results outside of this encounter: I independently reviewed the previous primary care progress note from June 1, 2024 when patient was evaluated for fatigue and dizziness symptoms.      PAST MEDICAL HISTORY  Active Ambulatory Problems     Diagnosis Date Noted    Depression     Mental developmental delay     GERD without esophagitis     Scoliosis     Amblyopia     Back pain     Chronic left shoulder pain 09/29/2017    Muscle spasm 09/29/2017    Seasonal allergies 11/05/2017    Panic attacks 11/15/2017    Anxiety 01/04/2018    Mild intermittent asthma without complication 03/24/2018    Chest pain on breathing 08/10/2018     Nicotine vapor product user 08/10/2018    Class 3 obesity without serious comorbidity with body mass index (BMI) of 40.0 to 44.9 in adult 08/10/2018    Acute UTI (urinary tract infection) 09/04/2020    Obesity (BMI 30-39.9) 09/04/2020    Tobacco abuse 09/04/2020    Septicemia due to E. coli 09/05/2020    Pyelonephritis of left kidney 10/22/2020    Sepsis 10/22/2020    Asthma 10/22/2020     Resolved Ambulatory Problems     Diagnosis Date Noted    Sinus bradycardia 09/29/2014    Palpitations 11/15/2017    Acute otitis externa of both ears 11/15/2017    Atypical chest pain 01/04/2018    Hypokalemia 09/04/2020    WILIAN (acute kidney injury) 09/04/2020     Past Medical History:   Diagnosis Date    Allergic     Cleft palate     Developmental delay     GERD (gastroesophageal reflux disease)     Impetigo     Kidney abscess     Kidney stone     Recurrent otitis media     Sinus infection     Sprain of shoulder, left 12/2016         PAST SURGICAL HISTORY  Past Surgical History:   Procedure Laterality Date    ABSCESS DRAINAGE      kidney    ADENOIDECTOMY      EAR TUBES      PHARYNGEAL FLAP      for speech    TONSILLECTOMY           FAMILY HISTORY  Family History   Problem Relation Age of Onset    COPD Mother     Arthritis Mother     Obesity Mother     Gestational diabetes Mother     Cancer Father     Scoliosis Father     Rheum arthritis Maternal Aunt     Diabetes Maternal Uncle     Alcohol abuse Maternal Uncle     Rheum arthritis Maternal Grandmother     COPD Maternal Grandmother     Stroke Maternal Grandfather     Alcohol abuse Paternal Uncle          SOCIAL HISTORY  Social History     Socioeconomic History    Marital status: Single   Tobacco Use    Smoking status: Some Days    Smokeless tobacco: Current    Tobacco comments:     quit cigarette smoking 2 years ago, uses vape now   Vaping Use    Vaping status: Every Day    Substances: Nicotine, Flavoring    Devices: Refillable tank   Substance and Sexual Activity    Alcohol use:  Yes     Comment: occasionally, 1-2 glasses of wine 1-2 x per week    Drug use: No    Sexual activity: Yes     Partners: Male     Birth control/protection: Injection         ALLERGIES  Dust mite extract and Ciprofloxacin      REVIEW OF SYSTEMS  Review of Systems  Included in HPI  All systems reviewed and negative except for those discussed in HPI.      PHYSICAL EXAM    I have reviewed the triage vital signs and nursing notes.    ED Triage Vitals   Temp Heart Rate Resp BP SpO2   07/12/24 1232 07/12/24 1232 07/12/24 1232 07/12/24 1241 07/12/24 1232   98.5 °F (36.9 °C) 89 17 132/88 99 %      Temp src Heart Rate Source Patient Position BP Location FiO2 (%)   -- -- -- -- --              Physical Exam  GENERAL: alert, no acute distress  SKIN: Warm, dry, no rashes  HENT: Normocephalic, atraumatic  EYES: no scleral icterus, normal conjunctivae  CV: regular rhythm, regular rate  RESPIRATORY: normal effort, no stridor  ABDOMEN: soft, nondistended, nontender  MUSCULOSKELETAL: no deformity or asymmetry to the extremities.  There is mild to moderate diffuse lumbar tenderness to palpation along the bilateral lumbar back soft tissues equally.  There is no point midline level of maximal tenderness to palpation elicited.  No step-offs or deformities are palpable.  No erythema or induration of the skin notable.  NEURO: alert, moves all extremities, follows commands, no focal motor deficits.  No sensory deficits.      LAB RESULTS  Recent Results (from the past 24 hour(s))   COVID-19 and FLU A/B PCR, 1 HR TAT - Swab, Nasopharynx    Collection Time: 07/12/24 12:57 PM    Specimen: Nasopharynx; Swab   Result Value Ref Range    COVID19 Detected (C) Not Detected - Ref. Range    Influenza A PCR Not Detected Not Detected    Influenza B PCR Not Detected Not Detected         RADIOLOGY  No Radiology Exams Resulted Within Past 24 Hours      MEDICATIONS GIVEN IN ER  Medications   cyclobenzaprine (FLEXERIL) tablet 10 mg (10 mg Oral Given 7/12/24  1254)   ketorolac (TORADOL) injection 30 mg (30 mg Intramuscular Given 7/12/24 1254)         ORDERS PLACED DURING THIS VISIT:  Orders Placed This Encounter   Procedures    COVID-19 and FLU A/B PCR, 1 HR TAT - Swab, Nasopharynx         OUTPATIENT MEDICATION MANAGEMENT:  No current Epic-ordered facility-administered medications on file.     Current Outpatient Medications Ordered in Epic   Medication Sig Dispense Refill    Acetaminophen (TYLENOL) 325 MG capsule Take 325 mg by mouth As Needed.      albuterol sulfate  (90 Base) MCG/ACT inhaler Inhale 2 puffs Every 4 (Four) Hours As Needed for Wheezing. 18 g 3    ARIPiprazole (ABILIFY) 5 MG tablet Take 5 mg by mouth Every Night.      cefdinir (OMNICEF) 300 MG capsule Take 1 capsule by mouth 2 (Two) Times a Day. 20 capsule 0    cetirizine (zyrTEC) 10 MG tablet Take 10 mg by mouth Daily.      [employer] NASAL ALLERGY SPRAY 55 MCG/ACT nasal inhaler 2 sprays into each nostril Daily.  0    dexlansoprazole (DEXILANT) 60 MG capsule Take 60 mg by mouth Daily.      estradiol cypionate (DEPO-ESTRADIOL) 5 MG/ML injection Inject 0.3 mL into the appropriate muscle as directed by prescriber Every 28 (Twenty-Eight) Days. Patient states she takes it every 3 months      fluticasone (FLONASE) 50 MCG/ACT nasal spray       metaxalone (SKELAXIN) 800 MG tablet Take 1 tablet by mouth 3 (Three) Times a Day. 15 tablet 0    methocarbamol (ROBAXIN) 750 MG tablet Take 1 tablet by mouth 3 (Three) Times a Day As Needed for Muscle Spasms. 21 tablet 0    methylPREDNISolone (MEDROL) 4 MG dose pack Take as directed on package instructions. 1 each 0    naproxen (NAPROSYN) 500 MG tablet Take 1 tablet by mouth 2 (Two) Times a Day As Needed for Moderate Pain . 20 tablet 0    ondansetron ODT (ZOFRAN-ODT) 4 MG disintegrating tablet Place 1 tablet on the tongue Every 6 (Six) Hours As Needed for Nausea or Vomiting. 20 tablet 0    saccharomyces boulardii (FLORASTOR) 250 MG capsule Take 1 capsule by mouth 2 (Two)  "Times a Day. 14 capsule 0    sucralfate (CARAFATE) 1 g tablet Take 1 tablet by mouth 4 (Four) Times a Day. 30 tablet 0    venlafaxine (EFFEXOR) 25 MG tablet Take 25 mg by mouth Every Night.           PROCEDURES  Procedures            PROGRESS, DATA ANALYSIS, CONSULTS, AND MEDICAL DECISION MAKING  All labs have been independently interpreted by me.  All radiology studies have been reviewed by me. All EKG's have been independently viewed and interpreted by me.  Discussion below represents my analysis of pertinent findings related to patient's condition, differential diagnosis, treatment plan and final disposition.    Differential diagnosis includes but is not limited to lumbar strain, scoliosis, degenerative disc disease, lumbar radiculopathy, sacroiliitis, viral syndrome.    Clinical Scores:                   ED Course as of 07/12/24 1447   Fri Jul 12, 2024   1336 COVID19(!!): Detected [RAVI]   1445 I discussed the Covid-19 test results with the patient and her mother at the bedside.  I think that this is probably contributing to some of her back pain symptoms as well, and I explained to her that it is common with viral illnesses to have some body aches and myalgia type symptoms.  She is afebrile and otherwise nontoxic-appearing at this time.  Work of breathing is normal.  Saturations are normal on room air.  There is no clinical indication for x-ray imaging or other tests at this time.  I think she is safe to discharge home for continued outpatient symptomatic management and follow-up with her PCP.  I did provide a work excuse statement for her as requested.  Will review with her the usual \"return to ER\" instructions prior to discharge. [RAVI]      ED Course User Index  [RAVI] Lalito Bravo MD         AS OF 14:47 EDT VITALS:    BP - 119/79  HR - 53  TEMP - 98.5 °F (36.9 °C)  O2 SATS - 98%    COMPLEXITY OF CARE  Admission was considered but after careful review of the patient's presentation, physical examination, " diagnostic results, and response to treatment the patient may be safely discharged with outpatient follow-up.      DIAGNOSIS  Final diagnoses:   COVID-19   Acute bilateral low back pain without sciatica         DISPOSITION  ED Disposition       ED Disposition   Discharge    Condition   Stable    Comment   --                Please note that portions of this document were completed with a voice recognition program.    Note Disclaimer: At [facility], we believe that sharing information builds trust and better relationships. You are receiving this note because you recently visited [facility]. It is possible you will see health information before a provider has talked with you about it. This kind of information can be easy to misunderstand. To help you fully understand what it means for your health, we urge you to discuss this note with your provider.         Lalito Bravo MD  07/12/24 7339

## 2024-07-12 NOTE — Clinical Note
[facility] EMERGENCY DEPARTMENT  [address] [facility] [address]  Phone: [phone]    Beatriz Boland was seen and treated in our emergency department on 7/12/2024.  She may return to work on 07/18/2024.         Thank you for choosing [facility].    Lalito Bravo MD

## 2024-07-12 NOTE — Clinical Note
[facility] EMERGENCY DEPARTMENT  [address] [facility] [address]  Phone: [phone]    Beatriz Boland was seen and treated in our emergency department on 7/12/2024.  She may return to work on 07/18/2024.         Thank you for choosing [facility].    Lalito Bravo MD

## 2024-07-12 NOTE — DISCHARGE INSTRUCTIONS
Rest as needed.  May take Tylenol or ibuprofen every 8 hours as needed for fevers or pain.  Please return to the emergency room for any worsening symptoms or concerns.

## 2024-07-12 NOTE — Clinical Note
[facility] EMERGENCY DEPARTMENT  [address] [facility] [address]  Phone: [phone]    Beatriz Boland was seen and treated in our emergency department on 7/12/2024.  She may return to work on 07/18/2024.         Thank you for choosing [facility].    Lalito Bravo MD

## 2024-10-11 ENCOUNTER — OFFICE (AMBULATORY)
Dept: URBAN - METROPOLITAN AREA CLINIC 76 | Facility: CLINIC | Age: 34
End: 2024-10-11

## 2024-10-11 ENCOUNTER — OFFICE (AMBULATORY)
Age: 34
End: 2024-10-11

## 2024-10-11 VITALS
OXYGEN SATURATION: 98 % | HEART RATE: 90 BPM | WEIGHT: 261 LBS | OXYGEN SATURATION: 98 % | DIASTOLIC BLOOD PRESSURE: 90 MMHG | OXYGEN SATURATION: 98 % | WEIGHT: 261 LBS | SYSTOLIC BLOOD PRESSURE: 120 MMHG | OXYGEN SATURATION: 98 % | DIASTOLIC BLOOD PRESSURE: 90 MMHG | DIASTOLIC BLOOD PRESSURE: 90 MMHG | SYSTOLIC BLOOD PRESSURE: 120 MMHG | WEIGHT: 261 LBS | SYSTOLIC BLOOD PRESSURE: 120 MMHG | SYSTOLIC BLOOD PRESSURE: 120 MMHG | HEART RATE: 90 BPM | DIASTOLIC BLOOD PRESSURE: 90 MMHG | WEIGHT: 261 LBS | WEIGHT: 261 LBS | HEIGHT: 67 IN | HEART RATE: 90 BPM | HEIGHT: 67 IN | OXYGEN SATURATION: 98 % | HEART RATE: 90 BPM | OXYGEN SATURATION: 98 % | DIASTOLIC BLOOD PRESSURE: 90 MMHG | HEART RATE: 90 BPM | HEIGHT: 67 IN | SYSTOLIC BLOOD PRESSURE: 120 MMHG | HEIGHT: 67 IN | DIASTOLIC BLOOD PRESSURE: 90 MMHG | HEIGHT: 67 IN | HEART RATE: 90 BPM | HEIGHT: 67 IN | HEIGHT: 67 IN | SYSTOLIC BLOOD PRESSURE: 120 MMHG | OXYGEN SATURATION: 98 % | DIASTOLIC BLOOD PRESSURE: 90 MMHG | WEIGHT: 261 LBS | HEART RATE: 90 BPM | WEIGHT: 261 LBS | SYSTOLIC BLOOD PRESSURE: 120 MMHG

## 2024-10-11 DIAGNOSIS — R07.89 OTHER CHEST PAIN: ICD-10-CM

## 2024-10-11 DIAGNOSIS — K21.9 GASTRO-ESOPHAGEAL REFLUX DISEASE WITHOUT ESOPHAGITIS: ICD-10-CM

## 2024-10-11 PROCEDURE — 99214 OFFICE O/P EST MOD 30 MIN: CPT

## 2024-10-11 RX ORDER — DEXLANSOPRAZOLE 60 MG/1
60 CAPSULE, DELAYED RELEASE ORAL
Qty: 90 | Refills: 3 | Status: ACTIVE
Start: 2019-08-09

## 2024-10-22 ENCOUNTER — HOSPITAL ENCOUNTER (INPATIENT)
Facility: HOSPITAL | Age: 34
LOS: 2 days | Discharge: HOME | End: 2024-10-25
Attending: EMERGENCY MEDICINE | Admitting: INTERNAL MEDICINE
Payer: COMMERCIAL

## 2024-10-22 ENCOUNTER — APPOINTMENT (OUTPATIENT)
Dept: CT IMAGING | Facility: HOSPITAL | Age: 34
End: 2024-10-22
Payer: COMMERCIAL

## 2024-10-22 ENCOUNTER — APPOINTMENT (OUTPATIENT)
Dept: GENERAL RADIOLOGY | Facility: HOSPITAL | Age: 34
End: 2024-10-22
Payer: COMMERCIAL

## 2024-10-22 DIAGNOSIS — N10 ACUTE PYELONEPHRITIS: Primary | ICD-10-CM

## 2024-10-22 LAB
ALBUMIN SERPL-MCNC: 3.3 G/DL (ref 3.5–5.2)
ALBUMIN/GLOB SERPL: 0.8 G/DL
ALP SERPL-CCNC: 66 U/L (ref 39–117)
ALT SERPL W P-5'-P-CCNC: 6 U/L (ref 1–33)
ANION GAP SERPL CALCULATED.3IONS-SCNC: 12.5 MMOL/L (ref 5–15)
AST SERPL-CCNC: 9 U/L (ref 1–32)
B PARAPERT DNA SPEC QL NAA+PROBE: NOT DETECTED
B PERT DNA SPEC QL NAA+PROBE: NOT DETECTED
BASOPHILS # BLD AUTO: 0.03 10*3/MM3 (ref 0–0.2)
BASOPHILS NFR BLD AUTO: 0.1 % (ref 0–1.5)
BILIRUB SERPL-MCNC: 0.6 MG/DL (ref 0–1.2)
BUN SERPL-MCNC: 7 MG/DL (ref 6–20)
BUN/CREAT SERPL: 5.9 (ref 7–25)
C PNEUM DNA NPH QL NAA+NON-PROBE: NOT DETECTED
CALCIUM SPEC-SCNC: 9.4 MG/DL (ref 8.6–10.5)
CHLORIDE SERPL-SCNC: 104 MMOL/L (ref 98–107)
CO2 SERPL-SCNC: 18.5 MMOL/L (ref 22–29)
CREAT SERPL-MCNC: 1.18 MG/DL (ref 0.57–1)
D-LACTATE SERPL-SCNC: 1.3 MMOL/L (ref 0.5–2)
DEPRECATED RDW RBC AUTO: 36.9 FL (ref 37–54)
EGFRCR SERPLBLD CKD-EPI 2021: 62.7 ML/MIN/1.73
EOSINOPHIL # BLD AUTO: 0 10*3/MM3 (ref 0–0.4)
EOSINOPHIL NFR BLD AUTO: 0 % (ref 0.3–6.2)
ERYTHROCYTE [DISTWIDTH] IN BLOOD BY AUTOMATED COUNT: 12.3 % (ref 12.3–15.4)
FLUAV SUBTYP SPEC NAA+PROBE: NOT DETECTED
FLUBV RNA ISLT QL NAA+PROBE: NOT DETECTED
GLOBULIN UR ELPH-MCNC: 4.3 GM/DL
GLUCOSE SERPL-MCNC: 128 MG/DL (ref 65–99)
HADV DNA SPEC NAA+PROBE: NOT DETECTED
HCG SERPL QL: NEGATIVE
HCOV 229E RNA SPEC QL NAA+PROBE: NOT DETECTED
HCOV HKU1 RNA SPEC QL NAA+PROBE: NOT DETECTED
HCOV NL63 RNA SPEC QL NAA+PROBE: NOT DETECTED
HCOV OC43 RNA SPEC QL NAA+PROBE: NOT DETECTED
HCT VFR BLD AUTO: 41.4 % (ref 34–46.6)
HGB BLD-MCNC: 13.8 G/DL (ref 12–15.9)
HMPV RNA NPH QL NAA+NON-PROBE: NOT DETECTED
HOLD SPECIMEN: NORMAL
HOLD SPECIMEN: NORMAL
HPIV1 RNA ISLT QL NAA+PROBE: NOT DETECTED
HPIV2 RNA SPEC QL NAA+PROBE: NOT DETECTED
HPIV3 RNA NPH QL NAA+PROBE: NOT DETECTED
HPIV4 P GENE NPH QL NAA+PROBE: NOT DETECTED
IMM GRANULOCYTES # BLD AUTO: 0.17 10*3/MM3 (ref 0–0.05)
IMM GRANULOCYTES NFR BLD AUTO: 0.8 % (ref 0–0.5)
INR PPP: 1.37 (ref 0.9–1.1)
LIPASE SERPL-CCNC: 13 U/L (ref 13–60)
LYMPHOCYTES # BLD AUTO: 1.33 10*3/MM3 (ref 0.7–3.1)
LYMPHOCYTES NFR BLD AUTO: 6 % (ref 19.6–45.3)
M PNEUMO IGG SER IA-ACNC: NOT DETECTED
MAGNESIUM SERPL-MCNC: 1.8 MG/DL (ref 1.6–2.6)
MCH RBC QN AUTO: 28.2 PG (ref 26.6–33)
MCHC RBC AUTO-ENTMCNC: 33.3 G/DL (ref 31.5–35.7)
MCV RBC AUTO: 84.5 FL (ref 79–97)
MONOCYTES # BLD AUTO: 2.49 10*3/MM3 (ref 0.1–0.9)
MONOCYTES NFR BLD AUTO: 11.3 % (ref 5–12)
NEUTROPHILS NFR BLD AUTO: 18.07 10*3/MM3 (ref 1.7–7)
NEUTROPHILS NFR BLD AUTO: 81.8 % (ref 42.7–76)
NRBC BLD AUTO-RTO: 0 /100 WBC (ref 0–0.2)
PLATELET # BLD AUTO: 217 10*3/MM3 (ref 140–450)
PMV BLD AUTO: 9.6 FL (ref 6–12)
POTASSIUM SERPL-SCNC: 3.3 MMOL/L (ref 3.5–5.2)
PROCALCITONIN SERPL-MCNC: 0.39 NG/ML (ref 0–0.25)
PROT SERPL-MCNC: 7.6 G/DL (ref 6–8.5)
PROTHROMBIN TIME: 17.1 SECONDS (ref 11.7–14.2)
RBC # BLD AUTO: 4.9 10*6/MM3 (ref 3.77–5.28)
RHINOVIRUS RNA SPEC NAA+PROBE: NOT DETECTED
RSV RNA NPH QL NAA+NON-PROBE: NOT DETECTED
SARS-COV-2 RNA NPH QL NAA+NON-PROBE: NOT DETECTED
SODIUM SERPL-SCNC: 135 MMOL/L (ref 136–145)
WBC NRBC COR # BLD AUTO: 22.09 10*3/MM3 (ref 3.4–10.8)
WHOLE BLOOD HOLD COAG: NORMAL
WHOLE BLOOD HOLD SPECIMEN: NORMAL

## 2024-10-22 PROCEDURE — 85610 PROTHROMBIN TIME: CPT | Performed by: EMERGENCY MEDICINE

## 2024-10-22 PROCEDURE — 99285 EMERGENCY DEPT VISIT HI MDM: CPT

## 2024-10-22 PROCEDURE — 25510000001 IOPAMIDOL 61 % SOLUTION: Performed by: EMERGENCY MEDICINE

## 2024-10-22 PROCEDURE — 25010000002 CEFTRIAXONE PER 250 MG: Performed by: EMERGENCY MEDICINE

## 2024-10-22 PROCEDURE — 71045 X-RAY EXAM CHEST 1 VIEW: CPT

## 2024-10-22 PROCEDURE — 70450 CT HEAD/BRAIN W/O DYE: CPT

## 2024-10-22 PROCEDURE — 83735 ASSAY OF MAGNESIUM: CPT | Performed by: EMERGENCY MEDICINE

## 2024-10-22 PROCEDURE — 0202U NFCT DS 22 TRGT SARS-COV-2: CPT | Performed by: EMERGENCY MEDICINE

## 2024-10-22 PROCEDURE — 87040 BLOOD CULTURE FOR BACTERIA: CPT | Performed by: EMERGENCY MEDICINE

## 2024-10-22 PROCEDURE — 25010000002 ONDANSETRON PER 1 MG: Performed by: EMERGENCY MEDICINE

## 2024-10-22 PROCEDURE — 84145 PROCALCITONIN (PCT): CPT | Performed by: EMERGENCY MEDICINE

## 2024-10-22 PROCEDURE — 80053 COMPREHEN METABOLIC PANEL: CPT | Performed by: EMERGENCY MEDICINE

## 2024-10-22 PROCEDURE — 85025 COMPLETE CBC W/AUTO DIFF WBC: CPT | Performed by: EMERGENCY MEDICINE

## 2024-10-22 PROCEDURE — 83690 ASSAY OF LIPASE: CPT | Performed by: EMERGENCY MEDICINE

## 2024-10-22 PROCEDURE — 83605 ASSAY OF LACTIC ACID: CPT | Performed by: EMERGENCY MEDICINE

## 2024-10-22 PROCEDURE — 36415 COLL VENOUS BLD VENIPUNCTURE: CPT

## 2024-10-22 PROCEDURE — 25810000003 SODIUM CHLORIDE 0.9 % SOLUTION: Performed by: EMERGENCY MEDICINE

## 2024-10-22 PROCEDURE — 74177 CT ABD & PELVIS W/CONTRAST: CPT

## 2024-10-22 PROCEDURE — 84703 CHORIONIC GONADOTROPIN ASSAY: CPT | Performed by: EMERGENCY MEDICINE

## 2024-10-22 RX ORDER — SODIUM CHLORIDE 0.9 % (FLUSH) 0.9 %
10 SYRINGE (ML) INJECTION AS NEEDED
Status: DISCONTINUED | OUTPATIENT
Start: 2024-10-22 | End: 2024-10-25 | Stop reason: HOSPADM

## 2024-10-22 RX ORDER — ACETAMINOPHEN 500 MG
1000 TABLET ORAL ONCE
Status: COMPLETED | OUTPATIENT
Start: 2024-10-22 | End: 2024-10-22

## 2024-10-22 RX ORDER — ONDANSETRON 2 MG/ML
4 INJECTION INTRAMUSCULAR; INTRAVENOUS ONCE
Status: COMPLETED | OUTPATIENT
Start: 2024-10-22 | End: 2024-10-22

## 2024-10-22 RX ORDER — IOPAMIDOL 612 MG/ML
85 INJECTION, SOLUTION INTRAVASCULAR
Status: COMPLETED | OUTPATIENT
Start: 2024-10-22 | End: 2024-10-22

## 2024-10-22 RX ADMIN — ACETAMINOPHEN 1000 MG: 500 TABLET ORAL at 20:11

## 2024-10-22 RX ADMIN — ONDANSETRON 4 MG: 2 INJECTION, SOLUTION INTRAMUSCULAR; INTRAVENOUS at 20:08

## 2024-10-22 RX ADMIN — IOPAMIDOL 85 ML: 612 INJECTION, SOLUTION INTRAVENOUS at 21:43

## 2024-10-22 RX ADMIN — SODIUM CHLORIDE 500 ML: 9 INJECTION, SOLUTION INTRAVENOUS at 20:07

## 2024-10-22 RX ADMIN — CEFTRIAXONE 2000 MG: 2 INJECTION, POWDER, FOR SOLUTION INTRAMUSCULAR; INTRAVENOUS at 20:11

## 2024-10-22 NOTE — ED PROVIDER NOTES
EMERGENCY DEPARTMENT ENCOUNTER    Room Number:  23/23  Date of encounter:  10/23/2024  PCP: Elyse Larkin APRN  Historian: Patient and mother  Relevant information and history provided by sources other than the patient will be included below and in the ED Course.  Review of pertinent past medical records may also be included in record below and ED Course.    HPI:  Chief Complaint: Fever  A complete HPI/ROS/PMH/PSH/SH/FH are unobtainable due to: Not applicable  Context: Beatriz Boland is a 34 y.o. female who presents to the ED c/o patient started with fever on Sunday afternoon.  It is coming and going.  She states she went to the urgent care center on Sunday and was diagnosed with a viral illness.  They did a COVID test that was negative.  No antibiotics were prescribed.  At the same time she had nausea and then had some diarrhea that started Sunday.  The diarrhea was not severe it was mild to moderate.  No blood in the diarrhea.  Then started with some increased nausea and vomiting today.  Still had fevers.  She has had a mild cough.  Denies any shortness of breath or chest pain.  Maybe a little bit of nasal drainage.  She seems to saw her primary physician on October 8 and was diagnosed with bacterial vaginosis.  Was prescribed Flagyl.  She is no longer on Flagyl.  She also saw an ENT about 3 to 4 weeks ago and had drainage out of her left ear and she was placed upon Zithromax.  She states that drainage has resolved.  She has no ear pain bilaterally.  She does not really have any burning with urination or frequent urination.  She has had a history of UTIs in the past.  She does suffer from chronic low back pain which is at baseline.  No urinary or fecal incontinence or retention no saddle anesthesia or any new weakness.  She has not had much to eat or drink today because of the nausea and vomiting.  Does complain of some mild lower abdominal pain.  As I do a review of systems she does report that  she has had a headache on the top of her head.  Is been going on for 2 days.  Is been waxing and waning.  Sometimes it is severe.      Previous Episodes: Does not feel like a typical UTI in the past.  There is report that she has had a history of sepsis before.  I look at old records I see that she has had E. coli sepsis  Current Symptoms: See above    MEDICAL HISTORY REVIEWED  Can see this patient has had urinary tract infections in the past.  Also had COVID in July 2024 and was seen in our ER.  Has had a history of septicemia due to E. coli.  History of vaping and tobacco abuse.  History of anxiety and there is mention in epic of mental developmental delay.  History of chronic low back pain.      PAST MEDICAL HISTORY  Active Ambulatory Problems     Diagnosis Date Noted    Depression     Mental developmental delay     GERD without esophagitis     Scoliosis     Amblyopia     Back pain     Chronic left shoulder pain 09/29/2017    Muscle spasm 09/29/2017    Seasonal allergies 11/05/2017    Panic attacks 11/15/2017    Anxiety 01/04/2018    Mild intermittent asthma without complication 03/24/2018    Chest pain on breathing 08/10/2018    Nicotine vapor product user 08/10/2018    Class 3 obesity without serious comorbidity with body mass index (BMI) of 40.0 to 44.9 in adult 08/10/2018    Acute UTI (urinary tract infection) 09/04/2020    Obesity (BMI 30-39.9) 09/04/2020    Tobacco abuse 09/04/2020    Septicemia due to E. coli 09/05/2020    Pyelonephritis of left kidney 10/22/2020    Sepsis 10/22/2020    Asthma 10/22/2020     Resolved Ambulatory Problems     Diagnosis Date Noted    Sinus bradycardia 09/29/2014    Palpitations 11/15/2017    Acute otitis externa of both ears 11/15/2017    Atypical chest pain 01/04/2018    Hypokalemia 09/04/2020    WILIAN (acute kidney injury) 09/04/2020     Past Medical History:   Diagnosis Date    Allergic     Cleft palate     Developmental delay     GERD (gastroesophageal reflux disease)      Impetigo     Kidney abscess     Kidney stone     Recurrent otitis media     Sinus infection     Sprain of shoulder, left 12/2016         PAST SURGICAL HISTORY  Past Surgical History:   Procedure Laterality Date    ABSCESS DRAINAGE      kidney    ADENOIDECTOMY      EAR TUBES      PHARYNGEAL FLAP      for speech    TONSILLECTOMY           FAMILY HISTORY  Family History   Problem Relation Age of Onset    COPD Mother     Arthritis Mother     Obesity Mother     Gestational diabetes Mother     Cancer Father     Scoliosis Father     Rheum arthritis Maternal Aunt     Diabetes Maternal Uncle     Alcohol abuse Maternal Uncle     Rheum arthritis Maternal Grandmother     COPD Maternal Grandmother     Stroke Maternal Grandfather     Alcohol abuse Paternal Uncle          SOCIAL HISTORY  Social History     Socioeconomic History    Marital status: Single   Tobacco Use    Smoking status: Some Days    Smokeless tobacco: Current    Tobacco comments:     quit cigarette smoking 2 years ago, uses vape now   Vaping Use    Vaping status: Every Day    Substances: Nicotine, Flavoring    Devices: Refillable tank   Substance and Sexual Activity    Alcohol use: Yes     Comment: occasionally, 1-2 glasses of wine 1-2 x per week    Drug use: No    Sexual activity: Yes     Partners: Male     Birth control/protection: Injection         ALLERGIES  Dust mite extract and Ciprofloxacin        REVIEW OF SYSTEMS  Review of Systems     All systems reviewed and negative except for those discussed in HPI.       PHYSICAL EXAM    I have reviewed the triage vital signs and nursing notes.    ED Triage Vitals   Temp Heart Rate Resp BP SpO2   10/22/24 1916 10/22/24 1916 10/22/24 1916 10/22/24 1925 10/22/24 1916   (!) 102.1 °F (38.9 °C) (!) 131 18 120/81 96 %      Temp src Heart Rate Source Patient Position BP Location FiO2 (%)   10/22/24 1916 -- 10/22/24 1925 10/22/24 1925 --   Tympanic  Lying Right arm        GENERAL: Young female.  Does not appear septic or  toxic.  Does not appear to be in obvious pain or distress.  Vital signs on my initial evaluation she has a fever of 102.1 here.  Heart rate is between 110 and 120 and is sinus tachycardia on the monitor.  Oxygen saturation is normal blood pressure is normal.  HENT: nares patent  Head/neck/ face are symmetric without gross deformity, signs of trauma, or swelling  EYES: no scleral icterus, no conjunctival pallor.  Was equal round reactive to light extraocular muscles are intact.  Examining her ears bilaterally she has opacification of TMs bilaterally but there is no obvious erythema or drainage.  External canals appear appropriate with no drainage or swelling.  (She has had over 18 surgeries on her ears with multiple tubes in the past) no mastoid tenderness bilaterally  NECK: Supple, no meningismus.  Full range of motion to flexion extension looking left and right with no obvious pain or deformity and no signs of meningismus.  CV: regular rhythm, regular rate with intact distal pulses.  RESPIRATORY: normal effort and no respiratory distress.  Clear to auscultation bilaterally  ABDOMEN: soft and nontender.  Morbidly obese.  She has normal inspection she has mild reproducible pain to the left lower quadrant suprapubic and right lower quadrant.  But there is no guarding or rebound.  Normal bowel sounds  MUSCULOSKELETAL: no deformity.  Intact distal pulses that are equal strong and symmetric.  NEURO: alert and appropriate, moves all extremities, follows commands.  No focal motor or sensory changes  SKIN: warm, dry    Vital signs and nursing notes reviewed.        LAB RESULTS  Recent Results (from the past 24 hours)   Comprehensive Metabolic Panel    Collection Time: 10/22/24  7:31 PM    Specimen: Blood   Result Value Ref Range    Glucose 128 (H) 65 - 99 mg/dL    BUN 7 6 - 20 mg/dL    Creatinine 1.18 (H) 0.57 - 1.00 mg/dL    Sodium 135 (L) 136 - 145 mmol/L    Potassium 3.3 (L) 3.5 - 5.2 mmol/L    Chloride 104 98 - 107  mmol/L    CO2 18.5 (L) 22.0 - 29.0 mmol/L    Calcium 9.4 8.6 - 10.5 mg/dL    Total Protein 7.6 6.0 - 8.5 g/dL    Albumin 3.3 (L) 3.5 - 5.2 g/dL    ALT (SGPT) 6 1 - 33 U/L    AST (SGOT) 9 1 - 32 U/L    Alkaline Phosphatase 66 39 - 117 U/L    Total Bilirubin 0.6 0.0 - 1.2 mg/dL    Globulin 4.3 gm/dL    A/G Ratio 0.8 g/dL    BUN/Creatinine Ratio 5.9 (L) 7.0 - 25.0    Anion Gap 12.5 5.0 - 15.0 mmol/L    eGFR 62.7 >60.0 mL/min/1.73   Lactic Acid, Plasma    Collection Time: 10/22/24  7:31 PM    Specimen: Blood   Result Value Ref Range    Lactate 1.3 0.5 - 2.0 mmol/L   CBC Auto Differential    Collection Time: 10/22/24  7:31 PM    Specimen: Blood   Result Value Ref Range    WBC 22.09 (H) 3.40 - 10.80 10*3/mm3    RBC 4.90 3.77 - 5.28 10*6/mm3    Hemoglobin 13.8 12.0 - 15.9 g/dL    Hematocrit 41.4 34.0 - 46.6 %    MCV 84.5 79.0 - 97.0 fL    MCH 28.2 26.6 - 33.0 pg    MCHC 33.3 31.5 - 35.7 g/dL    RDW 12.3 12.3 - 15.4 %    RDW-SD 36.9 (L) 37.0 - 54.0 fl    MPV 9.6 6.0 - 12.0 fL    Platelets 217 140 - 450 10*3/mm3    Neutrophil % 81.8 (H) 42.7 - 76.0 %    Lymphocyte % 6.0 (L) 19.6 - 45.3 %    Monocyte % 11.3 5.0 - 12.0 %    Eosinophil % 0.0 (L) 0.3 - 6.2 %    Basophil % 0.1 0.0 - 1.5 %    Immature Grans % 0.8 (H) 0.0 - 0.5 %    Neutrophils, Absolute 18.07 (H) 1.70 - 7.00 10*3/mm3    Lymphocytes, Absolute 1.33 0.70 - 3.10 10*3/mm3    Monocytes, Absolute 2.49 (H) 0.10 - 0.90 10*3/mm3    Eosinophils, Absolute 0.00 0.00 - 0.40 10*3/mm3    Basophils, Absolute 0.03 0.00 - 0.20 10*3/mm3    Immature Grans, Absolute 0.17 (H) 0.00 - 0.05 10*3/mm3    nRBC 0.0 0.0 - 0.2 /100 WBC   Green Top (Gel)    Collection Time: 10/22/24  7:31 PM   Result Value Ref Range    Extra Tube Hold for add-ons.    Lavender Top    Collection Time: 10/22/24  7:31 PM   Result Value Ref Range    Extra Tube hold for add-on    Gold Top - SST    Collection Time: 10/22/24  7:31 PM   Result Value Ref Range    Extra Tube Hold for add-ons.    Light Blue Top    Collection  Time: 10/22/24  7:31 PM   Result Value Ref Range    Extra Tube Hold for add-ons.    Protime-INR    Collection Time: 10/22/24  7:31 PM    Specimen: Blood   Result Value Ref Range    Protime 17.1 (H) 11.7 - 14.2 Seconds    INR 1.37 (H) 0.90 - 1.10   hCG, Serum, Qualitative    Collection Time: 10/22/24  7:31 PM    Specimen: Blood   Result Value Ref Range    HCG Qualitative Negative Negative   Lipase    Collection Time: 10/22/24  7:31 PM    Specimen: Blood   Result Value Ref Range    Lipase 13 13 - 60 U/L   Procalcitonin    Collection Time: 10/22/24  7:31 PM    Specimen: Blood   Result Value Ref Range    Procalcitonin 0.39 (H) 0.00 - 0.25 ng/mL   Magnesium    Collection Time: 10/22/24  7:31 PM    Specimen: Blood   Result Value Ref Range    Magnesium 1.8 1.6 - 2.6 mg/dL   Respiratory Panel PCR w/COVID-19(SARS-CoV-2) ANNIA/LORIE/SALTY/PAD/COR/GEORGES In-House, NP Swab in [place], 2 HR TAT - Swab, Nasopharynx    Collection Time: 10/22/24  8:14 PM    Specimen: Nasopharynx; Swab   Result Value Ref Range    ADENOVIRUS, PCR Not Detected Not Detected    Coronavirus 229E Not Detected Not Detected    Coronavirus HKU1 Not Detected Not Detected    Coronavirus NL63 Not Detected Not Detected    Coronavirus OC43 Not Detected Not Detected    COVID19 Not Detected Not Detected - Ref. Range    Human Metapneumovirus Not Detected Not Detected    Human Rhinovirus/Enterovirus Not Detected Not Detected    Influenza A PCR Not Detected Not Detected    Influenza B PCR Not Detected Not Detected    Parainfluenza Virus 1 Not Detected Not Detected    Parainfluenza Virus 2 Not Detected Not Detected    Parainfluenza Virus 3 Not Detected Not Detected    Parainfluenza Virus 4 Not Detected Not Detected    RSV, PCR Not Detected Not Detected    Bordetella pertussis pcr Not Detected Not Detected    Bordetella parapertussis PCR Not Detected Not Detected    Chlamydophila pneumoniae PCR Not Detected Not Detected    Mycoplasma pneumo by PCR Not Detected Not Detected    Urinalysis With Microscopic If Indicated (No Culture) - Urine, Clean Catch    Collection Time: 10/23/24 12:20 AM    Specimen: Urine, Clean Catch   Result Value Ref Range    Color, UA Yellow Yellow, Straw    Appearance, UA Clear Clear    pH, UA 6.0 5.0 - 8.0    Specific Gravity, UA >1.030 (H) 1.005 - 1.030    Glucose, UA Negative Negative    Ketones, UA 15 mg/dL (1+) (A) Negative    Bilirubin, UA Negative Negative    Blood, UA Moderate (2+) (A) Negative    Protein, UA Trace (A) Negative    Leuk Esterase, UA Small (1+) (A) Negative    Nitrite, UA Negative Negative    Urobilinogen, UA 0.2 E.U./dL 0.2 - 1.0 E.U./dL   Urinalysis, Microscopic Only - Urine, Clean Catch    Collection Time: 10/23/24 12:20 AM    Specimen: Urine, Clean Catch   Result Value Ref Range    RBC, UA 11-20 (A) None Seen, 0-2 /HPF    WBC, UA 11-20 (A) None Seen, 0-2 /HPF    Bacteria, UA None Seen None Seen /HPF    Squamous Epithelial Cells, UA 7-12 (A) None Seen, 0-2 /HPF    Hyaline Casts, UA 0-2 None Seen /LPF    Methodology Automated Microscopy        Ordered the above labs and independently reviewed the results.        RADIOLOGY  CT Abdomen Pelvis With Contrast    Result Date: 10/22/2024  CT ABDOMEN PELVIS W CONTRAST-  HISTORY: 34 years of age, Female.  Fever, nausea vomiting diarrhea. Pain in lower abdomen bilaterally.  TECHNIQUE:  CT includes axial imaging from the lung bases to the trochanters with intravenous contrast and without use of oral contrast. Data reconstructed in coronal and sagittal planes. Radiation dose reduction techniques were utilized, including automated exposure control and exposure modulation based on body size.  COMPARISON: CT abdomen and pelvis 06/13/2024, 11/20/2021, 10/20/2020  FINDINGS: There is a left upper pole area of low density with adjacent cortical thinning and this heterogenous low-density measures 3 x 3.1 cm. Within the anterior left interpolar kidney there is a low-density lesion likely representing a cyst  that measures 1 cm. There is left renal posterolateral lower pole cortical thinning.  Within the right renal lower pole there is an abnormal low-density masslike lesion that measures approximately 2.8 x 2.8 cm. There are adjacent small cysts or dilated calyces and there is chronic right lower pole cortical thinning as well as chronic right upper pole cortical thinning. There is no hydronephrosis though there is perinephric stranding particularly surrounding the right renal lower pole. No hydroureter. Urinary bladder exhibits mild wall thickening though is low volume.  There is mild retroperitoneal brooklyn enlargement. Lymph node medial to the right kidney measures 1.5 cm. There are additional smaller retroperitoneal nodes. No evidence for brooklyn enlargement within the pelvis.  Lung bases appear clear. Liver, gallbladder, spleen, adrenal glands, pancreas, bowel loops appear within normal limits. No evidence for bowel obstruction. No ascites.      1. 2.8 cm right lower pole complex collection, potential developing abscess, or mass. This is suspicious for infection/pyelonephritis. There is surrounding right lower pole perinephric stranding. There is also left upper pole low-density lesion that is indeterminant and there is chronic bilateral renal cortical thinning. Recommend short interval follow-up with multiphasic CT or MRI.. There is no hydronephrosis. Urinary bladder exhibits mild wall thickening though exhibits low volume without surrounding inflammation. 2. Mild retroperitoneal brooklyn enlargement has developed with lymph node medial to the right kidney measuring 1.5 cm. Additional smaller retroperitoneal nodes.  Radiation dose reduction techniques were utilized, including automated exposure control and exposure modulation based on body size.   This report was finalized on 10/22/2024 11:07 PM by Richar Barajas M.D on Workstation: [id number]      CT Head Without Contrast    Result Date: 10/22/2024  Patient:  SY PRESCOTT  Time Out: 22:25 Exam(s): CT HEAD Without Contrast EXAM:   CT Head Without Intravenous Contrast CLINICAL HISTORY:    Reason for exam: Headache. Top of head. TECHNIQUE:   Axial computed tomography images of the head brain without intravenous contrast.  CTDI is 55.44 mGy and DLP is 970.5 mGy-cm.  This CT exam was performed according to the principle of ALARA (As Low As Reasonably Achievable) by using one or more of the following dose reduction techniques: automated exposure control, adjustment of the mA and or kV according to patient size, and or use of iterative reconstruction technique.   Coronal and sagittal reformatted images were created and reviewed. COMPARISON:   No relevant prior studies available. FINDINGS:   Brain:  No mass effect, hemorrhage, large extra-axial collection, or CT evidence of acute cortical infarction.  CSF presents in the slightly expanded sella turcica compatible with empty sella.  Dilated perivascular space in the caudal left basal ganglia, normal variant.   Ventricles:  Unremarkable. No midline shift or ventriculomegaly.   Bones joints:  Unremarkable.  No acute fracture.   Soft tissues:  Unremarkable.   Sinuses:  Mild left ethmoid air cell mucosal thickening.   Mastoid air cells:  Left mastoid effusion.   Auditory system:  Left middle ear cavity effusion. IMPRESSION:     1.  No CT evidence of acute intracranial process. 2.  Left mastoid effusion and left middle ear effusion.  Correlate for otomastoiditis. 3.  Empty sella.     Electronically signed by Navdeep Koch M.D. on 10-22-24 at 2225    XR Chest 1 View    Result Date: 10/22/2024  CHEST SINGLE VIEW  HISTORY: 34 years of age, Female.  Fever mild cough, nausea vomiting and diarrhea  COMPARISON: AP chest 05/04/2024  FINDINGS: Heart and mediastinal structures appear within normal limits. Lungs appear clear and there is no evidence for pulmonary edema or pleural effusion or infiltrate. Cardiac monitor and leads are  present      No evidence for active disease in the chest  This report was finalized on 10/22/2024 9:20 PM by Richar Barajas M.D on Workstation: [id number]       I ordered the above noted radiological studies. Reviewed by me and discussed with radiologist.  See dictation for official radiology interpretation.      PROCEDURES    Procedures      MEDICATIONS GIVEN IN ER    Medications   sodium chloride 0.9 % flush 10 mL (has no administration in time range)   vancomycin 2250 mg/500 mL 0.9% NS IVPB ([facility]) (2,250 mg Intravenous New Bag 10/23/24 0150)   sodium chloride 0.9 % flush 10 mL (10 mL Intravenous Given 10/23/24 0151)   sodium chloride 0.9 % flush 10 mL (has no administration in time range)   acetaminophen (TYLENOL) tablet 650 mg (has no administration in time range)     Or   acetaminophen (TYLENOL) 160 MG/5ML oral solution 650 mg (has no administration in time range)     Or   acetaminophen (TYLENOL) suppository 650 mg (has no administration in time range)   sennosides-docusate (PERICOLACE) 8.6-50 MG per tablet 2 tablet (has no administration in time range)     And   polyethylene glycol (MIRALAX) packet 17 g (has no administration in time range)     And   bisacodyl (DULCOLAX) EC tablet 5 mg (has no administration in time range)     And   bisacodyl (DULCOLAX) suppository 10 mg (has no administration in time range)   ondansetron ODT (ZOFRAN-ODT) disintegrating tablet 4 mg (has no administration in time range)     Or   ondansetron (ZOFRAN) injection 4 mg (has no administration in time range)   calcium carbonate (TUMS) chewable tablet 500 mg (200 mg elemental) (has no administration in time range)   Pharmacy to dose vancomycin (has no administration in time range)   Pharmacy to Dose Zosyn (has no administration in time range)   piperacillin-tazobactam (ZOSYN) 3.375 g IVPB in 100 mL NS MBP (CD) (3.375 g Intravenous New Bag 10/23/24 0151)   piperacillin-tazobactam (ZOSYN) 3.375 g IVPB in 100 mL NS MBP (CD) (has no  administration in time range)   sodium chloride 0.9 % bolus 500 mL (0 mL Intravenous Stopped 10/22/24 2240)   acetaminophen (TYLENOL) tablet 1,000 mg (1,000 mg Oral Given 10/22/24 2011)   ondansetron (ZOFRAN) injection 4 mg (4 mg Intravenous Given 10/22/24 2008)   cefTRIAXone (ROCEPHIN) 2,000 mg in sodium chloride 0.9 % 100 mL MBP (0 mg Intravenous Stopped 10/22/24 2155)   iopamidol (ISOVUE-300) 61 % injection 85 mL (85 mL Intravenous Given by Other 10/22/24 2143)   sodium chloride 0.9 % bolus 500 mL (0 mL Intravenous Stopped 10/23/24 0151)         All labs have been independently reviewed by me.  All radiology studies have been reviewed by me and I discussed with radiologist dictating the report when indicated below.  All EKG's independently viewed and interpreted by me.  Discussion below represents my analysis of pertinent findings related to patient's condition, differential diagnosis, treatment plan and final disposition.        PROGRESS, DATA ANALYSIS, CONSULTS, AND MEDICAL DECISION MAKING    Differential diagnosis includes   - hepatobiliary pathology such as cholecystitis, cholangitis, and symptomatic cholelithiasis  -PUD  -Mesenteric ischemia  - Pancreatitis  - Dyspepsia  - Small bowel or large bowel obstruction  - Appendicitis  - Diverticulitis  - UTI including pyelonephritis  - Ureteral stone  - Zoster  - Colitis, including infectious and ischemic  - Atypical ACS  I clinically suspect that this is probably of viral illness.  She has a mild cough she is has mild abdominal pain with no guarding with nausea vomiting and diarrhea and fevers.  Diarrhea started the same time that the fever started.  Vomiting started today.  But her CBC is come back and she has marked leukocytosis of 22,000.  I Omayra check blood cultures on her, lactic acid procalcitonin.  I am going to go ahead and start her on antibiotics prophylactically.  I do not believe this is meningitis.  Her headache is the top of the head she has no  signs of meningismus whatsoever.  Will check a viral respiratory panel as well.  She will need to be admitted to the hospital without marked leukocytosis.  Informed patient as well as family at bedside of my initial concerns initial test that we will order.  All questions answered.      ED Course as of 10/23/24 0157   Tue Oct 22, 2024   2036 I have updated the patient as well as mother in the room informed initial lab work with a leukocytosis.  Informed about the plan for admission my treat with antibiotics.  She is hemodynamically stable blood pressure is normal heart rate is improved and normalized.  She does not appear septic or toxic. [MM]   2036 Lactate: 1.3 [MM]   2201 Procalcitonin(!): 0.39 [MM]   2202 Lactate: 1.3 [MM]   2202 Creatinine(!): 1.18  Creatinine 4 months ago was normal. [MM]   2202 CO2(!): 18.5 [MM]   2202 Anion Gap: 12.5 [MM]   2202 Neutrophil Rel %(!): 81.8 [MM]   2202 Chest x-ray is unremarkable no active disease seen. [MM]   2347 CT scan of the head report was reviewed.  No acute intracranial process seen.  There is left mastoid effusion and left middle ear effusion and would like to correlate with auto mastoiditis.  Please see complete dictated report from radiologist [MM]   2350 I reviewed the CT scan of the abdomen pelvis.  There is a 2.8 cm right lower pole complex collection potentially developing abscess or mass this is concerning for infection or pyelonephritis there is surrounding right lower lobe perinephric stranding there is also a left upper pole low-density lesion that is indeterminant and chronic bilateral renal cortical thickening recommend short interval follow-up with multiphasic CT or MRI there is no hydronephrosis mild wall bladder thickening.  Mild rub retroperitoneal brooklyn enlargement that is developed with lymph node medial to the right kidney measuring 1.5 cm.  Please see complete dictated report from radiologist [MM]   5725 I have reevaluated the patient.  Currently  she has a normal blood pressure normal heart rate and normal oxygen saturation.  She states that she is feeling better.  Her headache is resolved to 100%.  I have reexamined her mastoids after reviewing the CT scan report.  She has no mastoid tenderness she has no headache and she has no ear pain.  I informed her and the mother about the results of the CT scans and lab work.  She states that this is pretty similar to what she had when she had a bad kidney infection.  They report that she had an infection in her bladder that then went up to her kidneys and a urologist saw her, Dr. Walt Jaimes.  She has reported some flank pain on the right as well.  We treated her Rocephin.  Will get admitted to the hospital.  Urology will be consulted. [MM]   Wed Oct 23, 2024   0015 I did discuss the case with Dr. Neeraj Arora is on for urology.  Informed the results of the CT scan.  There is no stone and there is no hydronephrosis but again has signs concerning for acute pyelonephritis.  He will consult and agrees with plan for admit to [facility]. [MM]   0016 Still have not been able to collect a urinalysis on this patient. [MM]   0045 Leukocytes, UA(!): Small (1+) [MM]   0045 WBC, UA(!): 11-20 [MM]   0045 Squamous Epithelial Cells, UA(!): 7-12 [MM]   0045 I discussed the case with Ameena who is the midlevel provider on for A.  Informed her of the patient's presenting symptoms results of the CT scans and my treatment plan.  Informed her of my conversation with urologist Dr. Arora.  She agrees to admit the patient to the hospital.  Dr. Amaya will be the attending. [MM]      ED Course User Index  [MM] Chao Prasad MD       AS OF 01:57 EDT VITALS:    BP - 129/77  HR - 78  TEMP - 97.9 °F (36.6 °C) (Oral)  02 SATS - 98%    SOCIAL DETERMINANTS OF HEALTH THAT IMPACT OR LIMIT CARE (For example..Homelessness,safe discharge, inability to obtain care, follow up, or prescriptions):      DIAGNOSIS  Final diagnoses:   Acute pyelonephritis          DISPOSITION  I have reviewed the test results with my patient and explained the current treatment plan.  I answered all of the patient's questions.  The patient will be admitted to monitor bed at this time.  The patient is not hypotensive and is tolerating their current disease condition well enough for a monitored bed at this time.  The patient's current condition does not require intensive care treatment at this time.            DICTATED UTILIZING DRAGON DICTATION    Note Disclaimer: At [facility], we believe that sharing information builds trust and better relationships. You are receiving this note because you recently visited [facility]. It is possible you will see health information before a provider has talked with you about it. This kind of information can be easy to misunderstand. To help you fully understand what it means for your health, we urge you to discuss this note with your provider.       Chao Prasad MD  10/23/24 0157

## 2024-10-22 NOTE — Clinical Note
Level of Care: Telemetry [5]   Diagnosis: Acute pyelonephritis [973202]   Admitting Physician: ROSA MADERA [[id number]]   Attending Physician: ROSA MADERA [[id number]]   Certification: I Certify That Inpatient Hospital Services Are Medically Necessary For Greater Than 2 Midnights

## 2024-10-23 PROBLEM — N10 ACUTE PYELONEPHRITIS: Status: ACTIVE | Noted: 2024-10-23

## 2024-10-23 LAB
ANION GAP SERPL CALCULATED.3IONS-SCNC: 13 MMOL/L (ref 5–15)
BACTERIA UR QL AUTO: ABNORMAL /HPF
BILIRUB UR QL STRIP: NEGATIVE
BUN SERPL-MCNC: 7 MG/DL (ref 6–20)
BUN/CREAT SERPL: 8.8 (ref 7–25)
CALCIUM SPEC-SCNC: 8.4 MG/DL (ref 8.6–10.5)
CHLORIDE SERPL-SCNC: 106 MMOL/L (ref 98–107)
CLARITY UR: CLEAR
CO2 SERPL-SCNC: 17 MMOL/L (ref 22–29)
COLOR UR: YELLOW
CREAT SERPL-MCNC: 0.8 MG/DL (ref 0.57–1)
DEPRECATED RDW RBC AUTO: 36.1 FL (ref 37–54)
EGFRCR SERPLBLD CKD-EPI 2021: 99.9 ML/MIN/1.73
ERYTHROCYTE [DISTWIDTH] IN BLOOD BY AUTOMATED COUNT: 12.2 % (ref 12.3–15.4)
GLUCOSE SERPL-MCNC: 89 MG/DL (ref 65–99)
GLUCOSE UR STRIP-MCNC: NEGATIVE MG/DL
HCT VFR BLD AUTO: 36 % (ref 34–46.6)
HGB BLD-MCNC: 11.9 G/DL (ref 12–15.9)
HGB UR QL STRIP.AUTO: ABNORMAL
HYALINE CASTS UR QL AUTO: ABNORMAL /LPF
KETONES UR QL STRIP: ABNORMAL
LEUKOCYTE ESTERASE UR QL STRIP.AUTO: ABNORMAL
MCH RBC QN AUTO: 27.7 PG (ref 26.6–33)
MCHC RBC AUTO-ENTMCNC: 33.1 G/DL (ref 31.5–35.7)
MCV RBC AUTO: 83.9 FL (ref 79–97)
NITRITE UR QL STRIP: NEGATIVE
PH UR STRIP.AUTO: 6 [PH] (ref 5–8)
PLATELET # BLD AUTO: 187 10*3/MM3 (ref 140–450)
PMV BLD AUTO: 10.1 FL (ref 6–12)
POTASSIUM SERPL-SCNC: 3.5 MMOL/L (ref 3.5–5.2)
POTASSIUM SERPL-SCNC: 3.7 MMOL/L (ref 3.5–5.2)
PROT UR QL STRIP: ABNORMAL
RBC # BLD AUTO: 4.29 10*6/MM3 (ref 3.77–5.28)
RBC # UR STRIP: ABNORMAL /HPF
REF LAB TEST METHOD: ABNORMAL
SODIUM SERPL-SCNC: 136 MMOL/L (ref 136–145)
SP GR UR STRIP: >1.03 (ref 1–1.03)
SQUAMOUS #/AREA URNS HPF: ABNORMAL /HPF
UROBILINOGEN UR QL STRIP: ABNORMAL
WBC # UR STRIP: ABNORMAL /HPF
WBC NRBC COR # BLD AUTO: 19.74 10*3/MM3 (ref 3.4–10.8)

## 2024-10-23 PROCEDURE — 25010000002 VANCOMYCIN 10 G RECONSTITUTED SOLUTION: Performed by: EMERGENCY MEDICINE

## 2024-10-23 PROCEDURE — 99223 1ST HOSP IP/OBS HIGH 75: CPT | Performed by: INTERNAL MEDICINE

## 2024-10-23 PROCEDURE — 84132 ASSAY OF SERUM POTASSIUM: CPT | Performed by: INTERNAL MEDICINE

## 2024-10-23 PROCEDURE — 81001 URINALYSIS AUTO W/SCOPE: CPT | Performed by: EMERGENCY MEDICINE

## 2024-10-23 PROCEDURE — 87086 URINE CULTURE/COLONY COUNT: CPT | Performed by: INTERNAL MEDICINE

## 2024-10-23 PROCEDURE — 36415 COLL VENOUS BLD VENIPUNCTURE: CPT | Performed by: NURSE PRACTITIONER

## 2024-10-23 PROCEDURE — 25810000003 SODIUM CHLORIDE 0.9 % SOLUTION: Performed by: EMERGENCY MEDICINE

## 2024-10-23 PROCEDURE — 25010000002 CEFTRIAXONE PER 250 MG: Performed by: INTERNAL MEDICINE

## 2024-10-23 PROCEDURE — 80048 BASIC METABOLIC PNL TOTAL CA: CPT | Performed by: NURSE PRACTITIONER

## 2024-10-23 PROCEDURE — 85027 COMPLETE CBC AUTOMATED: CPT | Performed by: NURSE PRACTITIONER

## 2024-10-23 PROCEDURE — 25010000002 PIPERACILLIN SOD-TAZOBACTAM PER 1 G: Performed by: NURSE PRACTITIONER

## 2024-10-23 RX ORDER — AMOXICILLIN 250 MG
2 CAPSULE ORAL 2 TIMES DAILY PRN
Status: DISCONTINUED | OUTPATIENT
Start: 2024-10-23 | End: 2024-10-25 | Stop reason: HOSPADM

## 2024-10-23 RX ORDER — ONDANSETRON 4 MG/1
4 TABLET, ORALLY DISINTEGRATING ORAL EVERY 6 HOURS PRN
Status: DISCONTINUED | OUTPATIENT
Start: 2024-10-23 | End: 2024-10-25 | Stop reason: HOSPADM

## 2024-10-23 RX ORDER — SODIUM CHLORIDE 0.9 % (FLUSH) 0.9 %
10 SYRINGE (ML) INJECTION EVERY 12 HOURS SCHEDULED
Status: DISCONTINUED | OUTPATIENT
Start: 2024-10-23 | End: 2024-10-25 | Stop reason: HOSPADM

## 2024-10-23 RX ORDER — ONDANSETRON 2 MG/ML
4 INJECTION INTRAMUSCULAR; INTRAVENOUS EVERY 6 HOURS PRN
Status: DISCONTINUED | OUTPATIENT
Start: 2024-10-23 | End: 2024-10-25 | Stop reason: HOSPADM

## 2024-10-23 RX ORDER — CALCIUM CARBONATE 500 MG/1
2 TABLET, CHEWABLE ORAL 2 TIMES DAILY PRN
Status: DISCONTINUED | OUTPATIENT
Start: 2024-10-23 | End: 2024-10-25 | Stop reason: HOSPADM

## 2024-10-23 RX ORDER — FLUTICASONE PROPIONATE 50 UG/1
1 SPRAY, METERED NASAL DAILY
Status: DISCONTINUED | OUTPATIENT
Start: 2024-10-23 | End: 2024-10-25 | Stop reason: HOSPADM

## 2024-10-23 RX ORDER — POLYETHYLENE GLYCOL 3350 17 G/17G
17 POWDER, FOR SOLUTION ORAL DAILY PRN
Status: DISCONTINUED | OUTPATIENT
Start: 2024-10-23 | End: 2024-10-25 | Stop reason: HOSPADM

## 2024-10-23 RX ORDER — ACETAMINOPHEN 650 MG/1
650 SUPPOSITORY RECTAL EVERY 4 HOURS PRN
Status: DISCONTINUED | OUTPATIENT
Start: 2024-10-23 | End: 2024-10-25 | Stop reason: HOSPADM

## 2024-10-23 RX ORDER — POTASSIUM CHLORIDE 750 MG/1
40 TABLET, FILM COATED, EXTENDED RELEASE ORAL EVERY 4 HOURS
Status: COMPLETED | OUTPATIENT
Start: 2024-10-23 | End: 2024-10-23

## 2024-10-23 RX ORDER — ACETAMINOPHEN 325 MG/1
650 TABLET ORAL EVERY 4 HOURS PRN
Status: DISCONTINUED | OUTPATIENT
Start: 2024-10-23 | End: 2024-10-25 | Stop reason: HOSPADM

## 2024-10-23 RX ORDER — ALBUTEROL SULFATE 0.83 MG/ML
2.5 SOLUTION RESPIRATORY (INHALATION) EVERY 6 HOURS PRN
Status: DISCONTINUED | OUTPATIENT
Start: 2024-10-23 | End: 2024-10-25 | Stop reason: HOSPADM

## 2024-10-23 RX ORDER — BISACODYL 10 MG
10 SUPPOSITORY, RECTAL RECTAL DAILY PRN
Status: DISCONTINUED | OUTPATIENT
Start: 2024-10-23 | End: 2024-10-25 | Stop reason: HOSPADM

## 2024-10-23 RX ORDER — ACETAMINOPHEN 160 MG/5ML
650 SOLUTION ORAL EVERY 4 HOURS PRN
Status: DISCONTINUED | OUTPATIENT
Start: 2024-10-23 | End: 2024-10-25 | Stop reason: HOSPADM

## 2024-10-23 RX ORDER — NICOTINE 21 MG/24HR
1 PATCH, TRANSDERMAL 24 HOURS TRANSDERMAL
Status: DISCONTINUED | OUTPATIENT
Start: 2024-10-23 | End: 2024-10-25 | Stop reason: HOSPADM

## 2024-10-23 RX ORDER — BISACODYL 5 MG/1
5 TABLET, DELAYED RELEASE ORAL DAILY PRN
Status: DISCONTINUED | OUTPATIENT
Start: 2024-10-23 | End: 2024-10-25 | Stop reason: HOSPADM

## 2024-10-23 RX ORDER — SODIUM CHLORIDE 0.9 % (FLUSH) 0.9 %
10 SYRINGE (ML) INJECTION AS NEEDED
Status: DISCONTINUED | OUTPATIENT
Start: 2024-10-23 | End: 2024-10-25 | Stop reason: HOSPADM

## 2024-10-23 RX ORDER — CETIRIZINE HYDROCHLORIDE 10 MG/1
10 TABLET ORAL DAILY
Status: DISCONTINUED | OUTPATIENT
Start: 2024-10-23 | End: 2024-10-25 | Stop reason: HOSPADM

## 2024-10-23 RX ORDER — PANTOPRAZOLE SODIUM 40 MG/1
40 TABLET, DELAYED RELEASE ORAL
Status: DISCONTINUED | OUTPATIENT
Start: 2024-10-23 | End: 2024-10-25 | Stop reason: HOSPADM

## 2024-10-23 RX ADMIN — POTASSIUM CHLORIDE 40 MEQ: 750 TABLET, EXTENDED RELEASE ORAL at 16:28

## 2024-10-23 RX ADMIN — Medication 10 ML: at 20:11

## 2024-10-23 RX ADMIN — ACETAMINOPHEN 650 MG: 325 TABLET ORAL at 11:02

## 2024-10-23 RX ADMIN — Medication 10 ML: at 08:24

## 2024-10-23 RX ADMIN — PIPERACILLIN AND TAZOBACTAM 3.38 G: 3; .375 INJECTION, POWDER, FOR SOLUTION INTRAVENOUS at 01:51

## 2024-10-23 RX ADMIN — SODIUM CHLORIDE 500 ML: 9 INJECTION, SOLUTION INTRAVENOUS at 00:41

## 2024-10-23 RX ADMIN — VANCOMYCIN HYDROCHLORIDE 2250 MG: 10 INJECTION, POWDER, LYOPHILIZED, FOR SOLUTION INTRAVENOUS at 01:50

## 2024-10-23 RX ADMIN — PANTOPRAZOLE SODIUM 40 MG: 40 TABLET, DELAYED RELEASE ORAL at 08:23

## 2024-10-23 RX ADMIN — POTASSIUM CHLORIDE 40 MEQ: 750 TABLET, EXTENDED RELEASE ORAL at 11:01

## 2024-10-23 RX ADMIN — CETIRIZINE HYDROCHLORIDE 10 MG: 10 TABLET ORAL at 08:23

## 2024-10-23 RX ADMIN — CEFTRIAXONE 2000 MG: 2 INJECTION, POWDER, FOR SOLUTION INTRAMUSCULAR; INTRAVENOUS at 11:01

## 2024-10-23 RX ADMIN — Medication 10 ML: at 01:51

## 2024-10-23 RX ADMIN — FLUTICASONE PROPIONATE 1 SPRAY: 50 SPRAY, METERED NASAL at 08:25

## 2024-10-23 RX ADMIN — PIPERACILLIN AND TAZOBACTAM 3.38 G: 3; .375 INJECTION, POWDER, FOR SOLUTION INTRAVENOUS at 08:24

## 2024-10-23 RX ADMIN — PANTOPRAZOLE SODIUM 40 MG: 40 TABLET, DELAYED RELEASE ORAL at 16:28

## 2024-10-23 NOTE — CONSULTS
FIRST UROLOGY CONSULT      Patient Identification:  NAME:  Beatriz Boland  Age:  34 y.o.   Sex:  female   :  1990   MRN:  [id number]     Chief complaint: Intermittent fever    History of present illness:      Pt is a 34 y.o. female with a history of kidney stones, Pola and left renal abscess that presented to the ER on 10/22/24 with complaints of intermittent fever x 3 days with associated nausea and diarrhea. [place] [facility]  and was diagnosed with a viral illness. No antibiotics were given. Urology consulted for evaluation and management of a possible right renal mass vs developing abscess. Patient does have a history of a left renal abscess that required drainage several years ago. ID consulted. Patient currently receiving IV Ceftriaxone. Pt denies current fever, chills, nausea or vomiting. Does report mild right flank pain.  Overall, patient reports her appetite has improved today and she has been able to tolerate PO intake.     In hospital:  -Temp max 102.1 on admission, currently AVSS, good UOP  -WBC - 19.74 (22.09)  -Creat - 0.80 (1.18)  -UA - Small LE, negative nitrites, moderate blood, 11-20 RBC, 11-20 WBC, no bacteria, 7-12 SE cells  -Blood culture - In process    -CT -  2.8 cm right lower pole complex collection with surrounding perinephric stranding, potential developing abscess vs mass;  indeterminant left upper pole low-density lesion, chronic bilateral renal cortical thinning. No hydronephrosis. Mild bladder wall thickening; Mild retroperitoneal brooklyn enlargement, lymph node medial to the right kidney measuring 1.5 cm    Asked to see    Past medical history:  Past Medical History:   Diagnosis Date    Allergic     Amblyopia     Anxiety     Back pain     Cleft palate     Depression     Developmental delay     GERD (gastroesophageal reflux disease)     followed by GI, Dr. Wesley Ferro    Impetigo     Kidney abscess     Kidney stone     Muscle spasm     Obesity (BMI 30-39.9)      Recurrent otitis media     Scoliosis     Sinus infection     recurrent    Sprain of shoulder, left 12/2016       Past surgical history:  Past Surgical History:   Procedure Laterality Date    ABSCESS DRAINAGE      kidney    ADENOIDECTOMY      EAR TUBES      PHARYNGEAL FLAP      for speech    TONSILLECTOMY         Allergies:  Dust mite extract and Ciprofloxacin    Home medications:  Medications Prior to Admission   Medication Sig Dispense Refill Last Dose/Taking    ondansetron ODT (ZOFRAN-ODT) 4 MG disintegrating tablet Place 1 tablet on the tongue Every 6 (Six) Hours As Needed for Nausea or Vomiting. 20 tablet 0 10/22/2024    Acetaminophen (TYLENOL) 325 MG capsule Take 325 mg by mouth As Needed.   10/20/2024    albuterol sulfate  (90 Base) MCG/ACT inhaler Inhale 2 puffs Every 4 (Four) Hours As Needed for Wheezing. 18 g 3 6/1/2023    cetirizine (zyrTEC) 10 MG tablet Take 1 tablet by mouth Daily.   10/21/2024    [employer] NASAL ALLERGY SPRAY 55 MCG/ACT nasal inhaler Administer 2 sprays into the nostril(s) as directed by provider Daily.  0 10/9/2024    dexlansoprazole (DEXILANT) 60 MG capsule Take 1 capsule by mouth Daily.   10/16/2024    estradiol cypionate (DEPO-ESTRADIOL) 5 MG/ML injection Inject 0.3 mL into the appropriate muscle as directed by prescriber Every 28 (Twenty-Eight) Days. Patient states she takes it every 3 months   10/1/2024    fluticasone (FLONASE) 50 MCG/ACT nasal spray    10/21/2024    methocarbamol (ROBAXIN) 750 MG tablet Take 1 tablet by mouth 3 (Three) Times a Day As Needed for Muscle Spasms. 21 tablet 0 8/15/2024        Hospital medications:  cefTRIAXone, 2,000 mg, Intravenous, Q24H  cetirizine, 10 mg, Oral, Daily  fluticasone, 1 spray, Each Nare, Daily  nicotine, 1 patch, Transdermal, Q24H  pantoprazole, 40 mg, Oral, BID AC  potassium chloride ER, 40 mEq, Oral, Q4H  sodium chloride, 10 mL, Intravenous, Q12H           acetaminophen **OR** acetaminophen **OR** acetaminophen    albuterol     senna-docusate sodium **AND** polyethylene glycol **AND** bisacodyl **AND** bisacodyl    calcium carbonate    Calcium Replacement - Follow Nurse / BPA Driven Protocol    Magnesium Standard Dose Replacement - Follow Nurse / BPA Driven Protocol    ondansetron ODT **OR** ondansetron    Phosphorus Replacement - Follow Nurse / BPA Driven Protocol    Potassium Replacement - Follow Nurse / BPA Driven Protocol    Potassium Replacement - Follow Nurse / BPA Driven Protocol    sodium chloride    sodium chloride    Family history:  Family History   Problem Relation Age of Onset    COPD Mother     Arthritis Mother     Obesity Mother     Gestational diabetes Mother     Cancer Father     Scoliosis Father     Rheum arthritis Maternal Aunt     Diabetes Maternal Uncle     Alcohol abuse Maternal Uncle     Rheum arthritis Maternal Grandmother     COPD Maternal Grandmother     Stroke Maternal Grandfather     Alcohol abuse Paternal Uncle        Social history:  Social History     Tobacco Use    Smoking status: Some Days    Smokeless tobacco: Current    Tobacco comments:     quit cigarette smoking 2 years ago, uses vape now   Vaping Use    Vaping status: Every Day    Substances: Nicotine, Flavoring    Devices: Refillable tank   Substance Use Topics    Alcohol use: Yes     Comment: occasionally, 1-2 glasses of wine 1-2 x per week    Drug use: No       Review of systems:      12 point negative except as in HPI    Objective:  TMax 24 hours:   Temp (24hrs), Av.4 °F (37.4 °C), Min:97.9 °F (36.6 °C), Max:102.1 °F (38.9 °C)      Vitals Ranges:   Temp:  [97.9 °F (36.6 °C)-102.1 °F (38.9 °C)] 98.8 °F (37.1 °C)  Heart Rate:  [] 59  Resp:  [16-18] 16  BP: ()/(51-82) 108/68    Intake/Output Last 3 shifts:  I/O last 3 completed shifts:  In: 1100 [IV Piggyback:1100]  Out: -      Physical Exam:    General Appearance:    Alert, cooperative, NAD   Back:     Mild right  CVA tenderness   Lungs:     Respirations unlabored, no wheezing    Abdomen:     Soft, NDNT   :    Pelvic not performed   Neuro/Psych:   Orientation intact, mood/affect pleasant       Results review:   I reviewed the patient's new clinical results.    Data review:  Lab Results (last 24 hours)       Procedure Component Value Units Date/Time    Basic Metabolic Panel [[id number]]  (Abnormal) Collected: 10/23/24 0820    Specimen: Blood Updated: 10/23/24 0912     Glucose 89 mg/dL      BUN 7 mg/dL      Creatinine 0.80 mg/dL      Sodium 136 mmol/L      Potassium 3.5 mmol/L      Chloride 106 mmol/L      CO2 17.0 mmol/L      Calcium 8.4 mg/dL      BUN/Creatinine Ratio 8.8     Anion Gap 13.0 mmol/L      eGFR 99.9 mL/min/1.73     Narrative:      GFR Normal >60  Chronic Kidney Disease <60  Kidney Failure <15      CBC (No Diff) [[id number]]  (Abnormal) Collected: 10/23/24 0440    Specimen: Blood Updated: 10/23/24 0537     WBC 19.74 10*3/mm3      RBC 4.29 10*6/mm3      Hemoglobin 11.9 g/dL      Hematocrit 36.0 %      MCV 83.9 fL      MCH 27.7 pg      MCHC 33.1 g/dL      RDW 12.2 %      RDW-SD 36.1 fl      MPV 10.1 fL      Platelets 187 10*3/mm3     Urinalysis With Microscopic If Indicated (No Culture) - Urine, Clean Catch [[id number]]  (Abnormal) Collected: 10/23/24 0020    Specimen: Urine, Clean Catch Updated: 10/23/24 0041     Color, UA Yellow     Appearance, UA Clear     pH, UA 6.0     Specific Gravity, UA >1.030     Glucose, UA Negative     Ketones, UA 15 mg/dL (1+)     Bilirubin, UA Negative     Blood, UA Moderate (2+)     Protein, UA Trace     Leuk Esterase, UA Small (1+)     Nitrite, UA Negative     Urobilinogen, UA 0.2 E.U./dL    Urinalysis, Microscopic Only - Urine, Clean Catch [[id number]]  (Abnormal) Collected: 10/23/24 0020    Specimen: Urine, Clean Catch Updated: 10/23/24 0041     RBC, UA 11-20 /HPF      WBC, UA 11-20 /HPF      Bacteria, UA None Seen /HPF      Squamous Epithelial Cells, UA 7-12 /HPF      Hyaline Casts, UA 0-2 /LPF      Methodology Automated Microscopy    Respiratory  Panel PCR w/COVID-19(SARS-CoV-2) ANNIA/LORIE/SALTY/PAD/COR/GEORGES In-House, NP Swab in UTM/VTM, 2 HR TAT - Swab, Nasopharynx [[id number]]  (Normal) Collected: 10/22/24 2014    Specimen: Swab from Nasopharynx Updated: 10/22/24 2118     ADENOVIRUS, PCR Not Detected     Coronavirus 229E Not Detected     Coronavirus HKU1 Not Detected     Coronavirus NL63 Not Detected     Coronavirus OC43 Not Detected     COVID19 Not Detected     Human Metapneumovirus Not Detected     Human Rhinovirus/Enterovirus Not Detected     Influenza A PCR Not Detected     Influenza B PCR Not Detected     Parainfluenza Virus 1 Not Detected     Parainfluenza Virus 2 Not Detected     Parainfluenza Virus 3 Not Detected     Parainfluenza Virus 4 Not Detected     RSV, PCR Not Detected     Bordetella pertussis pcr Not Detected     Bordetella parapertussis PCR Not Detected     Chlamydophila pneumoniae PCR Not Detected     Mycoplasma pneumo by PCR Not Detected    Narrative:      In the setting of a positive respiratory panel with a viral infection PLUS a negative procalcitonin without other underlying concern for bacterial infection, consider observing off antibiotics or discontinuation of antibiotics and continue supportive care. If the respiratory panel is positive for atypical bacterial infection (Bordetella pertussis, Chlamydophila pneumoniae, or Mycoplasma pneumoniae), consider antibiotic de-escalation to target atypical bacterial infection.    Procalcitonin [[id number]]  (Abnormal) Collected: 10/22/24 1931    Specimen: Blood Updated: 10/22/24 2042     Procalcitonin 0.39 ng/mL     Narrative:      As a Marker for Sepsis (Non-Neonates):    1. <0.5 ng/mL represents a low risk of severe sepsis and/or septic shock.  2. >2 ng/mL represents a high risk of severe sepsis and/or septic shock.    As a Marker for Lower Respiratory Tract Infections that require antibiotic therapy:    PCT on Admission    Antibiotic Therapy       6-12 Hrs later    >0.5                Strongly  "Recommended  >0.25 - <0.5        Recommended   0.1 - 0.25          Discouraged              Remeasure/reassess PCT  <0.1                Strongly Discouraged     Remeasure/reassess PCT    As 28 day mortality risk marker: \"Change in Procalcitonin Result\" (>80% or <=80%) if Day 0 (or Day 1) and Day 4 values are available. Refer to http://[email or web address]-pct-calculator.com    Change in PCT <=80%  A decrease of PCT levels below or equal to 80% defines a positive change in PCT test result representing a higher risk for 28-day all-cause mortality of patients diagnosed with severe sepsis for septic shock.    Change in PCT >80%  A decrease of PCT levels of more than 80% defines a negative change in PCT result representing a lower risk for 28-day all-cause mortality of patients diagnosed with severe sepsis or septic shock.       Lactic Acid, Plasma [[id number]]  (Normal) Collected: 10/22/24 1931    Specimen: Blood Updated: 10/22/24 2018     Lactate 1.3 mmol/L     Lipase [[id number]]  (Normal) Collected: 10/22/24 1931    Specimen: Blood Updated: 10/22/24 2017     Lipase 13 U/L     Magnesium [[id number]]  (Normal) Collected: 10/22/24 1931    Specimen: Blood Updated: 10/22/24 2017     Magnesium 1.8 mg/dL     hCG, Serum, Qualitative [[id number]]  (Normal) Collected: 10/22/24 1931    Specimen: Blood Updated: 10/22/24 2015     HCG Qualitative Negative    Protime-INR [[id number]]  (Abnormal) Collected: 10/22/24 1931    Specimen: Blood Updated: 10/22/24 2011     Protime 17.1 Seconds      INR 1.37    Comprehensive Metabolic Panel [[id number]]  (Abnormal) Collected: 10/22/24 1931    Specimen: Blood Updated: 10/22/24 2007     Glucose 128 mg/dL      BUN 7 mg/dL      Creatinine 1.18 mg/dL      Sodium 135 mmol/L      Potassium 3.3 mmol/L      Chloride 104 mmol/L      CO2 18.5 mmol/L      Calcium 9.4 mg/dL      Total Protein 7.6 g/dL      Albumin 3.3 g/dL      ALT (SGPT) 6 U/L      AST (SGOT) 9 U/L      Alkaline Phosphatase 66 U/L      Total Bilirubin " 0.6 mg/dL      Globulin 4.3 gm/dL      A/G Ratio 0.8 g/dL      BUN/Creatinine Ratio 5.9     Anion Gap 12.5 mmol/L      eGFR 62.7 mL/min/1.73     Narrative:      GFR Normal >60  Chronic Kidney Disease <60  Kidney Failure <15      Blood Culture - Blood, Hand, Left [[id number]] Collected: 10/22/24 1946    Specimen: Blood from Hand, Left Updated: 10/22/24 1951    Blood Culture - Blood, Arm, Right [[id number]] Collected: 10/22/24 1946    Specimen: Blood from Arm, Right Updated: 10/22/24 1951    [place] Draw [[id number]] Collected: 10/22/24 1931    Specimen: Blood Updated: 10/22/24 1946    Narrative:      The following orders were created for panel order [place] Draw.  Procedure                               Abnormality         Status                     ---------                               -----------         ------                     Green Top (Gel)[[id number]]                                  Final result               Lavender Top[[id number]]                                     Final result               Gold Top - SST[[id number]]                                   Final result               Light Blue Top[[id number]]                                   Final result                 Please view results for these tests on the individual orders.    Lavender Top [[id number]] Collected: 10/22/24 1931    Specimen: Blood Updated: 10/22/24 1946     Extra Tube hold for add-on     Comment: Auto resulted       Gold Top - SST [[id number]] Collected: 10/22/24 1931    Specimen: Blood Updated: 10/22/24 1946     Extra Tube Hold for add-ons.     Comment: Auto resulted.       Light Blue Top [[id number]] Collected: 10/22/24 1931    Specimen: Blood Updated: 10/22/24 1946     Extra Tube Hold for add-ons.     Comment: Auto resulted       Green Top (Gel) [[id number]] Collected: 10/22/24 1931    Specimen: Blood Updated: 10/22/24 1946     Extra Tube Hold for add-ons.     Comment: Auto resulted.       CBC & Differential [[id number]]  (Abnormal)  Collected: 10/22/24 1931    Specimen: Blood Updated: 10/22/24 1943    Narrative:      The following orders were created for panel order CBC & Differential.  Procedure                               Abnormality         Status                     ---------                               -----------         ------                     CBC Auto Differential[[id number]]        Abnormal            Final result                 Please view results for these tests on the individual orders.    CBC Auto Differential [[id number]]  (Abnormal) Collected: 10/22/24 1931    Specimen: Blood Updated: 10/22/24 1943     WBC 22.09 10*3/mm3      RBC 4.90 10*6/mm3      Hemoglobin 13.8 g/dL      Hematocrit 41.4 %      MCV 84.5 fL      MCH 28.2 pg      MCHC 33.3 g/dL      RDW 12.3 %      RDW-SD 36.9 fl      MPV 9.6 fL      Platelets 217 10*3/mm3      Neutrophil % 81.8 %      Lymphocyte % 6.0 %      Monocyte % 11.3 %      Eosinophil % 0.0 %      Basophil % 0.1 %      Immature Grans % 0.8 %      Neutrophils, Absolute 18.07 10*3/mm3      Lymphocytes, Absolute 1.33 10*3/mm3      Monocytes, Absolute 2.49 10*3/mm3      Eosinophils, Absolute 0.00 10*3/mm3      Basophils, Absolute 0.03 10*3/mm3      Immature Grans, Absolute 0.17 10*3/mm3      nRBC 0.0 /100 WBC              Imaging:  Imaging Results (Last 24 Hours)       Procedure Component Value Units Date/Time    CT Abdomen Pelvis With Contrast [[id number]] Collected: 10/22/24 2224     Updated: 10/22/24 2310    Narrative:      CT ABDOMEN PELVIS W CONTRAST-     HISTORY: 34 years of age, Female.  Fever, nausea vomiting diarrhea.   Pain in lower abdomen bilaterally.     TECHNIQUE:  CT includes axial imaging from the lung bases to the  trochanters with intravenous contrast and without use of oral contrast.  Data reconstructed in coronal and sagittal planes. Radiation dose  reduction techniques were utilized, including automated exposure control  and exposure modulation based on body size.     COMPARISON: CT  abdomen and pelvis 06/13/2024, 11/20/2021, 10/20/2020     FINDINGS: There is a left upper pole area of low density with adjacent  cortical thinning and this heterogenous low-density measures 3 x 3.1 cm.  Within the anterior left interpolar kidney there is a low-density lesion  likely representing a cyst that measures 1 cm. There is left renal  posterolateral lower pole cortical thinning.     Within the right renal lower pole there is an abnormal low-density  masslike lesion that measures approximately 2.8 x 2.8 cm. There are  adjacent small cysts or dilated calyces and there is chronic right lower  pole cortical thinning as well as chronic right upper pole cortical  thinning. There is no hydronephrosis though there is perinephric  stranding particularly surrounding the right renal lower pole. No  hydroureter. Urinary bladder exhibits mild wall thickening though is low  volume.     There is mild retroperitoneal brooklyn enlargement. Lymph node medial to  the right kidney measures 1.5 cm. There are additional smaller  retroperitoneal nodes. No evidence for brooklyn enlargement within the  pelvis.     Lung bases appear clear. Liver, gallbladder, spleen, adrenal glands,  pancreas, bowel loops appear within normal limits. No evidence for bowel  obstruction. No ascites.       Impression:      1. 2.8 cm right lower pole complex collection, potential developing  abscess, or mass. This is suspicious for infection/pyelonephritis. There  is surrounding right lower pole perinephric stranding. There is also  left upper pole low-density lesion that is indeterminant and there is  chronic bilateral renal cortical thinning. Recommend short interval  follow-up with multiphasic CT or MRI.. There is no hydronephrosis.  Urinary bladder exhibits mild wall thickening though exhibits low volume  without surrounding inflammation.  2. Mild retroperitoneal brooklyn enlargement has developed with lymph node  medial to the right kidney measuring 1.5 cm.  Additional smaller  retroperitoneal nodes.      Radiation dose reduction techniques were utilized, including automated  exposure control and exposure modulation based on body size.        This report was finalized on 10/22/2024 11:07 PM by Richar Barajas M.D  on Workstation: [id number]       CT Head Without Contrast [[id number]] Collected: 10/22/24 2225     Updated: 10/22/24 2225    Narrative:        Patient: SY PRESCOTT  Time Out: 22:25  Exam(s): CT HEAD Without Contrast     EXAM:    CT Head Without Intravenous Contrast    CLINICAL HISTORY:     Reason for exam: Headache. Top of head.    TECHNIQUE:    Axial computed tomography images of the head brain without intravenous   contrast.  CTDI is 55.44 mGy and DLP is 970.5 mGy-cm.  This CT exam was   performed according to the principle of ALARA (As Low As Reasonably   Achievable) by using one or more of the following dose reduction   techniques: automated exposure control, adjustment of the mA and or kV   according to patient size, and or use of iterative reconstruction   technique.    Coronal and sagittal reformatted images were created and reviewed.    COMPARISON:    No relevant prior studies available.    FINDINGS:    Brain:  No mass effect, hemorrhage, large extra-axial collection, or CT   evidence of acute cortical infarction.  CSF presents in the slightly   expanded sella turcica compatible with empty sella.  Dilated perivascular   space in the caudal left basal ganglia, normal variant.    Ventricles:  Unremarkable. No midline shift or ventriculomegaly.    Bones joints:  Unremarkable.  No acute fracture.    Soft tissues:  Unremarkable.    Sinuses:  Mild left ethmoid air cell mucosal thickening.    Mastoid air cells:  Left mastoid effusion.    Auditory system:  Left middle ear cavity effusion.    IMPRESSION:       1.  No CT evidence of acute intracranial process.  2.  Left mastoid effusion and left middle ear effusion.  Correlate for    otomastoiditis.  3.  Empty sella.      Impression:          Electronically signed by Navdeep Koch M.D. on 10-22-24 at 2225    XR Chest 1 View [[id number]] Collected: 10/22/24 2102     Updated: 10/22/24 2123    Narrative:      CHEST SINGLE VIEW     HISTORY: 34 years of age, Female.  Fever mild cough, nausea vomiting and  diarrhea     COMPARISON: AP chest 05/04/2024     FINDINGS: Heart and mediastinal structures appear within normal limits.  Lungs appear clear and there is no evidence for pulmonary edema or  pleural effusion or infiltrate. Cardiac monitor and leads are present       Impression:      No evidence for active disease in the chest     This report was finalized on 10/22/2024 9:20 PM by Richar Barajas M.D  on Workstation: [id number]                Assessment:     Acute pyelonephritis   Right renal mass vs abscess  Sepsis    Plan:   - No acute urologic intervention planned for now  - Monitor clinically, trend WBC and follow urine/blood cultures  - Continue IV Rocephin- per recommendation of ID  - If clinically worsening, consider re-imaging with plan for IR perc drainage   - Urology will continue to follow through stability    Shannon Leo, LIVIA  10/23/24  12:17 EDT    Plan reviewed and discussed with Dr. Jaimes

## 2024-10-23 NOTE — PLAN OF CARE
AAOX4. RA. SR. Continues IV abx. Denies pain this shift. Urology consulted. WBC trending down.       Goal Outcome Evaluation:  Plan of Care Reviewed With: patient, parent        Progress: improving            Problem: Adult Inpatient Plan of Care  Goal: Plan of Care Review  Outcome: Progressing  Flowsheets (Taken 10/23/2024 1744)  Progress: improving  Plan of Care Reviewed With:   patient   parent  Goal: Patient-Specific Goal (Individualized)  Outcome: Progressing  Goal: Absence of Hospital-Acquired Illness or Injury  Outcome: Progressing  Intervention: Identify and Manage Fall Risk  Recent Flowsheet Documentation  Taken 10/23/2024 1628 by Inés Webster RN  Safety Promotion/Fall Prevention:   safety round/check completed   room organization consistent   nonskid shoes/slippers when out of bed   lighting adjusted   clutter free environment maintained   assistive device/personal items within reach   activity supervised  Taken 10/23/2024 1430 by Inés Webster RN  Safety Promotion/Fall Prevention:   safety round/check completed   room organization consistent   nonskid shoes/slippers when out of bed   lighting adjusted   clutter free environment maintained   activity supervised   assistive device/personal items within reach  Taken 10/23/2024 1215 by Inés Webster RN  Safety Promotion/Fall Prevention:   safety round/check completed   room organization consistent   nonskid shoes/slippers when out of bed   clutter free environment maintained   assistive device/personal items within reach   activity supervised  Taken 10/23/2024 1015 by Inés Webster RN  Safety Promotion/Fall Prevention:   safety round/check completed   room organization consistent   nonskid shoes/slippers when out of bed   lighting adjusted   assistive device/personal items within reach   clutter free environment maintained   activity supervised  Taken 10/23/2024 0823 by Inés Webster RN  Safety Promotion/Fall Prevention:   activity  supervised   assistive device/personal items within reach   clutter free environment maintained   lighting adjusted   safety round/check completed   room organization consistent   nonskid shoes/slippers when out of bed  Intervention: Prevent Skin Injury  Recent Flowsheet Documentation  Taken 10/23/2024 1635 by Inés Webster RN  Body Position: sitting up in bed  Taken 10/23/2024 1628 by Inés Webster RN  Body Position: position changed independently  Taken 10/23/2024 1430 by Inés Webster RN  Body Position: position changed independently  Skin Protection:   transparent dressing maintained   protective footwear used  Taken 10/23/2024 1215 by Inés Webster RN  Body Position: position changed independently  Taken 10/23/2024 1015 by Inés Webster RN  Body Position: position changed independently  Taken 10/23/2024 0823 by Inés Webster RN  Body Position: sitting up in bed  Skin Protection:   transparent dressing maintained   protective footwear used  Intervention: Prevent and Manage VTE (Venous Thromboembolism) Risk  Recent Flowsheet Documentation  Taken 10/23/2024 1430 by Inés Webster RN  VTE Prevention/Management:   bilateral   SCDs (sequential compression devices) off   patient refused intervention  Taken 10/23/2024 0823 by Inés Webster RN  VTE Prevention/Management:   bilateral   SCDs (sequential compression devices) on  Intervention: Prevent Infection  Recent Flowsheet Documentation  Taken 10/23/2024 1628 by Inés Webster RN  Infection Prevention:   single patient room provided   rest/sleep promoted   personal protective equipment utilized   hand hygiene promoted  Taken 10/23/2024 1430 by Inés Webster RN  Infection Prevention:   single patient room provided   rest/sleep promoted   personal protective equipment utilized   hand hygiene promoted  Taken 10/23/2024 1215 by Inés Webster RN  Infection Prevention:   single patient room provided   rest/sleep promoted   personal  protective equipment utilized   equipment surfaces disinfected  Taken 10/23/2024 1015 by Inés Webster RN  Infection Prevention:   single patient room provided   rest/sleep promoted   personal protective equipment utilized   hand hygiene promoted  Taken 10/23/2024 0823 by Inés Webster RN  Infection Prevention:   single patient room provided   rest/sleep promoted   personal protective equipment utilized   hand hygiene promoted  Goal: Optimal Comfort and Wellbeing  Outcome: Progressing  Intervention: Monitor Pain and Promote Comfort  Recent Flowsheet Documentation  Taken 10/23/2024 1101 by Inés Webster RN  Pain Management Interventions: (light turned off)   care clustered   pain medication given   quiet environment facilitated   other (see comments)  Intervention: Provide Person-Centered Care  Recent Flowsheet Documentation  Taken 10/23/2024 1430 by Inés Webster RN  Trust Relationship/Rapport:   care explained   choices provided   emotional support provided   questions answered   empathic listening provided   questions encouraged   reassurance provided   thoughts/feelings acknowledged  Taken 10/23/2024 0823 by Inés Webster RN  Trust Relationship/Rapport:   care explained   choices provided   emotional support provided   empathic listening provided   questions answered   questions encouraged   reassurance provided   thoughts/feelings acknowledged  Goal: Readiness for Transition of Care  Outcome: Progressing  Goal: Plan of Care Review  Outcome: Progressing  Flowsheets (Taken 10/23/2024 1744)  Progress: improving  Plan of Care Reviewed With:   patient   parent  Goal: Patient-Specific Goal (Individualized)  Outcome: Progressing  Goal: Absence of Hospital-Acquired Illness or Injury  Outcome: Progressing  Intervention: Identify and Manage Fall Risk  Recent Flowsheet Documentation  Taken 10/23/2024 1628 by Inés Websetr RN  Safety Promotion/Fall Prevention:   safety round/check completed   room  organization consistent   nonskid shoes/slippers when out of bed   lighting adjusted   clutter free environment maintained   assistive device/personal items within reach   activity supervised  Taken 10/23/2024 1430 by Inés Webster RN  Safety Promotion/Fall Prevention:   safety round/check completed   room organization consistent   nonskid shoes/slippers when out of bed   lighting adjusted   clutter free environment maintained   activity supervised   assistive device/personal items within reach  Taken 10/23/2024 1215 by Inés Webster RN  Safety Promotion/Fall Prevention:   safety round/check completed   room organization consistent   nonskid shoes/slippers when out of bed   clutter free environment maintained   assistive device/personal items within reach   activity supervised  Taken 10/23/2024 1015 by Inés Webster RN  Safety Promotion/Fall Prevention:   safety round/check completed   room organization consistent   nonskid shoes/slippers when out of bed   lighting adjusted   assistive device/personal items within reach   clutter free environment maintained   activity supervised  Taken 10/23/2024 0823 by Inés Webster RN  Safety Promotion/Fall Prevention:   activity supervised   assistive device/personal items within reach   clutter free environment maintained   lighting adjusted   safety round/check completed   room organization consistent   nonskid shoes/slippers when out of bed  Intervention: Prevent Skin Injury  Recent Flowsheet Documentation  Taken 10/23/2024 1635 by Inés Webster RN  Body Position: sitting up in bed  Taken 10/23/2024 1628 by Inés Webster RN  Body Position: position changed independently  Taken 10/23/2024 1430 by Inés Webster RN  Body Position: position changed independently  Skin Protection:   transparent dressing maintained   protective footwear used  Taken 10/23/2024 1215 by Inés Webster RN  Body Position: position changed independently  Taken 10/23/2024  1015 by Inés Webster RN  Body Position: position changed independently  Taken 10/23/2024 0823 by Inés Webster RN  Body Position: sitting up in bed  Skin Protection:   transparent dressing maintained   protective footwear used  Intervention: Prevent and Manage VTE (Venous Thromboembolism) Risk  Recent Flowsheet Documentation  Taken 10/23/2024 1430 by Inés Webster RN  VTE Prevention/Management:   bilateral   SCDs (sequential compression devices) off   patient refused intervention  Taken 10/23/2024 0823 by Inés Webster RN  VTE Prevention/Management:   bilateral   SCDs (sequential compression devices) on  Intervention: Prevent Infection  Recent Flowsheet Documentation  Taken 10/23/2024 1628 by Inés Webster RN  Infection Prevention:   single patient room provided   rest/sleep promoted   personal protective equipment utilized   hand hygiene promoted  Taken 10/23/2024 1430 by Inés Webster RN  Infection Prevention:   single patient room provided   rest/sleep promoted   personal protective equipment utilized   hand hygiene promoted  Taken 10/23/2024 1215 by Inés Webster RN  Infection Prevention:   single patient room provided   rest/sleep promoted   personal protective equipment utilized   equipment surfaces disinfected  Taken 10/23/2024 1015 by Inés Webster RN  Infection Prevention:   single patient room provided   rest/sleep promoted   personal protective equipment utilized   hand hygiene promoted  Taken 10/23/2024 0823 by Inés Webster RN  Infection Prevention:   single patient room provided   rest/sleep promoted   personal protective equipment utilized   hand hygiene promoted  Goal: Optimal Comfort and Wellbeing  Outcome: Progressing  Intervention: Monitor Pain and Promote Comfort  Recent Flowsheet Documentation  Taken 10/23/2024 1101 by Inés Webster RN  Pain Management Interventions: (light turned off)   care clustered   pain medication given   quiet environment  facilitated   other (see comments)  Intervention: Provide Person-Centered Care  Recent Flowsheet Documentation  Taken 10/23/2024 1430 by Inés Webster RN  Trust Relationship/Rapport:   care explained   choices provided   emotional support provided   questions answered   empathic listening provided   questions encouraged   reassurance provided   thoughts/feelings acknowledged  Taken 10/23/2024 0823 by Inés Webster RN  Trust Relationship/Rapport:   care explained   choices provided   emotional support provided   empathic listening provided   questions answered   questions encouraged   reassurance provided   thoughts/feelings acknowledged  Goal: Readiness for Transition of Care  Outcome: Progressing     Problem: Sepsis/Septic Shock  Goal: Optimal Coping  Outcome: Progressing  Intervention: Support Patient and Family Response  Recent Flowsheet Documentation  Taken 10/23/2024 1430 by Inés Webster RN  Supportive Measures:   active listening utilized   decision-making supported   verbalization of feelings encouraged  Taken 10/23/2024 0823 by Inés Webster RN  Supportive Measures:   active listening utilized   decision-making supported   goal-setting facilitated   verbalization of feelings encouraged  Goal: Absence of Bleeding  Outcome: Progressing  Goal: Blood Glucose Level Within Target Range  Outcome: Progressing  Goal: Absence of Infection Signs and Symptoms  Outcome: Progressing  Intervention: Initiate Sepsis Management  Recent Flowsheet Documentation  Taken 10/23/2024 1628 by Inés Webster RN  Infection Prevention:   single patient room provided   rest/sleep promoted   personal protective equipment utilized   hand hygiene promoted  Taken 10/23/2024 1430 by Inés Webster RN  Infection Prevention:   single patient room provided   rest/sleep promoted   personal protective equipment utilized   hand hygiene promoted  Taken 10/23/2024 1215 by Inés Webster RN  Infection Prevention:   single  patient room provided   rest/sleep promoted   personal protective equipment utilized   equipment surfaces disinfected  Taken 10/23/2024 1015 by Inés Webster, RN  Infection Prevention:   single patient room provided   rest/sleep promoted   personal protective equipment utilized   hand hygiene promoted  Taken 10/23/2024 0823 by Inés Webster, RN  Infection Prevention:   single patient room provided   rest/sleep promoted   personal protective equipment utilized   hand hygiene promoted  Intervention: Promote Recovery  Recent Flowsheet Documentation  Taken 10/23/2024 1635 by Inés Webster, RN  Activity Management: back to bed  Taken 10/23/2024 1628 by Inés Webster, RN  Activity Management: ambulated to bathroom  Taken 10/23/2024 1430 by Inés Webster, RN  Sleep/Rest Enhancement:   awakenings minimized   noise level reduced   room darkened  Taken 10/23/2024 0823 by Inés Webster, RN  Activity Management: activity encouraged  Goal: Optimal Nutrition Delivery  Outcome: Progressing     Problem: UTI (Urinary Tract Infection)  Goal: Improved Infection Symptoms  Outcome: Progressing  Goal: Improved Infection Symptoms  Outcome: Progressing

## 2024-10-23 NOTE — ED NOTES
.Nursing report ED to floor  Beatriz Boland  34 y.o.  female    HPI :  HPI  Stated Reason for Visit: pt to ed via pv for c/o fever, chills, nausea and vomiting since sunday.    Chief Complaint  Chief Complaint   Patient presents with    Fever    Chills       Admitting doctor:   Harmeet Amaya MD    Admitting diagnosis:   The encounter diagnosis was Acute pyelonephritis.    Code status:   Current Code Status       Date Active Code Status Order ID Comments User Context       10/23/2024 0058 CPR (Attempt to Resuscitate) [id number]  Ameena Wasserman APRN ED        Question Answer    Code Status (Patient has no pulse and is not breathing) CPR (Attempt to Resuscitate)    Medical Interventions (Patient has pulse or is breathing) Full Support                    Allergies:   Dust mite extract and Ciprofloxacin    Isolation:   No active isolations    Intake and Output    Intake/Output Summary (Last 24 hours) at 10/23/2024 0131  Last data filed at 10/22/2024 2240  Gross per 24 hour   Intake 600 ml   Output --   Net 600 ml       Weight:       10/22/24  1924   Weight: 113 kg (250 lb)       Most recent vitals:   Vitals:    10/22/24 2302 10/23/24 0031 10/23/24 0101 10/23/24 0122   BP: 97/51 128/82 129/77    BP Location:       Patient Position:       Pulse: 66 86  78   Resp:       Temp:       TempSrc:       SpO2: 96% 98% 100% 98%   Weight:       Height:           Active LDAs/IV Access:   Lines, Drains & Airways       Active LDAs       Name Placement date Placement time Site Days    Peripheral IV 10/22/24 1929 Left Antecubital 10/22/24  1929  Antecubital  less than 1    Peripheral IV 10/22/24 2120 Anterior;Distal;Right Forearm 10/22/24  2120  Forearm  less than 1                    Labs (abnormal labs have a star):   Labs Reviewed   COMPREHENSIVE METABOLIC PANEL - Abnormal; Notable for the following components:       Result Value    Glucose 128 (*)     Creatinine 1.18 (*)     Sodium 135 (*)     Potassium 3.3 (*)     CO2  "18.5 (*)     Albumin 3.3 (*)     BUN/Creatinine Ratio 5.9 (*)     All other components within normal limits    Narrative:     GFR Normal >60  Chronic Kidney Disease <60  Kidney Failure <15     CBC WITH AUTO DIFFERENTIAL - Abnormal; Notable for the following components:    WBC 22.09 (*)     RDW-SD 36.9 (*)     Neutrophil % 81.8 (*)     Lymphocyte % 6.0 (*)     Eosinophil % 0.0 (*)     Immature Grans % 0.8 (*)     Neutrophils, Absolute 18.07 (*)     Monocytes, Absolute 2.49 (*)     Immature Grans, Absolute 0.17 (*)     All other components within normal limits   PROTIME-INR - Abnormal; Notable for the following components:    Protime 17.1 (*)     INR 1.37 (*)     All other components within normal limits   URINALYSIS W/ MICROSCOPIC IF INDICATED (NO CULTURE) - Abnormal; Notable for the following components:    Specific Gravity, UA >1.030 (*)     Ketones, UA 15 mg/dL (1+) (*)     Blood, UA Moderate (2+) (*)     Protein, UA Trace (*)     Leuk Esterase, UA Small (1+) (*)     All other components within normal limits   PROCALCITONIN - Abnormal; Notable for the following components:    Procalcitonin 0.39 (*)     All other components within normal limits    Narrative:     As a Marker for Sepsis (Non-Neonates):    1. <0.5 ng/mL represents a low risk of severe sepsis and/or septic shock.  2. >2 ng/mL represents a high risk of severe sepsis and/or septic shock.    As a Marker for Lower Respiratory Tract Infections that require antibiotic therapy:    PCT on Admission    Antibiotic Therapy       6-12 Hrs later    >0.5                Strongly Recommended  >0.25 - <0.5        Recommended   0.1 - 0.25          Discouraged              Remeasure/reassess PCT  <0.1                Strongly Discouraged     Remeasure/reassess PCT    As 28 day mortality risk marker: \"Change in Procalcitonin Result\" (>80% or <=80%) if Day 0 (or Day 1) and Day 4 values are available. Refer to http://www.[place]-pct-calculator.com    Change in PCT <=80%  A " decrease of PCT levels below or equal to 80% defines a positive change in PCT test result representing a higher risk for 28-day all-cause mortality of patients diagnosed with severe sepsis for septic shock.    Change in PCT >80%  A decrease of PCT levels of more than 80% defines a negative change in PCT result representing a lower risk for 28-day all-cause mortality of patients diagnosed with severe sepsis or septic shock.      URINALYSIS, MICROSCOPIC ONLY - Abnormal; Notable for the following components:    RBC, UA 11-20 (*)     WBC, UA 11-20 (*)     Squamous Epithelial Cells, UA 7-12 (*)     All other components within normal limits   RESPIRATORY PANEL PCR W/ COVID-19 (SARS-COV-2), NP SWAB IN UTM/VTP, 2 HR TAT - Normal    Narrative:     In the setting of a positive respiratory panel with a viral infection PLUS a negative procalcitonin without other underlying concern for bacterial infection, consider observing off antibiotics or discontinuation of antibiotics and continue supportive care. If the respiratory panel is positive for atypical bacterial infection (Bordetella pertussis, Chlamydophila pneumoniae, or Mycoplasma pneumoniae), consider antibiotic de-escalation to target atypical bacterial infection.   LACTIC ACID, PLASMA - Normal   HCG, SERUM, QUALITATIVE - Normal   LIPASE - Normal   MAGNESIUM - Normal   BLOOD CULTURE   BLOOD CULTURE   RAINBOW DRAW    Narrative:     The following orders were created for panel order [place] Draw.  Procedure                               Abnormality         Status                     ---------                               -----------         ------                     Green Top (Gel)[[id number]]                                  Final result               Lavender Top[[id number]]                                     Final result               Gold Top - SST[[id number]]                                   Final result               Light Blue Top[[id number]]                                    Final result                 Please view results for these tests on the individual orders.   BASIC METABOLIC PANEL   CBC (NO DIFF)   CBC AND DIFFERENTIAL    Narrative:     The following orders were created for panel order CBC & Differential.  Procedure                               Abnormality         Status                     ---------                               -----------         ------                     CBC Auto Differential[[id number]]        Abnormal            Final result                 Please view results for these tests on the individual orders.   GREEN TOP   LAVENDER TOP   GOLD TOP - SST   LIGHT BLUE TOP       EKG:   No orders to display       Meds given in ED:   Medications   sodium chloride 0.9 % flush 10 mL (has no administration in time range)   vancomycin 2250 mg/500 mL 0.9% NS IVPB ([facility]) (has no administration in time range)   sodium chloride 0.9 % flush 10 mL (has no administration in time range)   sodium chloride 0.9 % flush 10 mL (has no administration in time range)   acetaminophen (TYLENOL) tablet 650 mg (has no administration in time range)     Or   acetaminophen (TYLENOL) 160 MG/5ML oral solution 650 mg (has no administration in time range)     Or   acetaminophen (TYLENOL) suppository 650 mg (has no administration in time range)   sennosides-docusate (PERICOLACE) 8.6-50 MG per tablet 2 tablet (has no administration in time range)     And   polyethylene glycol (MIRALAX) packet 17 g (has no administration in time range)     And   bisacodyl (DULCOLAX) EC tablet 5 mg (has no administration in time range)     And   bisacodyl (DULCOLAX) suppository 10 mg (has no administration in time range)   ondansetron ODT (ZOFRAN-ODT) disintegrating tablet 4 mg (has no administration in time range)     Or   ondansetron (ZOFRAN) injection 4 mg (has no administration in time range)   calcium carbonate (TUMS) chewable tablet 500 mg (200 mg elemental) (has no administration in time range)   Pharmacy to  dose vancomycin (has no administration in time range)   Pharmacy to Dose Zosyn (has no administration in time range)   piperacillin-tazobactam (ZOSYN) 3.375 g IVPB in 100 mL NS MBP (CD) (has no administration in time range)   piperacillin-tazobactam (ZOSYN) 3.375 g IVPB in 100 mL NS MBP (CD) (has no administration in time range)   sodium chloride 0.9 % bolus 500 mL (0 mL Intravenous Stopped 10/22/24 2240)   acetaminophen (TYLENOL) tablet 1,000 mg (1,000 mg Oral Given 10/22/24 2011)   ondansetron (ZOFRAN) injection 4 mg (4 mg Intravenous Given 10/22/24 2008)   cefTRIAXone (ROCEPHIN) 2,000 mg in sodium chloride 0.9 % 100 mL MBP (0 mg Intravenous Stopped 10/22/24 2155)   iopamidol (ISOVUE-300) 61 % injection 85 mL (85 mL Intravenous Given by Other 10/22/24 2143)   sodium chloride 0.9 % bolus 500 mL (500 mL Intravenous New Bag 10/23/24 0041)       Imaging results:  CT Abdomen Pelvis With Contrast    Result Date: 10/22/2024  1. 2.8 cm right lower pole complex collection, potential developing abscess, or mass. This is suspicious for infection/pyelonephritis. There is surrounding right lower pole perinephric stranding. There is also left upper pole low-density lesion that is indeterminant and there is chronic bilateral renal cortical thinning. Recommend short interval follow-up with multiphasic CT or MRI.. There is no hydronephrosis. Urinary bladder exhibits mild wall thickening though exhibits low volume without surrounding inflammation. 2. Mild retroperitoneal [place] enlargement has developed with lymph node medial to the right kidney measuring 1.5 cm. Additional smaller retroperitoneal nodes.  Radiation dose reduction techniques were utilized, including automated exposure control and exposure modulation based on body size.   This report was finalized on 10/22/2024 11:07 PM by Richar Barajas M.D on Workstation: [id number]      CT Head Without Contrast    Result Date: 10/22/2024  Electronically signed by Navdeep Koch  M.D. on 10-22-24 at 2225    XR Chest 1 View    Result Date: 10/22/2024  No evidence for active disease in the chest  This report was finalized on 10/22/2024 9:20 PM by Richar Barajas M.D on Workstation: [id number]         Social issues:   Social History     Socioeconomic History    Marital status: Single   Tobacco Use    Smoking status: Some Days    Smokeless tobacco: Current    Tobacco comments:     quit cigarette smoking 2 years ago, uses vape now   Vaping Use    Vaping status: Every Day    Substances: Nicotine, Flavoring    Devices: Refillable tank   Substance and Sexual Activity    Alcohol use: Yes     Comment: occasionally, 1-2 glasses of wine 1-2 x per week    Drug use: No    Sexual activity: Yes     Partners: Male     Birth control/protection: Injection       Peripheral Neurovascular  Peripheral Neurovascular (Adult)  Peripheral Neurovascular WDL: WDL    Neuro Cognitive  Neuro Cognitive (Adult)  Cognitive/Neuro/Behavioral WDL: WDL  [place] Coma Scale  Best Eye Response: 4-->(E4) spontaneous  Best Motor Response: 6-->(M6) obeys commands  Best Verbal Response: 5-->(V5) oriented  Shiv Coma Scale Score: 15    Learning  Learning Assessment  Learning Readiness and Ability: no barriers identified    Respiratory  Respiratory WDL  Respiratory WDL: WDL    Abdominal Pain       Pain Assessments  Pain (Adult)  (0-10) Pain Rating: Rest: 10  Pain Location: back  Pain Side/Orientation: lower  Pain Management Interventions: quiet environment facilitated  Response to Pain Interventions: interventions effective per patient    NIH Stroke Scale       Lay Covarrubias RN  10/23/24 01:31 EDT

## 2024-10-23 NOTE — H&P
Patient Name:  Beatriz Boland  YOB: 1990  MRN:  [id number]  Admit Date:  10/22/2024  Patient Care Team:  Elyse Larkin APRN as PCP - General (Family Medicine)  Maria Ines Eisenberg MD as Consulting Physician (Otolaryngology)  Wesley Ferro MD as Consulting Physician (Gastroenterology)  Chao Mayberry MD (Ophthalmology)  Shannon Burr PA-C (Physician Assistant)  Colette Caal APRN as Nurse Practitioner (Family Medicine)  Víctor Gunter MD as Consulting Physician (Cardiology)  Dedrick Jaimes MD as Consulting Physician (Urology)  Nacho Pacheco MD as Consulting Physician (Hand Surgery)  Francisco Bond MD as Consulting Physician (Obstetrics and Gynecology)  Elyse Larkin APRN (Family Medicine)      Subjective   History Present Illness     Chief Complaint   Patient presents with    Fever    Chills       Ms. Boland is a 34 y.o. smoker with a history of developmental delay, E.coli septicemia, asthma, and GERD that presents to [facility] complaining of fevers, vomiting, diarrhea, and headache. She reports a headache that began a few days ago. She states she also spiked a fever and had chills, back pain, and diarrhea. She also began vomiting yesterday. On arrival to the ED, she was found to have a temperature of 102.1 and she was tachycardic.  Work up revealed creatinine 1.18, K+ 3.3, procalcitonin 0.39, and WBC 22.09. A urinalysis was positive for WBCs, RBCs, trace protein, 1+ leukocytes, 2+ blood, and 1+ ketones. Squamous cells were also present. A CT of the abdomen/pelvis showed a 2.8 cm right lower pole complex collection, potential developing abscess, or mass. This is suspicious for infection/pyelonephritis. There is surrounding right lower pole perinephric stranding. There is also a left upper pole low-density lesion that is indeterminant and there is chronic bilateral renal cortical thinning. There is mild  retroperitoneal brooklyn enlargement with lymph node medial to the right kidney measuring 1.5 cm. A CT of the head showed a left mastoid effusion and left middle ear effusion consistent with ostomastoiditis. She received a dose of Rocephin and has been admitted for further evaluation.            History of Present Illness  Review of Systems   Constitutional:  Positive for chills and fever.   HENT:  Negative for congestion and sore throat.    Eyes:  Negative for photophobia and visual disturbance.   Respiratory:  Positive for shortness of breath. Negative for cough, chest tightness and wheezing.    Cardiovascular:  Negative for chest pain, palpitations and leg swelling.   Gastrointestinal:  Positive for diarrhea, nausea and vomiting. Negative for abdominal pain.   Genitourinary:  Negative for decreased urine volume, dysuria, flank pain, hematuria and urgency.   Musculoskeletal:  Positive for back pain. Negative for arthralgias and myalgias.   Neurological:  Positive for light-headedness and headaches. Negative for syncope, facial asymmetry, speech difficulty, weakness and numbness.        Personal History     Past Medical History:   Diagnosis Date    Allergic     Amblyopia     Anxiety     Back pain     Cleft palate     Depression     Developmental delay     GERD (gastroesophageal reflux disease)     followed by GI, Dr. Wesley Ferro    Impetigo     Kidney abscess     Kidney stone     Muscle spasm     Obesity (BMI 30-39.9)     Recurrent otitis media     Scoliosis     Sinus infection     recurrent    Sprain of shoulder, left 12/2016     Past Surgical History:   Procedure Laterality Date    ABSCESS DRAINAGE      kidney    ADENOIDECTOMY      EAR TUBES      PHARYNGEAL FLAP      for speech    TONSILLECTOMY       Family History   Problem Relation Age of Onset    COPD Mother     Arthritis Mother     Obesity Mother     Gestational diabetes Mother     Cancer Father     Scoliosis Father     Rheum arthritis Maternal Aunt      Diabetes Maternal Uncle     Alcohol abuse Maternal Uncle     Rheum arthritis Maternal Grandmother     COPD Maternal Grandmother     Stroke Maternal Grandfather     Alcohol abuse Paternal Uncle      Social History     Tobacco Use    Smoking status: Some Days    Smokeless tobacco: Current    Tobacco comments:     quit cigarette smoking 2 years ago, uses vape now   Vaping Use    Vaping status: Every Day    Substances: Nicotine, Flavoring    Devices: SETiT   Substance Use Topics    Alcohol use: Yes     Comment: occasionally, 1-2 glasses of wine 1-2 x per week    Drug use: No     Medications Prior to Admission   Medication Sig Dispense Refill Last Dose/Taking    ondansetron ODT (ZOFRAN-ODT) 4 MG disintegrating tablet Place 1 tablet on the tongue Every 6 (Six) Hours As Needed for Nausea or Vomiting. 20 tablet 0 10/22/2024    Acetaminophen (TYLENOL) 325 MG capsule Take 325 mg by mouth As Needed.   10/20/2024    albuterol sulfate  (90 Base) MCG/ACT inhaler Inhale 2 puffs Every 4 (Four) Hours As Needed for Wheezing. 18 g 3 6/1/2023    cefdinir (OMNICEF) 300 MG capsule Take 1 capsule by mouth 2 (Two) Times a Day. 20 capsule 0     cetirizine (zyrTEC) 10 MG tablet Take 1 tablet by mouth Daily.   10/21/2024    [employer] NASAL ALLERGY SPRAY 55 MCG/ACT nasal inhaler Administer 2 sprays into the nostril(s) as directed by provider Daily.  0 10/9/2024    dexlansoprazole (DEXILANT) 60 MG capsule Take 1 capsule by mouth Daily.   10/16/2024    estradiol cypionate (DEPO-ESTRADIOL) 5 MG/ML injection Inject 0.3 mL into the appropriate muscle as directed by prescriber Every 28 (Twenty-Eight) Days. Patient states she takes it every 3 months   10/1/2024    fluticasone (FLONASE) 50 MCG/ACT nasal spray    10/21/2024    metaxalone (SKELAXIN) 800 MG tablet Take 1 tablet by mouth 3 (Three) Times a Day. 15 tablet 0     methocarbamol (ROBAXIN) 750 MG tablet Take 1 tablet by mouth 3 (Three) Times a Day As Needed for Muscle Spasms. 21  tablet 0 8/15/2024    methylPREDNISolone (MEDROL) 4 MG dose pack Take as directed on package instructions. 1 each 0     naproxen (NAPROSYN) 500 MG tablet Take 1 tablet by mouth 2 (Two) Times a Day As Needed for Moderate Pain . 20 tablet 0     saccharomyces boulardii (FLORASTOR) 250 MG capsule Take 1 capsule by mouth 2 (Two) Times a Day. 14 capsule 0     sucralfate (CARAFATE) 1 g tablet Take 1 tablet by mouth 4 (Four) Times a Day. 30 tablet 0     venlafaxine (EFFEXOR) 25 MG tablet Take 1 tablet by mouth Every Night.        Allergies:    Allergies   Allergen Reactions    Dust Mite Extract Itching     Other reaction(s): Other (See Comments)  Nasal symptoms    Ciprofloxacin Diarrhea and Nausea And Vomiting     Tingling in left leg       Objective    Objective     Vital Signs  Temp:  [97.9 °F (36.6 °C)-102.1 °F (38.9 °C)] 98.2 °F (36.8 °C)  Heart Rate:  [] 84  Resp:  [16-18] 16  BP: ()/(51-82) 115/77  SpO2:  [95 %-100 %] 100 %  on   ;   Device (Oxygen Therapy): room air  Body mass index is 39.94 kg/m².    Physical Exam  Vitals and nursing note reviewed.   Constitutional:       Appearance: Normal appearance.   HENT:      Head: Normocephalic and atraumatic.      Nose: Nose normal.      Mouth/Throat:      Mouth: Mucous membranes are moist.      Pharynx: Oropharynx is clear.   Eyes:      Extraocular Movements: Extraocular movements intact.      Conjunctiva/sclera: Conjunctivae normal.   Cardiovascular:      Rate and Rhythm: Normal rate and regular rhythm.      Pulses: Normal pulses.      Heart sounds: Normal heart sounds.   Pulmonary:      Effort: Pulmonary effort is normal.      Breath sounds: Normal breath sounds.   Abdominal:      General: Bowel sounds are normal. There is no distension.      Palpations: Abdomen is soft. There is no mass.      Tenderness: There is no abdominal tenderness. There is no right CVA tenderness, left CVA tenderness, guarding or rebound.      Hernia: No hernia is present.    Musculoskeletal:         General: Normal range of motion.      Cervical back: Normal range of motion and neck supple.      Right lower leg: Edema present.      Left lower leg: Edema (trace edema) present.   Skin:     General: Skin is warm and dry.   Neurological:      General: No focal deficit present.      Mental Status: She is alert and oriented to person, place, and time.   Psychiatric:         Mood and Affect: Mood normal.         Behavior: Behavior normal.         Results Review:  I reviewed the patient's new clinical results.  I reviewed the patient's new imaging results and agree with the interpretation.  I reviewed the patient's other test results and agree with the interpretation  I personally viewed and interpreted the patient's EKG/Telemetry data  Discussed with ED provider.    Lab Results (last 24 hours)       Procedure Component Value Units Date/Time    CBC & Differential [[id number]]  (Abnormal) Collected: 10/22/24 1931    Specimen: Blood Updated: 10/22/24 1943    Narrative:      The following orders were created for panel order CBC & Differential.  Procedure                               Abnormality         Status                     ---------                               -----------         ------                     CBC Auto Differential[[id number]]        Abnormal            Final result                 Please view results for these tests on the individual orders.    Comprehensive Metabolic Panel [[id number]]  (Abnormal) Collected: 10/22/24 1931    Specimen: Blood Updated: 10/22/24 2007     Glucose 128 mg/dL      BUN 7 mg/dL      Creatinine 1.18 mg/dL      Sodium 135 mmol/L      Potassium 3.3 mmol/L      Chloride 104 mmol/L      CO2 18.5 mmol/L      Calcium 9.4 mg/dL      Total Protein 7.6 g/dL      Albumin 3.3 g/dL      ALT (SGPT) 6 U/L      AST (SGOT) 9 U/L      Alkaline Phosphatase 66 U/L      Total Bilirubin 0.6 mg/dL      Globulin 4.3 gm/dL      A/G Ratio 0.8 g/dL      BUN/Creatinine Ratio 5.9      Anion Gap 12.5 mmol/L      eGFR 62.7 mL/min/1.73     Narrative:      GFR Normal >60  Chronic Kidney Disease <60  Kidney Failure <15      Lactic Acid, Plasma [[id number]]  (Normal) Collected: 10/22/24 1931    Specimen: Blood Updated: 10/22/24 2018     Lactate 1.3 mmol/L     CBC Auto Differential [[id number]]  (Abnormal) Collected: 10/22/24 1931    Specimen: Blood Updated: 10/22/24 1943     WBC 22.09 10*3/mm3      RBC 4.90 10*6/mm3      Hemoglobin 13.8 g/dL      Hematocrit 41.4 %      MCV 84.5 fL      MCH 28.2 pg      MCHC 33.3 g/dL      RDW 12.3 %      RDW-SD 36.9 fl      MPV 9.6 fL      Platelets 217 10*3/mm3      Neutrophil % 81.8 %      Lymphocyte % 6.0 %      Monocyte % 11.3 %      Eosinophil % 0.0 %      Basophil % 0.1 %      Immature Grans % 0.8 %      Neutrophils, Absolute 18.07 10*3/mm3      Lymphocytes, Absolute 1.33 10*3/mm3      Monocytes, Absolute 2.49 10*3/mm3      Eosinophils, Absolute 0.00 10*3/mm3      Basophils, Absolute 0.03 10*3/mm3      Immature Grans, Absolute 0.17 10*3/mm3      nRBC 0.0 /100 WBC     Protime-INR [[id number]]  (Abnormal) Collected: 10/22/24 1931    Specimen: Blood Updated: 10/22/24 2011     Protime 17.1 Seconds      INR 1.37    hCG, Serum, Qualitative [[id number]]  (Normal) Collected: 10/22/24 1931    Specimen: Blood Updated: 10/22/24 2015     HCG Qualitative Negative    Lipase [[id number]]  (Normal) Collected: 10/22/24 1931    Specimen: Blood Updated: 10/22/24 2017     Lipase 13 U/L     Procalcitonin [[id number]]  (Abnormal) Collected: 10/22/24 1931    Specimen: Blood Updated: 10/22/24 2042     Procalcitonin 0.39 ng/mL     Narrative:      As a Marker for Sepsis (Non-Neonates):    1. <0.5 ng/mL represents a low risk of severe sepsis and/or septic shock.  2. >2 ng/mL represents a high risk of severe sepsis and/or septic shock.    As a Marker for Lower Respiratory Tract Infections that require antibiotic therapy:    PCT on Admission    Antibiotic Therapy       6-12 Hrs later    >0.5    "             Strongly Recommended  >0.25 - <0.5        Recommended   0.1 - 0.25          Discouraged              Remeasure/reassess PCT  <0.1                Strongly Discouraged     Remeasure/reassess PCT    As 28 day mortality risk marker: \"Change in Procalcitonin Result\" (>80% or <=80%) if Day 0 (or Day 1) and Day 4 values are available. Refer to http://www.[facility]-pct-calculator.com    Change in PCT <=80%  A decrease of PCT levels below or equal to 80% defines a positive change in PCT test result representing a higher risk for 28-day all-cause mortality of patients diagnosed with severe sepsis for septic shock.    Change in PCT >80%  A decrease of PCT levels of more than 80% defines a negative change in PCT result representing a lower risk for 28-day all-cause mortality of patients diagnosed with severe sepsis or septic shock.       Magnesium [[id number]]  (Normal) Collected: 10/22/24 1931    Specimen: Blood Updated: 10/22/24 2017     Magnesium 1.8 mg/dL     Blood Culture - Blood, Arm, Right [[id number]] Collected: 10/22/24 1946    Specimen: Blood from Arm, Right Updated: 10/22/24 1951    Blood Culture - Blood, Hand, Left [[id number]] Collected: 10/22/24 1946    Specimen: Blood from Hand, Left Updated: 10/22/24 1951    Respiratory Panel PCR w/COVID-19(SARS-CoV-2) ANNIA/LORIE/SALTY/PAD/COR/GEORGES In-House, NP Swab in UTM/VTM, 2 HR TAT - Swab, Nasopharynx [[id number]]  (Normal) Collected: 10/22/24 2014    Specimen: Swab from Nasopharynx Updated: 10/22/24 2118     ADENOVIRUS, PCR Not Detected     Coronavirus 229E Not Detected     Coronavirus HKU1 Not Detected     Coronavirus NL63 Not Detected     Coronavirus OC43 Not Detected     COVID19 Not Detected     Human Metapneumovirus Not Detected     Human Rhinovirus/Enterovirus Not Detected     Influenza A PCR Not Detected     Influenza B PCR Not Detected     Parainfluenza Virus 1 Not Detected     Parainfluenza Virus 2 Not Detected     Parainfluenza Virus 3 Not Detected     " Parainfluenza Virus 4 Not Detected     RSV, PCR Not Detected     Bordetella pertussis pcr Not Detected     Bordetella parapertussis PCR Not Detected     Chlamydophila pneumoniae PCR Not Detected     Mycoplasma pneumo by PCR Not Detected    Narrative:      In the setting of a positive respiratory panel with a viral infection PLUS a negative procalcitonin without other underlying concern for bacterial infection, consider observing off antibiotics or discontinuation of antibiotics and continue supportive care. If the respiratory panel is positive for atypical bacterial infection (Bordetella pertussis, Chlamydophila pneumoniae, or Mycoplasma pneumoniae), consider antibiotic de-escalation to target atypical bacterial infection.    Urinalysis With Microscopic If Indicated (No Culture) - Urine, Clean Catch [[id number]]  (Abnormal) Collected: 10/23/24 0020    Specimen: Urine, Clean Catch Updated: 10/23/24 0041     Color, UA Yellow     Appearance, UA Clear     pH, UA 6.0     Specific Gravity, UA >1.030     Glucose, UA Negative     Ketones, UA 15 mg/dL (1+)     Bilirubin, UA Negative     Blood, UA Moderate (2+)     Protein, UA Trace     Leuk Esterase, UA Small (1+)     Nitrite, UA Negative     Urobilinogen, UA 0.2 E.U./dL    Urinalysis, Microscopic Only - Urine, Clean Catch [[id number]]  (Abnormal) Collected: 10/23/24 0020    Specimen: Urine, Clean Catch Updated: 10/23/24 0041     RBC, UA 11-20 /HPF      WBC, UA 11-20 /HPF      Bacteria, UA None Seen /HPF      Squamous Epithelial Cells, UA 7-12 /HPF      Hyaline Casts, UA 0-2 /LPF      Methodology Automated Microscopy            Imaging Results (Last 24 Hours)       Procedure Component Value Units Date/Time    CT Abdomen Pelvis With Contrast [[id number]] Collected: 10/22/24 2224     Updated: 10/22/24 2310    Narrative:      CT ABDOMEN PELVIS W CONTRAST-     HISTORY: 34 years of age, Female.  Fever, nausea vomiting diarrhea.   Pain in lower abdomen bilaterally.     TECHNIQUE:  CT  includes axial imaging from the lung bases to the  trochanters with intravenous contrast and without use of oral contrast.  Data reconstructed in coronal and sagittal planes. Radiation dose  reduction techniques were utilized, including automated exposure control  and exposure modulation based on body size.     COMPARISON: CT abdomen and pelvis 06/13/2024, 11/20/2021, 10/20/2020     FINDINGS: There is a left upper pole area of low density with adjacent  cortical thinning and this heterogenous low-density measures 3 x 3.1 cm.  Within the anterior left interpolar kidney there is a low-density lesion  likely representing a cyst that measures 1 cm. There is left renal  posterolateral lower pole cortical thinning.     Within the right renal lower pole there is an abnormal low-density  masslike lesion that measures approximately 2.8 x 2.8 cm. There are  adjacent small cysts or dilated calyces and there is chronic right lower  pole cortical thinning as well as chronic right upper pole cortical  thinning. There is no hydronephrosis though there is perinephric  stranding particularly surrounding the right renal lower pole. No  hydroureter. Urinary bladder exhibits mild wall thickening though is low  volume.     There is mild retroperitoneal brooklyn enlargement. Lymph node medial to  the right kidney measures 1.5 cm. There are additional smaller  retroperitoneal nodes. No evidence for brooklyn enlargement within the  pelvis.     Lung bases appear clear. Liver, gallbladder, spleen, adrenal glands,  pancreas, bowel loops appear within normal limits. No evidence for bowel  obstruction. No ascites.       Impression:      1. 2.8 cm right lower pole complex collection, potential developing  abscess, or mass. This is suspicious for infection/pyelonephritis. There  is surrounding right lower pole perinephric stranding. There is also  left upper pole low-density lesion that is indeterminant and there is  chronic bilateral renal cortical  thinning. Recommend short interval  follow-up with multiphasic CT or MRI.. There is no hydronephrosis.  Urinary bladder exhibits mild wall thickening though exhibits low volume  without surrounding inflammation.  2. Mild retroperitoneal brooklyn enlargement has developed with lymph node  medial to the right kidney measuring 1.5 cm. Additional smaller  retroperitoneal nodes.      Radiation dose reduction techniques were utilized, including automated  exposure control and exposure modulation based on body size.        This report was finalized on 10/22/2024 11:07 PM by Richar Barajas M.D  on Workstation: [id number]       CT Head Without Contrast [[id number]] Collected: 10/22/24 2225     Updated: 10/22/24 2225    Narrative:        Patient: SY PRESCOTT  Time Out: 22:25  Exam(s): CT HEAD Without Contrast     EXAM:    CT Head Without Intravenous Contrast    CLINICAL HISTORY:     Reason for exam: Headache. Top of head.    TECHNIQUE:    Axial computed tomography images of the head brain without intravenous   contrast.  CTDI is 55.44 mGy and DLP is 970.5 mGy-cm.  This CT exam was   performed according to the principle of ALARA (As Low As Reasonably   Achievable) by using one or more of the following dose reduction   techniques: automated exposure control, adjustment of the mA and or kV   according to patient size, and or use of iterative reconstruction   technique.    Coronal and sagittal reformatted images were created and reviewed.    COMPARISON:    No relevant prior studies available.    FINDINGS:    Brain:  No mass effect, hemorrhage, large extra-axial collection, or CT   evidence of acute cortical infarction.  CSF presents in the slightly   expanded sella turcica compatible with empty sella.  Dilated perivascular   space in the caudal left basal ganglia, normal variant.    Ventricles:  Unremarkable. No midline shift or ventriculomegaly.    Bones joints:  Unremarkable.  No acute fracture.    Soft tissues:   Unremarkable.    Sinuses:  Mild left ethmoid air cell mucosal thickening.    Mastoid air cells:  Left mastoid effusion.    Auditory system:  Left middle ear cavity effusion.    IMPRESSION:       1.  No CT evidence of acute intracranial process.  2.  Left mastoid effusion and left middle ear effusion.  Correlate for   otomastoiditis.  3.  Empty sella.      Impression:          Electronically signed by Navdeep Koch M.D. on 10-22-24 at 2225    XR Chest 1 View [[id number]] Collected: 10/22/24 2102     Updated: 10/22/24 2123    Narrative:      CHEST SINGLE VIEW     HISTORY: 34 years of age, Female.  Fever mild cough, nausea vomiting and  diarrhea     COMPARISON: AP chest 05/04/2024     FINDINGS: Heart and mediastinal structures appear within normal limits.  Lungs appear clear and there is no evidence for pulmonary edema or  pleural effusion or infiltrate. Cardiac monitor and leads are present       Impression:      No evidence for active disease in the chest     This report was finalized on 10/22/2024 9:20 PM by Richar Barajas M.D  on Workstation: [id number]                   Telemetry Scan   Final Result      Telemetry Scan   Final Result           Assessment/Plan     Active Hospital Problems    Diagnosis  POA    **Acute pyelonephritis [N10]  Yes    Sepsis [A41.9]  Yes    Tobacco abuse [Z72.0]  Yes    Hypokalemia [E87.6]  Yes    Mild intermittent asthma without complication [J45.20]  Yes    GERD without esophagitis [K21.9]  Yes    Mental developmental delay [F81.9]  Yes     Sepsis  Acute Pyelonephritis  Possible kidney abscess  -Admit to a telemetry unit for monitoring  -She is febrile with leukocytosis and an elevated procal  -Pyuria is present on urinalysis and there is evidence of pyelonephritis/right kidney collection on CT A/P  -She received Rocephin in the ED, will start Zosyn to cover for possible kidney abscess  -Blood cultures are pending  -Urology consult   -She reports diarrhea, nausea, and vomiting.  Zofran is available as needed. Will order GI panel    Asthma  -Her respiratory status is stable on room air  -Continue home inhalers    Hypokalemia  -Replace potassium per protocol    GERD  -She takes dexlansoprazole at home, will start     Tobacco Abuse  -Nicotine patch    -I discussed the patients findings and my recommendations with patient.    VTE Prophylaxis - SCDs.  Code Status - Full code.       LIVIA Bajwa  [facility]  10/23/24  05:23 EDT

## 2024-10-23 NOTE — PROGRESS NOTES
"[facility] Clinical Pharmacy Services: Vancomycin Pharmacokinetic Initial Consult Note    Beatriz Boland is a 34 y.o. female who is on day 1 of pharmacy to dose vancomycin.    Indication: Intra-Abdominal Infection  Consulting Provider: Ameena Wasserman  Planned Duration of Therapy: 5 days  Loading Dose Ordered or Given: 2250 mg on 10/23 at 0200  MRSA PCR performed: no;  Culture/Source: bc pending  Target: -600 mg/L.hr   Pertinent Vanc Dosing History: na  Other Antimicrobials: zosyn    Vitals/Labs  Ht: 170 cm (66.93\"); Wt: 115 kg (254 lb 8 oz)  Temp Readings from Last 1 Encounters:   10/23/24 98.2 °F (36.8 °C) (Oral)    Estimated Creatinine Clearance: 88.6 mL/min (A) (by C-G formula based on SCr of 1.18 mg/dL (H)).        Results from last 7 days   Lab Units 10/22/24  1931   CREATININE mg/dL 1.18*   WBC 10*3/mm3 22.09*     Assessment/Plan:    Due to possible WILIAN at this time, will get a level 12 hour after loading dose for AM clinical to further dose.  Vanc Random 10/23 at 1400    Pharmacy will follow patient's kidney function and will adjust doses and obtain levels as necessary. Thank you for involving pharmacy in this patient's care. Please contact pharmacy with any questions or concerns.                           Faina Sanon [facility]  Clinical Pharmacist    10/23 10:04 ADDENDUM:  Vancomycin discontinued per ID.  No further need for therapeutic drug monitoring. Pharmacy will sign off, please re-consult if needed.    Thank you for allowing us to participate in the care of this patient.    Brianne Martin, PharmD, MPH, BCPS    "

## 2024-10-23 NOTE — CONSULTS
Referring Provider: Harmeet Amaya MD      Subjective   History of present illness: 34-year-old with history of developmental delay, depression, cleft palate E. coli septicemia in September 2020 thought to be secondary to urinary source.  She also says that she has a history of abscess on the left kidney about 11 years ago treated by Dr. Walt Jaimes.  She has been having some vaginal itching and urinary urgency.  Apparently she had been treated for yeast infection with fluconazole and also initiated on metronidazole for possible BV on 10/20.  That evening she started developing fevers and chills, nausea vomiting and 2-3 bowel movements per day.  She was evaluated for COVID and flu which were negative.  Symptoms progressed and came to the ER yesterday where she was found to have a right renal mass concerning for potential abscess.  She does have some mild flank pain.  She is started on vancomycin and Zosyn and admitted and does feel little bit better.        Physical Exam:   Vital Signs   Temp:  [97.9 °F (36.6 °C)-102.1 °F (38.9 °C)] 98.8 °F (37.1 °C)  Heart Rate:  [] 59  Resp:  [16-18] 16  BP: ()/(51-82) 108/68    GENERAL: Awake and alert, in no acute distress.   HEENT: Oropharynx is clear. Hearing is grossly normal.   EYES: . No conjunctival injection. No lid lag.   LUNGS:normal respiratory effort.   SKIN: no cutaneous eruptions in exposed areas  PSYCHIATRIC: Appropriate mood, affect, insight, and judgment.     Results Review:  Creatinine 0.8  White count 19.74, hemoglobin 11.9, platelets 187  Procalcitonin 0.39  Urinalysis 11-20 red blood cells/white blood cells; 7-12 squamous epithelial cells    CT abdomen pelvis shows a 2.8 cm right pole kidney mass.  Independently interpreted    A/p  1.  Sepsis secondary #2  2.  Right renal abscess    Suspected right renal abscess/pyelonephritis on the right.    I will hold Zosyn and vancomycin in favor of ceftriaxone.  I do not think she has an infectious diarrhea  we will stop the GI PCR panel  Add urine culture to urinalysis and lab.           Thank you for this consult.  We will continue to follow along and tailor antibiotics as the patient's clinical course evolves.

## 2024-10-23 NOTE — CASE MANAGEMENT/SOCIAL WORK
Discharge Planning Assessment  [facility]     Patient Name: Beatriz Boland  MRN: [id number]  Today's Date: 10/23/2024    Admit Date: 10/22/2024    Plan: Home with family   Discharge Needs Assessment       Row Name 10/23/24 1134       Living Environment    People in Home parent(s)    Name(s) of People in Home Mother Marilu    Current Living Arrangements home    Potentially Unsafe Housing Conditions none    Primary Care Provided by self    Family Caregiver if Needed parent(s)    Family Caregiver Names Mom Marilu    Quality of Family Relationships involved;helpful    Able to Return to Prior Arrangements yes       Resource/Environmental Concerns    Resource/Environmental Concerns none    Transportation Concerns none       Transition Planning    Patient/Family Anticipates Transition to home with family    Patient/Family Anticipated Services at Transition none    Transportation Anticipated family or friend will provide       Discharge Needs Assessment    Readmission Within the Last 30 Days no previous admission in last 30 days    Equipment Currently Used at Home none    Concerns to be Addressed no discharge needs identified    Anticipated Changes Related to Illness none    Equipment Needed After Discharge none                   Discharge Plan       Row Name 10/23/24 1136       Plan    Plan Home with family    Patient/Family in Agreement with Plan yes    Plan Comments Spoke with patient at bedside, introduced self and CCP role. Confirmed facesheet and pharmacy information. Pt lives with her mother in 2 level home with 2 THI, she is IAdL's, uses no medical equipment, no HH or SNF history. She plans home with mom to transport, no anticipated needs. CCP will follow - Prudence SAUER                  Continued Care and Services - Admitted Since 10/22/2024    No active coordination exists for this encounter.       Expected Discharge Date and Time       Expected Discharge Date Expected Discharge Time    Oct 25, 2024             Demographic Summary       Row Name 10/23/24 1133       General Information    Admission Type inpatient                   Functional Status       Row Name 10/23/24 1133       Functional Status    Usual Activity Tolerance excellent    Current Activity Tolerance excellent       Assessment of Health Literacy    Health Literacy Good       Functional Status, IADL    Medications independent    Meal Preparation independent    Housekeeping independent    Laundry independent    Shopping independent       Mental Status    General Appearance WDL WDL       Mental Status Summary    Recent Changes in Mental Status/Cognitive Functioning no changes                   Psychosocial    No documentation.                  Abuse/Neglect    No documentation.                  Legal       Row Name 10/23/24 1134       Financial/Legal    Who Manages Finances if Patient Unable mother                   Substance Abuse    No documentation.                  Patient Forms    No documentation.                     Prudence Rivas RN

## 2024-10-23 NOTE — PLAN OF CARE
Goal Outcome Evaluation:  Plan of Care Reviewed With: patient        Progress: no change  Outcome Evaluation: pt alert and oriented, mother helps with transportation and daily needs. Infectious Disease consulted, called. Pt. received Rocephin, Zosyn, Vancomycin this shift. Asked for food, Chicken Noodle Soup and crackers given, sprite. Patient denies nausea or pain at this time and would like to sleep. Education printouts at bedside to discusss further on Vaping, Sepsis and UTI.

## 2024-10-23 NOTE — PAYOR COMM NOTE
"Beatriz Boland \"Karina\" (34 y.o. Female)        PLEASE SE ATTACHED FOR INPT AUTH.     REF#[id number]    PLEASE CALL  OR  [phone]    THANK YOU    LAVERN LUKE LPN CCP   Date of Birth   1990    Social Security Number       Address   [address] [place] [address]    Home Phone   [phone]    MRN   [id number]       Protestant   Latter day    Marital Status   Single                            Admission Date   10/22/24    Admission Type   Emergency    Admitting Provider   Melvin Medley MD    Attending Provider   Melvin Medley MD    Department, Room/Bed   [address], N624/1       Discharge Date       Discharge Disposition       Discharge Destination                                 Attending Provider: Melvin Medley MD    Allergies: Dust Mite Extract, Ciprofloxacin    Isolation: None   Infection: None   Code Status: CPR    Ht: 170 cm (66.93\")   Wt: 115 kg (254 lb 8 oz)    Admission Cmt: None   Principal Problem: Acute pyelonephritis [N10]                   Active Insurance as of 10/22/2024       Primary Coverage       Payor Plan Insurance Group Employer/Plan Group    ANTHEM BLUE CROSS ANTHEM BLUE CROSS BLUE SHIELD PPO [id number]       Payor Plan Address Payor Plan Phone Number Payor Plan Fax Number Effective Dates    [address] [phone]  1/1/2022 - None Entered    [facility] [id number]         Subscriber Name Subscriber Birth Date Member ID       BEATRZI BOLAND 1990 [id number]                     Emergency Contacts        (Rel.) Home Phone Work Phone Mobile Phone    KyaMarilu (Mother) [phone] -- [phone]    Tere Gallardo (Friend) [phone] --    Elizabeth Boland (Relative) -- -- [phone]              [place]: [facility] [id number]  Tax ID [id number]     History & Physical        Ameena Wasserman, LIVIA at 10/23/24 0303       Attestation signed by " Melvin Medley MD at 10/23/24 1152    Addendum: I have reviewed the history and plan as obtained by LIVIA Ingram and performed my own independent history. I have personally examined the patient. My exam confirms her physical findings and I agree with the plan as listed above, with the addition of the following:     This is a 34-year-old female who presented to the emergency room with generalized bodyaches, fever, chills, nausea and vomiting.  She is found to have right pyelonephritis with concern for abscess.  On exam she is in no acute distress.  Does not appear septic or toxic.  She is awake and orient x 3.  Heart is regular.  Lungs are diminished but clear.  Abdominal exam reveals some mild upper abdominal discomfort to palpation.  She does have abdominal obesity.  No significant peripheral edema.  She is currently on Rocephin under the direction of infectious disease.  She does have a history of left renal abscess requiring drainage about a decade ago.  Urology has been consulted.  We will continue supportive measures with pain medication and antiemetics.  She appears to be eating and drinking reasonably well and we will hold off on any additional IV fluids.  Additional plans based on her clinical course.  Discussed with patient and RN at bedside.    Melvin Medley MD  [facility]  10/23/24  11:50 EDT                          Patient Name:  Beatriz Boland  YOB: 1990  MRN:  [id number]  Admit Date:  10/22/2024  Patient Care Team:  Elyse Larkin APRN as PCP - General (Family Medicine)  Maria Ines Eisenberg MD as Consulting Physician (Otolaryngology)  Wesley Ferro MD as Consulting Physician (Gastroenterology)  Chao Mayberry MD (Ophthalmology)  Shannon Burr PA-C (Physician Assistant)  Colette Caal APRN as Nurse Practitioner (Family Medicine)  Víctor Gunter MD as Consulting Physician (Cardiology)  Theodore  Dedrick DAVENPORT MD as Consulting Physician (Urology)  Nacho Pacheco MD as Consulting Physician (Hand Surgery)  Francisco Bond MD as Consulting Physician (Obstetrics and Gynecology)  Elyse Larkin APRN (Family Medicine)      Subjective  History Present Illness     Chief Complaint   Patient presents with    Fever    Chills       Ms. Boland is a 34 y.o. smoker with a history of developmental delay, E.coli septicemia, asthma, and GERD that presents to [facility] complaining of fevers, vomiting, diarrhea, and headache. She reports a headache that began a few days ago. She states she also spiked a fever and had chills, back pain, and diarrhea. She also began vomiting yesterday. On arrival to the ED, she was found to have a temperature of 102.1 and she was tachycardic.  Work up revealed creatinine 1.18, K+ 3.3, procalcitonin 0.39, and WBC 22.09. A urinalysis was positive for WBCs, RBCs, trace protein, 1+ leukocytes, 2+ blood, and 1+ ketones. Squamous cells were also present. A CT of the abdomen/pelvis showed a 2.8 cm right lower pole complex collection, potential developing abscess, or mass. This is suspicious for infection/pyelonephritis. There is surrounding right lower pole perinephric stranding. There is also a left upper pole low-density lesion that is indeterminant and there is chronic bilateral renal cortical thinning. There is mild retroperitoneal brooklyn enlargement with lymph node medial to the right kidney measuring 1.5 cm. A CT of the head showed a left mastoid effusion and left middle ear effusion consistent with ostomastoiditis. She received a dose of Rocephin and has been admitted for further evaluation.            History of Present Illness  Review of Systems   Constitutional:  Positive for chills and fever.   HENT:  Negative for congestion and sore throat.    Eyes:  Negative for photophobia and visual disturbance.   Respiratory:  Positive for shortness of breath.  Negative for cough, chest tightness and wheezing.    Cardiovascular:  Negative for chest pain, palpitations and leg swelling.   Gastrointestinal:  Positive for diarrhea, nausea and vomiting. Negative for abdominal pain.   Genitourinary:  Negative for decreased urine volume, dysuria, flank pain, hematuria and urgency.   Musculoskeletal:  Positive for back pain. Negative for arthralgias and myalgias.   Neurological:  Positive for light-headedness and headaches. Negative for syncope, facial asymmetry, speech difficulty, weakness and numbness.        Personal History     Past Medical History:   Diagnosis Date    Allergic     Amblyopia     Anxiety     Back pain     Cleft palate     Depression     Developmental delay     GERD (gastroesophageal reflux disease)     followed by GI, Dr. Wesley Ferro    Impetigo     Kidney abscess     Kidney stone     Muscle spasm     Obesity (BMI 30-39.9)     Recurrent otitis media     Scoliosis     Sinus infection     recurrent    Sprain of shoulder, left 12/2016     Past Surgical History:   Procedure Laterality Date    ABSCESS DRAINAGE      kidney    ADENOIDECTOMY      EAR TUBES      PHARYNGEAL FLAP      for speech    TONSILLECTOMY       Family History   Problem Relation Age of Onset    COPD Mother     Arthritis Mother     Obesity Mother     Gestational diabetes Mother     Cancer Father     Scoliosis Father     Rheum arthritis Maternal Aunt     Diabetes Maternal Uncle     Alcohol abuse Maternal Uncle     Rheum arthritis Maternal Grandmother     COPD Maternal Grandmother     Stroke Maternal Grandfather     Alcohol abuse Paternal Uncle      Social History     Tobacco Use    Smoking status: Some Days    Smokeless tobacco: Current    Tobacco comments:     quit cigarette smoking 2 years ago, uses vape now   Vaping Use    Vaping status: Every Day    Substances: Nicotine, Flavoring    Devices: Refillable tank   Substance Use Topics    Alcohol use: Yes     Comment: occasionally, 1-2 glasses of  wine 1-2 x per week    Drug use: No     Medications Prior to Admission   Medication Sig Dispense Refill Last Dose/Taking    ondansetron ODT (ZOFRAN-ODT) 4 MG disintegrating tablet Place 1 tablet on the tongue Every 6 (Six) Hours As Needed for Nausea or Vomiting. 20 tablet 0 10/22/2024    Acetaminophen (TYLENOL) 325 MG capsule Take 325 mg by mouth As Needed.   10/20/2024    albuterol sulfate  (90 Base) MCG/ACT inhaler Inhale 2 puffs Every 4 (Four) Hours As Needed for Wheezing. 18 g 3 6/1/2023    cefdinir (OMNICEF) 300 MG capsule Take 1 capsule by mouth 2 (Two) Times a Day. 20 capsule 0     cetirizine (zyrTEC) 10 MG tablet Take 1 tablet by mouth Daily.   10/21/2024    [employer] NASAL ALLERGY SPRAY 55 MCG/ACT nasal inhaler Administer 2 sprays into the nostril(s) as directed by provider Daily.  0 10/9/2024    dexlansoprazole (DEXILANT) 60 MG capsule Take 1 capsule by mouth Daily.   10/16/2024    estradiol cypionate (DEPO-ESTRADIOL) 5 MG/ML injection Inject 0.3 mL into the appropriate muscle as directed by prescriber Every 28 (Twenty-Eight) Days. Patient states she takes it every 3 months   10/1/2024    fluticasone (FLONASE) 50 MCG/ACT nasal spray    10/21/2024    metaxalone (SKELAXIN) 800 MG tablet Take 1 tablet by mouth 3 (Three) Times a Day. 15 tablet 0     methocarbamol (ROBAXIN) 750 MG tablet Take 1 tablet by mouth 3 (Three) Times a Day As Needed for Muscle Spasms. 21 tablet 0 8/15/2024    methylPREDNISolone (MEDROL) 4 MG dose pack Take as directed on package instructions. 1 each 0     naproxen (NAPROSYN) 500 MG tablet Take 1 tablet by mouth 2 (Two) Times a Day As Needed for Moderate Pain . 20 tablet 0     saccharomyces boulardii (FLORASTOR) 250 MG capsule Take 1 capsule by mouth 2 (Two) Times a Day. 14 capsule 0     sucralfate (CARAFATE) 1 g tablet Take 1 tablet by mouth 4 (Four) Times a Day. 30 tablet 0     venlafaxine (EFFEXOR) 25 MG tablet Take 1 tablet by mouth Every Night.        Allergies:    Allergies    Allergen Reactions    Dust Mite Extract Itching     Other reaction(s): Other (See Comments)  Nasal symptoms    Ciprofloxacin Diarrhea and Nausea And Vomiting     Tingling in left leg       Objective   Objective     Vital Signs  Temp:  [97.9 °F (36.6 °C)-102.1 °F (38.9 °C)] 98.2 °F (36.8 °C)  Heart Rate:  [] 84  Resp:  [16-18] 16  BP: ()/(51-82) 115/77  SpO2:  [95 %-100 %] 100 %  on   ;   Device (Oxygen Therapy): room air  Body mass index is 39.94 kg/m².    Physical Exam  Vitals and nursing note reviewed.   Constitutional:       Appearance: Normal appearance.   HENT:      Head: Normocephalic and atraumatic.      Nose: Nose normal.      Mouth/Throat:      Mouth: Mucous membranes are moist.      Pharynx: Oropharynx is clear.   Eyes:      Extraocular Movements: Extraocular movements intact.      Conjunctiva/sclera: Conjunctivae normal.   Cardiovascular:      Rate and Rhythm: Normal rate and regular rhythm.      Pulses: Normal pulses.      Heart sounds: Normal heart sounds.   Pulmonary:      Effort: Pulmonary effort is normal.      Breath sounds: Normal breath sounds.   Abdominal:      General: Bowel sounds are normal. There is no distension.      Palpations: Abdomen is soft. There is no mass.      Tenderness: There is no abdominal tenderness. There is no right CVA tenderness, left CVA tenderness, guarding or rebound.      Hernia: No hernia is present.   Musculoskeletal:         General: Normal range of motion.      Cervical back: Normal range of motion and neck supple.      Right lower leg: Edema present.      Left lower leg: Edema (trace edema) present.   Skin:     General: Skin is warm and dry.   Neurological:      General: No focal deficit present.      Mental Status: She is alert and oriented to person, place, and time.   Psychiatric:         Mood and Affect: Mood normal.         Behavior: Behavior normal.         Results Review:  I reviewed the patient's new clinical results.  I reviewed the patient's  new imaging results and agree with the interpretation.  I reviewed the patient's other test results and agree with the interpretation  I personally viewed and interpreted the patient's EKG/Telemetry data  Discussed with ED provider.    Lab Results (last 24 hours)       Procedure Component Value Units Date/Time    CBC & Differential [[id number]]  (Abnormal) Collected: 10/22/24 1931    Specimen: Blood Updated: 10/22/24 1943    Narrative:      The following orders were created for panel order CBC & Differential.  Procedure                               Abnormality         Status                     ---------                               -----------         ------                     CBC Auto Differential[[id number]]        Abnormal            Final result                 Please view results for these tests on the individual orders.    Comprehensive Metabolic Panel [[id number]]  (Abnormal) Collected: 10/22/24 1931    Specimen: Blood Updated: 10/22/24 2007     Glucose 128 mg/dL      BUN 7 mg/dL      Creatinine 1.18 mg/dL      Sodium 135 mmol/L      Potassium 3.3 mmol/L      Chloride 104 mmol/L      CO2 18.5 mmol/L      Calcium 9.4 mg/dL      Total Protein 7.6 g/dL      Albumin 3.3 g/dL      ALT (SGPT) 6 U/L      AST (SGOT) 9 U/L      Alkaline Phosphatase 66 U/L      Total Bilirubin 0.6 mg/dL      Globulin 4.3 gm/dL      A/G Ratio 0.8 g/dL      BUN/Creatinine Ratio 5.9     Anion Gap 12.5 mmol/L      eGFR 62.7 mL/min/1.73     Narrative:      GFR Normal >60  Chronic Kidney Disease <60  Kidney Failure <15      Lactic Acid, Plasma [[id number]]  (Normal) Collected: 10/22/24 1931    Specimen: Blood Updated: 10/22/24 2018     Lactate 1.3 mmol/L     CBC Auto Differential [[id number]]  (Abnormal) Collected: 10/22/24 1931    Specimen: Blood Updated: 10/22/24 1943     WBC 22.09 10*3/mm3      RBC 4.90 10*6/mm3      Hemoglobin 13.8 g/dL      Hematocrit 41.4 %      MCV 84.5 fL      MCH 28.2 pg      MCHC 33.3 g/dL      RDW 12.3 %       "RDW-SD 36.9 fl      MPV 9.6 fL      Platelets 217 10*3/mm3      Neutrophil % 81.8 %      Lymphocyte % 6.0 %      Monocyte % 11.3 %      Eosinophil % 0.0 %      Basophil % 0.1 %      Immature Grans % 0.8 %      Neutrophils, Absolute 18.07 10*3/mm3      Lymphocytes, Absolute 1.33 10*3/mm3      Monocytes, Absolute 2.49 10*3/mm3      Eosinophils, Absolute 0.00 10*3/mm3      Basophils, Absolute 0.03 10*3/mm3      Immature Grans, Absolute 0.17 10*3/mm3      nRBC 0.0 /100 WBC     Protime-INR [[id number]]  (Abnormal) Collected: 10/22/24 1931    Specimen: Blood Updated: 10/22/24 2011     Protime 17.1 Seconds      INR 1.37    hCG, Serum, Qualitative [[id number]]  (Normal) Collected: 10/22/24 1931    Specimen: Blood Updated: 10/22/24 2015     HCG Qualitative Negative    Lipase [[id number]]  (Normal) Collected: 10/22/24 1931    Specimen: Blood Updated: 10/22/24 2017     Lipase 13 U/L     Procalcitonin [[id number]]  (Abnormal) Collected: 10/22/24 1931    Specimen: Blood Updated: 10/22/24 2042     Procalcitonin 0.39 ng/mL     Narrative:      As a Marker for Sepsis (Non-Neonates):    1. <0.5 ng/mL represents a low risk of severe sepsis and/or septic shock.  2. >2 ng/mL represents a high risk of severe sepsis and/or septic shock.    As a Marker for Lower Respiratory Tract Infections that require antibiotic therapy:    PCT on Admission    Antibiotic Therapy       6-12 Hrs later    >0.5                Strongly Recommended  >0.25 - <0.5        Recommended   0.1 - 0.25          Discouraged              Remeasure/reassess PCT  <0.1                Strongly Discouraged     Remeasure/reassess PCT    As 28 day mortality risk marker: \"Change in Procalcitonin Result\" (>80% or <=80%) if Day 0 (or Day 1) and Day 4 values are available. Refer to http://www.[facility]-pct-calculator.com    Change in PCT <=80%  A decrease of PCT levels below or equal to 80% defines a positive change in PCT test result representing a higher risk for 28-day all-cause " mortality of patients diagnosed with severe sepsis for septic shock.    Change in PCT >80%  A decrease of PCT levels of more than 80% defines a negative change in PCT result representing a lower risk for 28-day all-cause mortality of patients diagnosed with severe sepsis or septic shock.       Magnesium [[id number]]  (Normal) Collected: 10/22/24 1931    Specimen: Blood Updated: 10/22/24 2017     Magnesium 1.8 mg/dL     Blood Culture - Blood, Arm, Right [[id number]] Collected: 10/22/24 1946    Specimen: Blood from Arm, Right Updated: 10/22/24 1951    Blood Culture - Blood, Hand, Left [[id number]] Collected: 10/22/24 1946    Specimen: Blood from Hand, Left Updated: 10/22/24 1951    Respiratory Panel PCR w/COVID-19(SARS-CoV-2) ANNIA/LORIE/SALTY/PAD/COR/GEORGES In-House, NP Swab in UTM/VTM, 2 HR TAT - Swab, Nasopharynx [[id number]]  (Normal) Collected: 10/22/24 2014    Specimen: Swab from Nasopharynx Updated: 10/22/24 2118     ADENOVIRUS, PCR Not Detected     Coronavirus 229E Not Detected     Coronavirus HKU1 Not Detected     Coronavirus NL63 Not Detected     Coronavirus OC43 Not Detected     COVID19 Not Detected     Human Metapneumovirus Not Detected     Human Rhinovirus/Enterovirus Not Detected     Influenza A PCR Not Detected     Influenza B PCR Not Detected     Parainfluenza Virus 1 Not Detected     Parainfluenza Virus 2 Not Detected     Parainfluenza Virus 3 Not Detected     Parainfluenza Virus 4 Not Detected     RSV, PCR Not Detected     Bordetella pertussis pcr Not Detected     Bordetella parapertussis PCR Not Detected     Chlamydophila pneumoniae PCR Not Detected     Mycoplasma pneumo by PCR Not Detected    Narrative:      In the setting of a positive respiratory panel with a viral infection PLUS a negative procalcitonin without other underlying concern for bacterial infection, consider observing off antibiotics or discontinuation of antibiotics and continue supportive care. If the respiratory panel is positive for atypical  bacterial infection (Bordetella pertussis, Chlamydophila pneumoniae, or Mycoplasma pneumoniae), consider antibiotic de-escalation to target atypical bacterial infection.    Urinalysis With Microscopic If Indicated (No Culture) - Urine, Clean Catch [[id number]]  (Abnormal) Collected: 10/23/24 0020    Specimen: Urine, Clean Catch Updated: 10/23/24 0041     Color, UA Yellow     Appearance, UA Clear     pH, UA 6.0     Specific Gravity, UA >1.030     Glucose, UA Negative     Ketones, UA 15 mg/dL (1+)     Bilirubin, UA Negative     Blood, UA Moderate (2+)     Protein, UA Trace     Leuk Esterase, UA Small (1+)     Nitrite, UA Negative     Urobilinogen, UA 0.2 E.U./dL    Urinalysis, Microscopic Only - Urine, Clean Catch [[id number]]  (Abnormal) Collected: 10/23/24 0020    Specimen: Urine, Clean Catch Updated: 10/23/24 0041     RBC, UA 11-20 /HPF      WBC, UA 11-20 /HPF      Bacteria, UA None Seen /HPF      Squamous Epithelial Cells, UA 7-12 /HPF      Hyaline Casts, UA 0-2 /LPF      Methodology Automated Microscopy            Imaging Results (Last 24 Hours)       Procedure Component Value Units Date/Time    CT Abdomen Pelvis With Contrast [[id number]] Collected: 10/22/24 2224     Updated: 10/22/24 2310    Narrative:      CT ABDOMEN PELVIS W CONTRAST-     HISTORY: 34 years of age, Female.  Fever, nausea vomiting diarrhea.   Pain in lower abdomen bilaterally.     TECHNIQUE:  CT includes axial imaging from the lung bases to the  trochanters with intravenous contrast and without use of oral contrast.  Data reconstructed in coronal and sagittal planes. Radiation dose  reduction techniques were utilized, including automated exposure control  and exposure modulation based on body size.     COMPARISON: CT abdomen and pelvis 06/13/2024, 11/20/2021, 10/20/2020     FINDINGS: There is a left upper pole area of low density with adjacent  cortical thinning and this heterogenous low-density measures 3 x 3.1 cm.  Within the anterior left  interpolar kidney there is a low-density lesion  likely representing a cyst that measures 1 cm. There is left renal  posterolateral lower pole cortical thinning.     Within the right renal lower pole there is an abnormal low-density  masslike lesion that measures approximately 2.8 x 2.8 cm. There are  adjacent small cysts or dilated calyces and there is chronic right lower  pole cortical thinning as well as chronic right upper pole cortical  thinning. There is no hydronephrosis though there is perinephric  stranding particularly surrounding the right renal lower pole. No  hydroureter. Urinary bladder exhibits mild wall thickening though is low  volume.     There is mild retroperitoneal brooklyn enlargement. Lymph node medial to  the right kidney measures 1.5 cm. There are additional smaller  retroperitoneal nodes. No evidence for brooklyn enlargement within the  pelvis.     Lung bases appear clear. Liver, gallbladder, spleen, adrenal glands,  pancreas, bowel loops appear within normal limits. No evidence for bowel  obstruction. No ascites.       Impression:      1. 2.8 cm right lower pole complex collection, potential developing  abscess, or mass. This is suspicious for infection/pyelonephritis. There  is surrounding right lower pole perinephric stranding. There is also  left upper pole low-density lesion that is indeterminant and there is  chronic bilateral renal cortical thinning. Recommend short interval  follow-up with multiphasic CT or MRI.. There is no hydronephrosis.  Urinary bladder exhibits mild wall thickening though exhibits low volume  without surrounding inflammation.  2. Mild retroperitoneal brooklyn enlargement has developed with lymph node  medial to the right kidney measuring 1.5 cm. Additional smaller  retroperitoneal nodes.      Radiation dose reduction techniques were utilized, including automated  exposure control and exposure modulation based on body size.        This report was finalized on 10/22/2024  11:07 PM by Richar Barajas M.D  on Workstation: [id number]       CT Head Without Contrast [[id number]] Collected: 10/22/24 2225     Updated: 10/22/24 2225    Narrative:        Patient: SY PRESCOTT  Time Out: 22:25  Exam(s): CT HEAD Without Contrast     EXAM:    CT Head Without Intravenous Contrast    CLINICAL HISTORY:     Reason for exam: Headache. Top of head.    TECHNIQUE:    Axial computed tomography images of the head brain without intravenous   contrast.  CTDI is 55.44 mGy and DLP is 970.5 mGy-cm.  This CT exam was   performed according to the principle of ALARA (As Low As Reasonably   Achievable) by using one or more of the following dose reduction   techniques: automated exposure control, adjustment of the mA and or kV   according to patient size, and or use of iterative reconstruction   technique.    Coronal and sagittal reformatted images were created and reviewed.    COMPARISON:    No relevant prior studies available.    FINDINGS:    Brain:  No mass effect, hemorrhage, large extra-axial collection, or CT   evidence of acute cortical infarction.  CSF presents in the slightly   expanded sella turcica compatible with empty sella.  Dilated perivascular   space in the caudal left basal ganglia, normal variant.    Ventricles:  Unremarkable. No midline shift or ventriculomegaly.    Bones joints:  Unremarkable.  No acute fracture.    Soft tissues:  Unremarkable.    Sinuses:  Mild left ethmoid air cell mucosal thickening.    Mastoid air cells:  Left mastoid effusion.    Auditory system:  Left middle ear cavity effusion.    IMPRESSION:       1.  No CT evidence of acute intracranial process.  2.  Left mastoid effusion and left middle ear effusion.  Correlate for   otomastoiditis.  3.  Empty sella.      Impression:          Electronically signed by Navdeep Koch M.D. on 10-22-24 at 2225    XR Chest 1 View [[id number]] Collected: 10/22/24 2102     Updated: 10/22/24 2123    Narrative:      CHEST SINGLE VIEW      HISTORY: 34 years of age, Female.  Fever mild cough, nausea vomiting and  diarrhea     COMPARISON: AP chest 05/04/2024     FINDINGS: Heart and mediastinal structures appear within normal limits.  Lungs appear clear and there is no evidence for pulmonary edema or  pleural effusion or infiltrate. Cardiac monitor and leads are present       Impression:      No evidence for active disease in the chest     This report was finalized on 10/22/2024 9:20 PM by Richar Barajas M.D  on Workstation: [id number]                   Telemetry Scan   Final Result      Telemetry Scan   Final Result           Assessment/Plan     Active Hospital Problems    Diagnosis  POA    **Acute pyelonephritis [N10]  Yes    Sepsis [A41.9]  Yes    Tobacco abuse [Z72.0]  Yes    Hypokalemia [E87.6]  Yes    Mild intermittent asthma without complication [J45.20]  Yes    GERD without esophagitis [K21.9]  Yes    Mental developmental delay [F81.9]  Yes     Sepsis  Acute Pyelonephritis  Possible kidney abscess  -Admit to a telemetry unit for monitoring  -She is febrile with leukocytosis and an elevated procal  -Pyuria is present on urinalysis and there is evidence of pyelonephritis/right kidney collection on CT A/P  -She received Rocephin in the ED, will start Zosyn to cover for possible kidney abscess  -Blood cultures are pending  -Urology consult   -She reports diarrhea, nausea, and vomiting. Zofran is available as needed. Will order GI panel    Asthma  -Her respiratory status is stable on room air  -Continue home inhalers    Hypokalemia  -Replace potassium per protocol    GERD  -She takes dexlansoprazole at home, will start     Tobacco Abuse  -Nicotine patch    -I discussed the patients findings and my recommendations with patient.    VTE Prophylaxis - SCDs.  Code Status - Full code.       LIVIA Bajwa  [facility]  10/23/24  05:23 EDT      Electronically signed by Melvin Medley MD at 10/23/24 5147           Emergency Department Notes        Lay Covarrubias, RN at 10/23/24 0131          .Nursing report ED to floor  Beatriz Boland  34 y.o.  female    HPI :  HPI  Stated Reason for Visit: pt to ed via pv for c/o fever, chills, nausea and vomiting since sunday.    Chief Complaint  Chief Complaint   Patient presents with    Fever    Chills       Admitting doctor:   Harmeet Amaya MD    Admitting diagnosis:   The encounter diagnosis was Acute pyelonephritis.    Code status:   Current Code Status       Date Active Code Status Order ID Comments User Context       10/23/2024 0058 CPR (Attempt to Resuscitate) [id number]  Ameena Wasserman APRN ED        Question Answer    Code Status (Patient has no pulse and is not breathing) CPR (Attempt to Resuscitate)    Medical Interventions (Patient has pulse or is breathing) Full Support                    Allergies:   Dust mite extract and Ciprofloxacin    Isolation:   No active isolations    Intake and Output    Intake/Output Summary (Last 24 hours) at 10/23/2024 0131  Last data filed at 10/22/2024 2240  Gross per 24 hour   Intake 600 ml   Output --   Net 600 ml       Weight:       10/22/24  1924   Weight: 113 kg (250 lb)       Most recent vitals:   Vitals:    10/22/24 2302 10/23/24 0031 10/23/24 0101 10/23/24 0122   BP: 97/51 128/82 129/77    BP Location:       Patient Position:       Pulse: 66 86  78   Resp:       Temp:       TempSrc:       SpO2: 96% 98% 100% 98%   Weight:       Height:           Active LDAs/IV Access:   Lines, Drains & Airways       Active LDAs       Name Placement date Placement time Site Days    Peripheral IV 10/22/24 1929 Left Antecubital 10/22/24  1929  Antecubital  less than 1    Peripheral IV 10/22/24 2120 Anterior;Distal;Right Forearm 10/22/24  2120  Forearm  less than 1                    Labs (abnormal labs have a star):   Labs Reviewed   COMPREHENSIVE METABOLIC PANEL - Abnormal; Notable for the following components:       Result Value     "Glucose 128 (*)     Creatinine 1.18 (*)     Sodium 135 (*)     Potassium 3.3 (*)     CO2 18.5 (*)     Albumin 3.3 (*)     BUN/Creatinine Ratio 5.9 (*)     All other components within normal limits    Narrative:     GFR Normal >60  Chronic Kidney Disease <60  Kidney Failure <15     CBC WITH AUTO DIFFERENTIAL - Abnormal; Notable for the following components:    WBC 22.09 (*)     RDW-SD 36.9 (*)     Neutrophil % 81.8 (*)     Lymphocyte % 6.0 (*)     Eosinophil % 0.0 (*)     Immature Grans % 0.8 (*)     Neutrophils, Absolute 18.07 (*)     Monocytes, Absolute 2.49 (*)     Immature Grans, Absolute 0.17 (*)     All other components within normal limits   PROTIME-INR - Abnormal; Notable for the following components:    Protime 17.1 (*)     INR 1.37 (*)     All other components within normal limits   URINALYSIS W/ MICROSCOPIC IF INDICATED (NO CULTURE) - Abnormal; Notable for the following components:    Specific Gravity, UA >1.030 (*)     Ketones, UA 15 mg/dL (1+) (*)     Blood, UA Moderate (2+) (*)     Protein, UA Trace (*)     Leuk Esterase, UA Small (1+) (*)     All other components within normal limits   PROCALCITONIN - Abnormal; Notable for the following components:    Procalcitonin 0.39 (*)     All other components within normal limits    Narrative:     As a Marker for Sepsis (Non-Neonates):    1. <0.5 ng/mL represents a low risk of severe sepsis and/or septic shock.  2. >2 ng/mL represents a high risk of severe sepsis and/or septic shock.    As a Marker for Lower Respiratory Tract Infections that require antibiotic therapy:    PCT on Admission    Antibiotic Therapy       6-12 Hrs later    >0.5                Strongly Recommended  >0.25 - <0.5        Recommended   0.1 - 0.25          Discouraged              Remeasure/reassess PCT  <0.1                Strongly Discouraged     Remeasure/reassess PCT    As 28 day mortality risk marker: \"Change in Procalcitonin Result\" (>80% or <=80%) if Day 0 (or Day 1) and Day 4 values " are available. Refer to http://[email or web address]-pct-calculator.com    Change in PCT <=80%  A decrease of PCT levels below or equal to 80% defines a positive change in PCT test result representing a higher risk for 28-day all-cause mortality of patients diagnosed with severe sepsis for septic shock.    Change in PCT >80%  A decrease of PCT levels of more than 80% defines a negative change in PCT result representing a lower risk for 28-day all-cause mortality of patients diagnosed with severe sepsis or septic shock.      URINALYSIS, MICROSCOPIC ONLY - Abnormal; Notable for the following components:    RBC, UA 11-20 (*)     WBC, UA 11-20 (*)     Squamous Epithelial Cells, UA 7-12 (*)     All other components within normal limits   RESPIRATORY PANEL PCR W/ COVID-19 (SARS-COV-2), NP SWAB IN UTM/VTP, 2 HR TAT - Normal    Narrative:     In the setting of a positive respiratory panel with a viral infection PLUS a negative procalcitonin without other underlying concern for bacterial infection, consider observing off antibiotics or discontinuation of antibiotics and continue supportive care. If the respiratory panel is positive for atypical bacterial infection (Bordetella pertussis, Chlamydophila pneumoniae, or Mycoplasma pneumoniae), consider antibiotic de-escalation to target atypical bacterial infection.   LACTIC ACID, PLASMA - Normal   HCG, SERUM, QUALITATIVE - Normal   LIPASE - Normal   MAGNESIUM - Normal   BLOOD CULTURE   BLOOD CULTURE   RAINBOW DRAW    Narrative:     The following orders were created for panel order [place] Draw.  Procedure                               Abnormality         Status                     ---------                               -----------         ------                     Green Top (Gel)[[id number]]                                  Final result               Lavender Top[[id number]]                                     Final result               Gold Top - [facility][[id number]]                                    Final result               Light Blue Top[[id number]]                                   Final result                 Please view results for these tests on the individual orders.   BASIC METABOLIC PANEL   CBC (NO DIFF)   CBC AND DIFFERENTIAL    Narrative:     The following orders were created for panel order CBC & Differential.  Procedure                               Abnormality         Status                     ---------                               -----------         ------                     CBC Auto Differential[[id number]]        Abnormal            Final result                 Please view results for these tests on the individual orders.   GREEN TOP   LAVENDER TOP   GOLD TOP - SST   LIGHT BLUE TOP       EKG:   No orders to display       Meds given in ED:   Medications   sodium chloride 0.9 % flush 10 mL (has no administration in time range)   vancomycin 2250 mg/500 mL 0.9% NS IVPB ([facility]) (has no administration in time range)   sodium chloride 0.9 % flush 10 mL (has no administration in time range)   sodium chloride 0.9 % flush 10 mL (has no administration in time range)   acetaminophen (TYLENOL) tablet 650 mg (has no administration in time range)     Or   acetaminophen (TYLENOL) 160 MG/5ML oral solution 650 mg (has no administration in time range)     Or   acetaminophen (TYLENOL) suppository 650 mg (has no administration in time range)   sennosides-docusate (PERICOLACE) 8.6-50 MG per tablet 2 tablet (has no administration in time range)     And   polyethylene glycol (MIRALAX) packet 17 g (has no administration in time range)     And   bisacodyl (DULCOLAX) EC tablet 5 mg (has no administration in time range)     And   bisacodyl (DULCOLAX) suppository 10 mg (has no administration in time range)   ondansetron ODT (ZOFRAN-ODT) disintegrating tablet 4 mg (has no administration in time range)     Or   ondansetron (ZOFRAN) injection 4 mg (has no administration in time range)   calcium carbonate (TUMS)  chewable tablet 500 mg (200 mg elemental) (has no administration in time range)   Pharmacy to dose vancomycin (has no administration in time range)   Pharmacy to Dose Zosyn (has no administration in time range)   piperacillin-tazobactam (ZOSYN) 3.375 g IVPB in 100 mL NS MBP (CD) (has no administration in time range)   piperacillin-tazobactam (ZOSYN) 3.375 g IVPB in 100 mL NS MBP (CD) (has no administration in time range)   sodium chloride 0.9 % bolus 500 mL (0 mL Intravenous Stopped 10/22/24 2240)   acetaminophen (TYLENOL) tablet 1,000 mg (1,000 mg Oral Given 10/22/24 2011)   ondansetron (ZOFRAN) injection 4 mg (4 mg Intravenous Given 10/22/24 2008)   cefTRIAXone (ROCEPHIN) 2,000 mg in sodium chloride 0.9 % 100 mL MBP (0 mg Intravenous Stopped 10/22/24 2155)   iopamidol (ISOVUE-300) 61 % injection 85 mL (85 mL Intravenous Given by Other 10/22/24 2143)   sodium chloride 0.9 % bolus 500 mL (500 mL Intravenous New Bag 10/23/24 0041)       Imaging results:  CT Abdomen Pelvis With Contrast    Result Date: 10/22/2024  1. 2.8 cm right lower pole complex collection, potential developing abscess, or mass. This is suspicious for infection/pyelonephritis. There is surrounding right lower pole perinephric stranding. There is also left upper pole low-density lesion that is indeterminant and there is chronic bilateral renal cortical thinning. Recommend short interval follow-up with multiphasic CT or MRI.. There is no hydronephrosis. Urinary bladder exhibits mild wall thickening though exhibits low volume without surrounding inflammation. 2. Mild retroperitoneal [place] enlargement has developed with lymph node medial to the right kidney measuring 1.5 cm. Additional smaller retroperitoneal nodes.  Radiation dose reduction techniques were utilized, including automated exposure control and exposure modulation based on body size.   This report was finalized on 10/22/2024 11:07 PM by Richar Barajas M.D on Workstation: [id number]       CT Head Without Contrast    Result Date: 10/22/2024  Electronically signed by Navdeep Koch M.D. on 10-22-24 at 2225    XR Chest 1 View    Result Date: 10/22/2024  No evidence for active disease in the chest  This report was finalized on 10/22/2024 9:20 PM by Richar Barajas M.D on Workstation: [id number]         Social issues:   Social History     Socioeconomic History    Marital status: Single   Tobacco Use    Smoking status: Some Days    Smokeless tobacco: Current    Tobacco comments:     quit cigarette smoking 2 years ago, uses vape now   Vaping Use    Vaping status: Every Day    Substances: Nicotine, Flavoring    Devices: Refillable tank   Substance and Sexual Activity    Alcohol use: Yes     Comment: occasionally, 1-2 glasses of wine 1-2 x per week    Drug use: No    Sexual activity: Yes     Partners: Male     Birth control/protection: Injection       Peripheral Neurovascular  Peripheral Neurovascular (Adult)  Peripheral Neurovascular WDL: WDL    Neuro Cognitive  Neuro Cognitive (Adult)  Cognitive/Neuro/Behavioral WDL: WDL  [place] Coma Scale  Best Eye Response: 4-->(E4) spontaneous  Best Motor Response: 6-->(M6) obeys commands  Best Verbal Response: 5-->(V5) oriented  [place] Coma Scale Score: 15    Learning  Learning Assessment  Learning Readiness and Ability: no barriers identified    Respiratory  Respiratory WDL  Respiratory WDL: WDL    Abdominal Pain       Pain Assessments  Pain (Adult)  (0-10) Pain Rating: Rest: 10  Pain Location: back  Pain Side/Orientation: lower  Pain Management Interventions: quiet environment facilitated  Response to Pain Interventions: interventions effective per patient    NIH Stroke Scale       Lay Covarrubias RN  10/23/24 01:31 EDT      Electronically signed by Lay Covarrubias RN at 10/23/24 0131       Chao Prasad MD at 10/22/24 1947           EMERGENCY DEPARTMENT ENCOUNTER    Room Number:  23/23  Date of encounter:  10/23/2024  PCP: Elyse Larkin  LIVIA  Historian: Patient and mother  Relevant information and history provided by sources other than the patient will be included below and in the ED Course.  Review of pertinent past medical records may also be included in record below and ED Course.    HPI:  Chief Complaint: Fever  A complete HPI/ROS/PMH/PSH/SH/FH are unobtainable due to: Not applicable  Context: Beatriz Boland is a 34 y.o. female who presents to the ED c/o patient started with fever on Sunday afternoon.  It is coming and going.  She states she went to the urgent care center on Sunday and was diagnosed with a viral illness.  They did a COVID test that was negative.  No antibiotics were prescribed.  At the same time she had nausea and then had some diarrhea that started Sunday.  The diarrhea was not severe it was mild to moderate.  No blood in the diarrhea.  Then started with some increased nausea and vomiting today.  Still had fevers.  She has had a mild cough.  Denies any shortness of breath or chest pain.  Maybe a little bit of nasal drainage.  She seems to saw her primary physician on October 8 and was diagnosed with bacterial vaginosis.  Was prescribed Flagyl.  She is no longer on Flagyl.  She also saw an ENT about 3 to 4 weeks ago and had drainage out of her left ear and she was placed upon Zithromax.  She states that drainage has resolved.  She has no ear pain bilaterally.  She does not really have any burning with urination or frequent urination.  She has had a history of UTIs in the past.  She does suffer from chronic low back pain which is at baseline.  No urinary or fecal incontinence or retention no saddle anesthesia or any new weakness.  She has not had much to eat or drink today because of the nausea and vomiting.  Does complain of some mild lower abdominal pain.  As I do a review of systems she does report that she has had a headache on the top of her head.  Is been going on for 2 days.  Is been waxing and waning.  Sometimes it  is severe.      Previous Episodes: Does not feel like a typical UTI in the past.  There is report that she has had a history of sepsis before.  I look at old records I see that she has had E. coli sepsis  Current Symptoms: See above    MEDICAL HISTORY REVIEWED  Can see this patient has had urinary tract infections in the past.  Also had COVID in July 2024 and was seen in our ER.  Has had a history of septicemia due to E. coli.  History of vaping and tobacco abuse.  History of anxiety and there is mention in epic of mental developmental delay.  History of chronic low back pain.      PAST MEDICAL HISTORY  Active Ambulatory Problems     Diagnosis Date Noted    Depression     Mental developmental delay     GERD without esophagitis     Scoliosis     Amblyopia     Back pain     Chronic left shoulder pain 09/29/2017    Muscle spasm 09/29/2017    Seasonal allergies 11/05/2017    Panic attacks 11/15/2017    Anxiety 01/04/2018    Mild intermittent asthma without complication 03/24/2018    Chest pain on breathing 08/10/2018    Nicotine vapor product user 08/10/2018    Class 3 obesity without serious comorbidity with body mass index (BMI) of 40.0 to 44.9 in adult 08/10/2018    Acute UTI (urinary tract infection) 09/04/2020    Obesity (BMI 30-39.9) 09/04/2020    Tobacco abuse 09/04/2020    Septicemia due to E. coli 09/05/2020    Pyelonephritis of left kidney 10/22/2020    Sepsis 10/22/2020    Asthma 10/22/2020     Resolved Ambulatory Problems     Diagnosis Date Noted    Sinus bradycardia 09/29/2014    Palpitations 11/15/2017    Acute otitis externa of both ears 11/15/2017    Atypical chest pain 01/04/2018    Hypokalemia 09/04/2020    WILIAN (acute kidney injury) 09/04/2020     Past Medical History:   Diagnosis Date    Allergic     Cleft palate     Developmental delay     GERD (gastroesophageal reflux disease)     Impetigo     Kidney abscess     Kidney stone     Recurrent otitis media     Sinus infection     Sprain of shoulder, left  12/2016         PAST SURGICAL HISTORY  Past Surgical History:   Procedure Laterality Date    ABSCESS DRAINAGE      kidney    ADENOIDECTOMY      EAR TUBES      PHARYNGEAL FLAP      for speech    TONSILLECTOMY           FAMILY HISTORY  Family History   Problem Relation Age of Onset    COPD Mother     Arthritis Mother     Obesity Mother     Gestational diabetes Mother     Cancer Father     Scoliosis Father     Rheum arthritis Maternal Aunt     Diabetes Maternal Uncle     Alcohol abuse Maternal Uncle     Rheum arthritis Maternal Grandmother     COPD Maternal Grandmother     Stroke Maternal Grandfather     Alcohol abuse Paternal Uncle          SOCIAL HISTORY  Social History     Socioeconomic History    Marital status: Single   Tobacco Use    Smoking status: Some Days    Smokeless tobacco: Current    Tobacco comments:     quit cigarette smoking 2 years ago, uses vape now   Vaping Use    Vaping status: Every Day    Substances: Nicotine, Flavoring    Devices: Refillable tank   Substance and Sexual Activity    Alcohol use: Yes     Comment: occasionally, 1-2 glasses of wine 1-2 x per week    Drug use: No    Sexual activity: Yes     Partners: Male     Birth control/protection: Injection         ALLERGIES  Dust mite extract and Ciprofloxacin        REVIEW OF SYSTEMS  Review of Systems     All systems reviewed and negative except for those discussed in HPI.       PHYSICAL EXAM    I have reviewed the triage vital signs and nursing notes.    ED Triage Vitals   Temp Heart Rate Resp BP SpO2   10/22/24 1916 10/22/24 1916 10/22/24 1916 10/22/24 1925 10/22/24 1916   (!) 102.1 °F (38.9 °C) (!) 131 18 120/81 96 %      Temp src Heart Rate Source Patient Position BP Location FiO2 (%)   10/22/24 1916 -- 10/22/24 1925 10/22/24 1925 --   Tympanic  Lying Right arm        GENERAL: Young female.  Does not appear septic or toxic.  Does not appear to be in obvious pain or distress.  Vital signs on my initial evaluation she has a fever of 102.1  here.  Heart rate is between 110 and 120 and is sinus tachycardia on the monitor.  Oxygen saturation is normal blood pressure is normal.  HENT: nares patent  Head/neck/ face are symmetric without gross deformity, signs of trauma, or swelling  EYES: no scleral icterus, no conjunctival pallor.  Was equal round reactive to light extraocular muscles are intact.  Examining her ears bilaterally she has opacification of TMs bilaterally but there is no obvious erythema or drainage.  External canals appear appropriate with no drainage or swelling.  (She has had over 18 surgeries on her ears with multiple tubes in the past) no mastoid tenderness bilaterally  NECK: Supple, no meningismus.  Full range of motion to flexion extension looking left and right with no obvious pain or deformity and no signs of meningismus.  CV: regular rhythm, regular rate with intact distal pulses.  RESPIRATORY: normal effort and no respiratory distress.  Clear to auscultation bilaterally  ABDOMEN: soft and nontender.  Morbidly obese.  She has normal inspection she has mild reproducible pain to the left lower quadrant suprapubic and right lower quadrant.  But there is no guarding or rebound.  Normal bowel sounds  MUSCULOSKELETAL: no deformity.  Intact distal pulses that are equal strong and symmetric.  NEURO: alert and appropriate, moves all extremities, follows commands.  No focal motor or sensory changes  SKIN: warm, dry    Vital signs and nursing notes reviewed.        LAB RESULTS  Recent Results (from the past 24 hours)   Comprehensive Metabolic Panel    Collection Time: 10/22/24  7:31 PM    Specimen: Blood   Result Value Ref Range    Glucose 128 (H) 65 - 99 mg/dL    BUN 7 6 - 20 mg/dL    Creatinine 1.18 (H) 0.57 - 1.00 mg/dL    Sodium 135 (L) 136 - 145 mmol/L    Potassium 3.3 (L) 3.5 - 5.2 mmol/L    Chloride 104 98 - 107 mmol/L    CO2 18.5 (L) 22.0 - 29.0 mmol/L    Calcium 9.4 8.6 - 10.5 mg/dL    Total Protein 7.6 6.0 - 8.5 g/dL    Albumin 3.3  (L) 3.5 - 5.2 g/dL    ALT (SGPT) 6 1 - 33 U/L    AST (SGOT) 9 1 - 32 U/L    Alkaline Phosphatase 66 39 - 117 U/L    Total Bilirubin 0.6 0.0 - 1.2 mg/dL    Globulin 4.3 gm/dL    A/G Ratio 0.8 g/dL    BUN/Creatinine Ratio 5.9 (L) 7.0 - 25.0    Anion Gap 12.5 5.0 - 15.0 mmol/L    eGFR 62.7 >60.0 mL/min/1.73   Lactic Acid, Plasma    Collection Time: 10/22/24  7:31 PM    Specimen: Blood   Result Value Ref Range    Lactate 1.3 0.5 - 2.0 mmol/L   CBC Auto Differential    Collection Time: 10/22/24  7:31 PM    Specimen: Blood   Result Value Ref Range    WBC 22.09 (H) 3.40 - 10.80 10*3/mm3    RBC 4.90 3.77 - 5.28 10*6/mm3    Hemoglobin 13.8 12.0 - 15.9 g/dL    Hematocrit 41.4 34.0 - 46.6 %    MCV 84.5 79.0 - 97.0 fL    MCH 28.2 26.6 - 33.0 pg    MCHC 33.3 31.5 - 35.7 g/dL    RDW 12.3 12.3 - 15.4 %    RDW-SD 36.9 (L) 37.0 - 54.0 fl    MPV 9.6 6.0 - 12.0 fL    Platelets 217 140 - 450 10*3/mm3    Neutrophil % 81.8 (H) 42.7 - 76.0 %    Lymphocyte % 6.0 (L) 19.6 - 45.3 %    Monocyte % 11.3 5.0 - 12.0 %    Eosinophil % 0.0 (L) 0.3 - 6.2 %    Basophil % 0.1 0.0 - 1.5 %    Immature Grans % 0.8 (H) 0.0 - 0.5 %    Neutrophils, Absolute 18.07 (H) 1.70 - 7.00 10*3/mm3    Lymphocytes, Absolute 1.33 0.70 - 3.10 10*3/mm3    Monocytes, Absolute 2.49 (H) 0.10 - 0.90 10*3/mm3    Eosinophils, Absolute 0.00 0.00 - 0.40 10*3/mm3    Basophils, Absolute 0.03 0.00 - 0.20 10*3/mm3    Immature Grans, Absolute 0.17 (H) 0.00 - 0.05 10*3/mm3    nRBC 0.0 0.0 - 0.2 /100 WBC   Green Top (Gel)    Collection Time: 10/22/24  7:31 PM   Result Value Ref Range    Extra Tube Hold for add-ons.    Lavender Top    Collection Time: 10/22/24  7:31 PM   Result Value Ref Range    Extra Tube hold for add-on    Gold Top - SST    Collection Time: 10/22/24  7:31 PM   Result Value Ref Range    Extra Tube Hold for add-ons.    Light Blue Top    Collection Time: 10/22/24  7:31 PM   Result Value Ref Range    Extra Tube Hold for add-ons.    Protime-INR    Collection Time: 10/22/24   7:31 PM    Specimen: Blood   Result Value Ref Range    Protime 17.1 (H) 11.7 - 14.2 Seconds    INR 1.37 (H) 0.90 - 1.10   hCG, Serum, Qualitative    Collection Time: 10/22/24  7:31 PM    Specimen: Blood   Result Value Ref Range    HCG Qualitative Negative Negative   Lipase    Collection Time: 10/22/24  7:31 PM    Specimen: Blood   Result Value Ref Range    Lipase 13 13 - 60 U/L   Procalcitonin    Collection Time: 10/22/24  7:31 PM    Specimen: Blood   Result Value Ref Range    Procalcitonin 0.39 (H) 0.00 - 0.25 ng/mL   Magnesium    Collection Time: 10/22/24  7:31 PM    Specimen: Blood   Result Value Ref Range    Magnesium 1.8 1.6 - 2.6 mg/dL   Respiratory Panel PCR w/COVID-19(SARS-CoV-2) ANNIA/LORIE/SALTY/PAD/COR/GEORGES In-House, NP Swab in UTM/VTM, 2 HR TAT - Swab, Nasopharynx    Collection Time: 10/22/24  8:14 PM    Specimen: Nasopharynx; Swab   Result Value Ref Range    ADENOVIRUS, PCR Not Detected Not Detected    Coronavirus 229E Not Detected Not Detected    Coronavirus HKU1 Not Detected Not Detected    Coronavirus NL63 Not Detected Not Detected    Coronavirus OC43 Not Detected Not Detected    COVID19 Not Detected Not Detected - Ref. Range    Human Metapneumovirus Not Detected Not Detected    Human Rhinovirus/Enterovirus Not Detected Not Detected    Influenza A PCR Not Detected Not Detected    Influenza B PCR Not Detected Not Detected    Parainfluenza Virus 1 Not Detected Not Detected    Parainfluenza Virus 2 Not Detected Not Detected    Parainfluenza Virus 3 Not Detected Not Detected    Parainfluenza Virus 4 Not Detected Not Detected    RSV, PCR Not Detected Not Detected    Bordetella pertussis pcr Not Detected Not Detected    Bordetella parapertussis PCR Not Detected Not Detected    Chlamydophila pneumoniae PCR Not Detected Not Detected    Mycoplasma pneumo by PCR Not Detected Not Detected   Urinalysis With Microscopic If Indicated (No Culture) - Urine, Clean Catch    Collection Time: 10/23/24 12:20 AM    Specimen:  Urine, Clean Catch   Result Value Ref Range    Color, UA Yellow Yellow, Straw    Appearance, UA Clear Clear    pH, UA 6.0 5.0 - 8.0    Specific Gravity, UA >1.030 (H) 1.005 - 1.030    Glucose, UA Negative Negative    Ketones, UA 15 mg/dL (1+) (A) Negative    Bilirubin, UA Negative Negative    Blood, UA Moderate (2+) (A) Negative    Protein, UA Trace (A) Negative    Leuk Esterase, UA Small (1+) (A) Negative    Nitrite, UA Negative Negative    Urobilinogen, UA 0.2 E.U./dL 0.2 - 1.0 E.U./dL   Urinalysis, Microscopic Only - Urine, Clean Catch    Collection Time: 10/23/24 12:20 AM    Specimen: Urine, Clean Catch   Result Value Ref Range    RBC, UA 11-20 (A) None Seen, 0-2 /HPF    WBC, UA 11-20 (A) None Seen, 0-2 /HPF    Bacteria, UA None Seen None Seen /HPF    Squamous Epithelial Cells, UA 7-12 (A) None Seen, 0-2 /HPF    Hyaline Casts, UA 0-2 None Seen /LPF    Methodology Automated Microscopy        Ordered the above labs and independently reviewed the results.        RADIOLOGY  CT Abdomen Pelvis With Contrast    Result Date: 10/22/2024  CT ABDOMEN PELVIS W CONTRAST-  HISTORY: 34 years of age, Female.  Fever, nausea vomiting diarrhea. Pain in lower abdomen bilaterally.  TECHNIQUE:  CT includes axial imaging from the lung bases to the trochanters with intravenous contrast and without use of oral contrast. Data reconstructed in coronal and sagittal planes. Radiation dose reduction techniques were utilized, including automated exposure control and exposure modulation based on body size.  COMPARISON: CT abdomen and pelvis 06/13/2024, 11/20/2021, 10/20/2020  FINDINGS: There is a left upper pole area of low density with adjacent cortical thinning and this heterogenous low-density measures 3 x 3.1 cm. Within the anterior left interpolar kidney there is a low-density lesion likely representing a cyst that measures 1 cm. There is left renal posterolateral lower pole cortical thinning.  Within the right renal lower pole there is  an abnormal low-density masslike lesion that measures approximately 2.8 x 2.8 cm. There are adjacent small cysts or dilated calyces and there is chronic right lower pole cortical thinning as well as chronic right upper pole cortical thinning. There is no hydronephrosis though there is perinephric stranding particularly surrounding the right renal lower pole. No hydroureter. Urinary bladder exhibits mild wall thickening though is low volume.  There is mild retroperitoneal brooklyn enlargement. Lymph node medial to the right kidney measures 1.5 cm. There are additional smaller retroperitoneal nodes. No evidence for brooklyn enlargement within the pelvis.  Lung bases appear clear. Liver, gallbladder, spleen, adrenal glands, pancreas, bowel loops appear within normal limits. No evidence for bowel obstruction. No ascites.      1. 2.8 cm right lower pole complex collection, potential developing abscess, or mass. This is suspicious for infection/pyelonephritis. There is surrounding right lower pole perinephric stranding. There is also left upper pole low-density lesion that is indeterminant and there is chronic bilateral renal cortical thinning. Recommend short interval follow-up with multiphasic CT or MRI.. There is no hydronephrosis. Urinary bladder exhibits mild wall thickening though exhibits low volume without surrounding inflammation. 2. Mild retroperitoneal brooklyn enlargement has developed with lymph node medial to the right kidney measuring 1.5 cm. Additional smaller retroperitoneal nodes.  Radiation dose reduction techniques were utilized, including automated exposure control and exposure modulation based on body size.   This report was finalized on 10/22/2024 11:07 PM by Richar Barajas M.D on Workstation: [id number]      CT Head Without Contrast    Result Date: 10/22/2024  Patient: SY PRESCOTT  Time Out: 22:25 Exam(s): CT HEAD Without Contrast EXAM:   CT Head Without Intravenous Contrast CLINICAL HISTORY:     Reason for exam: Headache. Top of head. TECHNIQUE:   Axial computed tomography images of the head brain without intravenous contrast.  CTDI is 55.44 mGy and DLP is 970.5 mGy-cm.  This CT exam was performed according to the principle of ALARA (As Low As Reasonably Achievable) by using one or more of the following dose reduction techniques: automated exposure control, adjustment of the mA and or kV according to patient size, and or use of iterative reconstruction technique.   Coronal and sagittal reformatted images were created and reviewed. COMPARISON:   No relevant prior studies available. FINDINGS:   Brain:  No mass effect, hemorrhage, large extra-axial collection, or CT evidence of acute cortical infarction.  CSF presents in the slightly expanded sella turcica compatible with empty sella.  Dilated perivascular space in the caudal left basal ganglia, normal variant.   Ventricles:  Unremarkable. No midline shift or ventriculomegaly.   Bones joints:  Unremarkable.  No acute fracture.   Soft tissues:  Unremarkable.   Sinuses:  Mild left ethmoid air cell mucosal thickening.   Mastoid air cells:  Left mastoid effusion.   Auditory system:  Left middle ear cavity effusion. IMPRESSION:     1.  No CT evidence of acute intracranial process. 2.  Left mastoid effusion and left middle ear effusion.  Correlate for otomastoiditis. 3.  Empty sella.     Electronically signed by Navdeep Koch M.D. on 10-22-24 at 2225    XR Chest 1 View    Result Date: 10/22/2024  CHEST SINGLE VIEW  HISTORY: 34 years of age, Female.  Fever mild cough, nausea vomiting and diarrhea  COMPARISON: AP chest 05/04/2024  FINDINGS: Heart and mediastinal structures appear within normal limits. Lungs appear clear and there is no evidence for pulmonary edema or pleural effusion or infiltrate. Cardiac monitor and leads are present      No evidence for active disease in the chest  This report was finalized on 10/22/2024 9:20 PM by Richar Barajas M.D on  Workstation: [id number]       I ordered the above noted radiological studies. Reviewed by me and discussed with radiologist.  See dictation for official radiology interpretation.      PROCEDURES    Procedures      MEDICATIONS GIVEN IN ER    Medications   sodium chloride 0.9 % flush 10 mL (has no administration in time range)   vancomycin 2250 mg/500 mL 0.9% NS IVPB ([facility]) (2,250 mg Intravenous New Bag 10/23/24 0150)   sodium chloride 0.9 % flush 10 mL (10 mL Intravenous Given 10/23/24 0151)   sodium chloride 0.9 % flush 10 mL (has no administration in time range)   acetaminophen (TYLENOL) tablet 650 mg (has no administration in time range)     Or   acetaminophen (TYLENOL) 160 MG/5ML oral solution 650 mg (has no administration in time range)     Or   acetaminophen (TYLENOL) suppository 650 mg (has no administration in time range)   sennosides-docusate (PERICOLACE) 8.6-50 MG per tablet 2 tablet (has no administration in time range)     And   polyethylene glycol (MIRALAX) packet 17 g (has no administration in time range)     And   bisacodyl (DULCOLAX) EC tablet 5 mg (has no administration in time range)     And   bisacodyl (DULCOLAX) suppository 10 mg (has no administration in time range)   ondansetron ODT (ZOFRAN-ODT) disintegrating tablet 4 mg (has no administration in time range)     Or   ondansetron (ZOFRAN) injection 4 mg (has no administration in time range)   calcium carbonate (TUMS) chewable tablet 500 mg (200 mg elemental) (has no administration in time range)   Pharmacy to dose vancomycin (has no administration in time range)   Pharmacy to Dose Zosyn (has no administration in time range)   piperacillin-tazobactam (ZOSYN) 3.375 g IVPB in 100 mL NS MBP (CD) (3.375 g Intravenous New Bag 10/23/24 0151)   piperacillin-tazobactam (ZOSYN) 3.375 g IVPB in 100 mL NS MBP (CD) (has no administration in time range)   sodium chloride 0.9 % bolus 500 mL (0 mL Intravenous Stopped 10/22/24 2240)   acetaminophen (TYLENOL)  tablet 1,000 mg (1,000 mg Oral Given 10/22/24 2011)   ondansetron (ZOFRAN) injection 4 mg (4 mg Intravenous Given 10/22/24 2008)   cefTRIAXone (ROCEPHIN) 2,000 mg in sodium chloride 0.9 % 100 mL MBP (0 mg Intravenous Stopped 10/22/24 2155)   iopamidol (ISOVUE-300) 61 % injection 85 mL (85 mL Intravenous Given by Other 10/22/24 2143)   sodium chloride 0.9 % bolus 500 mL (0 mL Intravenous Stopped 10/23/24 0151)         All labs have been independently reviewed by me.  All radiology studies have been reviewed by me and I discussed with radiologist dictating the report when indicated below.  All EKG's independently viewed and interpreted by me.  Discussion below represents my analysis of pertinent findings related to patient's condition, differential diagnosis, treatment plan and final disposition.        PROGRESS, DATA ANALYSIS, CONSULTS, AND MEDICAL DECISION MAKING    Differential diagnosis includes   - hepatobiliary pathology such as cholecystitis, cholangitis, and symptomatic cholelithiasis  -PUD  -Mesenteric ischemia  - Pancreatitis  - Dyspepsia  - Small bowel or large bowel obstruction  - Appendicitis  - Diverticulitis  - UTI including pyelonephritis  - Ureteral stone  - Zoster  - Colitis, including infectious and ischemic  - Atypical ACS  I clinically suspect that this is probably of viral illness.  She has a mild cough she is has mild abdominal pain with no guarding with nausea vomiting and diarrhea and fevers.  Diarrhea started the same time that the fever started.  Vomiting started today.  But her CBC is come back and she has marked leukocytosis of 22,000.  I Omayra check blood cultures on her, lactic acid procalcitonin.  I am going to go ahead and start her on antibiotics prophylactically.  I do not believe this is meningitis.  Her headache is the top of the head she has no signs of meningismus whatsoever.  Will check a viral respiratory panel as well.  She will need to be admitted to the hospital without  marked leukocytosis.  Informed patient as well as family at bedside of my initial concerns initial test that we will order.  All questions answered.      ED Course as of 10/23/24 0157   Tue Oct 22, 2024   2036 I have updated the patient as well as mother in the room informed initial lab work with a leukocytosis.  Informed about the plan for admission my treat with antibiotics.  She is hemodynamically stable blood pressure is normal heart rate is improved and normalized.  She does not appear septic or toxic. [MM]   2036 Lactate: 1.3 [MM]   2201 Procalcitonin(!): 0.39 [MM]   2202 Lactate: 1.3 [MM]   2202 Creatinine(!): 1.18  Creatinine 4 months ago was normal. [MM]   2202 CO2(!): 18.5 [MM]   2202 Anion Gap: 12.5 [MM]   2202 Neutrophil Rel %(!): 81.8 [MM]   2202 Chest x-ray is unremarkable no active disease seen. [MM]   2347 CT scan of the head report was reviewed.  No acute intracranial process seen.  There is left mastoid effusion and left middle ear effusion and would like to correlate with auto mastoiditis.  Please see complete dictated report from radiologist [MM]   2350 I reviewed the CT scan of the abdomen pelvis.  There is a 2.8 cm right lower pole complex collection potentially developing abscess or mass this is concerning for infection or pyelonephritis there is surrounding right lower lobe perinephric stranding there is also a left upper pole low-density lesion that is indeterminant and chronic bilateral renal cortical thickening recommend short interval follow-up with multiphasic CT or MRI there is no hydronephrosis mild wall bladder thickening.  Mild rub retroperitoneal brooklyn enlargement that is developed with lymph node medial to the right kidney measuring 1.5 cm.  Please see complete dictated report from radiologist [MM]   2356 I have reevaluated the patient.  Currently she has a normal blood pressure normal heart rate and normal oxygen saturation.  She states that she is feeling better.  Her headache is  resolved to 100%.  I have reexamined her mastoids after reviewing the CT scan report.  She has no mastoid tenderness she has no headache and she has no ear pain.  I informed her and the mother about the results of the CT scans and lab work.  She states that this is pretty similar to what she had when she had a bad kidney infection.  They report that she had an infection in her bladder that then went up to her kidneys and a urologist saw her, Dr. Walt Jaimes.  She has reported some flank pain on the right as well.  We treated her Rocephin.  Will get admitted to the hospital.  Urology will be consulted. [MM]   Wed Oct 23, 2024   0015 I did discuss the case with . Neeraj Arora is on for urology.  Informed the results of the CT scan.  There is no stone and there is no hydronephrosis but again has signs concerning for acute pyelonephritis.  He will consult and agrees with plan for admit to [facility]. [MM]   0016 Still have not been able to collect a urinalysis on this patient. [MM]   0045 Leukocytes, UA(!): Small (1+) [MM]   0045 WBC, UA(!): 11-20 [MM]   0045 Squamous Epithelial Cells, UA(!): 7-12 [MM]   0045 I discussed the case with Ameena who is the midlevel provider on for A.  Informed her of the patient's presenting symptoms results of the CT scans and my treatment plan.  Informed her of my conversation with urologist Dr. Arora.  She agrees to admit the patient to the hospital.  Dr. Amaya will be the attending. [MM]      ED Course User Index  [MM] Chao Prasad MD       AS OF 01:57 EDT VITALS:    BP - 129/77  HR - 78  TEMP - 97.9 °F (36.6 °C) (Oral)  02 SATS - 98%    SOCIAL DETERMINANTS OF HEALTH THAT IMPACT OR LIMIT CARE (For example..Homelessness,safe discharge, inability to obtain care, follow up, or prescriptions):      DIAGNOSIS  Final diagnoses:   Acute pyelonephritis         DISPOSITION  I have reviewed the test results with my patient and explained the current treatment plan.  I answered all of the  patient's questions.  The patient will be admitted to monitor bed at this time.  The patient is not hypotensive and is tolerating their current disease condition well enough for a monitored bed at this time.  The patient's current condition does not require intensive care treatment at this time.            DICTATED UTILIZING DRAGON DICTATION    Note Disclaimer: At [facility], we believe that sharing information builds trust and better relationships. You are receiving this note because you recently visited [facility]. It is possible you will see health information before a provider has talked with you about it. This kind of information can be easy to misunderstand. To help you fully understand what it means for your health, we urge you to discuss this note with your provider.       Chao Prasad MD  10/23/24 0157      Electronically signed by Chao Prasad MD at 10/23/24 0157       Oxygen Therapy (since admission)       Date/Time SpO2 Device (Oxygen Therapy) Flow (L/min) (Oxygen Therapy) Oxygen Concentration (%) ETCO2 (mmHg)    10/23/24 1015 96 -- -- -- --    10/23/24 0840 97 -- -- -- --    10/23/24 0823 -- room air -- -- --    10/23/24 0735 96 room air -- -- --    10/23/24 0310 -- room air -- -- --    10/23/24 0232 100 -- -- -- --    10/23/24 0122 98 -- -- -- --    10/23/24 0101 100 -- -- -- --    10/23/24 0031 98 -- -- -- --    10/22/24 2302 96 -- -- -- --    10/22/24 2231 96 -- -- -- --    10/22/24 2207 96 -- -- -- --    10/22/24 2154 96 -- -- -- --    10/22/24 2109 96 -- -- -- --    10/22/24 2108 96 -- -- -- --    10/22/24 2101 96 -- -- -- --    10/22/24 2031 97 -- -- -- --    10/22/24 2021 96 -- -- -- --    10/22/24 2020 96 -- -- -- --    10/22/24 1929 95 -- -- -- --    10/22/24 1925 95 -- -- -- --    10/22/24 1916 96 room air -- -- --          Intake & Output (last 2 days)         10/21 0701  10/22 0700 10/22 0701  10/23 0700 10/23 0701  10/24 0700    IV Piggyback  1100     Total Intake(mL/kg)  1100  "(9.6)     Urine (mL/kg/hr)   450 (0.6)    Total Output   450    Net  +1100 -450           Urine Unmeasured Occurrence  1 x           Lines, Drains & Airways       Active LDAs       Name Placement date Placement time Site Days    Peripheral IV 10/22/24 1929 Left Antecubital 10/22/24  1929  Antecubital  less than 1    Peripheral IV 10/22/24 2120 Anterior;Distal;Right Forearm 10/22/24  2120  Forearm  less than 1    Peripheral IV 10/23/24 0150 Anterior;Right;Upper Arm 10/23/24  0150  Arm  less than 1              Inactive LDAs       None                  Medication Administration Report for Beatriz Boland \"Karina\" as of 10/22/24 through 10/23/24    Given  Hold  Not Given  Due  Canceled Entry  Other Actions  Time  Time  (Time)  Time  Time-Action    Discontinued  Completed  Future  MAR Hold  Linked    Medications  10/22/24  10/23/24        acetaminophen (TYLENOL) tablet 650 mg  Dose: 650 mg  Freq: Every 4 Hours PRN Route: PO  PRN Reason: Mild Pain  Start: 10/23/24 0048    If given for fever, use fever parameter: fever greater than 100.4 °F  Based on patient request - if ordered for moderate or severe pain, provider allows for administration of a medication prescribed for a lower pain scale.    Do not exceed 4 grams of acetaminophen in a 24 hr period. Max dose of 2gm for AST/ALT greater than 120 units/L.    If given for pain, use the following pain scale:   Mild Pain = Pain Score of 1-3, CPOT 1-2  Moderate Pain = Pain Score of 4-6, CPOT 3-4  Severe Pain = Pain Score of 7-10, CPOT 5-8  1102-Given  Or    acetaminophen (TYLENOL) 160 MG/5ML oral solution 650 mg  Dose: 650 mg  Freq: Every 4 Hours PRN Route: PO  PRN Reason: Mild Pain  Start: 10/23/24 0048      Admin Instructions:  If given for fever, use fever parameter: fever greater than 100.4 °F  Based on patient request - if ordered for moderate or severe pain, provider allows for administration of a medication prescribed for a lower pain scale.    Do not exceed 4 grams " of acetaminophen in a 24 hr period. Max dose of 2gm for AST/ALT greater than 120 units/L.    If given for pain, use the following pain scale:   Mild Pain = Pain Score of 1-3, CPOT 1-2  Moderate Pain = Pain Score of 4-6, CPOT 3-4  Severe Pain = Pain Score of 7-10, CPOT 5-8  1102-Not Given:  See Alt  albuterol (PROVENTIL) nebulizer solution 0.083% 2.5 mg/3mL  Dose: 2.5 mg  Freq: Every 6 Hours PRN Route: NEBULIZATION  PRN Reasons: Shortness of Air,Wheezing  Start: 10/23/24 0530      sennosides-docusate (PERICOLACE) 8.6-50 MG per tablet 2 tablet  Dose: 2 tablet  Freq: 2 Times Daily PRN Route: PO  PRN Reason: Constipation  Start: 10/23/24 0048    Start bowel management regimen if patient has not had a bowel movement after 12 hours.  And      polyethylene glycol (MIRALAX) packet 17 g  Dose: 17 g  Freq: Daily PRN Route: PO  PRN Reason: Constipation  PRN Comment: Use if senna-docusate is ineffective  Start: 10/23/24 0048  Admin Instructions:         Consult Notes (all)        Nelson Bragg MD at 10/23/24 0935        Consult Orders    1. Inpatient Infectious Diseases Consult [[id number]] ordered by Ameena Wasserman APRN at 10/23/24 0058                 Referring Provider: Harmeet Amaya MD      Subjective   History of present illness: 34-year-old with history of developmental delay, depression, cleft palate E. coli septicemia in September 2020 thought to be secondary to urinary source.  She also says that she has a history of abscess on the left kidney about 11 years ago treated by Dr. Walt Jaimes.  She has been having some vaginal itching and urinary urgency.  Apparently she had been treated for yeast infection with fluconazole and also initiated on metronidazole for possible BV on 10/20.  That evening she started developing fevers and chills, nausea vomiting and 2-3 bowel movements per day.  She was evaluated for COVID and flu which were negative.  Symptoms progressed and came to the ER yesterday where she  was found to have a right renal mass concerning for potential abscess.  She does have some mild flank pain.  She is started on vancomycin and Zosyn and admitted and does feel little bit better.        Physical Exam:   Vital Signs   Temp:  [97.9 °F (36.6 °C)-102.1 °F (38.9 °C)] 98.8 °F (37.1 °C)  Heart Rate:  [] 59  Resp:  [16-18] 16  BP: ()/(51-82) 108/68    GENERAL: Awake and alert, in no acute distress.   HEENT: Oropharynx is clear. Hearing is grossly normal.   EYES: . No conjunctival injection. No lid lag.   LUNGS:normal respiratory effort.   SKIN: no cutaneous eruptions in exposed areas  PSYCHIATRIC: Appropriate mood, affect, insight, and judgment.     Results Review:  Creatinine 0.8  White count 19.74, hemoglobin 11.9, platelets 187  Procalcitonin 0.39  Urinalysis 11-20 red blood cells/white blood cells; 7-12 squamous epithelial cells    CT abdomen pelvis shows a 2.8 cm right pole kidney mass.  Independently interpreted    A/p  1.  Sepsis secondary #2  2.  Right renal abscess    Suspected right renal abscess/pyelonephritis on the right.    I will hold Zosyn and vancomycin in favor of ceftriaxone.  I do not think she has an infectious diarrhea we will stop the GI PCR panel  Add urine culture to urinalysis and lab.           Thank you for this consult.  We will continue to follow along and tailor antibiotics as the patient's clinical course evolves.              Electronically signed by Nelson Bragg MD at 10/23/24 0397

## 2024-10-23 NOTE — PROGRESS NOTES
[facility] Clinical Pharmacy Services: Piperacillin-Tazobactam Consult    Pt Name: Beatriz Boland   : 1990    Indication: Intra-Abdominal Infection    Relevant clinical data and objective history reviewed:    Past Medical History:   Diagnosis Date    Allergic     Amblyopia     Anxiety     Back pain     Cleft palate     Depression     Developmental delay     GERD (gastroesophageal reflux disease)     followed by GI, Dr. Wesley Ferro    Impetigo     Kidney abscess     Kidney stone     Muscle spasm     Obesity (BMI 30-39.9)     Recurrent otitis media     Scoliosis     Sinus infection     recurrent    Sprain of shoulder, left 2016     Creatinine   Date Value Ref Range Status   10/22/2024 1.18 (H) 0.57 - 1.00 mg/dL Final   2024 0.75 0.57 - 1.00 mg/dL Final   2024 0.85 0.57 - 1.00 mg/dL Final   2023 0.90 0.55 - 1.02 mg/dL Final   2022 0.78 0.55 - 1.02 mg/dL Final   2021 0.8 0.7 - 1.5 mg/dL Final     BUN   Date Value Ref Range Status   10/22/2024 7 6 - 20 mg/dL Final   2023 10 7 - 19 mg/dL Final     Estimated Creatinine Clearance: 88 mL/min (A) (by C-G formula based on SCr of 1.18 mg/dL (H)).    Lab Results   Component Value Date    WBC 22.09 (H) 10/22/2024     Temp Readings from Last 3 Encounters:   10/22/24 97.9 °F (36.6 °C) (Oral)   24 98.5 °F (36.9 °C)   24 98.7 °F (37.1 °C) (Tympanic)      Assessment/Plan  Estimated CrCl >20 mL/min at this time; BMI 39.16 kg/m2  Will start piperacillin-tazobactam 3.375 g IV every 8 hours     Pharmacy will continue to follow daily while on piperacillin-tazobactam and adjust as needed. Thank you for this consult.    Faina Sanon [facility]  Clinical Pharmacist

## 2024-10-24 LAB
ANION GAP SERPL CALCULATED.3IONS-SCNC: 13.2 MMOL/L (ref 5–15)
BACTERIA SPEC AEROBE CULT: NORMAL
BUN SERPL-MCNC: 6 MG/DL (ref 6–20)
BUN/CREAT SERPL: 7.1 (ref 7–25)
CALCIUM SPEC-SCNC: 8.5 MG/DL (ref 8.6–10.5)
CHLORIDE SERPL-SCNC: 109 MMOL/L (ref 98–107)
CO2 SERPL-SCNC: 16.8 MMOL/L (ref 22–29)
CREAT SERPL-MCNC: 0.85 MG/DL (ref 0.57–1)
DEPRECATED RDW RBC AUTO: 37.9 FL (ref 37–54)
EGFRCR SERPLBLD CKD-EPI 2021: 92.9 ML/MIN/1.73
ERYTHROCYTE [DISTWIDTH] IN BLOOD BY AUTOMATED COUNT: 12.4 % (ref 12.3–15.4)
GLUCOSE SERPL-MCNC: 95 MG/DL (ref 65–99)
HCT VFR BLD AUTO: 35.1 % (ref 34–46.6)
HGB BLD-MCNC: 11.5 G/DL (ref 12–15.9)
MCH RBC QN AUTO: 27.8 PG (ref 26.6–33)
MCHC RBC AUTO-ENTMCNC: 32.8 G/DL (ref 31.5–35.7)
MCV RBC AUTO: 84.8 FL (ref 79–97)
PLATELET # BLD AUTO: 197 10*3/MM3 (ref 140–450)
PMV BLD AUTO: 9.9 FL (ref 6–12)
POTASSIUM SERPL-SCNC: 3.9 MMOL/L (ref 3.5–5.2)
RBC # BLD AUTO: 4.14 10*6/MM3 (ref 3.77–5.28)
SODIUM SERPL-SCNC: 139 MMOL/L (ref 136–145)
WBC NRBC COR # BLD AUTO: 11.91 10*3/MM3 (ref 3.4–10.8)

## 2024-10-24 PROCEDURE — 99232 SBSQ HOSP IP/OBS MODERATE 35: CPT | Performed by: INTERNAL MEDICINE

## 2024-10-24 PROCEDURE — 25010000002 CEFTRIAXONE PER 250 MG: Performed by: INTERNAL MEDICINE

## 2024-10-24 PROCEDURE — 80048 BASIC METABOLIC PNL TOTAL CA: CPT | Performed by: INTERNAL MEDICINE

## 2024-10-24 PROCEDURE — 85027 COMPLETE CBC AUTOMATED: CPT | Performed by: INTERNAL MEDICINE

## 2024-10-24 RX ADMIN — PANTOPRAZOLE SODIUM 40 MG: 40 TABLET, DELAYED RELEASE ORAL at 06:47

## 2024-10-24 RX ADMIN — PANTOPRAZOLE SODIUM 40 MG: 40 TABLET, DELAYED RELEASE ORAL at 16:33

## 2024-10-24 RX ADMIN — Medication 10 ML: at 09:32

## 2024-10-24 RX ADMIN — CEFTRIAXONE 2000 MG: 2 INJECTION, POWDER, FOR SOLUTION INTRAMUSCULAR; INTRAVENOUS at 09:32

## 2024-10-24 RX ADMIN — CETIRIZINE HYDROCHLORIDE 10 MG: 10 TABLET ORAL at 07:43

## 2024-10-24 NOTE — PROGRESS NOTES
"  [facility] Progress Note    Chief Complaint: Flank pain    She is doing a bit better.  Fever curve is slightly improved.  No nausea.  Pain still significant but a little better.  No dysuria    Review of Systems:    The following systems were reviewed and negative;  respiratory and cardiovascular          Vital Signs  /74 (BP Location: Left arm, Patient Position: Sitting)   Pulse 91   Temp 98.1 °F (36.7 °C) (Oral)   Resp 16   Ht 170 cm (66.93\")   Wt 115 kg (254 lb 8 oz)   LMP  (LMP Unknown)   SpO2 98%   BMI 39.94 kg/m²     Physical Exam:     General Appearance:    Alert, cooperative, NAD   HEENT:    No trauma, pupils reactive, hearing intact   Back:     No CVA tenderness   Lungs:     Respirations unlabored, no wheezing    Heart:    RRR, intact peripheral pulses   Abdomen:   Soft benign   :       Extremities:   No edema, no deformity   Lymphatic:   No neck or groin LAD   Skin:   No bleeding, bruising or rashes   Neuro/Psych:   Orientation intact, mood/affect pleasant, no focal findings        Results Review:     I reviewed the patient's new clinical results.  Lab Results (last 24 hours)       Procedure Component Value Units Date/Time    CBC (No Diff) [[id number]]  (Abnormal) Collected: 10/24/24 0427    Specimen: Blood Updated: 10/24/24 0532     WBC 11.91 10*3/mm3      RBC 4.14 10*6/mm3      Hemoglobin 11.5 g/dL      Hematocrit 35.1 %      MCV 84.8 fL      MCH 27.8 pg      MCHC 32.8 g/dL      RDW 12.4 %      RDW-SD 37.9 fl      MPV 9.9 fL      Platelets 197 10*3/mm3     Basic Metabolic Panel [[id number]] Collected: 10/24/24 0427    Specimen: Blood Updated: 10/24/24 0519    Blood Culture - Blood, Arm, Right [[id number]]  (Normal) Collected: 10/22/24 1946    Specimen: Blood from Arm, Right Updated: 10/23/24 2000     Blood Culture No growth at 24 hours    Blood Culture - Blood, Hand, Left [[id number]]  (Normal) Collected: 10/22/24 1946    Specimen: Blood from Hand, Left Updated: 10/23/24 2000     Blood " Culture No growth at 24 hours    Narrative:      Less than seven (7) mL's of blood was collected.  Insufficient quantity may yield false negative results.    Potassium [[id number]]  (Normal) Collected: 10/23/24 1905    Specimen: Blood Updated: 10/23/24 1955     Potassium 3.7 mmol/L     Urine Culture - Urine, Urine, Clean Catch [[id number]] Collected: 10/23/24 0020    Specimen: Urine, Clean Catch Updated: 10/23/24 1312    Basic Metabolic Panel [[id number]]  (Abnormal) Collected: 10/23/24 0820    Specimen: Blood Updated: 10/23/24 0912     Glucose 89 mg/dL      BUN 7 mg/dL      Creatinine 0.80 mg/dL      Sodium 136 mmol/L      Potassium 3.5 mmol/L      Chloride 106 mmol/L      CO2 17.0 mmol/L      Calcium 8.4 mg/dL      BUN/Creatinine Ratio 8.8     Anion Gap 13.0 mmol/L      eGFR 99.9 mL/min/1.73     Narrative:      GFR Normal >60  Chronic Kidney Disease <60  Kidney Failure <15            Imaging Results (Last 24 Hours)       ** No results found for the last 24 hours. **            Medication Review:   I have personally reviewed    Current Facility-Administered Medications:     acetaminophen (TYLENOL) tablet 650 mg, 650 mg, Oral, Q4H PRN, 650 mg at 10/23/24 1102 **OR** acetaminophen (TYLENOL) 160 MG/5ML oral solution 650 mg, 650 mg, Oral, Q4H PRN **OR** acetaminophen (TYLENOL) suppository 650 mg, 650 mg, Rectal, Q4H PRN, Ameena Wasserman APRN    albuterol (PROVENTIL) nebulizer solution 0.083% 2.5 mg/3mL, 2.5 mg, Nebulization, Q6H PRN, Ameena Wasserman APRN    sennosides-docusate (PERICOLACE) 8.6-50 MG per tablet 2 tablet, 2 tablet, Oral, BID PRN **AND** polyethylene glycol (MIRALAX) packet 17 g, 17 g, Oral, Daily PRN **AND** bisacodyl (DULCOLAX) EC tablet 5 mg, 5 mg, Oral, Daily PRN **AND** bisacodyl (DULCOLAX) suppository 10 mg, 10 mg, Rectal, Daily PRN, Ameena Wasserman, LIVIA    calcium carbonate (TUMS) chewable tablet 500 mg (200 mg elemental), 2 tablet, Oral, BID PRN, Ameena Wasserman, APRN     Calcium Replacement - Follow Nurse / BPA Driven Protocol, , Does not apply, PRN, Ameena Wasserman APRN    cefTRIAXone (ROCEPHIN) 2,000 mg in sodium chloride 0.9 % 100 mL MBP, 2,000 mg, Intravenous, Q24H, Nelson Bragg MD, Last Rate: 200 mL/hr at 10/23/24 1101, 2,000 mg at 10/23/24 1101    cetirizine (zyrTEC) tablet 10 mg, 10 mg, Oral, Daily, Ameena Wasserman APRN, 10 mg at 10/24/24 0743    fluticasone (FLONASE) 50 MCG/ACT nasal spray 1 spray, 1 spray, Each Nare, Daily, Ameena Wasserman APRN, 1 spray at 10/23/24 0825    Magnesium Standard Dose Replacement - Follow Nurse / BPA Driven Protocol, , Does not apply, PRN, Ameena Wasserman APRN    nicotine (NICODERM CQ) 14 MG/24HR patch 1 patch, 1 patch, Transdermal, Q24H, Ameena Wasserman APRN    ondansetron ODT (ZOFRAN-ODT) disintegrating tablet 4 mg, 4 mg, Oral, Q6H PRN **OR** ondansetron (ZOFRAN) injection 4 mg, 4 mg, Intravenous, Q6H PRN, Ameena Wasserman APRN    pantoprazole (PROTONIX) EC tablet 40 mg, 40 mg, Oral, BID AC, Ameena Wasserman APRN, 40 mg at 10/24/24 0647    Phosphorus Replacement - Follow Nurse / BPA Driven Protocol, , Does not apply, PRN, Ameena Wasserman APRN    Potassium Replacement - Follow Nurse / BPA Driven Protocol, , Does not apply, PRN, Ameena Wasserman APRN    Potassium Replacement - Follow Nurse / BPA Driven Protocol, , Does not apply, PRN, Ameena Wasserman APRN    sodium chloride 0.9 % flush 10 mL, 10 mL, Intravenous, PRN, Chao Prasad MD    sodium chloride 0.9 % flush 10 mL, 10 mL, Intravenous, Q12H, Ameena Wasserman APRN, 10 mL at 10/23/24 2011    sodium chloride 0.9 % flush 10 mL, 10 mL, Intravenous, PRN, Ameena Wasserman, APRN    Allergies:    Dust mite extract and Ciprofloxacin    Assessment:    Active Problems:    Acute pyelonephritis    Mental developmental delay    GERD without esophagitis    Mild intermittent asthma without complication    Hypokalemia     Tobacco abuse    Sepsis       Right pyelonephritis possible developing abscess    Plan:    Close observation.  May need to repeat her CAT scan depending on her clinical course.  Nothing to drain at this point.      Dedrick Jaimes MD    10/24/2024  08:00 EDT

## 2024-10-24 NOTE — PLAN OF CARE
Goal Outcome Evaluation:           Progress: no change  Outcome Evaluation: VSS. Room air. No c/o pain, soa, N/V. Patient asleep majority of shift. Up with standy assist to bathroom for voids. One continent, loose BM overnight. AM labs show WBC trending back down, currently 11.91.

## 2024-10-24 NOTE — PLAN OF CARE
AAOX4. RA. SR. Urinating without difficulty. Still have some watery stools. Denies pain. Continues IV abx.       Goal Outcome Evaluation:  Plan of Care Reviewed With: patient        Progress: improving            Problem: Adult Inpatient Plan of Care  Goal: Plan of Care Review  Outcome: Progressing  Flowsheets (Taken 10/24/2024 1803)  Progress: improving  Plan of Care Reviewed With: patient  Goal: Patient-Specific Goal (Individualized)  Outcome: Progressing  Goal: Absence of Hospital-Acquired Illness or Injury  Outcome: Progressing  Intervention: Identify and Manage Fall Risk  Recent Flowsheet Documentation  Taken 10/24/2024 1801 by Inés Webster RN  Safety Promotion/Fall Prevention:   safety round/check completed   room organization consistent   nonskid shoes/slippers when out of bed   lighting adjusted   clutter free environment maintained   assistive device/personal items within reach  Taken 10/24/2024 1633 by Inés Webster RN  Safety Promotion/Fall Prevention:   safety round/check completed   room organization consistent   nonskid shoes/slippers when out of bed   lighting adjusted   assistive device/personal items within reach   clutter free environment maintained  Taken 10/24/2024 1414 by Inés Webster RN  Safety Promotion/Fall Prevention:   safety round/check completed   room organization consistent   nonskid shoes/slippers when out of bed   lighting adjusted   clutter free environment maintained   assistive device/personal items within reach   activity supervised  Taken 10/24/2024 1200 by Inés Webster RN  Safety Promotion/Fall Prevention:   safety round/check completed   room organization consistent   nonskid shoes/slippers when out of bed   lighting adjusted   clutter free environment maintained   activity supervised   assistive device/personal items within reach  Taken 10/24/2024 0800 by Inés Webster RN  Safety Promotion/Fall Prevention: safety round/check completed  Taken 10/24/2024  0733 by Inés Webster RN  Safety Promotion/Fall Prevention:   room organization consistent   safety round/check completed   nonskid shoes/slippers when out of bed   lighting adjusted   clutter free environment maintained   assistive device/personal items within reach   activity supervised  Intervention: Prevent Skin Injury  Recent Flowsheet Documentation  Taken 10/24/2024 1801 by Inés Webster RN  Body Position: position changed independently  Taken 10/24/2024 1633 by Inés Webster RN  Body Position: position changed independently  Taken 10/24/2024 1414 by Inés Webster RN  Body Position: position changed independently  Skin Protection: protective footwear used  Taken 10/24/2024 1200 by Inés Webster RN  Body Position: position changed independently  Taken 10/24/2024 0800 by Inés Webster RN  Body Position: position changed independently  Taken 10/24/2024 0733 by Inés Webster RN  Body Position: legs elevated  Skin Protection:   protective footwear used   transparent dressing maintained  Intervention: Prevent and Manage VTE (Venous Thromboembolism) Risk  Recent Flowsheet Documentation  Taken 10/24/2024 1414 by Inés Webster RN  VTE Prevention/Management:   bilateral   SCDs (sequential compression devices) off   patient refused intervention  Taken 10/24/2024 0733 by Inés Webster RN  VTE Prevention/Management:   bilateral   SCDs (sequential compression devices) off   patient refused intervention  Intervention: Prevent Infection  Recent Flowsheet Documentation  Taken 10/24/2024 1801 by Inés Webster RN  Infection Prevention:   single patient room provided   rest/sleep promoted   hand hygiene promoted   personal protective equipment utilized  Taken 10/24/2024 1633 by Inés Webster RN  Infection Prevention:   rest/sleep promoted   personal protective equipment utilized   hand hygiene promoted  Taken 10/24/2024 1414 by Inés Webster RN  Infection Prevention:   single  patient room provided   personal protective equipment utilized   rest/sleep promoted   hand hygiene promoted  Taken 10/24/2024 1200 by Inés Webster RN  Infection Prevention:   single patient room provided   rest/sleep promoted   personal protective equipment utilized   hand hygiene promoted  Taken 10/24/2024 0733 by Inés Webster RN  Infection Prevention:   single patient room provided   rest/sleep promoted   personal protective equipment utilized   hand hygiene promoted  Goal: Optimal Comfort and Wellbeing  Outcome: Progressing  Intervention: Provide Person-Centered Care  Recent Flowsheet Documentation  Taken 10/24/2024 1414 by Inés Webster RN  Trust Relationship/Rapport:   care explained   choices provided   emotional support provided   empathic listening provided   questions encouraged   questions answered   reassurance provided   thoughts/feelings acknowledged  Taken 10/24/2024 0733 by Inés Webster RN  Trust Relationship/Rapport:   care explained   choices provided   emotional support provided   questions encouraged   questions answered   empathic listening provided   reassurance provided   thoughts/feelings acknowledged  Goal: Readiness for Transition of Care  Outcome: Progressing  Goal: Plan of Care Review  Outcome: Progressing  Flowsheets (Taken 10/24/2024 1803)  Progress: improving  Plan of Care Reviewed With: patient  Goal: Patient-Specific Goal (Individualized)  Outcome: Progressing  Goal: Absence of Hospital-Acquired Illness or Injury  Outcome: Progressing  Intervention: Identify and Manage Fall Risk  Recent Flowsheet Documentation  Taken 10/24/2024 1801 by Inés Webster RN  Safety Promotion/Fall Prevention:   safety round/check completed   room organization consistent   nonskid shoes/slippers when out of bed   lighting adjusted   clutter free environment maintained   assistive device/personal items within reach  Taken 10/24/2024 1633 by Inés Webster RN  Safety Promotion/Fall  Prevention:   safety round/check completed   room organization consistent   nonskid shoes/slippers when out of bed   lighting adjusted   assistive device/personal items within reach   clutter free environment maintained  Taken 10/24/2024 1414 by Inés Webster RN  Safety Promotion/Fall Prevention:   safety round/check completed   room organization consistent   nonskid shoes/slippers when out of bed   lighting adjusted   clutter free environment maintained   assistive device/personal items within reach   activity supervised  Taken 10/24/2024 1200 by Inés Webster RN  Safety Promotion/Fall Prevention:   safety round/check completed   room organization consistent   nonskid shoes/slippers when out of bed   lighting adjusted   clutter free environment maintained   activity supervised   assistive device/personal items within reach  Taken 10/24/2024 0800 by Inés Webster RN  Safety Promotion/Fall Prevention: safety round/check completed  Taken 10/24/2024 0733 by Inés Webster RN  Safety Promotion/Fall Prevention:   room organization consistent   safety round/check completed   nonskid shoes/slippers when out of bed   lighting adjusted   clutter free environment maintained   assistive device/personal items within reach   activity supervised  Intervention: Prevent Skin Injury  Recent Flowsheet Documentation  Taken 10/24/2024 1801 by Inés Webster RN  Body Position: position changed independently  Taken 10/24/2024 1633 by Inés Webster RN  Body Position: position changed independently  Taken 10/24/2024 1414 by Inés Webster RN  Body Position: position changed independently  Skin Protection: protective footwear used  Taken 10/24/2024 1200 by Inés Webster RN  Body Position: position changed independently  Taken 10/24/2024 0800 by Inés Webster RN  Body Position: position changed independently  Taken 10/24/2024 0733 by Inés Webster RN  Body Position: legs elevated  Skin Protection:    protective footwear used   transparent dressing maintained  Intervention: Prevent and Manage VTE (Venous Thromboembolism) Risk  Recent Flowsheet Documentation  Taken 10/24/2024 1414 by Inés Webster RN  VTE Prevention/Management:   bilateral   SCDs (sequential compression devices) off   patient refused intervention  Taken 10/24/2024 0733 by Inés Webster RN  VTE Prevention/Management:   bilateral   SCDs (sequential compression devices) off   patient refused intervention  Intervention: Prevent Infection  Recent Flowsheet Documentation  Taken 10/24/2024 1801 by Inés Webster RN  Infection Prevention:   single patient room provided   rest/sleep promoted   hand hygiene promoted   personal protective equipment utilized  Taken 10/24/2024 1633 by Inés Webster RN  Infection Prevention:   rest/sleep promoted   personal protective equipment utilized   hand hygiene promoted  Taken 10/24/2024 1414 by Inés Webster RN  Infection Prevention:   single patient room provided   personal protective equipment utilized   rest/sleep promoted   hand hygiene promoted  Taken 10/24/2024 1200 by Inés Webster RN  Infection Prevention:   single patient room provided   rest/sleep promoted   personal protective equipment utilized   hand hygiene promoted  Taken 10/24/2024 0733 by Inés Webster RN  Infection Prevention:   single patient room provided   rest/sleep promoted   personal protective equipment utilized   hand hygiene promoted  Goal: Optimal Comfort and Wellbeing  Outcome: Progressing  Intervention: Provide Person-Centered Care  Recent Flowsheet Documentation  Taken 10/24/2024 1414 by Inés Webster RN  Trust Relationship/Rapport:   care explained   choices provided   emotional support provided   empathic listening provided   questions encouraged   questions answered   reassurance provided   thoughts/feelings acknowledged  Taken 10/24/2024 0733 by Inés Webster RN  Trust Relationship/Rapport:   care  explained   choices provided   emotional support provided   questions encouraged   questions answered   empathic listening provided   reassurance provided   thoughts/feelings acknowledged  Goal: Readiness for Transition of Care  Outcome: Progressing     Problem: Sepsis/Septic Shock  Goal: Optimal Coping  Outcome: Progressing  Intervention: Support Patient and Family Response  Recent Flowsheet Documentation  Taken 10/24/2024 1414 by Inés Webster RN  Supportive Measures:   active listening utilized   verbalization of feelings encouraged   goal-setting facilitated  Family/Support System Care: support provided  Taken 10/24/2024 0733 by Inés Webster RN  Supportive Measures: active listening utilized  Goal: Absence of Bleeding  Outcome: Progressing  Goal: Blood Glucose Level Within Target Range  Outcome: Progressing  Goal: Absence of Infection Signs and Symptoms  Outcome: Progressing  Intervention: Initiate Sepsis Management  Recent Flowsheet Documentation  Taken 10/24/2024 1801 by Inés Webster RN  Infection Prevention:   single patient room provided   rest/sleep promoted   hand hygiene promoted   personal protective equipment utilized  Taken 10/24/2024 1633 by Inés Webster RN  Infection Prevention:   rest/sleep promoted   personal protective equipment utilized   hand hygiene promoted  Taken 10/24/2024 1414 by Inés Webster RN  Infection Prevention:   single patient room provided   personal protective equipment utilized   rest/sleep promoted   hand hygiene promoted  Taken 10/24/2024 1200 by Inés Webster RN  Infection Prevention:   single patient room provided   rest/sleep promoted   personal protective equipment utilized   hand hygiene promoted  Taken 10/24/2024 0733 by Inés Webster RN  Infection Prevention:   single patient room provided   rest/sleep promoted   personal protective equipment utilized   hand hygiene promoted  Intervention: Promote Stabilization  Recent Flowsheet  Documentation  Taken 10/24/2024 0733 by Inés Webster, RN  Fluid/Electrolyte Management: fluids provided  Intervention: Promote Recovery  Recent Flowsheet Documentation  Taken 10/24/2024 1801 by Inés Webster, RN  Activity Management:   up ad katia   up in chair  Taken 10/24/2024 1633 by Inés Webster RN  Activity Management:   up in chair   up ad katia  Taken 10/24/2024 1414 by Inés Webster, RN  Activity Management: up in chair  Sleep/Rest Enhancement: natural light exposure provided  Taken 10/24/2024 1200 by Inés Webster, RN  Activity Management: up in chair  Taken 10/24/2024 0800 by Inés Webster, RN  Activity Management: up in chair  Taken 10/24/2024 0733 by Inés Webster RN  Activity Management:   activity encouraged   up in chair  Sleep/Rest Enhancement:   noise level reduced   room darkened  Goal: Optimal Nutrition Delivery  Outcome: Progressing     Problem: UTI (Urinary Tract Infection)  Goal: Improved Infection Symptoms  Outcome: Progressing  Goal: Improved Infection Symptoms  Outcome: Progressing

## 2024-10-24 NOTE — PROGRESS NOTES
Name: Beatriz Boland ADMIT: 10/22/2024   : 1990  PCP: Elyse Larkin LIVIA    MRN: [id number] LOS: 1 days   AGE/SEX: 34 y.o. female  ROOM: [facility]     Subjective   Subjective   10/24/2024  Patient seen and examined sitting in chair at bedside, does not appear to be in distress, states she is feeling much improved       Objective   Objective   Vital Signs  Temp:  [97.9 °F (36.6 °C)-99 °F (37.2 °C)] 98.1 °F (36.7 °C)  Heart Rate:  [] 91  Resp:  [16] 16  BP: ()/(63-78) 106/74  SpO2:  [95 %-100 %] 98 %  on   ;   Device (Oxygen Therapy): room air  Body mass index is 39.94 kg/m².  Physical Exam  Constitutional:       General: She is not in acute distress.     Appearance: She is obese. She is not toxic-appearing.   HENT:      Head: Normocephalic and atraumatic.      Nose: No congestion.      Mouth/Throat:      Pharynx: Oropharynx is clear. No oropharyngeal exudate.   Eyes:      General: No scleral icterus.  Cardiovascular:      Rate and Rhythm: Normal rate and regular rhythm.      Heart sounds: No murmur heard.     No friction rub. No gallop.   Pulmonary:      Effort: No respiratory distress.      Breath sounds: No wheezing or rales.   Abdominal:      General: There is no distension.      Tenderness: There is no abdominal tenderness. There is no guarding.   Musculoskeletal:         General: No swelling, deformity or signs of injury.      Cervical back: Normal range of motion. No rigidity.   Skin:     Coloration: Skin is not jaundiced.      Findings: No bruising or lesion.   Neurological:      General: No focal deficit present.      Mental Status: She is alert and oriented to person, place, and time.      Motor: No weakness.       Results Review     I reviewed the patient's new clinical results.  Results from last 7 days   Lab Units 10/24/24  0427 10/23/24  0440 10/22/24  1931   WBC 10*3/mm3 11.91* 19.74* 22.09*   HEMOGLOBIN g/dL 11.5* 11.9* 13.8   PLATELETS 10*3/mm3 197 187 217  "    Results from last 7 days   Lab Units 10/24/24  0427 10/23/24  1905 10/23/24  0820 10/22/24  1931   SODIUM mmol/L 139  --  136 135*   POTASSIUM mmol/L 3.9 3.7 3.5 3.3*   CHLORIDE mmol/L 109*  --  106 104   CO2 mmol/L 16.8*  --  17.0* 18.5*   BUN mg/dL 6  --  7 7   CREATININE mg/dL 0.85  --  0.80 1.18*   GLUCOSE mg/dL 95  --  89 128*   EGFR mL/min/1.73 92.9  --  99.9 62.7     Results from last 7 days   Lab Units 10/22/24  1931   ALBUMIN g/dL 3.3*   BILIRUBIN mg/dL 0.6   ALK PHOS U/L 66   AST (SGOT) U/L 9   ALT (SGPT) U/L 6     Results from last 7 days   Lab Units 10/24/24  0427 10/23/24  0820 10/22/24  1931   CALCIUM mg/dL 8.5* 8.4* 9.4   ALBUMIN g/dL  --   --  3.3*   MAGNESIUM mg/dL  --   --  1.8     Results from last 7 days   Lab Units 10/22/24  1931   PROCALCITONIN ng/mL 0.39*   LACTATE mmol/L 1.3     No results found for: \"HGBA1C\", \"POCGLU\"    CT Abdomen Pelvis With Contrast    Result Date: 10/22/2024  1. 2.8 cm right lower pole complex collection, potential developing abscess, or mass. This is suspicious for infection/pyelonephritis. There is surrounding right lower pole perinephric stranding. There is also left upper pole low-density lesion that is indeterminant and there is chronic bilateral renal cortical thinning. Recommend short interval follow-up with multiphasic CT or MRI.. There is no hydronephrosis. Urinary bladder exhibits mild wall thickening though exhibits low volume without surrounding inflammation. 2. Mild retroperitoneal brooklyn enlargement has developed with lymph node medial to the right kidney measuring 1.5 cm. Additional smaller retroperitoneal nodes.  Radiation dose reduction techniques were utilized, including automated exposure control and exposure modulation based on body size.   This report was finalized on 10/22/2024 11:07 PM by Richar Barajas M.D on Workstation: [id number]      CT Head Without Contrast    Result Date: 10/22/2024  Electronically signed by Navdeep Koch M.D. on " 10-22-24 at 2225    XR Chest 1 View    Result Date: 10/22/2024  No evidence for active disease in the chest  This report was finalized on 10/22/2024 9:20 PM by Richar Barajas M.D on Workstation: [id number]       I have personally reviewed all medications:  Scheduled Medications  cefTRIAXone, 2,000 mg, Intravenous, Q24H  cetirizine, 10 mg, Oral, Daily  fluticasone, 1 spray, Each Nare, Daily  nicotine, 1 patch, Transdermal, Q24H  pantoprazole, 40 mg, Oral, BID AC  sodium chloride, 10 mL, Intravenous, Q12H    Infusions   Diet  Diet: Regular/House; Fluid Consistency: Thin (IDDSI 0)    I have personally reviewed:  [x]  Laboratory   [x]  Microbiology   [x]  Radiology   [x]  EKG/Telemetry  [x]  Cardiology/Vascular   []  Pathology    []  Records       Assessment/Plan     Active Hospital Problems    Diagnosis  POA    **Acute pyelonephritis [N10]  Yes    Sepsis [A41.9]  Yes    Tobacco abuse [Z72.0]  Yes    Hypokalemia [E87.6]  Yes    Mild intermittent asthma without complication [J45.20]  Yes    GERD without esophagitis [K21.9]  Yes    Mental developmental delay [F81.9]  Yes      Resolved Hospital Problems   No resolved problems to display.       34 y.o. female admitted with Acute pyelonephritis.    #Sepsis  #Acute Pyelonephritis  #Possible kidney abscess    -Admit to a telemetry unit for monitoring    -Met sepsis criteria as febrile, tachycardic, leukocytosis present with uti/pyelo    - sepsis bolus not give as BP stable with normal lactic    -Pyuria is present on urinalysis and there is evidence of pyelonephritis/right kidney collection on CT A/P    -She received Rocephin in the ED, will start Zosyn to cover for possible kidney abscess    -Blood cultures are pending    - Urine culture negative    -Urology following, considering IR drain placement    -  wbc 22.09 > 11.91, trend    -She reports diarrhea, nausea, and vomiting. Zofran is available as needed. Will order GI panel    - ID > continue Rocephin 2g IV daily      #Asthma    -Chronic, stable on room air    -Continue home inhalers     #Hypokalemia    -Replace potassium per protocol    #Anemia    - hgb 13.8 > 11.5    - no overt bleeding, possibly 2/2 dilution    - monitor labs     #GERD    -PPI     #Tobacco Abuse    -Nicotine patch      SCDs for DVT prophylaxis.  Full code.  Discussed with patient.  Anticipate discharge home in 1-2 days.  Expected Discharge Date: 10/25/2024; Expected Discharge Time:       Danilo Ashley DO  [facility]  10/24/24  10:21 EDT

## 2024-10-24 NOTE — PROGRESS NOTES
ID note for pyelo  Subjective: Her urinary symptoms are improved.  Tmax of 99 over the past 24 hours.  Afebrile.    Physical Exam:   Vital Signs   Temp:  [97.9 °F (36.6 °C)-99 °F (37.2 °C)] 98.1 °F (36.7 °C)  Heart Rate:  [] 91  Resp:  [16] 16  BP: ()/(63-78) 106/74    GENERAL: Awake and alert, in no acute distress.   HEENT: Oropharynx is clear. Hearing is grossly normal.   EYES: . No conjunctival injection. No lid lag.   LUNGS:normal respiratory effort.   SKIN: no cutaneous eruptions in exposed areas  PSYCHIATRIC: Appropriate mood, affect, insight, and judgment.     Results Review:  Creatinine 0.8  White count 11, hgb 11.5, plt 197   BCx ngtd  RPP neg  UCx P    A/p  1.  Sepsis secondary #2  2.  Right pyelonephritis with possible renal abscess    Patient with right pyelonephritis and even possible evolving right renal abscess.  Continue ceftriaxone.  Await urine culture and blood culture results.  Clinically she seems some better today.

## 2024-10-25 ENCOUNTER — READMISSION MANAGEMENT (OUTPATIENT)
Dept: CALL CENTER | Facility: HOSPITAL | Age: 34
End: 2024-10-25
Payer: COMMERCIAL

## 2024-10-25 VITALS
SYSTOLIC BLOOD PRESSURE: 92 MMHG | HEART RATE: 77 BPM | BODY MASS INDEX: 39.94 KG/M2 | OXYGEN SATURATION: 98 % | TEMPERATURE: 97.5 F | RESPIRATION RATE: 16 BRPM | HEIGHT: 67 IN | DIASTOLIC BLOOD PRESSURE: 53 MMHG | WEIGHT: 254.5 LBS

## 2024-10-25 LAB
ADV 40+41 DNA STL QL NAA+NON-PROBE: NOT DETECTED
ANION GAP SERPL CALCULATED.3IONS-SCNC: 9.7 MMOL/L (ref 5–15)
ASTRO TYP 1-8 RNA STL QL NAA+NON-PROBE: NOT DETECTED
BUN SERPL-MCNC: 7 MG/DL (ref 6–20)
BUN/CREAT SERPL: 8.1 (ref 7–25)
C CAYETANENSIS DNA STL QL NAA+NON-PROBE: NOT DETECTED
C COLI+JEJ+UPSA DNA STL QL NAA+NON-PROBE: NOT DETECTED
CALCIUM SPEC-SCNC: 8.4 MG/DL (ref 8.6–10.5)
CHLORIDE SERPL-SCNC: 108 MMOL/L (ref 98–107)
CO2 SERPL-SCNC: 21.3 MMOL/L (ref 22–29)
CREAT SERPL-MCNC: 0.86 MG/DL (ref 0.57–1)
CRYPTOSP DNA STL QL NAA+NON-PROBE: NOT DETECTED
DEPRECATED RDW RBC AUTO: 40 FL (ref 37–54)
E HISTOLYT DNA STL QL NAA+NON-PROBE: NOT DETECTED
EAEC PAA PLAS AGGR+AATA ST NAA+NON-PRB: NOT DETECTED
EC STX1+STX2 GENES STL QL NAA+NON-PROBE: NOT DETECTED
EGFRCR SERPLBLD CKD-EPI 2021: 91.6 ML/MIN/1.73
EPEC EAE GENE STL QL NAA+NON-PROBE: NOT DETECTED
ERYTHROCYTE [DISTWIDTH] IN BLOOD BY AUTOMATED COUNT: 12.6 % (ref 12.3–15.4)
ETEC LTA+ST1A+ST1B TOX ST NAA+NON-PROBE: NOT DETECTED
G LAMBLIA DNA STL QL NAA+NON-PROBE: NOT DETECTED
GLUCOSE SERPL-MCNC: 89 MG/DL (ref 65–99)
HCT VFR BLD AUTO: 35.9 % (ref 34–46.6)
HGB BLD-MCNC: 11.5 G/DL (ref 12–15.9)
MCH RBC QN AUTO: 27.7 PG (ref 26.6–33)
MCHC RBC AUTO-ENTMCNC: 32 G/DL (ref 31.5–35.7)
MCV RBC AUTO: 86.5 FL (ref 79–97)
NOROVIRUS GI+II RNA STL QL NAA+NON-PROBE: NOT DETECTED
P SHIGELLOIDES DNA STL QL NAA+NON-PROBE: NOT DETECTED
PLATELET # BLD AUTO: 198 10*3/MM3 (ref 140–450)
PMV BLD AUTO: 9.6 FL (ref 6–12)
POTASSIUM SERPL-SCNC: 3.7 MMOL/L (ref 3.5–5.2)
RBC # BLD AUTO: 4.15 10*6/MM3 (ref 3.77–5.28)
RVA RNA STL QL NAA+NON-PROBE: NOT DETECTED
S ENT+BONG DNA STL QL NAA+NON-PROBE: NOT DETECTED
SAPO I+II+IV+V RNA STL QL NAA+NON-PROBE: NOT DETECTED
SHIGELLA SP+EIEC IPAH ST NAA+NON-PROBE: NOT DETECTED
SODIUM SERPL-SCNC: 139 MMOL/L (ref 136–145)
V CHOL+PARA+VUL DNA STL QL NAA+NON-PROBE: NOT DETECTED
V CHOLERAE DNA STL QL NAA+NON-PROBE: NOT DETECTED
WBC NRBC COR # BLD AUTO: 6.96 10*3/MM3 (ref 3.4–10.8)
Y ENTEROCOL DNA STL QL NAA+NON-PROBE: NOT DETECTED

## 2024-10-25 PROCEDURE — 25010000002 CEFTRIAXONE PER 250 MG: Performed by: INTERNAL MEDICINE

## 2024-10-25 PROCEDURE — 87507 IADNA-DNA/RNA PROBE TQ 12-25: CPT | Performed by: STUDENT IN AN ORGANIZED HEALTH CARE EDUCATION/TRAINING PROGRAM

## 2024-10-25 PROCEDURE — 85027 COMPLETE CBC AUTOMATED: CPT | Performed by: STUDENT IN AN ORGANIZED HEALTH CARE EDUCATION/TRAINING PROGRAM

## 2024-10-25 PROCEDURE — 99232 SBSQ HOSP IP/OBS MODERATE 35: CPT | Performed by: INTERNAL MEDICINE

## 2024-10-25 PROCEDURE — 80048 BASIC METABOLIC PNL TOTAL CA: CPT | Performed by: STUDENT IN AN ORGANIZED HEALTH CARE EDUCATION/TRAINING PROGRAM

## 2024-10-25 RX ORDER — CEFPODOXIME PROXETIL 200 MG/1
200 TABLET, FILM COATED ORAL EVERY 12 HOURS
Qty: 28 TABLET | Refills: 0 | Status: SHIPPED | OUTPATIENT
Start: 2024-10-25 | End: 2024-10-25

## 2024-10-25 RX ORDER — CEFPODOXIME PROXETIL 200 MG/1
200 TABLET, FILM COATED ORAL EVERY 12 HOURS
Qty: 28 TABLET | Refills: 0 | Status: SHIPPED | OUTPATIENT
Start: 2024-10-25 | End: 2024-11-08

## 2024-10-25 RX ADMIN — Medication 10 ML: at 11:12

## 2024-10-25 RX ADMIN — CEFTRIAXONE 2000 MG: 2 INJECTION, POWDER, FOR SOLUTION INTRAMUSCULAR; INTRAVENOUS at 11:12

## 2024-10-25 RX ADMIN — CETIRIZINE HYDROCHLORIDE 10 MG: 10 TABLET ORAL at 11:12

## 2024-10-25 RX ADMIN — PANTOPRAZOLE SODIUM 40 MG: 40 TABLET, DELAYED RELEASE ORAL at 11:12

## 2024-10-25 NOTE — DISCHARGE SUMMARY
Patient Name: Beatriz Boland  : 1990  MRN: [id number]    Date of Admission: 10/22/2024  Date of Discharge:  10/25/2024  Primary Care Physician: Elyse Larkin APRN      Chief Complaint:   Fever and Chills      Discharge Diagnoses     Active Hospital Problems    Diagnosis  POA    **Acute pyelonephritis [N10]  Yes    Sepsis [A41.9]  Yes    Tobacco abuse [Z72.0]  Yes    Hypokalemia [E87.6]  Yes    Mild intermittent asthma without complication [J45.20]  Yes    GERD without esophagitis [K21.9]  Yes    Mental developmental delay [F81.9]  Yes      Resolved Hospital Problems   No resolved problems to display.        Hospital Course     Ms. Boland is a 34 y.o. female who presented to [facility] on 10/22/2024 due to fevers, chills, nausea.  She was found to have right pyelonephritis with concern for abscess.  Started on broad spectrum abx and improved.  Sepsis bolus not given as BP stable with normal lactic.  ID consulted, rocephin continued.  Urology monitored to consider drain placement. However white count  improved along with patient .  She was cleared for discharge by ID and urology on 10/25/2024.  She had some diarrhea but CT a/p along with GI stool panel negative.  She was discharged home on 10/25/2024 in agreement with plan below.     #Discharge Plan  - Follow up with PCP in 3-5 days  - Take Cefpodoxime 200mg twice a day by mouth for 14 days  - Follow up on GI stool panel    Day of Discharge     Subjective:  Patient feeling much improved, she is without fevers/chills and leukocytosis has improved.  Admits to some diarrhea.  Stable for discharge    Physical Exam:  Temp:  [97.5 °F (36.4 °C)-98.2 °F (36.8 °C)] 97.5 °F (36.4 °C)  Heart Rate:  [62-91] 77  Resp:  [16] 16  BP: ()/(53-93) 92/53  Body mass index is 39.94 kg/m².  Physical Exam  Constitutional:       General: She is not in acute distress.     Appearance: She is obese. She is not toxic-appearing.   HENT:      Head: Normocephalic and  atraumatic.      Nose: Nose normal. No congestion.      Mouth/Throat:      Pharynx: No oropharyngeal exudate.   Eyes:      General: No scleral icterus.  Cardiovascular:      Rate and Rhythm: Normal rate and regular rhythm.      Heart sounds: No murmur heard.     No friction rub. No gallop.   Pulmonary:      Effort: No respiratory distress.      Breath sounds: No wheezing or rales.   Abdominal:      General: There is no distension.      Tenderness: There is no abdominal tenderness. There is no guarding.   Musculoskeletal:         General: No swelling or deformity.      Cervical back: Normal range of motion. No rigidity.      Right lower leg: No edema.      Left lower leg: No edema.   Skin:     Coloration: Skin is not jaundiced.      Findings: No bruising or lesion.   Neurological:      General: No focal deficit present.      Mental Status: She is alert and oriented to person, place, and time.      Motor: No weakness.         Consultants     Consult Orders (all) (From admission, onward)       Start     Ordered    10/23/24 0059  Inpatient Urology Consult  Once        Specialty:  Urology  Provider:  Juan J John MD    10/23/24 0058    10/23/24 0058  Inpatient Infectious Diseases Consult  Once        Specialty:  Infectious Diseases  Provider:  Alejandro Alvarado MD    10/23/24 0058    10/22/24 2357  [facility] (on-call MD unless specified) Details  Once        Specialty:  Hospitalist  Provider:  (Not yet assigned)    10/22/24 2356    10/22/24 2357  Urology (on-call MD unless specified)  Once        Specialty:  Urology  Provider:  Juan J John MD    10/22/24 2356                  Procedures     * Surgery not found *    Imaging Results (All)       Procedure Component Value Units Date/Time    CT Abdomen Pelvis With Contrast [[id number]] Collected: 10/22/24 2224     Updated: 10/22/24 2310    Narrative:      CT ABDOMEN PELVIS W CONTRAST-     HISTORY: 34 years of age, Female.  Fever, nausea vomiting  diarrhea.   Pain in lower abdomen bilaterally.     TECHNIQUE:  CT includes axial imaging from the lung bases to the  trochanters with intravenous contrast and without use of oral contrast.  Data reconstructed in coronal and sagittal planes. Radiation dose  reduction techniques were utilized, including automated exposure control  and exposure modulation based on body size.     COMPARISON: CT abdomen and pelvis 06/13/2024, 11/20/2021, 10/20/2020     FINDINGS: There is a left upper pole area of low density with adjacent  cortical thinning and this heterogenous low-density measures 3 x 3.1 cm.  Within the anterior left interpolar kidney there is a low-density lesion  likely representing a cyst that measures 1 cm. There is left renal  posterolateral lower pole cortical thinning.     Within the right renal lower pole there is an abnormal low-density  masslike lesion that measures approximately 2.8 x 2.8 cm. There are  adjacent small cysts or dilated calyces and there is chronic right lower  pole cortical thinning as well as chronic right upper pole cortical  thinning. There is no hydronephrosis though there is perinephric  stranding particularly surrounding the right renal lower pole. No  hydroureter. Urinary bladder exhibits mild wall thickening though is low  volume.     There is mild retroperitoneal brooklyn enlargement. Lymph node medial to  the right kidney measures 1.5 cm. There are additional smaller  retroperitoneal nodes. No evidence for brooklyn enlargement within the  pelvis.     Lung bases appear clear. Liver, gallbladder, spleen, adrenal glands,  pancreas, bowel loops appear within normal limits. No evidence for bowel  obstruction. No ascites.       Impression:      1. 2.8 cm right lower pole complex collection, potential developing  abscess, or mass. This is suspicious for infection/pyelonephritis. There  is surrounding right lower pole perinephric stranding. There is also  left upper pole low-density lesion that  is indeterminant and there is  chronic bilateral renal cortical thinning. Recommend short interval  follow-up with multiphasic CT or MRI.. There is no hydronephrosis.  Urinary bladder exhibits mild wall thickening though exhibits low volume  without surrounding inflammation.  2. Mild retroperitoneal brooklyn enlargement has developed with lymph node  medial to the right kidney measuring 1.5 cm. Additional smaller  retroperitoneal nodes.      Radiation dose reduction techniques were utilized, including automated  exposure control and exposure modulation based on body size.        This report was finalized on 10/22/2024 11:07 PM by Richar Barajas M.D  on Workstation: [id number]       CT Head Without Contrast [[id number]] Collected: 10/22/24 2225     Updated: 10/22/24 2225    Narrative:        Patient: SY PRESCOTT  Time Out: 22:25  Exam(s): CT HEAD Without Contrast     EXAM:    CT Head Without Intravenous Contrast    CLINICAL HISTORY:     Reason for exam: Headache. Top of head.    TECHNIQUE:    Axial computed tomography images of the head brain without intravenous   contrast.  CTDI is 55.44 mGy and DLP is 970.5 mGy-cm.  This CT exam was   performed according to the principle of ALARA (As Low As Reasonably   Achievable) by using one or more of the following dose reduction   techniques: automated exposure control, adjustment of the mA and or kV   according to patient size, and or use of iterative reconstruction   technique.    Coronal and sagittal reformatted images were created and reviewed.    COMPARISON:    No relevant prior studies available.    FINDINGS:    Brain:  No mass effect, hemorrhage, large extra-axial collection, or CT   evidence of acute cortical infarction.  CSF presents in the slightly   expanded sella turcica compatible with empty sella.  Dilated perivascular   space in the caudal left basal ganglia, normal variant.    Ventricles:  Unremarkable. No midline shift or ventriculomegaly.    Bones  joints:  Unremarkable.  No acute fracture.    Soft tissues:  Unremarkable.    Sinuses:  Mild left ethmoid air cell mucosal thickening.    Mastoid air cells:  Left mastoid effusion.    Auditory system:  Left middle ear cavity effusion.    IMPRESSION:       1.  No CT evidence of acute intracranial process.  2.  Left mastoid effusion and left middle ear effusion.  Correlate for   otomastoiditis.  3.  Empty sella.      Impression:          Electronically signed by Navdeep Koch M.D. on 10-22-24 at 2225    XR Chest 1 View [[id number]] Collected: 10/22/24 2102     Updated: 10/22/24 2123    Narrative:      CHEST SINGLE VIEW     HISTORY: 34 years of age, Female.  Fever mild cough, nausea vomiting and  diarrhea     COMPARISON: AP chest 05/04/2024     FINDINGS: Heart and mediastinal structures appear within normal limits.  Lungs appear clear and there is no evidence for pulmonary edema or  pleural effusion or infiltrate. Cardiac monitor and leads are present       Impression:      No evidence for active disease in the chest     This report was finalized on 10/22/2024 9:20 PM by Richar Barajas M.D  on Workstation: [id number]             Results for orders placed during the hospital encounter of 09/04/18    Duplex Venous Lower Extremity - Left    Interpretation Summary  · Normal left lower extremity venous duplex scan.      Pertinent Labs     Results from last 7 days   Lab Units 10/25/24  0535 10/24/24  0427 10/23/24  0440 10/22/24  1931   WBC 10*3/mm3 6.96 11.91* 19.74* 22.09*   HEMOGLOBIN g/dL 11.5* 11.5* 11.9* 13.8   PLATELETS 10*3/mm3 198 197 187 217     Results from last 7 days   Lab Units 10/25/24  0535 10/24/24  0427 10/23/24  1905 10/23/24  0820 10/22/24  1931   SODIUM mmol/L 139 139  --  136 135*   POTASSIUM mmol/L 3.7 3.9 3.7 3.5 3.3*   CHLORIDE mmol/L 108* 109*  --  106 104   CO2 mmol/L 21.3* 16.8*  --  17.0* 18.5*   BUN mg/dL 7 6  --  7 7   CREATININE mg/dL 0.86 0.85  --  0.80 1.18*   GLUCOSE mg/dL 89 95  --  89  "128*   EGFR mL/min/1.73 91.6 92.9  --  99.9 62.7     Results from last 7 days   Lab Units 10/22/24  1931   ALBUMIN g/dL 3.3*   BILIRUBIN mg/dL 0.6   ALK PHOS U/L 66   AST (SGOT) U/L 9   ALT (SGPT) U/L 6     Results from last 7 days   Lab Units 10/25/24  0535 10/24/24  0427 10/23/24  0820 10/22/24  1931   CALCIUM mg/dL 8.4* 8.5* 8.4* 9.4   ALBUMIN g/dL  --   --   --  3.3*   MAGNESIUM mg/dL  --   --   --  1.8     Results from last 7 days   Lab Units 10/22/24  1931   LIPASE U/L 13             Invalid input(s): \"LDLCALC\"  Results from last 7 days   Lab Units 10/23/24  0020 10/22/24  1946   BLOODCX   --  No growth at 2 days  No growth at 2 days   URINECX  25,000 CFU/mL Normal Urogenital Wanda  --      Results from last 7 days   Lab Units 10/22/24  2014   COVID19  Not Detected       Test Results Pending at Discharge     Pending Results       Procedure [Order ID] Specimen - Date/Time    Gastrointestinal Panel, PCR - Stool, Per Rectum [[id number]] Collected: 10/25/24 1011    Specimen: Stool from Per Rectum Updated: 10/25/24 1018              Discharge Details        Discharge Medications        New Medications        Instructions Start Date   cefpodoxime 200 MG tablet  Commonly known as: VANTIN   200 mg, Oral, Every 12 Hours             Continue These Medications        Instructions Start Date   albuterol sulfate  (90 Base) MCG/ACT inhaler  Commonly known as: PROVENTIL HFA;VENTOLIN HFA;PROAIR HFA   2 puffs, Inhalation, Every 4 Hours PRN      cetirizine 10 MG tablet  Commonly known as: zyrTEC   10 mg, Oral, Daily      [employer] Nasal Allergy Buna 55 MCG/ACT nasal inhaler  Generic drug: Triamcinolone Acetonide   2 sprays, Nasal, Daily      Dexilant 60 MG capsule  Generic drug: dexlansoprazole   60 mg, Oral, Daily      estradiol cypionate 5 MG/ML injection  Commonly known as: DEPO-ESTRADIOL   1.5 mg, Intramuscular, Every 28 Days, Patient states she takes it every 3 months       fluticasone 50 MCG/ACT nasal spray  Commonly " known as: FLONASE   No dose, route, or frequency recorded.      methocarbamol 750 MG tablet  Commonly known as: ROBAXIN   750 mg, Oral, 3 Times Daily PRN      ondansetron ODT 4 MG disintegrating tablet  Commonly known as: ZOFRAN-ODT   4 mg, Translingual, Every 6 Hours PRN      Tylenol 325 MG capsule  Generic drug: Acetaminophen   325 mg, Oral, As Needed               Allergies   Allergen Reactions    Dust Mite Extract Itching     Other reaction(s): Other (See Comments)  Nasal symptoms    Ciprofloxacin Diarrhea and Nausea And Vomiting     Tingling in left leg       Discharge Disposition:  Home or Self Care      Discharge Diet:  Diet Order   Procedures    Diet: Regular/House; Fluid Consistency: Thin (IDDSI 0)       Discharge Activity:       CODE STATUS:    Code Status and Medical Interventions: CPR (Attempt to Resuscitate); Full Support   Ordered at: 10/23/24 0058     Code Status (Patient has no pulse and is not breathing):    CPR (Attempt to Resuscitate)     Medical Interventions (Patient has pulse or is breathing):    Full Support       No future appointments.   Follow-up Information       Elyse Larkin, APRN .    Specialty: Family Medicine  Contact information:  [address] [facility] [facility] [address] [phone]                             Time Spent on Discharge: 34 minutes      DO [facility]  10/25/24  10:29 EDT

## 2024-10-25 NOTE — NURSING NOTE
Patient is being discharged at this time in stable condition. Patient's IV and telemetry were removed prior to leaving facility. Patient is being transported home via private vehicle accompanied by her mother. Patient is leaving with all personal belongings.

## 2024-10-25 NOTE — PAYOR COMM NOTE
"Alice Bolanderasmo HENRIQUEZ \"Karina\" (34 y.o. Female)        PLEASE SEE ATTACHED DC SUMMARY    REF#[id number]     THANK YOU    LAVERN LUKE LPN [facility]   Date of Birth   1990    Social Security Number       Address   [address] [place] [address]    Home Phone   [phone]    MRN   [id number]       Yarsanism   Voodoo    Marital Status   Single                            Admission Date   10/22/24    Admission Type   Emergency    Admitting Provider   Melvin Medley MD    Attending Provider       Department, Room/Bed   [address], [address]/1       Discharge Date   10/25/2024    Discharge Disposition   Home or Self Care    Discharge Destination                                 Attending Provider: (none)   Allergies: Dust Mite Extract, Ciprofloxacin    Isolation: None   Infection: None   Code Status: CPR    Ht: 170 cm (66.93\")   Wt: 115 kg (254 lb 8 oz)    Admission Cmt: None   Principal Problem: Acute pyelonephritis [N10]                   Active Insurance as of 10/22/2024       Primary Coverage       Payor Plan Insurance Group Employer/Plan Group    [employer] BLUE CROSS AN[employer] BLUE CROSS BLUE [employer]IELD PPO [id number]       Payor Plan Address Payor Plan Phone Number Payor Plan Fax Number Effective Dates    [id number] [phone]  2022 - None Entered    [facility] [id number]         Subscriber Name Subscriber Birth Date Member ID       BEATRIZ BOLAND 1990 [id number]                     Emergency Contacts        (Rel.) Home Phone Work Phone Mobile Phone    Marilu Boland (Mother) [phone] -- [phone]    Tere Gallardo (Friend) [phone] --    Elizabeth Boland (Relative) -- -- [phone]                 Discharge Summary        Danilo Ashley DO at 10/25/24 1029              Patient Name: Beatriz Boland  : 1990  MRN: [id number]    Date of Admission: 10/22/2024  Date of Discharge:  10/25/2024  Primary Care " Physician: Elyse Larkin, APRN      Chief Complaint:   Fever and Chills      Discharge Diagnoses     Active Hospital Problems    Diagnosis  POA    **Acute pyelonephritis [N10]  Yes    Sepsis [A41.9]  Yes    Tobacco abuse [Z72.0]  Yes    Hypokalemia [E87.6]  Yes    Mild intermittent asthma without complication [J45.20]  Yes    GERD without esophagitis [K21.9]  Yes    Mental developmental delay [F81.9]  Yes      Resolved Hospital Problems   No resolved problems to display.        Hospital Course     Ms. Boland is a 34 y.o. female who presented to [facility] on 10/22/2024 due to fevers, chills, nausea.  She was found to have right pyelonephritis with concern for abscess.  Started on broad spectrum abx and improved.  Sepsis bolus not given as BP stable with normal lactic.  ID consulted, rocephin continued.  Urology monitored to consider drain placement. However white count  improved along with patient .  She was cleared for discharge by ID and urology on 10/25/2024.  She had some diarrhea but CT a/p along with GI stool panel negative.  She was discharged home on 10/25/2024 in agreement with plan below.     #Discharge Plan  - Follow up with PCP in 3-5 days  - Take Cefpodoxime 200mg twice a day by mouth for 14 days  - Follow up on GI stool panel    Day of Discharge     Subjective:  Patient feeling much improved, she is without fevers/chills and leukocytosis has improved.  Admits to some diarrhea.  Stable for discharge    Physical Exam:  Temp:  [97.5 °F (36.4 °C)-98.2 °F (36.8 °C)] 97.5 °F (36.4 °C)  Heart Rate:  [62-91] 77  Resp:  [16] 16  BP: ()/(53-93) 92/53  Body mass index is 39.94 kg/m².  Physical Exam  Constitutional:       General: She is not in acute distress.     Appearance: She is obese. She is not toxic-appearing.   HENT:      Head: Normocephalic and atraumatic.      Nose: Nose normal. No congestion.      Mouth/Throat:      Pharynx: No oropharyngeal exudate.   Eyes:      General: No scleral  icterus.  Cardiovascular:      Rate and Rhythm: Normal rate and regular rhythm.      Heart sounds: No murmur heard.     No friction rub. No gallop.   Pulmonary:      Effort: No respiratory distress.      Breath sounds: No wheezing or rales.   Abdominal:      General: There is no distension.      Tenderness: There is no abdominal tenderness. There is no guarding.   Musculoskeletal:         General: No swelling or deformity.      Cervical back: Normal range of motion. No rigidity.      Right lower leg: No edema.      Left lower leg: No edema.   Skin:     Coloration: Skin is not jaundiced.      Findings: No bruising or lesion.   Neurological:      General: No focal deficit present.      Mental Status: She is alert and oriented to person, place, and time.      Motor: No weakness.         Consultants     Consult Orders (all) (From admission, onward)       Start     Ordered    10/23/24 0059  Inpatient Urology Consult  Once        Specialty:  Urology  Provider:  Juan J John MD    10/23/24 0058    10/23/24 0058  Inpatient Infectious Diseases Consult  Once        Specialty:  Infectious Diseases  Provider:  Alejandro Alvarado MD    10/23/24 0058    10/22/24 2357  [facility] (on-call MD unless specified) Details  Once        Specialty:  Hospitalist  Provider:  (Not yet assigned)    10/22/24 2356    10/22/24 2357  Urology (on-call MD unless specified)  Once        Specialty:  Urology  Provider:  Juan J John MD    10/22/24 2356                  Procedures     * Surgery not found *    Imaging Results (All)       Procedure Component Value Units Date/Time    CT Abdomen Pelvis With Contrast [[id number]] Collected: 10/22/24 2224     Updated: 10/22/24 2310    Narrative:      CT ABDOMEN PELVIS W CONTRAST-     HISTORY: 34 years of age, Female.  Fever, nausea vomiting diarrhea.   Pain in lower abdomen bilaterally.     TECHNIQUE:  CT includes axial imaging from the lung bases to the  trochanters with intravenous  contrast and without use of oral contrast.  Data reconstructed in coronal and sagittal planes. Radiation dose  reduction techniques were utilized, including automated exposure control  and exposure modulation based on body size.     COMPARISON: CT abdomen and pelvis 06/13/2024, 11/20/2021, 10/20/2020     FINDINGS: There is a left upper pole area of low density with adjacent  cortical thinning and this heterogenous low-density measures 3 x 3.1 cm.  Within the anterior left interpolar kidney there is a low-density lesion  likely representing a cyst that measures 1 cm. There is left renal  posterolateral lower pole cortical thinning.     Within the right renal lower pole there is an abnormal low-density  masslike lesion that measures approximately 2.8 x 2.8 cm. There are  adjacent small cysts or dilated calyces and there is chronic right lower  pole cortical thinning as well as chronic right upper pole cortical  thinning. There is no hydronephrosis though there is perinephric  stranding particularly surrounding the right renal lower pole. No  hydroureter. Urinary bladder exhibits mild wall thickening though is low  volume.     There is mild retroperitoneal brooklyn enlargement. Lymph node medial to  the right kidney measures 1.5 cm. There are additional smaller  retroperitoneal nodes. No evidence for brooklyn enlargement within the  pelvis.     Lung bases appear clear. Liver, gallbladder, spleen, adrenal glands,  pancreas, bowel loops appear within normal limits. No evidence for bowel  obstruction. No ascites.       Impression:      1. 2.8 cm right lower pole complex collection, potential developing  abscess, or mass. This is suspicious for infection/pyelonephritis. There  is surrounding right lower pole perinephric stranding. There is also  left upper pole low-density lesion that is indeterminant and there is  chronic bilateral renal cortical thinning. Recommend short interval  follow-up with multiphasic CT or MRI.. There  is no hydronephrosis.  Urinary bladder exhibits mild wall thickening though exhibits low volume  without surrounding inflammation.  2. Mild retroperitoneal brooklyn enlargement has developed with lymph node  medial to the right kidney measuring 1.5 cm. Additional smaller  retroperitoneal nodes.      Radiation dose reduction techniques were utilized, including automated  exposure control and exposure modulation based on body size.        This report was finalized on 10/22/2024 11:07 PM by Richar Barajas M.D  on Workstation: [id number]       CT Head Without Contrast [[id number]] Collected: 10/22/24 2225     Updated: 10/22/24 2225    Narrative:        Patient: SY PRESCOTT  Time Out: 22:25  Exam(s): CT HEAD Without Contrast     EXAM:    CT Head Without Intravenous Contrast    CLINICAL HISTORY:     Reason for exam: Headache. Top of head.    TECHNIQUE:    Axial computed tomography images of the head brain without intravenous   contrast.  CTDI is 55.44 mGy and DLP is 970.5 mGy-cm.  This CT exam was   performed according to the principle of ALARA (As Low As Reasonably   Achievable) by using one or more of the following dose reduction   techniques: automated exposure control, adjustment of the mA and or kV   according to patient size, and or use of iterative reconstruction   technique.    Coronal and sagittal reformatted images were created and reviewed.    COMPARISON:    No relevant prior studies available.    FINDINGS:    Brain:  No mass effect, hemorrhage, large extra-axial collection, or CT   evidence of acute cortical infarction.  CSF presents in the slightly   expanded sella turcica compatible with empty sella.  Dilated perivascular   space in the caudal left basal ganglia, normal variant.    Ventricles:  Unremarkable. No midline shift or ventriculomegaly.    Bones joints:  Unremarkable.  No acute fracture.    Soft tissues:  Unremarkable.    Sinuses:  Mild left ethmoid air cell mucosal thickening.    Mastoid  air cells:  Left mastoid effusion.    Auditory system:  Left middle ear cavity effusion.    IMPRESSION:       1.  No CT evidence of acute intracranial process.  2.  Left mastoid effusion and left middle ear effusion.  Correlate for   otomastoiditis.  3.  Empty sella.      Impression:          Electronically signed by Navdeep Koch M.D. on 10-22-24 at 2225    XR Chest 1 View [[id number]] Collected: 10/22/24 2102     Updated: 10/22/24 2123    Narrative:      CHEST SINGLE VIEW     HISTORY: 34 years of age, Female.  Fever mild cough, nausea vomiting and  diarrhea     COMPARISON: AP chest 05/04/2024     FINDINGS: Heart and mediastinal structures appear within normal limits.  Lungs appear clear and there is no evidence for pulmonary edema or  pleural effusion or infiltrate. Cardiac monitor and leads are present       Impression:      No evidence for active disease in the chest     This report was finalized on 10/22/2024 9:20 PM by Richar Barajas M.D  on Workstation: [id number]             Results for orders placed during the hospital encounter of 09/04/18    Duplex Venous Lower Extremity - Left    Interpretation Summary  · Normal left lower extremity venous duplex scan.      Pertinent Labs     Results from last 7 days   Lab Units 10/25/24  0535 10/24/24  0427 10/23/24  0440 10/22/24  1931   WBC 10*3/mm3 6.96 11.91* 19.74* 22.09*   HEMOGLOBIN g/dL 11.5* 11.5* 11.9* 13.8   PLATELETS 10*3/mm3 198 197 187 217     Results from last 7 days   Lab Units 10/25/24  0535 10/24/24  0427 10/23/24  1905 10/23/24  0820 10/22/24  1931   SODIUM mmol/L 139 139  --  136 135*   POTASSIUM mmol/L 3.7 3.9 3.7 3.5 3.3*   CHLORIDE mmol/L 108* 109*  --  106 104   CO2 mmol/L 21.3* 16.8*  --  17.0* 18.5*   BUN mg/dL 7 6  --  7 7   CREATININE mg/dL 0.86 0.85  --  0.80 1.18*   GLUCOSE mg/dL 89 95  --  89 128*   EGFR mL/min/1.73 91.6 92.9  --  99.9 62.7     Results from last 7 days   Lab Units 10/22/24  1931   ALBUMIN g/dL 3.3*   BILIRUBIN mg/dL 0.6  "  ALK PHOS U/L 66   AST (SGOT) U/L 9   ALT (SGPT) U/L 6     Results from last 7 days   Lab Units 10/25/24  0535 10/24/24  0427 10/23/24  0820 10/22/24  1931   CALCIUM mg/dL 8.4* 8.5* 8.4* 9.4   ALBUMIN g/dL  --   --   --  3.3*   MAGNESIUM mg/dL  --   --   --  1.8     Results from last 7 days   Lab Units 10/22/24  1931   LIPASE U/L 13             Invalid input(s): \"LDLCALC\"  Results from last 7 days   Lab Units 10/23/24  0020 10/22/24  1946   BLOODCX   --  No growth at 2 days  No growth at 2 days   URINECX  25,000 CFU/mL Normal Urogenital Wanda  --      Results from last 7 days   Lab Units 10/22/24  2014   COVID19  Not Detected       Test Results Pending at Discharge     Pending Results       Procedure [Order ID] Specimen - Date/Time    Gastrointestinal Panel, PCR - Stool, Per Rectum [[id number]] Collected: 10/25/24 1011    Specimen: Stool from Per Rectum Updated: 10/25/24 1018              Discharge Details        Discharge Medications        New Medications        Instructions Start Date   cefpodoxime 200 MG tablet  Commonly known as: VANTIN   200 mg, Oral, Every 12 Hours             Continue These Medications        Instructions Start Date   albuterol sulfate  (90 Base) MCG/ACT inhaler  Commonly known as: PROVENTIL HFA;VENTOLIN HFA;PROAIR HFA   2 puffs, Inhalation, Every 4 Hours PRN      cetirizine 10 MG tablet  Commonly known as: zyrTEC   10 mg, Oral, Daily      [facility] Nasal Allergy [place] 55 MCG/ACT nasal inhaler  Generic drug: Triamcinolone Acetonide   2 sprays, Nasal, Daily      Dexilant 60 MG capsule  Generic drug: dexlansoprazole   60 mg, Oral, Daily      estradiol cypionate 5 MG/ML injection  Commonly known as: DEPO-ESTRADIOL   1.5 mg, Intramuscular, Every 28 Days, Patient states she takes it every 3 months       fluticasone 50 MCG/ACT nasal spray  Commonly known as: FLONASE   No dose, route, or frequency recorded.      methocarbamol 750 MG tablet  Commonly known as: ROBAXIN   750 mg, Oral, 3 Times Daily " PRN      ondansetron ODT 4 MG disintegrating tablet  Commonly known as: ZOFRAN-ODT   4 mg, Translingual, Every 6 Hours PRN      Tylenol 325 MG capsule  Generic drug: Acetaminophen   325 mg, Oral, As Needed               Allergies   Allergen Reactions    Dust Mite Extract Itching     Other reaction(s): Other (See Comments)  Nasal symptoms    Ciprofloxacin Diarrhea and Nausea And Vomiting     Tingling in left leg       Discharge Disposition:  Home or Self Care      Discharge Diet:  Diet Order   Procedures    Diet: Regular/House; Fluid Consistency: Thin (IDDSI 0)       Discharge Activity:       CODE STATUS:    Code Status and Medical Interventions: CPR (Attempt to Resuscitate); Full Support   Ordered at: 10/23/24 0058     Code Status (Patient has no pulse and is not breathing):    CPR (Attempt to Resuscitate)     Medical Interventions (Patient has pulse or is breathing):    Full Support       No future appointments.   Follow-up Information       Elyse Larkin, APRN .    Specialty: Family Medicine  Contact information:  [address] [place], [place] [address] [phone]                             Time Spent on Discharge: 34 minutes      Danilo Toledo DO  [facility]  10/25/24  10:29 EDT     Electronically signed by Danilo Toledo DO at 10/25/24 1334       Discharge Order (From admission, onward)       Start     Ordered    10/25/24 1026  Discharge patient  Once        Expected Discharge Date: 10/25/24   Expected Discharge Time: Midday   Discharge Disposition: Home or Self Care   Physician of Record for Attribution - Please select from Treatment Team: DANILO TOLEDO [[id number]]   Review needed by CMO to determine Physician of Record: No      Question Answer Comment   Physician of Record for Attribution - Please select from Treatment Team DANILO TOLEDO    Review needed by CMO to determine Physician of Record No        10/25/24 1020

## 2024-10-25 NOTE — PROGRESS NOTES
ID note for pyelo  Subjective: As below    Physical Exam:   Vital Signs   Temp:  [97.5 °F (36.4 °C)-98.2 °F (36.8 °C)] 97.5 °F (36.4 °C)  Heart Rate:  [62-91] 62  Resp:  [16] 16  BP: ()/(53-93) 92/53    GENERAL: Awake and alert, in no acute distress.   HEENT: Oropharynx is clear. Hearing is grossly normal.   EYES: . No conjunctival injection. No lid lag.   LUNGS:normal respiratory effort.   SKIN: no cutaneous eruptions in exposed areas  PSYCHIATRIC: Appropriate mood, affect, insight, and judgment.     Results Review:  Creatinine 0.86  White count 6.96   BCx ngtd  RPP neg  UCx 25K urogenital zoe    A/p  1.  Sepsis secondary #2  2.  Right pyelonephritis with possible renal abscess    She seems to be doing much better.  Fevers and leukocytosis resolved.  No pain.  No new urinary symptoms.  Tolerating the antibiotics.  I think it be reasonable to attempt medical therapy for this but we will see what urology says.  Currently on ceftriaxone and if they are okay for medical therapy then I would recommend cefpodoxime 200 mg p.o. twice daily x 14 days.

## 2024-10-26 NOTE — OUTREACH NOTE
Prep Survey      Flowsheet Row Responses   [facility] facility patient discharged from? [facility]   Is LACE score < 7 ? No   Eligibility Readm Mgmt   Discharge diagnosis Acute pyelonephritis/Sepsis   Does the patient have one of the following disease processes/diagnoses(primary or secondary)? Sepsis   Does the patient have Home health ordered? No   Is there a DME ordered? No   Prep survey completed? Yes            NICCI SHEIKH - Registered Nurse

## 2024-10-27 LAB
BACTERIA SPEC AEROBE CULT: NORMAL
BACTERIA SPEC AEROBE CULT: NORMAL

## 2024-10-29 ENCOUNTER — READMISSION MANAGEMENT (OUTPATIENT)
Dept: CALL CENTER | Facility: HOSPITAL | Age: 34
End: 2024-10-29
Payer: COMMERCIAL

## 2024-10-29 NOTE — OUTREACH NOTE
Sepsis Week 1 Survey      Flowsheet Row Responses   [facility] patient discharged from? [facility]   Does the patient have one of the following disease processes/diagnoses(primary or secondary)? Sepsis   Week 1 attempt successful? Yes   Call start time 0828   Call end time 0830   Discharge diagnosis Acute pyelonephritis/Sepsis   Meds reviewed with patient/caregiver? Yes   Is the patient having any side effects they believe may be caused by any medication additions or changes? No   Does the patient have all medications related to this admission filled (includes all antibiotics, inhalers, nebulizers,steroids,etc.) Yes   Is the patient taking all medications as directed (includes completed medication regime)? Yes   Medication comments reports that she picked up half her prescription as pharmacy only had limited supply, will  the rest today   Does the patient have a primary care provider?  Yes   Has the patient kept scheduled appointments due by today? N/A   Comments pt is caling today to schedule appt with PCP as well as Dr. Jaimes   Has home health visited the patient within 72 hours of discharge? N/A   Psychosocial issues? No   Did the patient receive a copy of their discharge instructions? Yes   Nursing interventions Reviewed instructions with patient   What is the patient's perception of their health status since discharge? Returned to baseline/stable  [Pt denies any ongoing s/s, afebrile, drinking plenty of fluids, no dysuria--taking meds as ordered.  Aware to monitor for return of s/s.]   Nursing interventions Nurse provided patient education   Is the patient/caregiver able to teach back TIME? E xtremely Ill - severe pain, discomfort, shortness of breath, M ental Decline - confused, sleepy, difficult to arouse, I nfection - may have signs and symptoms of an infection, T emperature - higher or lower than normal  [reviewed]   Nursing interventions Nurse provided patient education   Is patient/caregiver  able to teach back steps to recovery at home? Eat a balanced diet   Is the patient/caregiver able to teach back signs and symptoms of worsening condition: Fever, Altered mental status(confusion/coma)   If the patient is a current smoker, are they able to teach back resources for cessation? Not a smoker   Is the patient/caregiver able to teach back the hierarchy of who to call/visit for symptoms/problems? PCP, Specialist, Home health nurse, Urgent Care, ED, 911 Yes   Week 1 call completed? Yes   Graduated Yes  [Pt denies ongoing s/s, pt making appts today]   Call end time 3830            NOLAN VELAZQUEZ - Registered Nurse

## 2025-05-16 ENCOUNTER — OFFICE (AMBULATORY)
Dept: URBAN - METROPOLITAN AREA CLINIC 76 | Facility: CLINIC | Age: 35
End: 2025-05-16
Payer: COMMERCIAL

## 2025-05-16 VITALS
DIASTOLIC BLOOD PRESSURE: 72 MMHG | HEIGHT: 67 IN | WEIGHT: 260 LBS | SYSTOLIC BLOOD PRESSURE: 118 MMHG | HEART RATE: 73 BPM | OXYGEN SATURATION: 99 %

## 2025-05-16 DIAGNOSIS — K21.9 GASTRO-ESOPHAGEAL REFLUX DISEASE WITHOUT ESOPHAGITIS: ICD-10-CM

## 2025-05-16 DIAGNOSIS — R10.32 LEFT LOWER QUADRANT PAIN: ICD-10-CM

## 2025-05-16 PROCEDURE — 99214 OFFICE O/P EST MOD 30 MIN: CPT

## 2025-05-16 RX ORDER — DICYCLOMINE HYDROCHLORIDE 10 MG/1
40 CAPSULE ORAL
Qty: 60 | Refills: 1 | Status: ACTIVE
Start: 2025-05-16

## 2025-06-13 ENCOUNTER — APPOINTMENT (OUTPATIENT)
Dept: CT IMAGING | Facility: HOSPITAL | Age: 35
End: 2025-06-13
Payer: COMMERCIAL

## 2025-06-13 ENCOUNTER — HOSPITAL ENCOUNTER (EMERGENCY)
Facility: HOSPITAL | Age: 35
Discharge: HOME | End: 2025-06-13
Attending: EMERGENCY MEDICINE
Payer: COMMERCIAL

## 2025-06-13 VITALS
SYSTOLIC BLOOD PRESSURE: 125 MMHG | HEART RATE: 55 BPM | WEIGHT: 253.53 LBS | DIASTOLIC BLOOD PRESSURE: 79 MMHG | OXYGEN SATURATION: 100 % | BODY MASS INDEX: 39.79 KG/M2 | TEMPERATURE: 97.8 F | HEIGHT: 67 IN | RESPIRATION RATE: 16 BRPM

## 2025-06-13 DIAGNOSIS — N20.0 RIGHT NEPHROLITHIASIS: ICD-10-CM

## 2025-06-13 DIAGNOSIS — R10.9 LEFT FLANK PAIN: Primary | ICD-10-CM

## 2025-06-13 LAB
ALBUMIN SERPL-MCNC: 3.8 G/DL (ref 3.5–5.2)
ALBUMIN/GLOB SERPL: 1.2 G/DL
ALP SERPL-CCNC: 69 U/L (ref 39–117)
ALT SERPL W P-5'-P-CCNC: 12 U/L (ref 1–33)
ANION GAP SERPL CALCULATED.3IONS-SCNC: 9.4 MMOL/L (ref 5–15)
AST SERPL-CCNC: 16 U/L (ref 1–32)
BASOPHILS # BLD AUTO: 0.06 10*3/MM3 (ref 0–0.2)
BASOPHILS NFR BLD AUTO: 0.6 % (ref 0–1.5)
BILIRUB SERPL-MCNC: 0.2 MG/DL (ref 0–1.2)
BILIRUB UR QL STRIP: NEGATIVE
BUN SERPL-MCNC: 11 MG/DL (ref 6–20)
BUN/CREAT SERPL: 9.2 (ref 7–25)
CALCIUM SPEC-SCNC: 9.3 MG/DL (ref 8.6–10.5)
CHLORIDE SERPL-SCNC: 105 MMOL/L (ref 98–107)
CLARITY UR: CLEAR
CO2 SERPL-SCNC: 24.6 MMOL/L (ref 22–29)
COLOR UR: YELLOW
CREAT SERPL-MCNC: 1.19 MG/DL (ref 0.57–1)
DEPRECATED RDW RBC AUTO: 38.8 FL (ref 37–54)
EGFRCR SERPLBLD CKD-EPI 2021: 62 ML/MIN/1.73
EOSINOPHIL # BLD AUTO: 0.17 10*3/MM3 (ref 0–0.4)
EOSINOPHIL NFR BLD AUTO: 1.6 % (ref 0.3–6.2)
ERYTHROCYTE [DISTWIDTH] IN BLOOD BY AUTOMATED COUNT: 12.2 % (ref 12.3–15.4)
GLOBULIN UR ELPH-MCNC: 3.3 GM/DL
GLUCOSE SERPL-MCNC: 92 MG/DL (ref 65–99)
GLUCOSE UR STRIP-MCNC: NEGATIVE MG/DL
HCG SERPL QL: NEGATIVE
HCT VFR BLD AUTO: 41.8 % (ref 34–46.6)
HGB BLD-MCNC: 13.8 G/DL (ref 12–15.9)
HGB UR QL STRIP.AUTO: NEGATIVE
HOLD SPECIMEN: NORMAL
IMM GRANULOCYTES # BLD AUTO: 0.02 10*3/MM3 (ref 0–0.05)
IMM GRANULOCYTES NFR BLD AUTO: 0.2 % (ref 0–0.5)
KETONES UR QL STRIP: NEGATIVE
LEUKOCYTE ESTERASE UR QL STRIP.AUTO: NEGATIVE
LIPASE SERPL-CCNC: 27 U/L (ref 13–60)
LYMPHOCYTES # BLD AUTO: 2.74 10*3/MM3 (ref 0.7–3.1)
LYMPHOCYTES NFR BLD AUTO: 25.8 % (ref 19.6–45.3)
MCH RBC QN AUTO: 28.8 PG (ref 26.6–33)
MCHC RBC AUTO-ENTMCNC: 33 G/DL (ref 31.5–35.7)
MCV RBC AUTO: 87.3 FL (ref 79–97)
MONOCYTES # BLD AUTO: 0.76 10*3/MM3 (ref 0.1–0.9)
MONOCYTES NFR BLD AUTO: 7.2 % (ref 5–12)
NEUTROPHILS NFR BLD AUTO: 6.86 10*3/MM3 (ref 1.7–7)
NEUTROPHILS NFR BLD AUTO: 64.6 % (ref 42.7–76)
NITRITE UR QL STRIP: NEGATIVE
NRBC BLD AUTO-RTO: 0 /100 WBC (ref 0–0.2)
PH UR STRIP.AUTO: 7 [PH] (ref 5–8)
PLATELET # BLD AUTO: 237 10*3/MM3 (ref 140–450)
PMV BLD AUTO: 9.5 FL (ref 6–12)
POTASSIUM SERPL-SCNC: 4 MMOL/L (ref 3.5–5.2)
PROT SERPL-MCNC: 7.1 G/DL (ref 6–8.5)
PROT UR QL STRIP: NEGATIVE
RBC # BLD AUTO: 4.79 10*6/MM3 (ref 3.77–5.28)
SODIUM SERPL-SCNC: 139 MMOL/L (ref 136–145)
SP GR UR STRIP: 1.02 (ref 1–1.03)
UROBILINOGEN UR QL STRIP: NORMAL
WBC NRBC COR # BLD AUTO: 10.61 10*3/MM3 (ref 3.4–10.8)
WHOLE BLOOD HOLD COAG: NORMAL
WHOLE BLOOD HOLD SPECIMEN: NORMAL

## 2025-06-13 PROCEDURE — 83690 ASSAY OF LIPASE: CPT

## 2025-06-13 PROCEDURE — 25010000002 KETOROLAC TROMETHAMINE PER 15 MG: Performed by: PHYSICIAN ASSISTANT

## 2025-06-13 PROCEDURE — 96374 THER/PROPH/DIAG INJ IV PUSH: CPT

## 2025-06-13 PROCEDURE — 80053 COMPREHEN METABOLIC PANEL: CPT

## 2025-06-13 PROCEDURE — 84703 CHORIONIC GONADOTROPIN ASSAY: CPT

## 2025-06-13 PROCEDURE — 81003 URINALYSIS AUTO W/O SCOPE: CPT | Performed by: EMERGENCY MEDICINE

## 2025-06-13 PROCEDURE — 36415 COLL VENOUS BLD VENIPUNCTURE: CPT

## 2025-06-13 PROCEDURE — 25810000003 SODIUM CHLORIDE 0.9 % SOLUTION: Performed by: PHYSICIAN ASSISTANT

## 2025-06-13 PROCEDURE — 99284 EMERGENCY DEPT VISIT MOD MDM: CPT

## 2025-06-13 PROCEDURE — 74176 CT ABD & PELVIS W/O CONTRAST: CPT

## 2025-06-13 PROCEDURE — 85025 COMPLETE CBC W/AUTO DIFF WBC: CPT

## 2025-06-13 RX ORDER — NAPROXEN 500 MG/1
500 TABLET ORAL 2 TIMES DAILY PRN
Qty: 20 TABLET | Refills: 0 | Status: SHIPPED | OUTPATIENT
Start: 2025-06-13

## 2025-06-13 RX ORDER — KETOROLAC TROMETHAMINE 30 MG/ML
30 INJECTION, SOLUTION INTRAMUSCULAR; INTRAVENOUS ONCE
Status: COMPLETED | OUTPATIENT
Start: 2025-06-13 | End: 2025-06-13

## 2025-06-13 RX ORDER — SODIUM CHLORIDE 0.9 % (FLUSH) 0.9 %
10 SYRINGE (ML) INJECTION AS NEEDED
Status: DISCONTINUED | OUTPATIENT
Start: 2025-06-13 | End: 2025-06-13 | Stop reason: HOSPADM

## 2025-06-13 RX ORDER — LIDOCAINE 50 MG/G
1 PATCH TOPICAL EVERY 24 HOURS
Qty: 10 EACH | Refills: 0 | Status: SHIPPED | OUTPATIENT
Start: 2025-06-13

## 2025-06-13 RX ORDER — METHOCARBAMOL 750 MG/1
750 TABLET, FILM COATED ORAL 3 TIMES DAILY PRN
Qty: 20 TABLET | Refills: 0 | Status: SHIPPED | OUTPATIENT
Start: 2025-06-13

## 2025-06-13 RX ORDER — LIDOCAINE 4 G/G
1 PATCH TOPICAL ONCE
Status: DISCONTINUED | OUTPATIENT
Start: 2025-06-13 | End: 2025-06-13 | Stop reason: HOSPADM

## 2025-06-13 RX ADMIN — KETOROLAC TROMETHAMINE 30 MG: 30 INJECTION INTRAMUSCULAR; INTRAVENOUS at 19:11

## 2025-06-13 RX ADMIN — LIDOCAINE 1 PATCH: 4 PATCH TOPICAL at 21:41

## 2025-06-13 RX ADMIN — SODIUM CHLORIDE 1000 ML: 9 INJECTION, SOLUTION INTRAVENOUS at 19:10

## 2025-06-13 NOTE — Clinical Note
[facility] EMERGENCY DEPARTMENT  [address] [facility] [address]  Phone: [phone]    Beatriz Boland was seen and treated in our emergency department on 6/13/2025.  She may return to work on 06/16/2025.         Thank you for choosing [facility].    Erika Oswald PA-C

## 2025-06-13 NOTE — ED PROVIDER NOTES
I have personally performed a face-to-face diagnostic evaluation of the patient.  I have reviewed and agree with the care plan as outlined by NP/PA.  My findings are as follows:    HPI:  Patient is a 34 y.o. female who presents with complaints of left-sided flank pain.  Symptoms have been present for the past few days, slightly worsening.  She has not had any associated dysuria or hematuria.  She has a history of a previous kidney abscess and is worried this might be the cause of her pain today.  She has not had any fevers, no vomiting.  No diarrhea or constipation.      PE:  Physical Exam  Constitutional:       Appearance: Normal appearance.   HENT:      Head: Normocephalic and atraumatic.   Eyes:      Conjunctiva/sclera: Conjunctivae normal.   Pulmonary:      Effort: Pulmonary effort is normal. No respiratory distress.   Abdominal:      Tenderness: There is no abdominal tenderness. There is left CVA tenderness. There is no guarding or rebound.   Neurological:      Mental Status: She is alert and oriented to person, place, and time.           MDM:  I provided a substantiate portion of the care of this patient. I personally performed the medical decision making.    Medical records are reviewed in [facility] and Care Everywhere, if applicable      Recent Results (from the past 24 hours)   Comprehensive Metabolic Panel    Collection Time: 06/13/25  4:40 PM    Specimen: Blood   Result Value Ref Range    Glucose 92 65 - 99 mg/dL    BUN 11.0 6.0 - 20.0 mg/dL    Creatinine 1.19 (H) 0.57 - 1.00 mg/dL    Sodium 139 136 - 145 mmol/L    Potassium 4.0 3.5 - 5.2 mmol/L    Chloride 105 98 - 107 mmol/L    CO2 24.6 22.0 - 29.0 mmol/L    Calcium 9.3 8.6 - 10.5 mg/dL    Total Protein 7.1 6.0 - 8.5 g/dL    Albumin 3.8 3.5 - 5.2 g/dL    ALT (SGPT) 12 1 - 33 U/L    AST (SGOT) 16 1 - 32 U/L    Alkaline Phosphatase 69 39 - 117 U/L    Total Bilirubin 0.2 0.0 - 1.2 mg/dL    Globulin 3.3 gm/dL    A/G Ratio 1.2 g/dL    BUN/Creatinine Ratio 9.2  7.0 - 25.0    Anion Gap 9.4 5.0 - 15.0 mmol/L    eGFR 62.0 >60.0 mL/min/1.73   Lipase    Collection Time: 06/13/25  4:40 PM    Specimen: Blood   Result Value Ref Range    Lipase 27 13 - 60 U/L   hCG, Serum, Qualitative    Collection Time: 06/13/25  4:40 PM    Specimen: Blood   Result Value Ref Range    HCG Qualitative Negative Negative   Green Top (Gel)    Collection Time: 06/13/25  4:40 PM   Result Value Ref Range    Extra Tube Hold for add-ons.    Lavender Top    Collection Time: 06/13/25  4:40 PM   Result Value Ref Range    Extra Tube hold for add-on    Light Blue Top    Collection Time: 06/13/25  4:40 PM   Result Value Ref Range    Extra Tube Hold for add-ons.    CBC Auto Differential    Collection Time: 06/13/25  4:40 PM    Specimen: Blood   Result Value Ref Range    WBC 10.61 3.40 - 10.80 10*3/mm3    RBC 4.79 3.77 - 5.28 10*6/mm3    Hemoglobin 13.8 12.0 - 15.9 g/dL    Hematocrit 41.8 34.0 - 46.6 %    MCV 87.3 79.0 - 97.0 fL    MCH 28.8 26.6 - 33.0 pg    MCHC 33.0 31.5 - 35.7 g/dL    RDW 12.2 (L) 12.3 - 15.4 %    RDW-SD 38.8 37.0 - 54.0 fl    MPV 9.5 6.0 - 12.0 fL    Platelets 237 140 - 450 10*3/mm3    Neutrophil % 64.6 42.7 - 76.0 %    Lymphocyte % 25.8 19.6 - 45.3 %    Monocyte % 7.2 5.0 - 12.0 %    Eosinophil % 1.6 0.3 - 6.2 %    Basophil % 0.6 0.0 - 1.5 %    Immature Grans % 0.2 0.0 - 0.5 %    Neutrophils, Absolute 6.86 1.70 - 7.00 10*3/mm3    Lymphocytes, Absolute 2.74 0.70 - 3.10 10*3/mm3    Monocytes, Absolute 0.76 0.10 - 0.90 10*3/mm3    Eosinophils, Absolute 0.17 0.00 - 0.40 10*3/mm3    Basophils, Absolute 0.06 0.00 - 0.20 10*3/mm3    Immature Grans, Absolute 0.02 0.00 - 0.05 10*3/mm3    nRBC 0.0 0.0 - 0.2 /100 WBC   Urinalysis With Microscopic If Indicated (No Culture) - Urine, Clean Catch    Collection Time: 06/13/25  7:09 PM    Specimen: Urine, Clean Catch   Result Value Ref Range    Color, UA Yellow Yellow, Straw    Appearance, UA Clear Clear    pH, UA 7.0 5.0 - 8.0    Specific Gravity, UA 1.019  1.005 - 1.030    Glucose, UA Negative Negative    Ketones, UA Negative Negative    Bilirubin, UA Negative Negative    Blood, UA Negative Negative    Protein, UA Negative Negative    Leuk Esterase, UA Negative Negative    Nitrite, UA Negative Negative    Urobilinogen, UA 1.0 E.U./dL 0.2 - 1.0 E.U./dL     I have reviewed the above labs    CT Abdomen Pelvis Stone Protocol  Result Date: 6/13/2025  CT ABDOMEN AND PELVIS WITHOUT CONTRAST:  HISTORY: flank pain; R10.9-Unspecified abdominal pain  TECHNIQUE: Helical CT was performed of the abdomen and pelvis from the lung bases through the lesser trochanters without IV contrast. Reformatted images were provided in the coronal and sagittal planes. Radiation dose reduction techniques were utilized, including automated exposure control, and exposure modulation based on body size.  COMPARISON: Prior CT abdomen/pelvis, most recent dated 10/22/2024  FINDINGS:  Lung bases: Visualized lung bases are unremarkable.  Abdomen: The liver and gallbladder are normal.  The spleen and pancreas appear normal.  Both adrenal glands are normal. The left kidney shows no evidence of acute abnormality. There is a right renal lower pole 2 mm nonobstructing calculus, but there is no CT evidence of hydronephrosis or ureterolithiasis on either side. The aorta is normal in caliber.   Minimally prominent right pericaval lymph node decreased in size when compared to 10/22/2024, previously about 14 and now about 10 mm in least dimension, of doubtful clinical significance. No new or suspicious adenopathy. There is no abdominal or retroperitoneal inflammatory change, or abnormal fluid or air collection.  There is no evidence of bowel obstruction.   Pelvis:  The appendix is clearly identified, and is normal.  There is no pelvic inflammatory change or other abnormal fluid collection.  There is no pelvic adenopathy or other soft tissue mass.  There is no CT evidence of hernia or bowel obstruction.  There is no  acute bony abnormality.       Right nephrolithiasis, no hydronephrosis or ureterolithiasis on either side, no other evidence of acute abnormality.     This report was finalized on 6/13/2025 9:13 PM by Dr. Dutch Paul M.D on Workstation: [id number]        My independent interpretation of the CT abdomen and pelvis: Pneumoperitoneum    This is a well-appearing 34-year-old patient who is presenting today secondary to complaints of left flank pain.  She overall looks well.  She presents afebrile with unremarkable vital signs.  Her clinical exam is overall benign with no evidence of peritonitis or other acute surgical exam findings.    She was evaluated with labs, which are reviewed and benign.  UA is bland.  She therefore underwent CT imaging, which did not demonstrate any acute findings in the left side of the urinary tract.  There was some right kidney stones, but no evidence of hydronephrosis or ureterolithiasis.    Therefore, I suspect her pain is likely musculoskeletal.  We can treat this supportively and have her follow-up with primary care for reevaluation.    Impression:  Final diagnoses:   Left flank pain   Right nephrolithiasis         Disposition:  ED Disposition       ED Disposition   Discharge    Condition   Stable    Comment   --                Deepika Fay MD  06/13/25 [id number]

## 2025-06-13 NOTE — ED PROVIDER NOTES
" EMERGENCY DEPARTMENT ENCOUNTER      PCP: Elyse Larkin APRN  Patient Care Team:  Elyse Larkin APRN as PCP - General (Family Medicine)  Maria Ines Eisenberg MD as Consulting Physician (Otolaryngology)  Wesley Ferro MD as Consulting Physician (Gastroenterology)  Chao Mayberry MD (Ophthalmology)  Shannon Burr PA-C (Physician Assistant)  Colette Caal APRN as Nurse Practitioner (Family Medicine)  Víctor Gunter MD as Consulting Physician (Cardiology)  Dedrick Jaimes MD as Consulting Physician (Urology)  Nacho Pacheco MD as Consulting Physician (Hand Surgery)  Francisco Bond MD as Consulting Physician (Obstetrics and Gynecology)  Elyse Larkin APRN (Family Medicine)   Independent Historians: Patient    HPI:  Chief Complaint: Flank pain  A complete HPI/ROS/PMH/PSH/SH/FH are unobtainable due to: None    Chronic or social conditions impacting patient care (social determinants of health): None    Context: Beatriz Boland is a 34 y.o. female who presents to the ED c/o acute left flank pain for the past 2 to 3 days.  She denies any dysuria, gross hematuria or difficulty urinating.  She reports pain is always present but sometimes gets more severe.  Position change does not seem to affect the pain.  No injuries or heavy lifting.  She reports history of \"kidney abscess\" in the past and believes she has had kidney stones before.  No fevers or chills.  No nausea or vomiting or diarrhea.    Review of prior external notes and/or external test results outside of this encounter: CT abdomen pelvis on 10/22/2024 showed 2.8 cm right lower pole complex collection, potentially developing abscess or mass, suspicious for infection/pyelonephritis.      PAST MEDICAL HISTORY  Active Ambulatory Problems     Diagnosis Date Noted    Depression     Mental developmental delay     GERD without esophagitis     Scoliosis     Amblyopia     Back pain     Chronic left shoulder " pain 09/29/2017    Muscle spasm 09/29/2017    Seasonal allergies 11/05/2017    Panic attacks 11/15/2017    Anxiety 01/04/2018    Mild intermittent asthma without complication 03/24/2018    Chest pain on breathing 08/10/2018    Nicotine vapor product user 08/10/2018    Class 3 obesity without serious comorbidity with body mass index (BMI) of 40.0 to 44.9 in adult 08/10/2018    Acute UTI (urinary tract infection) 09/04/2020    Hypokalemia 09/04/2020    Obesity (BMI 30-39.9) 09/04/2020    Tobacco abuse 09/04/2020    Septicemia due to E. coli 09/05/2020    Pyelonephritis of left kidney 10/22/2020    Sepsis 10/22/2020    Asthma 10/22/2020    Acute pyelonephritis 10/23/2024     Resolved Ambulatory Problems     Diagnosis Date Noted    Sinus bradycardia 09/29/2014    Palpitations 11/15/2017    Acute otitis externa of both ears 11/15/2017    Atypical chest pain 01/04/2018    WILIAN (acute kidney injury) 09/04/2020     Past Medical History:   Diagnosis Date    Allergic     Cleft palate     Developmental delay     GERD (gastroesophageal reflux disease)     Impetigo     Kidney abscess     Kidney stone     Recurrent otitis media     Sinus infection     Sprain of shoulder, left 12/2016       The patient has started, but not completed, their COVID-19 vaccination series.    PAST SURGICAL HISTORY  Past Surgical History:   Procedure Laterality Date    ABSCESS DRAINAGE      kidney    ADENOIDECTOMY      EAR TUBES      PHARYNGEAL FLAP      for speech    TONSILLECTOMY           FAMILY HISTORY  Family History   Problem Relation Age of Onset    COPD Mother     Arthritis Mother     Obesity Mother     Gestational diabetes Mother     Cancer Father     Scoliosis Father     Rheum arthritis Maternal Aunt     Diabetes Maternal Uncle     Alcohol abuse Maternal Uncle     Rheum arthritis Maternal Grandmother     COPD Maternal Grandmother     Stroke Maternal Grandfather     Alcohol abuse Paternal Uncle          SOCIAL HISTORY  Social History      Socioeconomic History    Marital status: Single   Tobacco Use    Smoking status: Some Days    Smokeless tobacco: Current    Tobacco comments:     quit cigarette smoking 2 years ago, uses vape now   Vaping Use    Vaping status: Every Day    Substances: Nicotine, Flavoring    Devices: Refillable tank   Substance and Sexual Activity    Alcohol use: Yes     Comment: occasionally, 1-2 glasses of wine 1-2 x per week    Drug use: No    Sexual activity: Yes     Partners: Male     Birth control/protection: Injection         ALLERGIES  Dust mite extract and Ciprofloxacin        REVIEW OF SYSTEMS  Review of Systems   Constitutional:  Negative for chills and fever.   Cardiovascular:  Negative for chest pain.   Gastrointestinal:  Negative for abdominal pain, diarrhea, nausea and vomiting.   Genitourinary:  Positive for flank pain. Negative for difficulty urinating, dysuria, frequency and hematuria.   Musculoskeletal:  Positive for back pain.        All systems reviewed and negative except for those discussed in HPI.       PHYSICAL EXAM    I have reviewed the triage vital signs and nursing notes.    ED Triage Vitals   Temp Heart Rate Resp BP SpO2   06/13/25 1632 06/13/25 1632 06/13/25 1632 06/13/25 1644 06/13/25 1632   97.8 °F (36.6 °C) 64 16 126/92 97 %      Temp src Heart Rate Source Patient Position BP Location FiO2 (%)   -- -- -- -- --              Physical Exam  GENERAL: alert, no acute distress, nontoxic-appearing  SKIN: Warm, dry  HENT: Normocephalic, atraumatic  EYES: no scleral icterus  CV: regular rhythm, regular rate  RESPIRATORY: normal effort, lungs clear  ABDOMEN: soft, left flank discomfort on palpation, nondistended  MUSCULOSKELETAL: no deformity  NEURO: alert, moves all extremities, follows commands          LAB RESULTS  Recent Results (from the past 24 hours)   Comprehensive Metabolic Panel    Collection Time: 06/13/25  4:40 PM    Specimen: Blood   Result Value Ref Range    Glucose 92 65 - 99 mg/dL    BUN  11.0 6.0 - 20.0 mg/dL    Creatinine 1.19 (H) 0.57 - 1.00 mg/dL    Sodium 139 136 - 145 mmol/L    Potassium 4.0 3.5 - 5.2 mmol/L    Chloride 105 98 - 107 mmol/L    CO2 24.6 22.0 - 29.0 mmol/L    Calcium 9.3 8.6 - 10.5 mg/dL    Total Protein 7.1 6.0 - 8.5 g/dL    Albumin 3.8 3.5 - 5.2 g/dL    ALT (SGPT) 12 1 - 33 U/L    AST (SGOT) 16 1 - 32 U/L    Alkaline Phosphatase 69 39 - 117 U/L    Total Bilirubin 0.2 0.0 - 1.2 mg/dL    Globulin 3.3 gm/dL    A/G Ratio 1.2 g/dL    BUN/Creatinine Ratio 9.2 7.0 - 25.0    Anion Gap 9.4 5.0 - 15.0 mmol/L    eGFR 62.0 >60.0 mL/min/1.73   Lipase    Collection Time: 06/13/25  4:40 PM    Specimen: Blood   Result Value Ref Range    Lipase 27 13 - 60 U/L   hCG, Serum, Qualitative    Collection Time: 06/13/25  4:40 PM    Specimen: Blood   Result Value Ref Range    HCG Qualitative Negative Negative   Green Top (Gel)    Collection Time: 06/13/25  4:40 PM   Result Value Ref Range    Extra Tube Hold for add-ons.    Lavender Top    Collection Time: 06/13/25  4:40 PM   Result Value Ref Range    Extra Tube hold for add-on    Light Blue Top    Collection Time: 06/13/25  4:40 PM   Result Value Ref Range    Extra Tube Hold for add-ons.    CBC Auto Differential    Collection Time: 06/13/25  4:40 PM    Specimen: Blood   Result Value Ref Range    WBC 10.61 3.40 - 10.80 10*3/mm3    RBC 4.79 3.77 - 5.28 10*6/mm3    Hemoglobin 13.8 12.0 - 15.9 g/dL    Hematocrit 41.8 34.0 - 46.6 %    MCV 87.3 79.0 - 97.0 fL    MCH 28.8 26.6 - 33.0 pg    MCHC 33.0 31.5 - 35.7 g/dL    RDW 12.2 (L) 12.3 - 15.4 %    RDW-SD 38.8 37.0 - 54.0 fl    MPV 9.5 6.0 - 12.0 fL    Platelets 237 140 - 450 10*3/mm3    Neutrophil % 64.6 42.7 - 76.0 %    Lymphocyte % 25.8 19.6 - 45.3 %    Monocyte % 7.2 5.0 - 12.0 %    Eosinophil % 1.6 0.3 - 6.2 %    Basophil % 0.6 0.0 - 1.5 %    Immature Grans % 0.2 0.0 - 0.5 %    Neutrophils, Absolute 6.86 1.70 - 7.00 10*3/mm3    Lymphocytes, Absolute 2.74 0.70 - 3.10 10*3/mm3    Monocytes, Absolute 0.76  0.10 - 0.90 10*3/mm3    Eosinophils, Absolute 0.17 0.00 - 0.40 10*3/mm3    Basophils, Absolute 0.06 0.00 - 0.20 10*3/mm3    Immature Grans, Absolute 0.02 0.00 - 0.05 10*3/mm3    nRBC 0.0 0.0 - 0.2 /100 WBC       Ordered the above labs and independently reviewed and interpreted the results.        RADIOLOGY  CT Abdomen Pelvis Stone Protocol   Final Result       Right nephrolithiasis, no hydronephrosis or ureterolithiasis on either   side, no other evidence of acute abnormality.                   This report was finalized on 6/13/2025 9:13 PM by Dr. Dutch Paul M.D on Workstation: [id number]              I ordered the above noted radiological studies. Independently reviewed and interpreted by me.  See dictation for official radiology interpretation.      PROCEDURES    Procedures      MEDICATIONS GIVEN IN ER    Medications   sodium chloride 0.9 % bolus 1,000 mL (0 mL Intravenous Stopped 6/13/25 1940)   ketorolac (TORADOL) injection 30 mg (30 mg Intravenous Given 6/13/25 1911)         PROGRESS, DATA ANALYSIS, CONSULTS, AND MEDICAL DECISION MAKING    All labs have been independently reviewed and interpreted by me.  All radiology studies have been independently reviewed and interpreted by me and discussed with radiologist dictating the report.   EKG's independently reviewed and interpreted by me.  Discussion below represents my analysis of pertinent findings related to patient's condition, differential diagnosis, treatment plan and final disposition.    My differential diagnosis for abdominal pain includes but is not limited to:    Gastritis, gastroenteritis, peptic ulcer disease, GERD, esophageal perforation, acute appendicitis, mesenteric adenitis, Meckel’s diverticulum, epiploic appendagitis, diverticulitis, colon cancer, ulcerative colitis, Crohn’s disease, intussusception, small bowel obstruction, adhesions, ischemic bowel, perforated viscus, ileus, obstipation, biliary colic, cholecystitis, cholelithiasis,  Toi-Sawyer Keon, hepatitis, pancreatitis, common bile duct obstruction, cholangitis, bile leak, splenic trauma, splenic rupture, splenic infarction, splenic abscess, abdominal abscess, ascites, spontaneous bacterial peritonitis, hernia, UTI, cystitis, ureterolithiasis, urinary obstruction, ovarian cyst, torsion, pregnancy, ectopic pregnancy, PID, pelvic abscess, mittelschmerz, endometriosis, AAA, myocardial infarction, pneumonia, cancer, porphyria, DKA, medications, sickle cell, viral syndrome, herpes zoster      ED Course as of 06/14/25 2302 Fri Jun 13, 2025 1927 Blood, UA: Negative [DC]   1945 WBC: 10.61 [DC]   1945 Hemoglobin: 13.8 [DC]   1945 HCG Qualitative: Negative [DC]   1945 Creatinine(!): 1.19 [DC]   1945 Sodium: 139 [DC]   1945 Potassium: 4.0 [DC]   1945 Lipase: 27 [DC]   2000 Patient was turned over to me by Clau Trimble PA-C.  Pending: CT and dispo   [CC]   2028 CT Abdomen Pelvis Stone Protocol  My independent interpretation of the CT of the abdomen pelvis is no ureteral stone [CC]   2127 I rechecked the patient.  Pain is better.  Suspect musculoskeletal pain.  Did discuss nephrolithiasis on the right.  I discussed the patient's labs, radiology findings (including all incidental findings), diagnosis, and plan for discharge.  A repeat exam reveals no new worrisome changes from my initial exam findings.  The patient understands that the fact that they are being discharged does not denote that nothing is abnormal, it indicates that no clinical emergency is present and that they must follow-up as directed in order to properly maintain their health.  Follow-up instructions (specifically listed below) and return to ER precautions were given at this time.  I specifically instructed the patient to follow-up with their PCP.  The patient understands and agrees with the plan, and is ready for discharge.  All questions answered. [CC]      ED Course User Index  [CC] Erika Oswald PA-C  [DC] Atilio  TOM Olguin             AS OF 23:02 EDT VITALS:    BP - 125/79  HR - 55  TEMP - 97.8 °F (36.6 °C)  O2 SATS - 100%        DIAGNOSIS  Final diagnoses:   Left flank pain   Right nephrolithiasis         DISPOSITION  ED Disposition       ED Disposition   Discharge    Condition   Stable    Comment   --                  Note Disclaimer: At [facility], we believe that sharing information builds trust and better relationships. You are receiving this note because you recently visited [facility]. It is possible you will see health information before a provider has talked with you about it. This kind of information can be easy to misunderstand. To help you fully understand what it means for your health, we urge you to discuss this note with your provider.         Clau Trimble PA  06/14/25 [id number]

## 2025-06-13 NOTE — Clinical Note
[facility] EMERGENCY DEPARTMENT  [address] [facility] [address]  Phone: [phone]    Beatriz Boland was seen and treated in our emergency department on 6/13/2025.  She may return to work on 06/16/2025.         Thank you for choosing [facility].    Erika Oswald PA-C

## 2025-06-13 NOTE — Clinical Note
[facility] EMERGENCY DEPARTMENT  [address] [facility] [address]  Phone: [phone]    Beatriz Boland was seen and treated in our emergency department on 6/13/2025.  She may return to work on 06/16/2025.         Thank you for choosing [facility].    Erika Oswald PA-C

## 2025-06-14 NOTE — ED PROVIDER NOTES
EMERGENCY DEPARTMENT ENCOUNTER    Room number:  35/35  Date Seen:  6/13/2025  PCP:  Elyse Larkin APRN    Laboratory Results:  Recent Results (from the past 24 hours)   Comprehensive Metabolic Panel    Collection Time: 06/13/25  4:40 PM    Specimen: Blood   Result Value Ref Range    Glucose 92 65 - 99 mg/dL    BUN 11.0 6.0 - 20.0 mg/dL    Creatinine 1.19 (H) 0.57 - 1.00 mg/dL    Sodium 139 136 - 145 mmol/L    Potassium 4.0 3.5 - 5.2 mmol/L    Chloride 105 98 - 107 mmol/L    CO2 24.6 22.0 - 29.0 mmol/L    Calcium 9.3 8.6 - 10.5 mg/dL    Total Protein 7.1 6.0 - 8.5 g/dL    Albumin 3.8 3.5 - 5.2 g/dL    ALT (SGPT) 12 1 - 33 U/L    AST (SGOT) 16 1 - 32 U/L    Alkaline Phosphatase 69 39 - 117 U/L    Total Bilirubin 0.2 0.0 - 1.2 mg/dL    Globulin 3.3 gm/dL    A/G Ratio 1.2 g/dL    BUN/Creatinine Ratio 9.2 7.0 - 25.0    Anion Gap 9.4 5.0 - 15.0 mmol/L    eGFR 62.0 >60.0 mL/min/1.73   Lipase    Collection Time: 06/13/25  4:40 PM    Specimen: Blood   Result Value Ref Range    Lipase 27 13 - 60 U/L   hCG, Serum, Qualitative    Collection Time: 06/13/25  4:40 PM    Specimen: Blood   Result Value Ref Range    HCG Qualitative Negative Negative   Green Top (Gel)    Collection Time: 06/13/25  4:40 PM   Result Value Ref Range    Extra Tube Hold for add-ons.    Lavender Top    Collection Time: 06/13/25  4:40 PM   Result Value Ref Range    Extra Tube hold for add-on    Light Blue Top    Collection Time: 06/13/25  4:40 PM   Result Value Ref Range    Extra Tube Hold for add-ons.    CBC Auto Differential    Collection Time: 06/13/25  4:40 PM    Specimen: Blood   Result Value Ref Range    WBC 10.61 3.40 - 10.80 10*3/mm3    RBC 4.79 3.77 - 5.28 10*6/mm3    Hemoglobin 13.8 12.0 - 15.9 g/dL    Hematocrit 41.8 34.0 - 46.6 %    MCV 87.3 79.0 - 97.0 fL    MCH 28.8 26.6 - 33.0 pg    MCHC 33.0 31.5 - 35.7 g/dL    RDW 12.2 (L) 12.3 - 15.4 %    RDW-SD 38.8 37.0 - 54.0 fl    MPV 9.5 6.0 - 12.0 fL    Platelets 237 140 - 450 10*3/mm3     Neutrophil % 64.6 42.7 - 76.0 %    Lymphocyte % 25.8 19.6 - 45.3 %    Monocyte % 7.2 5.0 - 12.0 %    Eosinophil % 1.6 0.3 - 6.2 %    Basophil % 0.6 0.0 - 1.5 %    Immature Grans % 0.2 0.0 - 0.5 %    Neutrophils, Absolute 6.86 1.70 - 7.00 10*3/mm3    Lymphocytes, Absolute 2.74 0.70 - 3.10 10*3/mm3    Monocytes, Absolute 0.76 0.10 - 0.90 10*3/mm3    Eosinophils, Absolute 0.17 0.00 - 0.40 10*3/mm3    Basophils, Absolute 0.06 0.00 - 0.20 10*3/mm3    Immature Grans, Absolute 0.02 0.00 - 0.05 10*3/mm3    nRBC 0.0 0.0 - 0.2 /100 WBC   Urinalysis With Microscopic If Indicated (No Culture) - Urine, Clean Catch    Collection Time: 06/13/25  7:09 PM    Specimen: Urine, Clean Catch   Result Value Ref Range    Color, UA Yellow Yellow, Straw    Appearance, UA Clear Clear    pH, UA 7.0 5.0 - 8.0    Specific Gravity, UA 1.019 1.005 - 1.030    Glucose, UA Negative Negative    Ketones, UA Negative Negative    Bilirubin, UA Negative Negative    Blood, UA Negative Negative    Protein, UA Negative Negative    Leuk Esterase, UA Negative Negative    Nitrite, UA Negative Negative    Urobilinogen, UA 1.0 E.U./dL 0.2 - 1.0 E.U./dL     I reviewed the above results.    Radiology:  CT Abdomen Pelvis Stone Protocol  Result Date: 6/13/2025  CT ABDOMEN AND PELVIS WITHOUT CONTRAST:  HISTORY: flank pain; R10.9-Unspecified abdominal pain  TECHNIQUE: Helical CT was performed of the abdomen and pelvis from the lung bases through the lesser trochanters without IV contrast. Reformatted images were provided in the coronal and sagittal planes. Radiation dose reduction techniques were utilized, including automated exposure control, and exposure modulation based on body size.  COMPARISON: Prior CT abdomen/pelvis, most recent dated 10/22/2024  FINDINGS:  Lung bases: Visualized lung bases are unremarkable.  Abdomen: The liver and gallbladder are normal.  The spleen and pancreas appear normal.  Both adrenal glands are normal. The left kidney shows no evidence  of acute abnormality. There is a right renal lower pole 2 mm nonobstructing calculus, but there is no CT evidence of hydronephrosis or ureterolithiasis on either side. The aorta is normal in caliber.   Minimally prominent right pericaval lymph node decreased in size when compared to 10/22/2024, previously about 14 and now about 10 mm in least dimension, of doubtful clinical significance. No new or suspicious adenopathy. There is no abdominal or retroperitoneal inflammatory change, or abnormal fluid or air collection.  There is no evidence of bowel obstruction.   Pelvis:  The appendix is clearly identified, and is normal.  There is no pelvic inflammatory change or other abnormal fluid collection.  There is no pelvic adenopathy or other soft tissue mass.  There is no CT evidence of hernia or bowel obstruction.  There is no acute bony abnormality.       Right nephrolithiasis, no hydronephrosis or ureterolithiasis on either side, no other evidence of acute abnormality.     This report was finalized on 6/13/2025 9:13 PM by Dr. Dutch Paul M.D on Workstation: [id number]          I reviewed the above results    Medications ordered in ED:  Medications   sodium chloride 0.9 % flush 10 mL (has no administration in time range)   Lidocaine 4 % 1 patch (has no administration in time range)   sodium chloride 0.9 % bolus 1,000 mL (0 mL Intravenous Stopped 6/13/25 1940)   ketorolac (TORADOL) injection 30 mg (30 mg Intravenous Given 6/13/25 1911)       ED Course as of 06/13/25 2127 Fri Jun 13, 2025 1927 Blood, UA: Negative [DC]   1945 WBC: 10.61 [DC]   1945 Hemoglobin: 13.8 [DC]   1945 HCG Qualitative: Negative [DC]   1945 Creatinine(!): 1.19 [DC]   1945 Sodium: 139 [DC]   1945 Potassium: 4.0 [DC]   1945 Lipase: 27 [DC]   2000 Patient was turned over to me by Clau Trimble PA-C.  Pending: CT and dispo   [CC]   2028 CT Abdomen Pelvis Stone Protocol  My independent interpretation of the CT of the abdomen pelvis is no  ureteral stone [CC]   2129 I rechecked the patient.  Pain is better.  Suspect musculoskeletal pain.  Did discuss nephrolithiasis on the right.  I discussed the patient's labs, radiology findings (including all incidental findings), diagnosis, and plan for discharge.  A repeat exam reveals no new worrisome changes from my initial exam findings.  The patient understands that the fact that they are being discharged does not denote that nothing is abnormal, it indicates that no clinical emergency is present and that they must follow-up as directed in order to properly maintain their health.  Follow-up instructions (specifically listed below) and return to ER precautions were given at this time.  I specifically instructed the patient to follow-up with their PCP.  The patient understands and agrees with the plan, and is ready for discharge.  All questions answered. [CC]      ED Course User Index  [CC] Erika Oswald PA-C  [DC] Clau Trimble PA       Diagnosis:  Final diagnoses:   Left flank pain   Right nephrolithiasis       Follow Up:  Elyse Larkin, APRN  [address] [id number] [phone]    Call in 2 days  for follow up and further evaluation      RX:     Medication List        New Prescriptions      lidocaine 5 %  Commonly known as: LIDODERM  Place 1 patch on the skin as directed by provider Daily. Remove & Discard patch within 12 hours or as directed by MD     naproxen 500 MG tablet  Commonly known as: NAPROSYN  Take 1 tablet by mouth 2 (Two) Times a Day As Needed for Mild Pain.            Changed      * methocarbamol 750 MG tablet  Commonly known as: ROBAXIN  Take 1 tablet by mouth 3 (Three) Times a Day As Needed for Muscle Spasms.  What changed: Another medication with the same name was added. Make sure you understand how and when to take each.     * methocarbamol 750 MG tablet  Commonly known as: ROBAXIN  Take 1 tablet by mouth 3 (Three) Times a Day As Needed for Muscle  Spasms.  What changed: You were already taking a medication with the same name, and this prescription was added. Make sure you understand how and when to take each.           * This list has 2 medication(s) that are the same as other medications prescribed for you. Read the directions carefully, and ask your doctor or other care provider to review them with you.                   Where to Get Your Medications        These medications were sent to [facility] [id number] - [place], [place] - [address]  AT [facility] ([facility] - [phone] [facility] [phone] [facility], [facility] [address]      Phone: [phone]   lidocaine 5 %  methocarbamol 750 MG tablet  naproxen 500 MG tablet         Provider attestation:  I personally reviewed the past medical history, past surgical history, social history, family history, current medications, and allergies as they appear in the chart.    The patient was seen and examined by myself and Dr. Fay, who agree with plan.      Erika Oswald PA-C  06/13/25 [id number]

## 2025-07-12 ENCOUNTER — APPOINTMENT (OUTPATIENT)
Dept: GENERAL RADIOLOGY | Facility: HOSPITAL | Age: 35
End: 2025-07-12
Payer: COMMERCIAL

## 2025-07-12 ENCOUNTER — HOSPITAL ENCOUNTER (EMERGENCY)
Facility: HOSPITAL | Age: 35
Discharge: HOME | End: 2025-07-12
Attending: EMERGENCY MEDICINE
Payer: COMMERCIAL

## 2025-07-12 VITALS
OXYGEN SATURATION: 100 % | SYSTOLIC BLOOD PRESSURE: 118 MMHG | BODY MASS INDEX: 37.67 KG/M2 | TEMPERATURE: 98.3 F | DIASTOLIC BLOOD PRESSURE: 68 MMHG | RESPIRATION RATE: 16 BRPM | WEIGHT: 240 LBS | HEART RATE: 79 BPM | HEIGHT: 67 IN

## 2025-07-12 DIAGNOSIS — R42 DIZZINESS: Primary | ICD-10-CM

## 2025-07-12 LAB
ALBUMIN SERPL-MCNC: 3.6 G/DL (ref 3.5–5.2)
ALBUMIN/GLOB SERPL: 1.1 G/DL
ALP SERPL-CCNC: 62 U/L (ref 39–117)
ALT SERPL W P-5'-P-CCNC: 12 U/L (ref 1–33)
ANION GAP SERPL CALCULATED.3IONS-SCNC: 8 MMOL/L (ref 5–15)
AST SERPL-CCNC: 12 U/L (ref 1–32)
BACTERIA UR QL AUTO: ABNORMAL /HPF
BASOPHILS # BLD AUTO: 0.05 10*3/MM3 (ref 0–0.2)
BASOPHILS NFR BLD AUTO: 0.7 % (ref 0–1.5)
BILIRUB SERPL-MCNC: 0.3 MG/DL (ref 0–1.2)
BILIRUB UR QL STRIP: NEGATIVE
BUN SERPL-MCNC: 9 MG/DL (ref 6–20)
BUN/CREAT SERPL: 9.9 (ref 7–25)
CALCIUM SPEC-SCNC: 8.8 MG/DL (ref 8.6–10.5)
CHLORIDE SERPL-SCNC: 108 MMOL/L (ref 98–107)
CLARITY UR: CLEAR
CO2 SERPL-SCNC: 23 MMOL/L (ref 22–29)
COLOR UR: YELLOW
CREAT SERPL-MCNC: 0.91 MG/DL (ref 0.57–1)
DEPRECATED RDW RBC AUTO: 41.3 FL (ref 37–54)
EGFRCR SERPLBLD CKD-EPI 2021: 85.1 ML/MIN/1.73
EOSINOPHIL # BLD AUTO: 0.2 10*3/MM3 (ref 0–0.4)
EOSINOPHIL NFR BLD AUTO: 2.7 % (ref 0.3–6.2)
ERYTHROCYTE [DISTWIDTH] IN BLOOD BY AUTOMATED COUNT: 12.5 % (ref 12.3–15.4)
GLOBULIN UR ELPH-MCNC: 3.2 GM/DL
GLUCOSE SERPL-MCNC: 91 MG/DL (ref 65–99)
GLUCOSE UR STRIP-MCNC: NEGATIVE MG/DL
HCG SERPL QL: NEGATIVE
HCT VFR BLD AUTO: 43.1 % (ref 34–46.6)
HGB BLD-MCNC: 13.5 G/DL (ref 12–15.9)
HGB UR QL STRIP.AUTO: NEGATIVE
HYALINE CASTS UR QL AUTO: ABNORMAL /LPF
IMM GRANULOCYTES # BLD AUTO: 0.05 10*3/MM3 (ref 0–0.05)
IMM GRANULOCYTES NFR BLD AUTO: 0.7 % (ref 0–0.5)
KETONES UR QL STRIP: NEGATIVE
LEUKOCYTE ESTERASE UR QL STRIP.AUTO: ABNORMAL
LYMPHOCYTES # BLD AUTO: 2.64 10*3/MM3 (ref 0.7–3.1)
LYMPHOCYTES NFR BLD AUTO: 36 % (ref 19.6–45.3)
MCH RBC QN AUTO: 28.1 PG (ref 26.6–33)
MCHC RBC AUTO-ENTMCNC: 31.3 G/DL (ref 31.5–35.7)
MCV RBC AUTO: 89.8 FL (ref 79–97)
MONOCYTES # BLD AUTO: 0.67 10*3/MM3 (ref 0.1–0.9)
MONOCYTES NFR BLD AUTO: 9.1 % (ref 5–12)
NEUTROPHILS NFR BLD AUTO: 3.72 10*3/MM3 (ref 1.7–7)
NEUTROPHILS NFR BLD AUTO: 50.8 % (ref 42.7–76)
NITRITE UR QL STRIP: NEGATIVE
NRBC BLD AUTO-RTO: 0 /100 WBC (ref 0–0.2)
PH UR STRIP.AUTO: 6.5 [PH] (ref 5–8)
PLATELET # BLD AUTO: 210 10*3/MM3 (ref 140–450)
PMV BLD AUTO: 9.6 FL (ref 6–12)
POTASSIUM SERPL-SCNC: 3.9 MMOL/L (ref 3.5–5.2)
PROT SERPL-MCNC: 6.8 G/DL (ref 6–8.5)
PROT UR QL STRIP: NEGATIVE
QT INTERVAL: 475 MS
QTC INTERVAL: 450 MS
RBC # BLD AUTO: 4.8 10*6/MM3 (ref 3.77–5.28)
RBC # UR STRIP: ABNORMAL /HPF
REF LAB TEST METHOD: ABNORMAL
SODIUM SERPL-SCNC: 139 MMOL/L (ref 136–145)
SP GR UR STRIP: 1.01 (ref 1–1.03)
SQUAMOUS #/AREA URNS HPF: ABNORMAL /HPF
UROBILINOGEN UR QL STRIP: ABNORMAL
WBC # UR STRIP: ABNORMAL /HPF
WBC NRBC COR # BLD AUTO: 7.33 10*3/MM3 (ref 3.4–10.8)

## 2025-07-12 PROCEDURE — 93005 ELECTROCARDIOGRAM TRACING: CPT | Performed by: NURSE PRACTITIONER

## 2025-07-12 PROCEDURE — 85025 COMPLETE CBC W/AUTO DIFF WBC: CPT | Performed by: NURSE PRACTITIONER

## 2025-07-12 PROCEDURE — 93010 ELECTROCARDIOGRAM REPORT: CPT | Performed by: INTERNAL MEDICINE

## 2025-07-12 PROCEDURE — 25810000003 SODIUM CHLORIDE 0.9 % SOLUTION: Performed by: NURSE PRACTITIONER

## 2025-07-12 PROCEDURE — 84703 CHORIONIC GONADOTROPIN ASSAY: CPT | Performed by: NURSE PRACTITIONER

## 2025-07-12 PROCEDURE — 99284 EMERGENCY DEPT VISIT MOD MDM: CPT

## 2025-07-12 PROCEDURE — 80053 COMPREHEN METABOLIC PANEL: CPT | Performed by: NURSE PRACTITIONER

## 2025-07-12 PROCEDURE — 81001 URINALYSIS AUTO W/SCOPE: CPT | Performed by: NURSE PRACTITIONER

## 2025-07-12 PROCEDURE — 71045 X-RAY EXAM CHEST 1 VIEW: CPT

## 2025-07-12 RX ORDER — ONDANSETRON 4 MG/1
4 TABLET, ORALLY DISINTEGRATING ORAL EVERY 6 HOURS PRN
Qty: 18 TABLET | Refills: 0 | Status: SHIPPED | OUTPATIENT
Start: 2025-07-12

## 2025-07-12 RX ORDER — MECLIZINE HYDROCHLORIDE 25 MG/1
25 TABLET ORAL 3 TIMES DAILY PRN
Qty: 18 TABLET | Refills: 0 | Status: SHIPPED | OUTPATIENT
Start: 2025-07-12 | End: 2025-07-12

## 2025-07-12 RX ORDER — MECLIZINE HYDROCHLORIDE 25 MG/1
25 TABLET ORAL 3 TIMES DAILY PRN
Qty: 18 TABLET | Refills: 0 | Status: SHIPPED | OUTPATIENT
Start: 2025-07-12

## 2025-07-12 RX ORDER — MECLIZINE HYDROCHLORIDE 25 MG/1
25 TABLET ORAL ONCE
Status: COMPLETED | OUTPATIENT
Start: 2025-07-12 | End: 2025-07-12

## 2025-07-12 RX ORDER — ONDANSETRON 4 MG/1
4 TABLET, ORALLY DISINTEGRATING ORAL EVERY 6 HOURS PRN
Qty: 18 TABLET | Refills: 0 | Status: SHIPPED | OUTPATIENT
Start: 2025-07-12 | End: 2025-07-12

## 2025-07-12 RX ORDER — SODIUM CHLORIDE 0.9 % (FLUSH) 0.9 %
10 SYRINGE (ML) INJECTION AS NEEDED
Status: DISCONTINUED | OUTPATIENT
Start: 2025-07-12 | End: 2025-07-12 | Stop reason: HOSPADM

## 2025-07-12 RX ADMIN — MECLIZINE HYDROCHLORIDE 25 MG: 25 TABLET ORAL at 07:25

## 2025-07-12 RX ADMIN — SODIUM CHLORIDE 1000 ML: 9 INJECTION, SOLUTION INTRAVENOUS at 07:27

## 2025-07-12 NOTE — Clinical Note
[facility] EMERGENCY DEPARTMENT  [address] [facility] [address]  Phone: [phone]    Beatriz Boland was seen and treated in our emergency department on 7/12/2025.  She may return to work on 07/14/2025.         Thank you for choosing [facility].    Florinda Conner, APRN

## 2025-07-12 NOTE — DISCHARGE INSTRUCTIONS
Your evaluated in the ER today for dizziness, you have been prescribed meclizine and Zofran to help relieve your symptoms please take as directed  Increase fluids  Change positions slowly  Follow-up with your PMD, call to get an appointment scheduled within the next week  Return to the ER for worsening or uncontrolled symptoms, worsening headache, chest pain, shortness of breath, persistent vomiting, visual changes, fainting or any other concerning symptoms

## 2025-07-12 NOTE — Clinical Note
[facility] EMERGENCY DEPARTMENT  [address] [facility] [address]  Phone: [phone]    Beatriz Boland was seen and treated in our emergency department on 7/12/2025.  She may return to work on 07/14/2025.         Thank you for choosing [facility].    Florinda Conner, APRN

## 2025-07-12 NOTE — ED PROVIDER NOTES
EMERGENCY DEPARTMENT MD ATTESTATION NOTE    Room Number:  03/03  PCP: Elyse Larkin APRN  Independent Historians: Patient    HPI:      Context: Beatriz Boland is a 35 y.o. female with a medical history of kidney stone, E. coli septicemia, pyelonephritis who presents to the ED c/o acute dizziness.  Patient reports yesterday evening she was having dizziness.  She states that it lasted for several hours and she went to sleep and she woke up this morning and she was normal.  The dizziness returned this morning.  She reports that she has a sensation of movement.  She denies syncope.  She states she was fine when she was lying on her left side but when she moved to the center she felt the sensation of motion.  She denies any focal weakness or numbness.  She denies any bowel or bladder incontinence.  She denies any speech or vision changes.        Review of prior external notes (non-ED) -and- Review of prior external test results outside of this encounter: Laboratory evaluation 6/13/2025 shows a CMP with an elevated creatinine of 1.19    Prescription drug monitoring program review:           PHYSICAL EXAM    I have reviewed the triage vital signs and nursing notes.    ED Triage Vitals   Temp Heart Rate Resp BP SpO2   07/12/25 0553 07/12/25 0553 07/12/25 0553 07/12/25 0556 07/12/25 0553   98.3 °F (36.8 °C) 65 18 131/95 100 %      Temp src Heart Rate Source Patient Position BP Location FiO2 (%)   07/12/25 0553 07/12/25 0553 07/12/25 0556 07/12/25 0556 --   Oral Monitor Sitting Right arm        Physical Exam  GENERAL: Awake, alert, no acute distress  SKIN: Warm, dry  HENT: Normocephalic, atraumatic  EYES: no scleral icterus  CV: regular rhythm, regular rate  RESPIRATORY: normal effort, lungs clear  ABDOMEN: soft, nontender, nondistended  MUSCULOSKELETAL: no deformity  NEURO: alert, moves all extremities, follows commands, cranial nerves II through XII intact.  Equal upper and lower extremity strength and  sensation            MEDICATIONS GIVEN IN ER  Medications   sodium chloride 0.9 % flush 10 mL (has no administration in time range)   meclizine (ANTIVERT) tablet 25 mg (25 mg Oral Given 7/12/25 0725)   sodium chloride 0.9 % bolus 1,000 mL (0 mL Intravenous Stopped 7/12/25 0858)         ORDERS PLACED DURING THIS VISIT:  Orders Placed This Encounter   Procedures    XR Chest 1 View    Comprehensive Metabolic Panel    hCG, Serum, Qualitative    Urinalysis With Microscopic If Indicated (No Culture) - Urine, Clean Catch    CBC Auto Differential    Urinalysis, Microscopic Only - Urine, Clean Catch    Orthostatic Vitals    ECG 12 Lead Other; dizziness    Telemetry Scan    Insert Peripheral IV    CBC & Differential         PROCEDURES  Procedures            PROGRESS, DATA ANALYSIS, CONSULTS, AND MEDICAL DECISION MAKING  All labs have been independently interpreted by me.  All radiology studies have been reviewed by me. All EKG's have been independently viewed and interpreted by me.  Discussion below represents my analysis of pertinent findings related to patient's condition, differential diagnosis, treatment plan and final disposition.    Differential diagnosis includes but is not limited to stroke, TIA, vertigo, anemia, orthostasis.    Clinical Scores:                                       ED Course as of 07/12/25 0940   Sat Jul 12, 2025   0650 5-year-old female who presents with dizzy spells that began around 630 last night.  She describes as feeling off balance but denies spinning sensation.  She has no other associated symptoms other than a mild frontal headache.  Plan for labs, EKG, orthostatics and chest x-ray. [MS]   0652 Orthostatic vital signs obtained:  Lying-129/92, heart rate 49  Sitting-138/98, heart rate 47, reported feeling slightly dizzy  Standing-129/88, heart rate 65, reported feeling a little more dizzy with standing [MS]   0702 EKG PROCEDURE    EKG time: 633  Rhythm/Rate: Normal sinus, rate 54  P waves and  RI: Normal P, normal RI  QRS, axis: Narrow QRS, normal axis  ST and T waves: No acute    Independently Interpreted by me  Nonsignificantly changed compared to prior 5/4/2024   [TR]   0703 Hemoglobin: 13.5 [TR]   0713 HCG Qualitative: Negative [TR]   0714 Creatinine: 0.91 [TR]   0721 Reviewed pt's history and workup with Dr. Conner.  After a bedside evaluation, he agrees with the plan of care.     [MS]   0728 XR Chest 1 View  My independent interpretation of the imaging study is no dense consolidation [TR]   0827 Bacteria, UA: None Seen [TR]   0843 Patient updated on workup done here today including unremarkable labs, normal chest x-ray.  Does report she is feeling a little better after receiving meclizine and IV fluids.  Has ambulated with steady gait while in the ER.  Strict return to ER precautions provided. Will send home with prescriptions for Meclizine & Zofran.  [MS]      ED Course User Index  [MS] Florinda Conner, LIVIA  [TR] García Conner MD       MDM: Plan laboratory evaluation.  I find no neurologic deficits.  I do suspect she is having some vertigo.  Plan to add meclizine.  Will obtain orthostatic vital signs and EKG.      COMPLEXITY OF CARE  Admission was considered but after careful review of the patient's presentation, physical examination, diagnostic results, and response to treatment the patient may be safely discharged with outpatient follow-up.    Please note that portions of this document were completed with a voice recognition program.    Note Disclaimer: At [facility], we believe that sharing information builds trust and better relationships. You are receiving this note because you recently visited [facility]. It is possible you will see health information before a provider has talked with you about it. This kind of information can be easy to misunderstand. To help you fully understand what it means for your health, we urge you to discuss this note with your provider.          García Conner MD  07/12/25 4346       García Conner MD  07/12/25 5518

## 2025-07-12 NOTE — ED TRIAGE NOTES
Pt to triage from home with c/o dizziness that started last night. Pt states is gone when she lays down, but then returns when she gets up, states the room spins.

## 2025-07-12 NOTE — ED PROVIDER NOTES
EMERGENCY DEPARTMENT ENCOUNTER      Room Number:  03/03  PCP: Elyse Larkin APRN  Independent Historians: Historian: Patient      HPI:  Chief Complaint: had concerns including Dizziness.     A complete HPI/ROS/PMH/PSH/SH/FH are unobtainable due to: EM_Unobtainable History : None    Chronic or social conditions impacting patient care (Social Determinants of Health): None      Context: Beatriz Boland is a 35 y.o. female with a medical history of GERD, chronic back pain who presents to the ED c/o acute dizziness.  She states around 630 last night while at work she started having dizzy spells.  She describes them as feeling off of balance.  Denies spinning sensation.  She states she has had a mild frontal headache off and on throughout the night.  She denies double or blurry vision, nausea, vomiting, chest pain, shortness of breath, dysuria, unilateral weakness or any other symptoms.  States she had a similar episode approximately 10 years ago and the workup was unremarkable.  She is Depo shot, last injection was approximately a month ago.      Review of prior external notes (non-ED) -and- Review of prior external test results outside of this encounter: Medical Records reviewed in epic, patient had CT head 10/22/2024 that showed no acute intracranial process.    Prescription drug monitoring program review:     EM_[place] : N/A    PAST MEDICAL HISTORY  Active Ambulatory Problems     Diagnosis Date Noted    Depression     Mental developmental delay     GERD without esophagitis     Scoliosis     Amblyopia     Back pain     Chronic left shoulder pain 09/29/2017    Muscle spasm 09/29/2017    Seasonal allergies 11/05/2017    Panic attacks 11/15/2017    Anxiety 01/04/2018    Mild intermittent asthma without complication 03/24/2018    Chest pain on breathing 08/10/2018    Nicotine vapor product user 08/10/2018    Class 3 obesity without serious comorbidity with body mass index (BMI) of 40.0 to 44.9 in adult  08/10/2018    Acute UTI (urinary tract infection) 09/04/2020    Hypokalemia 09/04/2020    Obesity (BMI 30-39.9) 09/04/2020    Tobacco abuse 09/04/2020    Septicemia due to E. coli 09/05/2020    Pyelonephritis of left kidney 10/22/2020    Sepsis 10/22/2020    Asthma 10/22/2020    Acute pyelonephritis 10/23/2024     Resolved Ambulatory Problems     Diagnosis Date Noted    Sinus bradycardia 09/29/2014    Palpitations 11/15/2017    Acute otitis externa of both ears 11/15/2017    Atypical chest pain 01/04/2018    WILIAN (acute kidney injury) 09/04/2020     Past Medical History:   Diagnosis Date    Allergic     Cleft palate     Developmental delay     GERD (gastroesophageal reflux disease)     Impetigo     Kidney abscess     Kidney stone     Recurrent otitis media     Sinus infection     Sprain of shoulder, left 12/2016         PAST SURGICAL HISTORY  Past Surgical History:   Procedure Laterality Date    ABSCESS DRAINAGE      kidney    ADENOIDECTOMY      EAR TUBES      PHARYNGEAL FLAP      for speech    TONSILLECTOMY           FAMILY HISTORY  Family History   Problem Relation Age of Onset    COPD Mother     Arthritis Mother     Obesity Mother     Gestational diabetes Mother     Cancer Father     Scoliosis Father     Rheum arthritis Maternal Aunt     Diabetes Maternal Uncle     Alcohol abuse Maternal Uncle     Rheum arthritis Maternal Grandmother     COPD Maternal Grandmother     Stroke Maternal Grandfather     Alcohol abuse Paternal Uncle          SOCIAL HISTORY  Social History     Socioeconomic History    Marital status: Single   Tobacco Use    Smoking status: Some Days    Smokeless tobacco: Current    Tobacco comments:     quit cigarette smoking 2 years ago, uses vape now   Vaping Use    Vaping status: Every Day    Substances: Nicotine, Flavoring    Devices: Refillable tank   Substance and Sexual Activity    Alcohol use: Yes     Comment: occasionally, 1-2 glasses of wine 1-2 x per week    Drug use: No    Sexual activity:  Yes     Partners: Male     Birth control/protection: Injection         ALLERGIES  Dust mite extract and Ciprofloxacin      REVIEW OF SYSTEMS  Review of Systems  Included in HPI  All systems reviewed and negative except for those discussed in HPI.      PHYSICAL EXAM    I have reviewed the triage vital signs and nursing notes.    ED Triage Vitals   Temp Heart Rate Resp BP SpO2   07/12/25 0553 07/12/25 0553 07/12/25 0553 07/12/25 0556 07/12/25 0553   98.3 °F (36.8 °C) 65 18 131/95 100 %       Physical Exam    GENERAL: Well appearing, nontoxic appearing, not distressed  HENT: normocephalic, atraumatic  EYES: no scleral icterus, PERRL  CV: regular rhythm, regular rate, no murmur  RESPIRATORY: normal effort, CTAB  ABDOMEN: soft, normal bowel sounds, nontender  MUSCULOSKELETAL: no deformity  NEURO: alert, moves all extremities, follows commands, mental status normal/baseline  Recent and remote memory functions are normal  Patient is attentive with normal concentration  Language is fluent  Speech is clear  Speech is nondysarthric  Symmetric smile with no facial droop  Eyes close shut strongly bilaterally  Symmetric eyebrow raise bilaterally  EOMI, PERRL  CN II-XII grossly normal otherwise  5/5 strength to bilateral upper and lower extremities  No pronator drift  Intact FNF   SKIN: warm, dry, no rash   Psych: Appropriate mood and affect  Nursing notes and vital signs reviewed    Vital signs and nursing notes reviewed.           LAB RESULTS  Recent Results (from the past 24 hours)   ECG 12 Lead Other; dizziness    Collection Time: 07/12/25  6:33 AM   Result Value Ref Range    QT Interval 475 ms    QTC Interval 450 ms   Comprehensive Metabolic Panel    Collection Time: 07/12/25  6:38 AM    Specimen: Blood   Result Value Ref Range    Glucose 91 65 - 99 mg/dL    BUN 9.0 6.0 - 20.0 mg/dL    Creatinine 0.91 0.57 - 1.00 mg/dL    Sodium 139 136 - 145 mmol/L    Potassium 3.9 3.5 - 5.2 mmol/L    Chloride 108 (H) 98 - 107 mmol/L    CO2  23.0 22.0 - 29.0 mmol/L    Calcium 8.8 8.6 - 10.5 mg/dL    Total Protein 6.8 6.0 - 8.5 g/dL    Albumin 3.6 3.5 - 5.2 g/dL    ALT (SGPT) 12 1 - 33 U/L    AST (SGOT) 12 1 - 32 U/L    Alkaline Phosphatase 62 39 - 117 U/L    Total Bilirubin 0.3 0.0 - 1.2 mg/dL    Globulin 3.2 gm/dL    A/G Ratio 1.1 g/dL    BUN/Creatinine Ratio 9.9 7.0 - 25.0    Anion Gap 8.0 5.0 - 15.0 mmol/L    eGFR 85.1 >60.0 mL/min/1.73   hCG, Serum, Qualitative    Collection Time: 07/12/25  6:38 AM    Specimen: Blood   Result Value Ref Range    HCG Qualitative Negative Negative   CBC Auto Differential    Collection Time: 07/12/25  6:38 AM    Specimen: Blood   Result Value Ref Range    WBC 7.33 3.40 - 10.80 10*3/mm3    RBC 4.80 3.77 - 5.28 10*6/mm3    Hemoglobin 13.5 12.0 - 15.9 g/dL    Hematocrit 43.1 34.0 - 46.6 %    MCV 89.8 79.0 - 97.0 fL    MCH 28.1 26.6 - 33.0 pg    MCHC 31.3 (L) 31.5 - 35.7 g/dL    RDW 12.5 12.3 - 15.4 %    RDW-SD 41.3 37.0 - 54.0 fl    MPV 9.6 6.0 - 12.0 fL    Platelets 210 140 - 450 10*3/mm3    Neutrophil % 50.8 42.7 - 76.0 %    Lymphocyte % 36.0 19.6 - 45.3 %    Monocyte % 9.1 5.0 - 12.0 %    Eosinophil % 2.7 0.3 - 6.2 %    Basophil % 0.7 0.0 - 1.5 %    Immature Grans % 0.7 (H) 0.0 - 0.5 %    Neutrophils, Absolute 3.72 1.70 - 7.00 10*3/mm3    Lymphocytes, Absolute 2.64 0.70 - 3.10 10*3/mm3    Monocytes, Absolute 0.67 0.10 - 0.90 10*3/mm3    Eosinophils, Absolute 0.20 0.00 - 0.40 10*3/mm3    Basophils, Absolute 0.05 0.00 - 0.20 10*3/mm3    Immature Grans, Absolute 0.05 0.00 - 0.05 10*3/mm3    nRBC 0.0 0.0 - 0.2 /100 WBC   Urinalysis With Microscopic If Indicated (No Culture) - Urine, Clean Catch    Collection Time: 07/12/25  8:11 AM    Specimen: Urine, Clean Catch   Result Value Ref Range    Color, UA Yellow Yellow, Straw    Appearance, UA Clear Clear    pH, UA 6.5 5.0 - 8.0    Specific Gravity, UA 1.011 1.005 - 1.030    Glucose, UA Negative Negative    Ketones, UA Negative Negative    Bilirubin, UA Negative Negative     Blood, UA Negative Negative    Protein, UA Negative Negative    Leuk Esterase, UA Trace (A) Negative    Nitrite, UA Negative Negative    Urobilinogen, UA 0.2 E.U./dL 0.2 - 1.0 E.U./dL   Urinalysis, Microscopic Only - Urine, Clean Catch    Collection Time: 07/12/25  8:11 AM    Specimen: Urine, Clean Catch   Result Value Ref Range    RBC, UA 0-2 None Seen, 0-2 /HPF    WBC, UA 0-2 None Seen, 0-2 /HPF    Bacteria, UA None Seen None Seen /HPF    Squamous Epithelial Cells, UA 3-6 (A) None Seen, 0-2 /HPF    Hyaline Casts, UA None Seen None Seen /LPF    Methodology Automated Microscopy          RADIOLOGY  XR Chest 1 View  Result Date: 7/12/2025  XR CHEST 1 VW-  HISTORY: Female who is 35 years-old, dizziness  TECHNIQUE: Frontal view of the chest  COMPARISON: 10/20/2024  FINDINGS: The heart size is borderline. Pulmonary vasculature is unremarkable. No focal pulmonary consolidation, pleural effusion, or pneumothorax. No acute osseous process.      No focal pulmonary consolidation. Borderline heart size. Follow-up as clinical indications persist.  This report was finalized on 7/12/2025 7:43 AM by Dr. Nando Vergara M.D on Workstation: [id number]          MEDICATIONS GIVEN IN ER  Medications   sodium chloride 0.9 % flush 10 mL (has no administration in time range)   meclizine (ANTIVERT) tablet 25 mg (25 mg Oral Given 7/12/25 7476)   sodium chloride 0.9 % bolus 1,000 mL (1,000 mL Intravenous New Bag 7/12/25 9808)         ORDERS PLACED DURING THIS VISIT:  Orders Placed This Encounter   Procedures    XR Chest 1 View    Comprehensive Metabolic Panel    hCG, Serum, Qualitative    Urinalysis With Microscopic If Indicated (No Culture) - Urine, Clean Catch    CBC Auto Differential    Urinalysis, Microscopic Only - Urine, Clean Catch    Orthostatic Vitals    ECG 12 Lead Other; dizziness    Telemetry Scan    Insert Peripheral IV    CBC & Differential         DIFFERENTIAL DIAGNOSIS:  Differential diagnosis for dizziness include but are  not limited to the following:  Vertigo, concussion, closed head injury, intracranial hemorrhage, CVA, cardiac arrhythmia, hypotension, hypertension, hypoxia, headache, anemia, electrolyte abnormality        OUTPATIENT MEDICATION MANAGEMENT:  Current Facility-Administered Medications Ordered in Epic   Medication Dose Route Frequency Provider Last Rate Last Admin    sodium chloride 0.9 % flush 10 mL  10 mL Intravenous PRN Florinda Conner APRN         Current Outpatient Medications Ordered in Epic   Medication Sig Dispense Refill    meclizine (ANTIVERT) 25 MG tablet Take 1 tablet by mouth 3 (Three) Times a Day As Needed for Dizziness. 18 tablet 0    ondansetron ODT (ZOFRAN-ODT) 4 MG disintegrating tablet Place 1 tablet on the tongue Every 6 (Six) Hours As Needed for Nausea or Vomiting. 18 tablet 0    Acetaminophen (TYLENOL) 325 MG capsule Take 325 mg by mouth As Needed.      albuterol sulfate  (90 Base) MCG/ACT inhaler Inhale 2 puffs Every 4 (Four) Hours As Needed for Wheezing. 18 g 3    cetirizine (zyrTEC) 10 MG tablet Take 1 tablet by mouth Daily.      [employer] NASAL ALLERGY SPRAY 55 MCG/ACT nasal inhaler Administer 2 sprays into the nostril(s) as directed by provider Daily.  0    dexlansoprazole (DEXILANT) 60 MG capsule Take 1 capsule by mouth Daily.      estradiol cypionate (DEPO-ESTRADIOL) 5 MG/ML injection Inject 0.3 mL into the appropriate muscle as directed by prescriber Every 28 (Twenty-Eight) Days. Patient states she takes it every 3 months      fluticasone (FLONASE) 50 MCG/ACT nasal spray       lidocaine (LIDODERM) 5 % Place 1 patch on the skin as directed by provider Daily. Remove & Discard patch within 12 hours or as directed by MD 10 each 0    methocarbamol (ROBAXIN) 750 MG tablet Take 1 tablet by mouth 3 (Three) Times a Day As Needed for Muscle Spasms. 21 tablet 0    methocarbamol (ROBAXIN) 750 MG tablet Take 1 tablet by mouth 3 (Three) Times a Day As Needed for Muscle Spasms. 20 tablet 0     naproxen (NAPROSYN) 500 MG tablet Take 1 tablet by mouth 2 (Two) Times a Day As Needed for Mild Pain. 20 tablet 0         PROCEDURES  Procedures      PROGRESS, DATA ANALYSIS, CONSULTS, AND MEDICAL DECISION MAKING  All labs have been independently interpreted by me.  All radiology studies have been reviewed by me. All EKG's have been independently viewed and interpreted by me.  Discussion below represents my analysis of pertinent findings related to patient's condition, differential diagnosis, treatment plan and final disposition        Clinical Scores:                  ED Course as of 07/12/25 0857   Sat Jul 12, 2025   0650 5-year-old female who presents with dizzy spells that began around 630 last night.  She describes as feeling off balance but denies spinning sensation.  She has no other associated symptoms other than a mild frontal headache.  Plan for labs, EKG, orthostatics and chest x-ray. [MS]   0652 Orthostatic vital signs obtained:  Lying-129/92, heart rate 49  Sitting-138/98, heart rate 47, reported feeling slightly dizzy  Standing-129/88, heart rate 65, reported feeling a little more dizzy with standing [MS]   0702 EKG PROCEDURE    EKG time: 633  Rhythm/Rate: Normal sinus, rate 54  P waves and WV: Normal P, normal WV  QRS, axis: Narrow QRS, normal axis  ST and T waves: No acute    Independently Interpreted by me  Nonsignificantly changed compared to prior 5/4/2024   [TR]   0703 Hemoglobin: 13.5 [TR]   0713 HCG Qualitative: Negative [TR]   0714 Creatinine: 0.91 [TR]   0721 Reviewed pt's history and workup with Dr. Conner.  After a bedside evaluation, he agrees with the plan of care.     [MS]   0728 XR Chest 1 View  My independent interpretation of the imaging study is no dense consolidation [TR]   0827 Bacteria, UA: None Seen [TR]   0843 Patient updated on workup done here today including unremarkable labs, normal chest x-ray.  Does report she is feeling a little better after receiving meclizine and IV  fluids.  Has ambulated with steady gait while in the ER.  Strict return to ER precautions provided. Will send home with prescriptions for Meclizine & Zofran.  [MS]      ED Course User Index  [MS] Florinda Conner APRN  [TR] García Conner MD             AS OF 08:57 EDT VITALS:    BP - 116/78  HR - (!) 46  TEMP - 98.3 °F (36.8 °C) (Oral)  O2 SATS - 100%    COMPLEXITY OF CARE  Admission was considered but after careful review of the patient's presentation, physical examination, diagnostic results, and response to treatment the patient may be safely discharged with outpatient follow-up.      DIAGNOSIS  Final diagnoses:   Dizziness         DISPOSITION  ED Disposition       ED Disposition   Discharge    Condition   Stable    Comment   --              Discussed plan for discharge, as there is no emergent indication for admission. Pt/family is agreeable and understands need for follow up and repeat testing.  Pt is aware that discharge does not mean that nothing is wrong but it indicates no emergency is present that requires admission and they must continue care with follow-up as given below or physician of their choice.   Patient/Family voiced understanding of above instructions.  Patient discharged in stable condition.    FOLLOW UP   Elyse Larkin, APRN  [address] [address]  [phone]    Call in 2 days        RX     Medication List        New Prescriptions      meclizine 25 MG tablet  Commonly known as: ANTIVERT  Take 1 tablet by mouth 3 (Three) Times a Day As Needed for Dizziness.               Where to Get Your Medications        These medications were sent to [facility] [id number] - [place], [place] - [address] AT [facility] [address] - [phone] [facility] [phone]   [address], [facility] [address]      Phone: [phone]   meclizine 25 MG tablet  ondansetron ODT 4 MG disintegrating tablet         Please note that portions of this document were  completed with a voice recognition program.    Note Disclaimer: At [facility], we believe that sharing information builds trust and better relationships. You are receiving this note because you recently visited [facility]. It is possible you will see health information before a provider has talked with you about it. This kind of information can be easy to misunderstand. To help you fully understand what it means for your health, we urge you to discuss this note with your provider.         Florinda Conner, APRN  07/12/25 0857

## 2025-07-12 NOTE — Clinical Note
[facility] EMERGENCY DEPARTMENT  [address] [facility] [address]  Phone: [phone]    Beatriz Boland was seen and treated in our emergency department on 7/12/2025.  She may return to work on 07/14/2025.         Thank you for choosing [facility].    Florinda Conner, APRN

## 2025-07-12 NOTE — Clinical Note
[facility] EMERGENCY DEPARTMENT  [address] [facility] [address]  Phone: [phone]    Beatriz Boland was seen and treated in our emergency department on 7/12/2025.  She may return to work on 07/14/2025.         Thank you for choosing [facility].    Florinda Conner, APRN

## 2025-08-23 ENCOUNTER — HOSPITAL ENCOUNTER (EMERGENCY)
Facility: HOSPITAL | Age: 35
Discharge: HOME | End: 2025-08-23
Attending: EMERGENCY MEDICINE
Payer: COMMERCIAL

## 2025-08-23 ENCOUNTER — APPOINTMENT (OUTPATIENT)
Dept: GENERAL RADIOLOGY | Facility: HOSPITAL | Age: 35
End: 2025-08-23
Payer: COMMERCIAL